# Patient Record
Sex: MALE | Race: WHITE | NOT HISPANIC OR LATINO
[De-identification: names, ages, dates, MRNs, and addresses within clinical notes are randomized per-mention and may not be internally consistent; named-entity substitution may affect disease eponyms.]

---

## 2021-10-20 PROBLEM — Z00.00 ENCOUNTER FOR PREVENTIVE HEALTH EXAMINATION: Status: ACTIVE | Noted: 2021-10-20

## 2021-10-26 ENCOUNTER — APPOINTMENT (OUTPATIENT)
Dept: INTERNAL MEDICINE | Facility: CLINIC | Age: 71
End: 2021-10-26
Payer: MEDICARE

## 2021-10-26 ENCOUNTER — NON-APPOINTMENT (OUTPATIENT)
Age: 71
End: 2021-10-26

## 2021-10-26 ENCOUNTER — LABORATORY RESULT (OUTPATIENT)
Age: 71
End: 2021-10-26

## 2021-10-26 VITALS
TEMPERATURE: 98.3 F | RESPIRATION RATE: 18 BRPM | HEART RATE: 93 BPM | DIASTOLIC BLOOD PRESSURE: 65 MMHG | HEIGHT: 72 IN | BODY MASS INDEX: 23.2 KG/M2 | SYSTOLIC BLOOD PRESSURE: 117 MMHG | WEIGHT: 171.25 LBS | OXYGEN SATURATION: 98 %

## 2021-10-26 DIAGNOSIS — F41.1 GENERALIZED ANXIETY DISORDER: ICD-10-CM

## 2021-10-26 DIAGNOSIS — Z86.59 PERSONAL HISTORY OF OTHER MENTAL AND BEHAVIORAL DISORDERS: ICD-10-CM

## 2021-10-26 DIAGNOSIS — F32.A ANXIETY DISORDER, UNSPECIFIED: ICD-10-CM

## 2021-10-26 DIAGNOSIS — F41.9 ANXIETY DISORDER, UNSPECIFIED: ICD-10-CM

## 2021-10-26 PROCEDURE — G0439: CPT

## 2021-10-26 PROCEDURE — 93000 ELECTROCARDIOGRAM COMPLETE: CPT

## 2021-10-26 RX ORDER — QUETIAPINE FUMARATE 50 MG/1
50 TABLET ORAL
Qty: 180 | Refills: 0 | Status: ACTIVE | COMMUNITY
Start: 2021-09-06

## 2021-10-26 RX ORDER — QUETIAPINE FUMARATE 200 MG/1
200 TABLET ORAL
Qty: 90 | Refills: 0 | Status: DISCONTINUED | COMMUNITY
Start: 2021-09-06

## 2021-10-26 RX ORDER — GABAPENTIN 300 MG/1
300 CAPSULE ORAL
Qty: 270 | Refills: 0 | Status: ACTIVE | COMMUNITY
Start: 2021-09-06

## 2021-10-26 RX ORDER — BUPROPION HYDROCHLORIDE 300 MG/1
300 TABLET, EXTENDED RELEASE ORAL
Qty: 90 | Refills: 0 | Status: DISCONTINUED | COMMUNITY
Start: 2021-09-07

## 2021-10-26 RX ORDER — BUPROPION HYDROCHLORIDE 150 MG/1
150 TABLET, EXTENDED RELEASE ORAL
Qty: 90 | Refills: 0 | Status: ACTIVE | COMMUNITY
Start: 2021-09-07

## 2021-10-27 ENCOUNTER — APPOINTMENT (OUTPATIENT)
Dept: PULMONOLOGY | Facility: CLINIC | Age: 71
End: 2021-10-27
Payer: MEDICARE

## 2021-10-27 VITALS — WEIGHT: 171 LBS | HEIGHT: 72 IN | BODY MASS INDEX: 23.16 KG/M2

## 2021-10-27 DIAGNOSIS — F17.210 NICOTINE DEPENDENCE, CIGARETTES, UNCOMPLICATED: ICD-10-CM

## 2021-10-27 PROCEDURE — G0296 VISIT TO DETERM LDCT ELIG: CPT

## 2021-10-27 NOTE — PLAN
[Smoking Cessation Guidance Provided] : Smoking cessation guidance was provided to patient [Smoking Cessation] : smoking cessation [FreeTextEntry1] : Plan:\par -Low Dose CT chest for lung cancer screening\par -Follow up with patient and his referring provider after his LDCT results have been reviewed by the multi-disciplinary clinical team\par -Encouraged smoking cessation\par -Declined referral to CTC\par \par Should I Screen? tool utilized. 6 year risk of lung cancer is 7%. Patient wishes to proceed with screening.\par \par Engaged in shared decision making with . LOAN ALIA . Answered all questions. He verbalized understanding and agreement. He knows to call back with any questions or concerns.

## 2021-10-27 NOTE — HISTORY OF PRESENT ILLNESS
[Current] : current smoker [_____ pack-years] : [unfilled] pack-years [TextBox_13] : Referred by Dr. Asencio\par \par Mr. LOAN ROJO  is a 71 year old man with a history of nicotine dependence\par \par He  was seen in the office by Dr. Asencio for review of eligibility for, as well as, discussion of Low-Dose CT lung cancer screening program. Over the telephone today we reviewed and confirmed that the patient meets screening eligibility criteria:\par -Age: 71 year \par -Smoking status:\par -Current smoker\par -Number of pack(s) per day: 1 PPD\par -Number of years smoked: 50\par -Number of pack years smokin\par \par He is currently smoking 2 cigars a day. He is not interested in quitting at this time. He needs "some pleasure" in his life. He also smokes e-cigarettes since he cannot smoke in his home. He goes through 1 cartridge every 2-3 days.\par \par Mr. ROJO denies any signs or symptoms of lung cancer including new cough, change in cough, hemoptysis and unintentional weight loss. \par \par Mr. ROJO denies any personal history of lung cancer. No lung cancer in a 1st degree relative. Denies any history of lung disease. Denies any history of occupational exposures\par  [TextBox_6] : 1 [TextBox_8] : 50

## 2021-10-27 NOTE — REASON FOR VISIT
[Home] : at home, [unfilled] , at the time of the visit. [Medical Office: ([facility])___] : at the medical office located in  [Verbal consent obtained from patient] : the patient, [unfilled] [Initial Evaluation] : an initial evaluation visit [Review of Eligibility] : review of eligibility [Low-Dose CT Screening Discussion] : low-dose CT lung cancer screening discussion [Virtual Visit] : virtual visit

## 2021-10-29 DIAGNOSIS — D50.0 IRON DEFICIENCY ANEMIA SECONDARY TO BLOOD LOSS (CHRONIC): ICD-10-CM

## 2021-10-29 LAB
ALBUMIN SERPL ELPH-MCNC: 4.5 G/DL
ALP BLD-CCNC: 131 U/L
ALT SERPL-CCNC: 15 U/L
ANION GAP SERPL CALC-SCNC: 19 MMOL/L
AST SERPL-CCNC: 23 U/L
BASOPHILS # BLD AUTO: 0.11 K/UL
BASOPHILS NFR BLD AUTO: 1.6 %
BILIRUB SERPL-MCNC: 0.3 MG/DL
BUN SERPL-MCNC: 14 MG/DL
CALCIUM SERPL-MCNC: 8.7 MG/DL
CHLORIDE SERPL-SCNC: 97 MMOL/L
CO2 SERPL-SCNC: 18 MMOL/L
CREAT SERPL-MCNC: 1.37 MG/DL
EOSINOPHIL # BLD AUTO: 0.17 K/UL
EOSINOPHIL NFR BLD AUTO: 2.4 %
ESTIMATED AVERAGE GLUCOSE: 105 MG/DL
GLUCOSE SERPL-MCNC: 75 MG/DL
HBA1C MFR BLD HPLC: 5.3 %
HCT VFR BLD CALC: 35.1 %
HGB BLD-MCNC: 9.9 G/DL
IMM GRANULOCYTES NFR BLD AUTO: 0.3 %
LYMPHOCYTES # BLD AUTO: 1.24 K/UL
LYMPHOCYTES NFR BLD AUTO: 17.8 %
MAN DIFF?: NORMAL
MCHC RBC-ENTMCNC: 20 PG
MCHC RBC-ENTMCNC: 28.2 GM/DL
MCV RBC AUTO: 70.8 FL
MONOCYTES # BLD AUTO: 0.64 K/UL
MONOCYTES NFR BLD AUTO: 9.2 %
NEUTROPHILS # BLD AUTO: 4.79 K/UL
NEUTROPHILS NFR BLD AUTO: 68.7 %
PLATELET # BLD AUTO: 424 K/UL
POTASSIUM SERPL-SCNC: 4.8 MMOL/L
PROT SERPL-MCNC: 7.2 G/DL
RBC # BLD: 4.96 M/UL
RBC # FLD: 21.3 %
SODIUM SERPL-SCNC: 134 MMOL/L
TSH SERPL-ACNC: 65 UIU/ML
WBC # FLD AUTO: 6.97 K/UL

## 2021-11-02 ENCOUNTER — OUTPATIENT (OUTPATIENT)
Dept: OUTPATIENT SERVICES | Facility: HOSPITAL | Age: 71
LOS: 1 days | End: 2021-11-02
Payer: MEDICARE

## 2021-11-02 ENCOUNTER — APPOINTMENT (OUTPATIENT)
Dept: CT IMAGING | Facility: HOSPITAL | Age: 71
End: 2021-11-02
Payer: MEDICARE

## 2021-11-02 PROCEDURE — 71271 CT THORAX LUNG CANCER SCR C-: CPT

## 2021-11-02 PROCEDURE — 71271 CT THORAX LUNG CANCER SCR C-: CPT | Mod: 26

## 2021-11-03 ENCOUNTER — NON-APPOINTMENT (OUTPATIENT)
Age: 71
End: 2021-11-03

## 2021-11-09 LAB — SARS-COV-2 N GENE NPH QL NAA+PROBE: NOT DETECTED

## 2021-11-10 ENCOUNTER — LABORATORY RESULT (OUTPATIENT)
Age: 71
End: 2021-11-10

## 2021-11-10 ENCOUNTER — APPOINTMENT (OUTPATIENT)
Dept: PULMONOLOGY | Facility: CLINIC | Age: 71
End: 2021-11-10
Payer: MEDICARE

## 2021-11-10 VITALS
DIASTOLIC BLOOD PRESSURE: 78 MMHG | OXYGEN SATURATION: 98 % | HEART RATE: 127 BPM | BODY MASS INDEX: 23.43 KG/M2 | TEMPERATURE: 97.3 F | HEIGHT: 72 IN | WEIGHT: 173 LBS | SYSTOLIC BLOOD PRESSURE: 130 MMHG

## 2021-11-10 DIAGNOSIS — E27.8 OTHER SPECIFIED DISORDERS OF ADRENAL GLAND: ICD-10-CM

## 2021-11-10 PROCEDURE — 99205 OFFICE O/P NEW HI 60 MIN: CPT

## 2021-11-10 PROCEDURE — 94727 GAS DIL/WSHOT DETER LNG VOL: CPT

## 2021-11-10 PROCEDURE — 94060 EVALUATION OF WHEEZING: CPT

## 2021-11-10 PROCEDURE — 94729 DIFFUSING CAPACITY: CPT

## 2021-11-10 RX ORDER — NICOTINE POLACRILEX 4 MG/1
4 LOZENGE ORAL
Qty: 72 | Refills: 0 | Status: ACTIVE | COMMUNITY
Start: 2021-11-10 | End: 1900-01-01

## 2021-11-10 RX ORDER — TIOTROPIUM BROMIDE 18 UG/1
18 CAPSULE ORAL; RESPIRATORY (INHALATION) DAILY
Qty: 1 | Refills: 6 | Status: ACTIVE | COMMUNITY
Start: 2021-11-10

## 2021-11-10 NOTE — PHYSICAL EXAM
[No Acute Distress] : no acute distress [II] : Mallampati Class: II [Supple] : supple [Normal Rate/Rhythm] : normal rate/rhythm [Normal S1, S2] : normal s1, s2 [No Resp Distress] : no resp distress [Clear to Auscultation Bilaterally] : clear to auscultation bilaterally [Not Tender] : not tender [Gait - Sufficient For Exercise Testing] : gait sufficient for exercise testing [No Edema] : no edema [Oriented x3] : oriented x3 [Normal Affect] : normal affect

## 2021-11-10 NOTE — HISTORY OF PRESENT ILLNESS
[TextBox_4] : 70 yo M, current smoker with history of hypothyroidism, major depression, anxiety here because of an abnormal lung cancer screening scan. A year ago had no exercise limitations but in the last year feels like he had to slow down. Now needs to stop after 4-5 blocks, and takes elevator when he uses the subway. Had an occasional cough in the morning. No PND, orthopnea or lower extremity edema.  \par \par SH: 2-3 cigs a day currently, previously smoking 1 ppd x 50 years, cut back about 6 years ago. Drinks a couple of beers daily. Worked in warehouse management.

## 2021-11-10 NOTE — REVIEW OF SYSTEMS
[Cough] : cough [SOB on Exertion] : sob on exertion [Depression] : depression [Anxiety] : anxiety [Negative] : Gastrointestinal [Headache] : no headache [Dizziness] : no dizziness

## 2021-11-10 NOTE — ASSESSMENT
[FreeTextEntry1] : Data Reviewed: \par \par LDCT 11-2-2021: \par Lungs and airways: Image numbers for description of nodule location refer to thin section axial series number 5.\par There is a spiculated solid nodule which demonstrates a coarse central calcification measuring 21 x 19 x 19 mm in transverse by AP by cephalocaudal dimensions. There are multiple scattered solid and is quite micronodules which are delineated with arrows in series 5:168). Mild mucous plugging is noted within the superior segmental bronchus left lower lobe (5:172). There is moderate centrilobular and substantial paraseptal emphysema.\par Trachea and central bronchi patent.\par Pleura: The pleural spaces are clear.\par Base of neck, mediastinum and heart: The thyroid gland is normal. There is an enlarged right paratracheal lymph node measuring 1 cm in short axis (3:18) no bulky hilar or contralateral lymphadenopathy is identified. The heart and pericardium are within normal limits.\par Vessels/Coronary artery calcification: Mild coronary artery calcification. Small calcified aortic mural plaque formation.\par Soft tissues: Mild symmetric gynecomastia.\par Abdomen: There is a 19 x 24 mm solid left adrenal gland nodule.\par Bones: No aggressive osseous lesions.\par \par PFT 11-10-21: FVC 4.43 (100%), FEV1 2.78 (85%), FEV1/FVC 0.63. No signficant bronchodilator response\par TLC 7.54 (106%), DLCO 10.48 (40%) \par \par Impression/Plan:\par 1. RUL spiculated nodule with right paratracheal adenopathy and left adrenal gland nodule. \par Concern is for metastatic lung cancer\par - will get PET scan\par - referral to IR for left adrenal nodule biopsy\par - obtain pre-op labs today\par \par 2. Dyspnea on exertion with PFT showing mild obstruction.\par - start Spiriva\par - can use albuterol PRN\par \par 3. Current smoker and daily alcohol use\par - advised to stop smoking and cut back on daily alcohol use\par - prescribed nicotine lozenge to help with quitting smoking\par \par 4. Healthcare maintenance\par - uptodate on flu, covid-19 and pneumonia vaccines\par \par RTC in 1 month \par

## 2021-11-11 LAB
ANION GAP SERPL CALC-SCNC: 20 MMOL/L
APTT BLD: 33 SEC
BASOPHILS # BLD AUTO: 0.1 K/UL
BASOPHILS NFR BLD AUTO: 1.5 %
BUN SERPL-MCNC: 12 MG/DL
CALCIUM SERPL-MCNC: 8.9 MG/DL
CHLORIDE SERPL-SCNC: 99 MMOL/L
CO2 SERPL-SCNC: 19 MMOL/L
CREAT SERPL-MCNC: 1.35 MG/DL
EOSINOPHIL # BLD AUTO: 0.22 K/UL
EOSINOPHIL NFR BLD AUTO: 3.3 %
GLUCOSE SERPL-MCNC: 76 MG/DL
HCT VFR BLD CALC: 36.6 %
HGB BLD-MCNC: 10.3 G/DL
IMM GRANULOCYTES NFR BLD AUTO: 0.3 %
INR PPP: 0.97 RATIO
LYMPHOCYTES # BLD AUTO: 1.14 K/UL
LYMPHOCYTES NFR BLD AUTO: 17.1 %
MAN DIFF?: NORMAL
MCHC RBC-ENTMCNC: 20.6 PG
MCHC RBC-ENTMCNC: 28.1 GM/DL
MCV RBC AUTO: 73.1 FL
MONOCYTES # BLD AUTO: 0.57 K/UL
MONOCYTES NFR BLD AUTO: 8.6 %
NEUTROPHILS # BLD AUTO: 4.61 K/UL
NEUTROPHILS NFR BLD AUTO: 69.2 %
PLATELET # BLD AUTO: 447 K/UL
POTASSIUM SERPL-SCNC: 5.3 MMOL/L
PT BLD: 11.5 SEC
RBC # BLD: 5.01 M/UL
RBC # FLD: 20.9 %
SODIUM SERPL-SCNC: 139 MMOL/L
WBC # FLD AUTO: 6.66 K/UL

## 2021-12-01 ENCOUNTER — OUTPATIENT (OUTPATIENT)
Dept: OUTPATIENT SERVICES | Facility: HOSPITAL | Age: 71
LOS: 1 days | End: 2021-12-01
Payer: MEDICARE

## 2021-12-01 LAB — GLUCOSE BLDC GLUCOMTR-MCNC: 93 MG/DL — SIGNIFICANT CHANGE UP (ref 70–99)

## 2021-12-02 PROCEDURE — A9552: CPT

## 2021-12-02 PROCEDURE — 82962 GLUCOSE BLOOD TEST: CPT

## 2021-12-02 PROCEDURE — 78815 PET IMAGE W/CT SKULL-THIGH: CPT | Mod: 26

## 2021-12-02 PROCEDURE — 78815 PET IMAGE W/CT SKULL-THIGH: CPT

## 2021-12-03 ENCOUNTER — APPOINTMENT (OUTPATIENT)
Dept: INTERNAL MEDICINE | Facility: CLINIC | Age: 71
End: 2021-12-03
Payer: MEDICARE

## 2021-12-03 VITALS
DIASTOLIC BLOOD PRESSURE: 80 MMHG | WEIGHT: 173 LBS | TEMPERATURE: 97.8 F | SYSTOLIC BLOOD PRESSURE: 123 MMHG | BODY MASS INDEX: 23.43 KG/M2 | HEIGHT: 72 IN | HEART RATE: 113 BPM | OXYGEN SATURATION: 97 %

## 2021-12-03 DIAGNOSIS — Z23 ENCOUNTER FOR IMMUNIZATION: ICD-10-CM

## 2021-12-03 PROCEDURE — 99213 OFFICE O/P EST LOW 20 MIN: CPT | Mod: 25

## 2021-12-03 PROCEDURE — 90471 IMMUNIZATION ADMIN: CPT

## 2021-12-03 PROCEDURE — 90715 TDAP VACCINE 7 YRS/> IM: CPT

## 2021-12-03 NOTE — HISTORY OF PRESENT ILLNESS
[FreeTextEntry1] : Interim reviewed; \par Primary Lung cancer with Mets\par Needs further tissue diagnosi\par Discussed with pulmonary [de-identified] : Still with some cigarettes\par Some cough \par Will get booster tomorrow;\par

## 2021-12-06 ENCOUNTER — NON-APPOINTMENT (OUTPATIENT)
Age: 71
End: 2021-12-06

## 2021-12-10 ENCOUNTER — APPOINTMENT (OUTPATIENT)
Dept: GASTROENTEROLOGY | Facility: CLINIC | Age: 71
End: 2021-12-10
Payer: MEDICARE

## 2021-12-10 VITALS
DIASTOLIC BLOOD PRESSURE: 60 MMHG | OXYGEN SATURATION: 98 % | RESPIRATION RATE: 16 BRPM | BODY MASS INDEX: 23.43 KG/M2 | WEIGHT: 173 LBS | TEMPERATURE: 97.2 F | HEART RATE: 103 BPM | SYSTOLIC BLOOD PRESSURE: 130 MMHG | HEIGHT: 72 IN

## 2021-12-10 PROCEDURE — 99204 OFFICE O/P NEW MOD 45 MIN: CPT

## 2021-12-10 PROCEDURE — 99214 OFFICE O/P EST MOD 30 MIN: CPT

## 2021-12-12 NOTE — HISTORY OF PRESENT ILLNESS
[de-identified] : 72 yo M current smoker with history of hypothyroidism, major depression, anxiety recently found with RUL spiculated nodule with R paratracheal adenopathy and L adrenal nodule who presents for evaluation and concern for possible metastatic lung cancer\par \par For the lung nodule patient underwent a PET scan and was found to have some areas in the rectum that lit up as well as the L adrenal area. Patient was referred to get a colonoscopy to evalute that abnormality in rectum as well as for EUS and possible biopsy of the L adrenal lesion\par Has a CT scan set up a week from today to better image the L adrenal lesion. \par \par He had a colonoscopy 20 yrs ago and feels like his stomach was never the same. He is adamantly refusing to get a colonoscopy done\par \par Has a sister dying in [facility], had a friend who recently , had to put his cat down. He is very stressed and anxious and now has this new finding of the lung nodule concerning for lung cancel. He is very tearful during today's visit and feels this has been a lot of information. He is not sure he wants to pursue aggressive treatment measures\par \par Labs are notable for anemia. His labs from October also with low Hgb\par \par No GI symptoms and no changes in bowel habits, no abdominal pain, nausea, vomiting, constipation/diarrhea. \par \par PMH: \par hypothyroidism\par major depression \par anxiety \par

## 2021-12-12 NOTE — PHYSICAL EXAM
[General Appearance - Alert] : alert [General Appearance - In No Acute Distress] : in no acute distress [General Appearance - Well Nourished] : well nourished [General Appearance - Well Developed] : well developed [General Appearance - Well-Appearing] : healthy appearing [Sclera] : the sclera and conjunctiva were normal [PERRL With Normal Accommodation] : pupils were equal in size, round, and reactive to light [Extraocular Movements] : extraocular movements were intact [Outer Ear] : the ears and nose were normal in appearance [Oropharynx] : the oropharynx was normal [Neck Appearance] : the appearance of the neck was normal [Neck Cervical Mass (___cm)] : no neck mass was observed [Jugular Venous Distention Increased] : there was no jugular-venous distention [Thyroid Diffuse Enlargement] : the thyroid was not enlarged [Thyroid Nodule] : there were no palpable thyroid nodules [Auscultation Breath Sounds / Voice Sounds] : lungs were clear to auscultation bilaterally [Heart Rate And Rhythm] : heart rate was normal and rhythm regular [Heart Sounds] : normal S1 and S2 [Heart Sounds Gallop] : no gallops [Murmurs] : no murmurs [Heart Sounds Pericardial Friction Rub] : no pericardial rub [Bowel Sounds] : normal bowel sounds [Abdomen Soft] : soft [Abdomen Tenderness] : non-tender [Abdomen Mass (___ Cm)] : no abdominal mass palpated [No CVA Tenderness] : no ~M costovertebral angle tenderness [No Spinal Tenderness] : no spinal tenderness [Abnormal Walk] : normal gait [Nail Clubbing] : no clubbing  or cyanosis of the fingernails [Musculoskeletal - Swelling] : no joint swelling seen [Motor Tone] : muscle strength and tone were normal [Skin Color & Pigmentation] : normal skin color and pigmentation [Skin Turgor] : normal skin turgor [] : no rash [Oriented To Time, Place, And Person] : oriented to person, place, and time

## 2021-12-12 NOTE — ASSESSMENT
[FreeTextEntry1] : 70 yo M current smoker with history of hypothyroidism, major depression, anxiety recently found with RUL spiculated nodule with R paratracheal adenopathy and L adrenal nodule who presents for evaluation and concern for possible metastatic lung cancer\par \par Lung nodule, concern for metastatic cancer because of rectal lesion on PET scan as well as L adrenal area that lit up\par - plan for EGD/EUS and flexible sigmoidoscopy this upcoming Tuesday (will need to confirm that Dr. Solis will also be around to perform open access EUS with biopsy of the adrenal lesion\par - patient is adamantly against colonoscopy. We discussed the risks of not doing a full colonoscopy given concern for metastatic disease and he is overdue for CRC screening (last scope was 20+ yrs ago. Patient is agreeable to a flexible sigmoidoscopy, though this is suboptimal, will allow to get a look at the rectum that was noted to be abnormal on the PET scan\par - reviewed r/b/a/i of the procedure and patient expressed understanding agrees to proceed\par - needs COVID test within 72 hours of the procedure\par - reviewed that he will need to do enemas for preparation for the flex sig and he is agreeable\par - needs adult escort\par - counseled about smoking cessation\par - patient would benefit from speaking to psychiatrist about life stressors\par - preprocedure bloodwork in chart\par \par Follow up post-procedure

## 2021-12-14 ENCOUNTER — APPOINTMENT (OUTPATIENT)
Dept: GASTROENTEROLOGY | Facility: HOSPITAL | Age: 71
End: 2021-12-14

## 2021-12-14 ENCOUNTER — NON-APPOINTMENT (OUTPATIENT)
Age: 71
End: 2021-12-14

## 2021-12-17 ENCOUNTER — APPOINTMENT (OUTPATIENT)
Dept: CT IMAGING | Facility: HOSPITAL | Age: 71
End: 2021-12-17

## 2022-01-01 ENCOUNTER — LABORATORY RESULT (OUTPATIENT)
Age: 72
End: 2022-01-01

## 2022-01-01 ENCOUNTER — OUTPATIENT (OUTPATIENT)
Dept: OUTPATIENT SERVICES | Facility: HOSPITAL | Age: 72
LOS: 1 days | End: 2022-01-01
Payer: MEDICARE

## 2022-01-01 ENCOUNTER — RX RENEWAL (OUTPATIENT)
Age: 72
End: 2022-01-01

## 2022-01-01 ENCOUNTER — RESULT REVIEW (OUTPATIENT)
Age: 72
End: 2022-01-01

## 2022-01-01 ENCOUNTER — TRANSCRIPTION ENCOUNTER (OUTPATIENT)
Age: 72
End: 2022-01-01

## 2022-01-01 ENCOUNTER — APPOINTMENT (OUTPATIENT)
Dept: HEMATOLOGY ONCOLOGY | Facility: CLINIC | Age: 72
End: 2022-01-01

## 2022-01-01 ENCOUNTER — NON-APPOINTMENT (OUTPATIENT)
Age: 72
End: 2022-01-01

## 2022-01-01 ENCOUNTER — APPOINTMENT (OUTPATIENT)
Dept: PULMONOLOGY | Facility: CLINIC | Age: 72
End: 2022-01-01

## 2022-01-01 ENCOUNTER — APPOINTMENT (OUTPATIENT)
Dept: INTERNAL MEDICINE | Facility: CLINIC | Age: 72
End: 2022-01-01

## 2022-01-01 ENCOUNTER — APPOINTMENT (OUTPATIENT)
Dept: CT IMAGING | Facility: HOSPITAL | Age: 72
End: 2022-01-01

## 2022-01-01 ENCOUNTER — APPOINTMENT (OUTPATIENT)
Dept: INFUSION THERAPY | Facility: CLINIC | Age: 72
End: 2022-01-01

## 2022-01-01 ENCOUNTER — APPOINTMENT (OUTPATIENT)
Dept: INTERNAL MEDICINE | Facility: CLINIC | Age: 72
End: 2022-01-01
Payer: MEDICARE

## 2022-01-01 ENCOUNTER — APPOINTMENT (OUTPATIENT)
Dept: ULTRASOUND IMAGING | Facility: HOSPITAL | Age: 72
End: 2022-01-01

## 2022-01-01 ENCOUNTER — APPOINTMENT (OUTPATIENT)
Dept: MRI IMAGING | Facility: HOSPITAL | Age: 72
End: 2022-01-01

## 2022-01-01 ENCOUNTER — APPOINTMENT (OUTPATIENT)
Dept: GASTROENTEROLOGY | Facility: HOSPITAL | Age: 72
End: 2022-01-01

## 2022-01-01 ENCOUNTER — INPATIENT (INPATIENT)
Facility: HOSPITAL | Age: 72
LOS: 29 days | Discharge: HOME | DRG: 871 | End: 2022-12-28
Attending: INTERNAL MEDICINE | Admitting: INTERNAL MEDICINE
Payer: MEDICARE

## 2022-01-01 ENCOUNTER — APPOINTMENT (OUTPATIENT)
Dept: PULMONOLOGY | Facility: CLINIC | Age: 72
End: 2022-01-01
Payer: MEDICARE

## 2022-01-01 ENCOUNTER — APPOINTMENT (OUTPATIENT)
Dept: NUCLEAR MEDICINE | Facility: HOSPITAL | Age: 72
End: 2022-01-01

## 2022-01-01 VITALS
RESPIRATION RATE: 18 BRPM | TEMPERATURE: 98 F | DIASTOLIC BLOOD PRESSURE: 61 MMHG | HEART RATE: 93 BPM | SYSTOLIC BLOOD PRESSURE: 121 MMHG | OXYGEN SATURATION: 97 %

## 2022-01-01 VITALS
HEIGHT: 71 IN | HEART RATE: 108 BPM | RESPIRATION RATE: 18 BRPM | DIASTOLIC BLOOD PRESSURE: 76 MMHG | WEIGHT: 147 LBS | OXYGEN SATURATION: 98 % | SYSTOLIC BLOOD PRESSURE: 125 MMHG | BODY MASS INDEX: 20.58 KG/M2 | TEMPERATURE: 97.7 F

## 2022-01-01 VITALS
HEART RATE: 93 BPM | SYSTOLIC BLOOD PRESSURE: 100 MMHG | RESPIRATION RATE: 18 BRPM | TEMPERATURE: 97 F | DIASTOLIC BLOOD PRESSURE: 63 MMHG | WEIGHT: 147.93 LBS | OXYGEN SATURATION: 99 % | HEIGHT: 72 IN

## 2022-01-01 VITALS
TEMPERATURE: 97 F | SYSTOLIC BLOOD PRESSURE: 144 MMHG | DIASTOLIC BLOOD PRESSURE: 79 MMHG | RESPIRATION RATE: 18 BRPM | HEART RATE: 98 BPM | OXYGEN SATURATION: 100 %

## 2022-01-01 VITALS
SYSTOLIC BLOOD PRESSURE: 105 MMHG | RESPIRATION RATE: 14 BRPM | WEIGHT: 173 LBS | OXYGEN SATURATION: 99 % | HEIGHT: 71 IN | DIASTOLIC BLOOD PRESSURE: 78 MMHG | BODY MASS INDEX: 24.22 KG/M2 | HEART RATE: 110 BPM

## 2022-01-01 VITALS
HEART RATE: 95 BPM | SYSTOLIC BLOOD PRESSURE: 95 MMHG | DIASTOLIC BLOOD PRESSURE: 60 MMHG | WEIGHT: 173 LBS | TEMPERATURE: 97.8 F | HEIGHT: 71 IN | OXYGEN SATURATION: 99 % | BODY MASS INDEX: 24.22 KG/M2

## 2022-01-01 VITALS
WEIGHT: 147 LBS | SYSTOLIC BLOOD PRESSURE: 133 MMHG | RESPIRATION RATE: 18 BRPM | HEART RATE: 96 BPM | HEIGHT: 71 IN | OXYGEN SATURATION: 99 % | DIASTOLIC BLOOD PRESSURE: 78 MMHG | TEMPERATURE: 95.6 F | BODY MASS INDEX: 20.58 KG/M2

## 2022-01-01 VITALS
HEART RATE: 120 BPM | SYSTOLIC BLOOD PRESSURE: 114 MMHG | DIASTOLIC BLOOD PRESSURE: 63 MMHG | RESPIRATION RATE: 20 BRPM | OXYGEN SATURATION: 98 % | TEMPERATURE: 99 F | HEIGHT: 72 IN

## 2022-01-01 VITALS
SYSTOLIC BLOOD PRESSURE: 130 MMHG | DIASTOLIC BLOOD PRESSURE: 80 MMHG | OXYGEN SATURATION: 100 % | BODY MASS INDEX: 23.43 KG/M2 | HEIGHT: 72 IN | HEART RATE: 95 BPM | WEIGHT: 173 LBS | TEMPERATURE: 97.8 F

## 2022-01-01 VITALS
RESPIRATION RATE: 18 BRPM | OXYGEN SATURATION: 99 % | HEIGHT: 71 IN | SYSTOLIC BLOOD PRESSURE: 116 MMHG | DIASTOLIC BLOOD PRESSURE: 82 MMHG | BODY MASS INDEX: 20.3 KG/M2 | HEART RATE: 110 BPM | TEMPERATURE: 97 F | WEIGHT: 145 LBS

## 2022-01-01 VITALS
RESPIRATION RATE: 18 BRPM | HEART RATE: 94 BPM | TEMPERATURE: 97 F | DIASTOLIC BLOOD PRESSURE: 86 MMHG | SYSTOLIC BLOOD PRESSURE: 135 MMHG | OXYGEN SATURATION: 100 %

## 2022-01-01 DIAGNOSIS — R29.818 OTHER SYMPTOMS AND SIGNS INVOLVING THE NERVOUS SYSTEM: ICD-10-CM

## 2022-01-01 DIAGNOSIS — E03.9 HYPOTHYROIDISM, UNSPECIFIED: ICD-10-CM

## 2022-01-01 DIAGNOSIS — R62.7 ADULT FAILURE TO THRIVE: ICD-10-CM

## 2022-01-01 DIAGNOSIS — C34.90 MALIGNANT NEOPLASM OF UNSPECIFIED PART OF UNSPECIFIED BRONCHUS OR LUNG: ICD-10-CM

## 2022-01-01 DIAGNOSIS — Z51.5 ENCOUNTER FOR PALLIATIVE CARE: ICD-10-CM

## 2022-01-01 DIAGNOSIS — R19.7 DIARRHEA, UNSPECIFIED: ICD-10-CM

## 2022-01-01 DIAGNOSIS — F17.200 NICOTINE DEPENDENCE, UNSPECIFIED, UNCOMPLICATED: ICD-10-CM

## 2022-01-01 DIAGNOSIS — N17.9 ACUTE KIDNEY FAILURE, UNSPECIFIED: ICD-10-CM

## 2022-01-01 DIAGNOSIS — J43.2 CENTRILOBULAR EMPHYSEMA: ICD-10-CM

## 2022-01-01 DIAGNOSIS — M79.89 OTHER SPECIFIED SOFT TISSUE DISORDERS: ICD-10-CM

## 2022-01-01 DIAGNOSIS — E83.42 HYPOMAGNESEMIA: ICD-10-CM

## 2022-01-01 DIAGNOSIS — F41.9 ANXIETY DISORDER, UNSPECIFIED: ICD-10-CM

## 2022-01-01 DIAGNOSIS — R91.1 SOLITARY PULMONARY NODULE: ICD-10-CM

## 2022-01-01 DIAGNOSIS — A41.9 SEPSIS, UNSPECIFIED ORGANISM: ICD-10-CM

## 2022-01-01 DIAGNOSIS — I82.409 ACUTE EMBOLISM AND THROMBOSIS OF UNSPECIFIED DEEP VEINS OF UNSPECIFIED LOWER EXTREMITY: ICD-10-CM

## 2022-01-01 DIAGNOSIS — K56.7 ILEUS, UNSPECIFIED: ICD-10-CM

## 2022-01-01 DIAGNOSIS — E83.39 OTHER DISORDERS OF PHOSPHORUS METABOLISM: ICD-10-CM
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
## 2022-01-01 DIAGNOSIS — Z71.89 OTHER SPECIFIED COUNSELING: ICD-10-CM

## 2022-01-01 DIAGNOSIS — R91.8 OTHER NONSPECIFIC ABNORMAL FINDING OF LUNG FIELD: ICD-10-CM

## 2022-01-01 DIAGNOSIS — K62.9 DISEASE OF ANUS AND RECTUM, UNSPECIFIED: ICD-10-CM

## 2022-01-01 DIAGNOSIS — D64.9 ANEMIA, UNSPECIFIED: ICD-10-CM

## 2022-01-01 DIAGNOSIS — N62 HYPERTROPHY OF BREAST: ICD-10-CM

## 2022-01-01 DIAGNOSIS — C79.72 SECONDARY MALIGNANT NEOPLASM OF LEFT ADRENAL GLAND: ICD-10-CM

## 2022-01-01 DIAGNOSIS — G47.00 INSOMNIA, UNSPECIFIED: ICD-10-CM

## 2022-01-01 DIAGNOSIS — E87.6 HYPOKALEMIA: ICD-10-CM

## 2022-01-01 DIAGNOSIS — J43.9 EMPHYSEMA, UNSPECIFIED: ICD-10-CM

## 2022-01-01 DIAGNOSIS — Z29.9 ENCOUNTER FOR PROPHYLACTIC MEASURES, UNSPECIFIED: ICD-10-CM

## 2022-01-01 LAB
ACANTHOCYTES BLD QL SMEAR: SLIGHT — SIGNIFICANT CHANGE UP
ALBUMIN SERPL ELPH-MCNC: 1.8 G/DL — LOW (ref 3.3–5)
ALBUMIN SERPL ELPH-MCNC: 1.8 G/DL — LOW (ref 3.3–5)
ALBUMIN SERPL ELPH-MCNC: 1.9 G/DL — LOW (ref 3.3–5)
ALBUMIN SERPL ELPH-MCNC: 2 G/DL — LOW (ref 3.3–5)
ALBUMIN SERPL ELPH-MCNC: 2 G/DL — LOW (ref 3.3–5)
ALBUMIN SERPL ELPH-MCNC: 2.4 G/DL — LOW (ref 3.3–5)
ALBUMIN SERPL ELPH-MCNC: 2.5 G/DL — LOW (ref 3.3–5)
ALBUMIN SERPL ELPH-MCNC: 2.6 G/DL — LOW (ref 3.3–5)
ALBUMIN SERPL ELPH-MCNC: 2.9 G/DL — LOW (ref 3.3–5)
ALBUMIN SERPL ELPH-MCNC: 3.4 G/DL — SIGNIFICANT CHANGE UP (ref 3.3–5)
ALBUMIN SERPL ELPH-MCNC: 3.6 G/DL
ALBUMIN SERPL ELPH-MCNC: 3.7 G/DL
ALBUMIN SERPL ELPH-MCNC: 3.9 G/DL
ALP BLD-CCNC: 120 U/L
ALP BLD-CCNC: 127 U/L
ALP BLD-CCNC: 130 U/L
ALP SERPL-CCNC: 107 U/L — SIGNIFICANT CHANGE UP (ref 40–120)
ALP SERPL-CCNC: 112 U/L — SIGNIFICANT CHANGE UP (ref 40–120)
ALP SERPL-CCNC: 113 U/L — SIGNIFICANT CHANGE UP (ref 40–120)
ALP SERPL-CCNC: 113 U/L — SIGNIFICANT CHANGE UP (ref 40–120)
ALP SERPL-CCNC: 115 U/L — SIGNIFICANT CHANGE UP (ref 40–120)
ALP SERPL-CCNC: 116 U/L — SIGNIFICANT CHANGE UP (ref 40–120)
ALP SERPL-CCNC: 117 U/L — SIGNIFICANT CHANGE UP (ref 40–120)
ALP SERPL-CCNC: 120 U/L — SIGNIFICANT CHANGE UP (ref 40–120)
ALP SERPL-CCNC: 132 U/L — HIGH (ref 40–120)
ALP SERPL-CCNC: 135 U/L — HIGH (ref 40–120)
ALP SERPL-CCNC: 143 U/L — HIGH (ref 40–120)
ALP SERPL-CCNC: 148 U/L — HIGH (ref 40–120)
ALP SERPL-CCNC: 153 U/L — HIGH (ref 40–120)
ALP SERPL-CCNC: 414 U/L — HIGH (ref 40–120)
ALT FLD-CCNC: 10 U/L — SIGNIFICANT CHANGE UP (ref 10–45)
ALT FLD-CCNC: 10 U/L — SIGNIFICANT CHANGE UP (ref 10–45)
ALT FLD-CCNC: 11 U/L — SIGNIFICANT CHANGE UP (ref 10–45)
ALT FLD-CCNC: 12 U/L — SIGNIFICANT CHANGE UP (ref 10–45)
ALT FLD-CCNC: 13 U/L — SIGNIFICANT CHANGE UP (ref 10–45)
ALT FLD-CCNC: 14 U/L — SIGNIFICANT CHANGE UP (ref 10–45)
ALT FLD-CCNC: 15 U/L — SIGNIFICANT CHANGE UP (ref 10–45)
ALT FLD-CCNC: 18 U/L — SIGNIFICANT CHANGE UP (ref 10–45)
ALT FLD-CCNC: 31 U/L — SIGNIFICANT CHANGE UP (ref 10–45)
ALT FLD-CCNC: 9 U/L — LOW (ref 10–45)
ALT FLD-CCNC: SIGNIFICANT CHANGE UP (ref 10–45)
ALT SERPL-CCNC: 12 U/L
ALT SERPL-CCNC: 15 U/L
ALT SERPL-CCNC: 17 U/L
AMPHET UR-MCNC: NEGATIVE — SIGNIFICANT CHANGE UP
ANION GAP SERPL CALC-SCNC: 10 MMOL/L — SIGNIFICANT CHANGE UP (ref 5–17)
ANION GAP SERPL CALC-SCNC: 11 MMOL/L — SIGNIFICANT CHANGE UP (ref 5–17)
ANION GAP SERPL CALC-SCNC: 12 MMOL/L — SIGNIFICANT CHANGE UP (ref 5–17)
ANION GAP SERPL CALC-SCNC: 13 MMOL/L — SIGNIFICANT CHANGE UP (ref 5–17)
ANION GAP SERPL CALC-SCNC: 14 MMOL/L — SIGNIFICANT CHANGE UP (ref 5–17)
ANION GAP SERPL CALC-SCNC: 15 MMOL/L — SIGNIFICANT CHANGE UP (ref 5–17)
ANION GAP SERPL CALC-SCNC: 16 MMOL/L — SIGNIFICANT CHANGE UP (ref 5–17)
ANION GAP SERPL CALC-SCNC: 16 MMOL/L — SIGNIFICANT CHANGE UP (ref 5–17)
ANION GAP SERPL CALC-SCNC: 17 MMOL/L
ANION GAP SERPL CALC-SCNC: 17 MMOL/L
ANION GAP SERPL CALC-SCNC: 17 MMOL/L — SIGNIFICANT CHANGE UP (ref 5–17)
ANION GAP SERPL CALC-SCNC: 17 MMOL/L — SIGNIFICANT CHANGE UP (ref 5–17)
ANION GAP SERPL CALC-SCNC: 18 MMOL/L
ANION GAP SERPL CALC-SCNC: 21 MMOL/L
ANION GAP SERPL CALC-SCNC: 24 MMOL/L — HIGH (ref 5–17)
ANION GAP SERPL CALC-SCNC: 8 MMOL/L — SIGNIFICANT CHANGE UP (ref 5–17)
ANION GAP SERPL CALC-SCNC: 9 MMOL/L — SIGNIFICANT CHANGE UP (ref 5–17)
ANISOCYTOSIS BLD QL: SIGNIFICANT CHANGE UP
ANISOCYTOSIS BLD QL: SLIGHT — SIGNIFICANT CHANGE UP
APPEARANCE UR: ABNORMAL
APPEARANCE UR: CLEAR — SIGNIFICANT CHANGE UP
APTT BLD: 27.1 SEC — LOW (ref 27.5–35.5)
APTT BLD: 33.6 SEC
AST SERPL-CCNC: 15 U/L — SIGNIFICANT CHANGE UP (ref 10–40)
AST SERPL-CCNC: 16 U/L — SIGNIFICANT CHANGE UP (ref 10–40)
AST SERPL-CCNC: 16 U/L — SIGNIFICANT CHANGE UP (ref 10–40)
AST SERPL-CCNC: 18 U/L — SIGNIFICANT CHANGE UP (ref 10–40)
AST SERPL-CCNC: 19 U/L — SIGNIFICANT CHANGE UP (ref 10–40)
AST SERPL-CCNC: 20 U/L — SIGNIFICANT CHANGE UP (ref 10–40)
AST SERPL-CCNC: 21 U/L
AST SERPL-CCNC: 21 U/L
AST SERPL-CCNC: 21 U/L — SIGNIFICANT CHANGE UP (ref 10–40)
AST SERPL-CCNC: 22 U/L — SIGNIFICANT CHANGE UP (ref 10–40)
AST SERPL-CCNC: 23 U/L — SIGNIFICANT CHANGE UP (ref 10–40)
AST SERPL-CCNC: 23 U/L — SIGNIFICANT CHANGE UP (ref 10–40)
AST SERPL-CCNC: 24 U/L — SIGNIFICANT CHANGE UP (ref 10–40)
AST SERPL-CCNC: 25 U/L — SIGNIFICANT CHANGE UP (ref 10–40)
AST SERPL-CCNC: 25 U/L — SIGNIFICANT CHANGE UP (ref 10–40)
AST SERPL-CCNC: 26 U/L — SIGNIFICANT CHANGE UP (ref 10–40)
AST SERPL-CCNC: 26 U/L — SIGNIFICANT CHANGE UP (ref 10–40)
AST SERPL-CCNC: 28 U/L — SIGNIFICANT CHANGE UP (ref 10–40)
AST SERPL-CCNC: 42 U/L
AST SERPL-CCNC: 64 U/L — HIGH (ref 10–40)
AST SERPL-CCNC: SIGNIFICANT CHANGE UP (ref 10–40)
BACTERIA # UR AUTO: PRESENT /HPF
BARBITURATES UR SCN-MCNC: NEGATIVE — SIGNIFICANT CHANGE UP
BASE EXCESS BLDV CALC-SCNC: -9.2 MMOL/L — LOW (ref -2–3)
BASOPHILS # BLD AUTO: 0 K/UL — SIGNIFICANT CHANGE UP (ref 0–0.2)
BASOPHILS # BLD AUTO: 0.01 K/UL — SIGNIFICANT CHANGE UP (ref 0–0.2)
BASOPHILS # BLD AUTO: 0.02 K/UL — SIGNIFICANT CHANGE UP (ref 0–0.2)
BASOPHILS # BLD AUTO: 0.03 K/UL — SIGNIFICANT CHANGE UP (ref 0–0.2)
BASOPHILS # BLD AUTO: 0.04 K/UL — SIGNIFICANT CHANGE UP (ref 0–0.2)
BASOPHILS # BLD AUTO: 0.04 K/UL — SIGNIFICANT CHANGE UP (ref 0–0.2)
BASOPHILS # BLD AUTO: 0.07 K/UL — SIGNIFICANT CHANGE UP (ref 0–0.2)
BASOPHILS # BLD AUTO: 0.09 K/UL
BASOPHILS # BLD AUTO: 0.11 K/UL
BASOPHILS NFR BLD AUTO: 0 % — SIGNIFICANT CHANGE UP (ref 0–2)
BASOPHILS NFR BLD AUTO: 0.1 % — SIGNIFICANT CHANGE UP (ref 0–2)
BASOPHILS NFR BLD AUTO: 0.2 % — SIGNIFICANT CHANGE UP (ref 0–2)
BASOPHILS NFR BLD AUTO: 0.3 % — SIGNIFICANT CHANGE UP (ref 0–2)
BASOPHILS NFR BLD AUTO: 0.4 % — SIGNIFICANT CHANGE UP (ref 0–2)
BASOPHILS NFR BLD AUTO: 0.5 % — SIGNIFICANT CHANGE UP (ref 0–2)
BASOPHILS NFR BLD AUTO: 0.5 % — SIGNIFICANT CHANGE UP (ref 0–2)
BASOPHILS NFR BLD AUTO: 0.6 % — SIGNIFICANT CHANGE UP (ref 0–2)
BASOPHILS NFR BLD AUTO: 0.9 % — SIGNIFICANT CHANGE UP (ref 0–2)
BASOPHILS NFR BLD AUTO: 1.1 %
BASOPHILS NFR BLD AUTO: 1.1 %
BENZODIAZ UR-MCNC: NEGATIVE — SIGNIFICANT CHANGE UP
BILIRUB SERPL-MCNC: 0.2 MG/DL — SIGNIFICANT CHANGE UP (ref 0.2–1.2)
BILIRUB SERPL-MCNC: 0.3 MG/DL — SIGNIFICANT CHANGE UP (ref 0.2–1.2)
BILIRUB SERPL-MCNC: 0.4 MG/DL — SIGNIFICANT CHANGE UP (ref 0.2–1.2)
BILIRUB SERPL-MCNC: 0.5 MG/DL
BILIRUB SERPL-MCNC: 0.5 MG/DL — SIGNIFICANT CHANGE UP (ref 0.2–1.2)
BILIRUB SERPL-MCNC: 0.6 MG/DL
BILIRUB SERPL-MCNC: 0.7 MG/DL
BILIRUB SERPL-MCNC: 0.7 MG/DL — SIGNIFICANT CHANGE UP (ref 0.2–1.2)
BILIRUB SERPL-MCNC: 0.8 MG/DL — SIGNIFICANT CHANGE UP (ref 0.2–1.2)
BILIRUB UR-MCNC: ABNORMAL
BILIRUB UR-MCNC: ABNORMAL
BILIRUB UR-MCNC: NEGATIVE — SIGNIFICANT CHANGE UP
BILIRUB UR-MCNC: NEGATIVE — SIGNIFICANT CHANGE UP
BLD GP AB SCN SERPL QL: NEGATIVE — SIGNIFICANT CHANGE UP
BUN SERPL-MCNC: 13 MG/DL
BUN SERPL-MCNC: 13 MG/DL
BUN SERPL-MCNC: 13 MG/DL — SIGNIFICANT CHANGE UP (ref 7–23)
BUN SERPL-MCNC: 14 MG/DL
BUN SERPL-MCNC: 14 MG/DL — SIGNIFICANT CHANGE UP (ref 7–23)
BUN SERPL-MCNC: 15 MG/DL — SIGNIFICANT CHANGE UP (ref 7–23)
BUN SERPL-MCNC: 15 MG/DL — SIGNIFICANT CHANGE UP (ref 7–23)
BUN SERPL-MCNC: 16 MG/DL — SIGNIFICANT CHANGE UP (ref 7–23)
BUN SERPL-MCNC: 16 MG/DL — SIGNIFICANT CHANGE UP (ref 7–23)
BUN SERPL-MCNC: 17 MG/DL — SIGNIFICANT CHANGE UP (ref 7–23)
BUN SERPL-MCNC: 19 MG/DL — SIGNIFICANT CHANGE UP (ref 7–23)
BUN SERPL-MCNC: 20 MG/DL — SIGNIFICANT CHANGE UP (ref 7–23)
BUN SERPL-MCNC: 21 MG/DL — SIGNIFICANT CHANGE UP (ref 7–23)
BUN SERPL-MCNC: 22 MG/DL — SIGNIFICANT CHANGE UP (ref 7–23)
BUN SERPL-MCNC: 23 MG/DL — SIGNIFICANT CHANGE UP (ref 7–23)
BUN SERPL-MCNC: 23 MG/DL — SIGNIFICANT CHANGE UP (ref 7–23)
BUN SERPL-MCNC: 26 MG/DL — HIGH (ref 7–23)
BUN SERPL-MCNC: 28 MG/DL — HIGH (ref 7–23)
BUN SERPL-MCNC: 29 MG/DL — HIGH (ref 7–23)
BUN SERPL-MCNC: 30 MG/DL — HIGH (ref 7–23)
BUN SERPL-MCNC: 31 MG/DL — HIGH (ref 7–23)
BUN SERPL-MCNC: 32 MG/DL — HIGH (ref 7–23)
BUN SERPL-MCNC: 32 MG/DL — HIGH (ref 7–23)
BUN SERPL-MCNC: 33 MG/DL — HIGH (ref 7–23)
BUN SERPL-MCNC: 35 MG/DL — HIGH (ref 7–23)
BUN SERPL-MCNC: 35 MG/DL — HIGH (ref 7–23)
BUN SERPL-MCNC: 36 MG/DL — HIGH (ref 7–23)
BUN SERPL-MCNC: 9 MG/DL
BURR CELLS BLD QL SMEAR: PRESENT — SIGNIFICANT CHANGE UP
BURR CELLS BLD QL SMEAR: SLIGHT — SIGNIFICANT CHANGE UP
C DIFF GDH STL QL: NEGATIVE — SIGNIFICANT CHANGE UP
C DIFF GDH STL QL: SIGNIFICANT CHANGE UP
CA-I SERPL-SCNC: 0.94 MMOL/L — LOW (ref 1.15–1.33)
CALCIUM SERPL-MCNC: 10.3 MG/DL
CALCIUM SERPL-MCNC: 6.5 MG/DL — CRITICAL LOW (ref 8.4–10.5)
CALCIUM SERPL-MCNC: 6.6 MG/DL — LOW (ref 8.4–10.5)
CALCIUM SERPL-MCNC: 6.8 MG/DL — LOW (ref 8.4–10.5)
CALCIUM SERPL-MCNC: 6.9 MG/DL — LOW (ref 8.4–10.5)
CALCIUM SERPL-MCNC: 7 MG/DL — LOW (ref 8.4–10.5)
CALCIUM SERPL-MCNC: 7 MG/DL — LOW (ref 8.4–10.5)
CALCIUM SERPL-MCNC: 7.1 MG/DL — LOW (ref 8.4–10.5)
CALCIUM SERPL-MCNC: 7.2 MG/DL — LOW (ref 8.4–10.5)
CALCIUM SERPL-MCNC: 7.3 MG/DL — LOW (ref 8.4–10.5)
CALCIUM SERPL-MCNC: 7.4 MG/DL — LOW (ref 8.4–10.5)
CALCIUM SERPL-MCNC: 7.6 MG/DL — LOW (ref 8.4–10.5)
CALCIUM SERPL-MCNC: 7.7 MG/DL — LOW (ref 8.4–10.5)
CALCIUM SERPL-MCNC: 7.8 MG/DL — LOW (ref 8.4–10.5)
CALCIUM SERPL-MCNC: 7.8 MG/DL — LOW (ref 8.4–10.5)
CALCIUM SERPL-MCNC: 7.9 MG/DL — LOW (ref 8.4–10.5)
CALCIUM SERPL-MCNC: 8 MG/DL — LOW (ref 8.4–10.5)
CALCIUM SERPL-MCNC: 8.2 MG/DL — LOW (ref 8.4–10.5)
CALCIUM SERPL-MCNC: 8.9 MG/DL
CALCIUM SERPL-MCNC: 9.3 MG/DL
CALCIUM SERPL-MCNC: 9.8 MG/DL
CHLORIDE SERPL-SCNC: 100 MMOL/L
CHLORIDE SERPL-SCNC: 101 MMOL/L
CHLORIDE SERPL-SCNC: 101 MMOL/L — SIGNIFICANT CHANGE UP (ref 96–108)
CHLORIDE SERPL-SCNC: 102 MMOL/L
CHLORIDE SERPL-SCNC: 103 MMOL/L — SIGNIFICANT CHANGE UP (ref 96–108)
CHLORIDE SERPL-SCNC: 104 MMOL/L — SIGNIFICANT CHANGE UP (ref 96–108)
CHLORIDE SERPL-SCNC: 105 MMOL/L — SIGNIFICANT CHANGE UP (ref 96–108)
CHLORIDE SERPL-SCNC: 106 MMOL/L — SIGNIFICANT CHANGE UP (ref 96–108)
CHLORIDE SERPL-SCNC: 106 MMOL/L — SIGNIFICANT CHANGE UP (ref 96–108)
CHLORIDE SERPL-SCNC: 107 MMOL/L — SIGNIFICANT CHANGE UP (ref 96–108)
CHLORIDE SERPL-SCNC: 108 MMOL/L — SIGNIFICANT CHANGE UP (ref 96–108)
CHLORIDE SERPL-SCNC: 109 MMOL/L — HIGH (ref 96–108)
CHLORIDE SERPL-SCNC: 110 MMOL/L — HIGH (ref 96–108)
CHLORIDE SERPL-SCNC: 111 MMOL/L — HIGH (ref 96–108)
CHLORIDE SERPL-SCNC: 111 MMOL/L — HIGH (ref 96–108)
CHLORIDE SERPL-SCNC: 113 MMOL/L — HIGH (ref 96–108)
CHLORIDE SERPL-SCNC: 114 MMOL/L — HIGH (ref 96–108)
CHLORIDE SERPL-SCNC: 99 MMOL/L
CO2 BLDV-SCNC: 18.1 MMOL/L — LOW (ref 22–26)
CO2 SERPL-SCNC: 13 MMOL/L — LOW (ref 22–31)
CO2 SERPL-SCNC: 14 MMOL/L — LOW (ref 22–31)
CO2 SERPL-SCNC: 14 MMOL/L — LOW (ref 22–31)
CO2 SERPL-SCNC: 15 MMOL/L — LOW (ref 22–31)
CO2 SERPL-SCNC: 16 MMOL/L
CO2 SERPL-SCNC: 16 MMOL/L — LOW (ref 22–31)
CO2 SERPL-SCNC: 16 MMOL/L — LOW (ref 22–31)
CO2 SERPL-SCNC: 17 MMOL/L — LOW (ref 22–31)
CO2 SERPL-SCNC: 18 MMOL/L — LOW (ref 22–31)
CO2 SERPL-SCNC: 19 MMOL/L — LOW (ref 22–31)
CO2 SERPL-SCNC: 20 MMOL/L — LOW (ref 22–31)
CO2 SERPL-SCNC: 21 MMOL/L
CO2 SERPL-SCNC: 21 MMOL/L — LOW (ref 22–31)
CO2 SERPL-SCNC: 22 MMOL/L
CO2 SERPL-SCNC: 22 MMOL/L — SIGNIFICANT CHANGE UP (ref 22–31)
CO2 SERPL-SCNC: 23 MMOL/L — SIGNIFICANT CHANGE UP (ref 22–31)
CO2 SERPL-SCNC: 24 MMOL/L
CO2 SERPL-SCNC: 24 MMOL/L — SIGNIFICANT CHANGE UP (ref 22–31)
COCAINE METAB.OTHER UR-MCNC: NEGATIVE — SIGNIFICANT CHANGE UP
COD CRY URNS QL: ABNORMAL /HPF
COLOR SPEC: YELLOW — SIGNIFICANT CHANGE UP
COMMENT - URINE: SIGNIFICANT CHANGE UP
CREAT ?TM UR-MCNC: 401 MG/DL — SIGNIFICANT CHANGE UP
CREAT SERPL-MCNC: 1.1 MG/DL
CREAT SERPL-MCNC: 1.1 MG/DL
CREAT SERPL-MCNC: 1.18 MG/DL
CREAT SERPL-MCNC: 1.19 MG/DL — SIGNIFICANT CHANGE UP (ref 0.5–1.3)
CREAT SERPL-MCNC: 1.25 MG/DL — SIGNIFICANT CHANGE UP (ref 0.5–1.3)
CREAT SERPL-MCNC: 1.28 MG/DL — SIGNIFICANT CHANGE UP (ref 0.5–1.3)
CREAT SERPL-MCNC: 1.3 MG/DL
CREAT SERPL-MCNC: 1.44 MG/DL — HIGH (ref 0.5–1.3)
CREAT SERPL-MCNC: 1.54 MG/DL — HIGH (ref 0.5–1.3)
CREAT SERPL-MCNC: 1.55 MG/DL — HIGH (ref 0.5–1.3)
CREAT SERPL-MCNC: 1.66 MG/DL — HIGH (ref 0.5–1.3)
CREAT SERPL-MCNC: 1.72 MG/DL — HIGH (ref 0.5–1.3)
CREAT SERPL-MCNC: 1.8 MG/DL — HIGH (ref 0.5–1.3)
CREAT SERPL-MCNC: 1.81 MG/DL — HIGH (ref 0.5–1.3)
CREAT SERPL-MCNC: 1.82 MG/DL — HIGH (ref 0.5–1.3)
CREAT SERPL-MCNC: 1.83 MG/DL — HIGH (ref 0.5–1.3)
CREAT SERPL-MCNC: 1.87 MG/DL — HIGH (ref 0.5–1.3)
CREAT SERPL-MCNC: 1.88 MG/DL — HIGH (ref 0.5–1.3)
CREAT SERPL-MCNC: 1.9 MG/DL — HIGH (ref 0.5–1.3)
CREAT SERPL-MCNC: 1.93 MG/DL — HIGH (ref 0.5–1.3)
CREAT SERPL-MCNC: 1.94 MG/DL — HIGH (ref 0.5–1.3)
CREAT SERPL-MCNC: 1.98 MG/DL — HIGH (ref 0.5–1.3)
CREAT SERPL-MCNC: 1.99 MG/DL — HIGH (ref 0.5–1.3)
CREAT SERPL-MCNC: 2.03 MG/DL — HIGH (ref 0.5–1.3)
CREAT SERPL-MCNC: 2.08 MG/DL — HIGH (ref 0.5–1.3)
CREAT SERPL-MCNC: 2.14 MG/DL — HIGH (ref 0.5–1.3)
CREAT SERPL-MCNC: 2.15 MG/DL — HIGH (ref 0.5–1.3)
CREAT SERPL-MCNC: 2.15 MG/DL — HIGH (ref 0.5–1.3)
CREAT SERPL-MCNC: 2.18 MG/DL — HIGH (ref 0.5–1.3)
CREAT SERPL-MCNC: 2.19 MG/DL — HIGH (ref 0.5–1.3)
CREAT SERPL-MCNC: 2.19 MG/DL — HIGH (ref 0.5–1.3)
CREAT SERPL-MCNC: 2.24 MG/DL — HIGH (ref 0.5–1.3)
CREAT SERPL-MCNC: 2.28 MG/DL — HIGH (ref 0.5–1.3)
CREAT SERPL-MCNC: 2.31 MG/DL — HIGH (ref 0.5–1.3)
CREAT SERPL-MCNC: 2.35 MG/DL — HIGH (ref 0.5–1.3)
CREAT SERPL-MCNC: 2.35 MG/DL — HIGH (ref 0.5–1.3)
CREAT SERPL-MCNC: 2.68 MG/DL — HIGH (ref 0.5–1.3)
CREAT SERPL-MCNC: 2.97 MG/DL — HIGH (ref 0.5–1.3)
CREAT SERPL-MCNC: 3.23 MG/DL — HIGH (ref 0.5–1.3)
CREAT SERPL-MCNC: 3.35 MG/DL — HIGH (ref 0.5–1.3)
CREAT SERPL-MCNC: 3.48 MG/DL — HIGH (ref 0.5–1.3)
CRP SERPL-MCNC: 132 MG/L — HIGH (ref 0–4)
CRP SERPL-MCNC: 198.6 MG/L — HIGH (ref 0–4)
CULTURE RESULTS: SIGNIFICANT CHANGE UP
D DIMER BLD IA.RAPID-MCNC: 1401 NG/ML DDU — HIGH
DACRYOCYTES BLD QL SMEAR: SLIGHT — SIGNIFICANT CHANGE UP
DIFF PNL FLD: ABNORMAL
EGFR: 18 ML/MIN/1.73M2 — LOW
EGFR: 19 ML/MIN/1.73M2 — LOW
EGFR: 20 ML/MIN/1.73M2 — LOW
EGFR: 22 ML/MIN/1.73M2 — LOW
EGFR: 25 ML/MIN/1.73M2 — LOW
EGFR: 29 ML/MIN/1.73M2 — LOW
EGFR: 30 ML/MIN/1.73M2 — LOW
EGFR: 31 ML/MIN/1.73M2 — LOW
EGFR: 32 ML/MIN/1.73M2 — LOW
EGFR: 33 ML/MIN/1.73M2 — LOW
EGFR: 34 ML/MIN/1.73M2 — LOW
EGFR: 35 ML/MIN/1.73M2 — LOW
EGFR: 35 ML/MIN/1.73M2 — LOW
EGFR: 36 ML/MIN/1.73M2 — LOW
EGFR: 37 ML/MIN/1.73M2 — LOW
EGFR: 37 ML/MIN/1.73M2 — LOW
EGFR: 38 ML/MIN/1.73M2 — LOW
EGFR: 38 ML/MIN/1.73M2 — LOW
EGFR: 39 ML/MIN/1.73M2 — LOW
EGFR: 40 ML/MIN/1.73M2 — LOW
EGFR: 42 ML/MIN/1.73M2 — LOW
EGFR: 44 ML/MIN/1.73M2 — LOW
EGFR: 48 ML/MIN/1.73M2 — LOW
EGFR: 48 ML/MIN/1.73M2 — LOW
EGFR: 52 ML/MIN/1.73M2 — LOW
EGFR: 59 ML/MIN/1.73M2
EGFR: 60 ML/MIN/1.73M2 — SIGNIFICANT CHANGE UP
EGFR: 62 ML/MIN/1.73M2 — SIGNIFICANT CHANGE UP
EGFR: 65 ML/MIN/1.73M2 — SIGNIFICANT CHANGE UP
EGFR: 66 ML/MIN/1.73M2
EGFR: 72 ML/MIN/1.73M2
EGFR: 72 ML/MIN/1.73M2
ELLIPTOCYTES BLD QL SMEAR: SLIGHT — SIGNIFICANT CHANGE UP
ELLIPTOCYTES BLD QL SMEAR: SLIGHT — SIGNIFICANT CHANGE UP
EOSINOPHIL # BLD AUTO: 0 K/UL — SIGNIFICANT CHANGE UP (ref 0–0.5)
EOSINOPHIL # BLD AUTO: 0.02 K/UL — SIGNIFICANT CHANGE UP (ref 0–0.5)
EOSINOPHIL # BLD AUTO: 0.02 K/UL — SIGNIFICANT CHANGE UP (ref 0–0.5)
EOSINOPHIL # BLD AUTO: 0.03 K/UL — SIGNIFICANT CHANGE UP (ref 0–0.5)
EOSINOPHIL # BLD AUTO: 0.04 K/UL — SIGNIFICANT CHANGE UP (ref 0–0.5)
EOSINOPHIL # BLD AUTO: 0.05 K/UL — SIGNIFICANT CHANGE UP (ref 0–0.5)
EOSINOPHIL # BLD AUTO: 0.06 K/UL — SIGNIFICANT CHANGE UP (ref 0–0.5)
EOSINOPHIL # BLD AUTO: 0.06 K/UL — SIGNIFICANT CHANGE UP (ref 0–0.5)
EOSINOPHIL # BLD AUTO: 0.09 K/UL — SIGNIFICANT CHANGE UP (ref 0–0.5)
EOSINOPHIL # BLD AUTO: 0.1 K/UL — SIGNIFICANT CHANGE UP (ref 0–0.5)
EOSINOPHIL # BLD AUTO: 0.11 K/UL — SIGNIFICANT CHANGE UP (ref 0–0.5)
EOSINOPHIL # BLD AUTO: 0.11 K/UL — SIGNIFICANT CHANGE UP (ref 0–0.5)
EOSINOPHIL # BLD AUTO: 0.14 K/UL
EOSINOPHIL # BLD AUTO: 0.15 K/UL — SIGNIFICANT CHANGE UP (ref 0–0.5)
EOSINOPHIL # BLD AUTO: 0.16 K/UL
EOSINOPHIL # BLD AUTO: 0.16 K/UL — SIGNIFICANT CHANGE UP (ref 0–0.5)
EOSINOPHIL NFR BLD AUTO: 0 % — SIGNIFICANT CHANGE UP (ref 0–6)
EOSINOPHIL NFR BLD AUTO: 0.1 % — SIGNIFICANT CHANGE UP (ref 0–6)
EOSINOPHIL NFR BLD AUTO: 0.2 % — SIGNIFICANT CHANGE UP (ref 0–6)
EOSINOPHIL NFR BLD AUTO: 0.2 % — SIGNIFICANT CHANGE UP (ref 0–6)
EOSINOPHIL NFR BLD AUTO: 0.4 % — SIGNIFICANT CHANGE UP (ref 0–6)
EOSINOPHIL NFR BLD AUTO: 0.5 % — SIGNIFICANT CHANGE UP (ref 0–6)
EOSINOPHIL NFR BLD AUTO: 0.6 % — SIGNIFICANT CHANGE UP (ref 0–6)
EOSINOPHIL NFR BLD AUTO: 0.8 % — SIGNIFICANT CHANGE UP (ref 0–6)
EOSINOPHIL NFR BLD AUTO: 0.9 % — SIGNIFICANT CHANGE UP (ref 0–6)
EOSINOPHIL NFR BLD AUTO: 1.3 % — SIGNIFICANT CHANGE UP (ref 0–6)
EOSINOPHIL NFR BLD AUTO: 1.4 % — SIGNIFICANT CHANGE UP (ref 0–6)
EOSINOPHIL NFR BLD AUTO: 1.4 % — SIGNIFICANT CHANGE UP (ref 0–6)
EOSINOPHIL NFR BLD AUTO: 1.6 %
EOSINOPHIL NFR BLD AUTO: 1.7 %
EPI CELLS # UR: SIGNIFICANT CHANGE UP /HPF (ref 0–5)
ERYTHROCYTE [SEDIMENTATION RATE] IN BLOOD: 60 MM/HR — HIGH
ETHANOL SERPL-MCNC: <10 MG/DL — SIGNIFICANT CHANGE UP (ref 0–10)
FERRITIN SERPL-MCNC: 393 NG/ML — SIGNIFICANT CHANGE UP (ref 30–400)
FOLATE SERPL-MCNC: 5.3 NG/ML — SIGNIFICANT CHANGE UP
GAS PNL BLDV: 142 MMOL/L — SIGNIFICANT CHANGE UP (ref 136–145)
GAS PNL BLDV: SIGNIFICANT CHANGE UP
GAS PNL BLDV: SIGNIFICANT CHANGE UP
GI PCR PANEL: SIGNIFICANT CHANGE UP
GIANT PLATELETS BLD QL SMEAR: PRESENT — SIGNIFICANT CHANGE UP
GLUCOSE BLDC GLUCOMTR-MCNC: 100 MG/DL — HIGH (ref 70–99)
GLUCOSE BLDC GLUCOMTR-MCNC: 101 MG/DL — HIGH (ref 70–99)
GLUCOSE BLDC GLUCOMTR-MCNC: 102 MG/DL — HIGH (ref 70–99)
GLUCOSE BLDC GLUCOMTR-MCNC: 106 MG/DL — HIGH (ref 70–99)
GLUCOSE BLDC GLUCOMTR-MCNC: 108 MG/DL — HIGH (ref 70–99)
GLUCOSE BLDC GLUCOMTR-MCNC: 109 MG/DL — HIGH (ref 70–99)
GLUCOSE BLDC GLUCOMTR-MCNC: 110 MG/DL — HIGH (ref 70–99)
GLUCOSE BLDC GLUCOMTR-MCNC: 111 MG/DL — HIGH (ref 70–99)
GLUCOSE BLDC GLUCOMTR-MCNC: 112 MG/DL — HIGH (ref 70–99)
GLUCOSE BLDC GLUCOMTR-MCNC: 112 MG/DL — HIGH (ref 70–99)
GLUCOSE BLDC GLUCOMTR-MCNC: 115 MG/DL — HIGH (ref 70–99)
GLUCOSE BLDC GLUCOMTR-MCNC: 115 MG/DL — HIGH (ref 70–99)
GLUCOSE BLDC GLUCOMTR-MCNC: 116 MG/DL — HIGH (ref 70–99)
GLUCOSE BLDC GLUCOMTR-MCNC: 119 MG/DL — HIGH (ref 70–99)
GLUCOSE BLDC GLUCOMTR-MCNC: 122 MG/DL — HIGH (ref 70–99)
GLUCOSE BLDC GLUCOMTR-MCNC: 123 MG/DL — HIGH (ref 70–99)
GLUCOSE BLDC GLUCOMTR-MCNC: 126 MG/DL — HIGH (ref 70–99)
GLUCOSE BLDC GLUCOMTR-MCNC: 127 MG/DL — HIGH (ref 70–99)
GLUCOSE BLDC GLUCOMTR-MCNC: 128 MG/DL — HIGH (ref 70–99)
GLUCOSE BLDC GLUCOMTR-MCNC: 134 MG/DL — HIGH (ref 70–99)
GLUCOSE BLDC GLUCOMTR-MCNC: 136 MG/DL — HIGH (ref 70–99)
GLUCOSE BLDC GLUCOMTR-MCNC: 141 MG/DL — HIGH (ref 70–99)
GLUCOSE BLDC GLUCOMTR-MCNC: 147 MG/DL — HIGH (ref 70–99)
GLUCOSE BLDC GLUCOMTR-MCNC: 154 MG/DL — HIGH (ref 70–99)
GLUCOSE BLDC GLUCOMTR-MCNC: 156 MG/DL — HIGH (ref 70–99)
GLUCOSE BLDC GLUCOMTR-MCNC: 161 MG/DL — HIGH (ref 70–99)
GLUCOSE BLDC GLUCOMTR-MCNC: 69 MG/DL — LOW (ref 70–99)
GLUCOSE BLDC GLUCOMTR-MCNC: 75 MG/DL — SIGNIFICANT CHANGE UP (ref 70–99)
GLUCOSE BLDC GLUCOMTR-MCNC: 89 MG/DL — SIGNIFICANT CHANGE UP (ref 70–99)
GLUCOSE BLDC GLUCOMTR-MCNC: 89 MG/DL — SIGNIFICANT CHANGE UP (ref 70–99)
GLUCOSE BLDC GLUCOMTR-MCNC: 90 MG/DL — SIGNIFICANT CHANGE UP (ref 70–99)
GLUCOSE BLDC GLUCOMTR-MCNC: 92 MG/DL — SIGNIFICANT CHANGE UP (ref 70–99)
GLUCOSE BLDC GLUCOMTR-MCNC: 93 MG/DL — SIGNIFICANT CHANGE UP (ref 70–99)
GLUCOSE BLDC GLUCOMTR-MCNC: 93 MG/DL — SIGNIFICANT CHANGE UP (ref 70–99)
GLUCOSE BLDC GLUCOMTR-MCNC: 94 MG/DL — SIGNIFICANT CHANGE UP (ref 70–99)
GLUCOSE BLDC GLUCOMTR-MCNC: 96 MG/DL — SIGNIFICANT CHANGE UP (ref 70–99)
GLUCOSE BLDC GLUCOMTR-MCNC: 96 MG/DL — SIGNIFICANT CHANGE UP (ref 70–99)
GLUCOSE BLDC GLUCOMTR-MCNC: 99 MG/DL — SIGNIFICANT CHANGE UP (ref 70–99)
GLUCOSE SERPL-MCNC: 100 MG/DL — HIGH (ref 70–99)
GLUCOSE SERPL-MCNC: 101 MG/DL
GLUCOSE SERPL-MCNC: 101 MG/DL — HIGH (ref 70–99)
GLUCOSE SERPL-MCNC: 103 MG/DL
GLUCOSE SERPL-MCNC: 103 MG/DL
GLUCOSE SERPL-MCNC: 106 MG/DL — HIGH (ref 70–99)
GLUCOSE SERPL-MCNC: 108 MG/DL — HIGH (ref 70–99)
GLUCOSE SERPL-MCNC: 110 MG/DL — HIGH (ref 70–99)
GLUCOSE SERPL-MCNC: 110 MG/DL — HIGH (ref 70–99)
GLUCOSE SERPL-MCNC: 112 MG/DL — HIGH (ref 70–99)
GLUCOSE SERPL-MCNC: 112 MG/DL — HIGH (ref 70–99)
GLUCOSE SERPL-MCNC: 113 MG/DL — HIGH (ref 70–99)
GLUCOSE SERPL-MCNC: 116 MG/DL — HIGH (ref 70–99)
GLUCOSE SERPL-MCNC: 116 MG/DL — HIGH (ref 70–99)
GLUCOSE SERPL-MCNC: 117 MG/DL — HIGH (ref 70–99)
GLUCOSE SERPL-MCNC: 118 MG/DL — HIGH (ref 70–99)
GLUCOSE SERPL-MCNC: 119 MG/DL — HIGH (ref 70–99)
GLUCOSE SERPL-MCNC: 119 MG/DL — HIGH (ref 70–99)
GLUCOSE SERPL-MCNC: 120 MG/DL — HIGH (ref 70–99)
GLUCOSE SERPL-MCNC: 120 MG/DL — HIGH (ref 70–99)
GLUCOSE SERPL-MCNC: 124 MG/DL — HIGH (ref 70–99)
GLUCOSE SERPL-MCNC: 129 MG/DL — HIGH (ref 70–99)
GLUCOSE SERPL-MCNC: 137 MG/DL — HIGH (ref 70–99)
GLUCOSE SERPL-MCNC: 137 MG/DL — HIGH (ref 70–99)
GLUCOSE SERPL-MCNC: 138 MG/DL — HIGH (ref 70–99)
GLUCOSE SERPL-MCNC: 147 MG/DL — HIGH (ref 70–99)
GLUCOSE SERPL-MCNC: 157 MG/DL — HIGH (ref 70–99)
GLUCOSE SERPL-MCNC: 72 MG/DL — SIGNIFICANT CHANGE UP (ref 70–99)
GLUCOSE SERPL-MCNC: 77 MG/DL — SIGNIFICANT CHANGE UP (ref 70–99)
GLUCOSE SERPL-MCNC: 86 MG/DL — SIGNIFICANT CHANGE UP (ref 70–99)
GLUCOSE SERPL-MCNC: 90 MG/DL — SIGNIFICANT CHANGE UP (ref 70–99)
GLUCOSE SERPL-MCNC: 91 MG/DL — SIGNIFICANT CHANGE UP (ref 70–99)
GLUCOSE SERPL-MCNC: 91 MG/DL — SIGNIFICANT CHANGE UP (ref 70–99)
GLUCOSE SERPL-MCNC: 96 MG/DL
GLUCOSE SERPL-MCNC: 97 MG/DL — SIGNIFICANT CHANGE UP (ref 70–99)
GLUCOSE SERPL-MCNC: 97 MG/DL — SIGNIFICANT CHANGE UP (ref 70–99)
GLUCOSE SERPL-MCNC: 98 MG/DL — SIGNIFICANT CHANGE UP (ref 70–99)
GLUCOSE UR QL: NEGATIVE — SIGNIFICANT CHANGE UP
GRAN CASTS # UR COMP ASSIST: ABNORMAL /LPF
GRAN CASTS # UR COMP ASSIST: ABNORMAL /LPF
HAPTOGLOB SERPL-MCNC: 257 MG/DL — HIGH (ref 34–200)
HAPTOGLOB SERPL-MCNC: 348 MG/DL — HIGH (ref 34–200)
HCO3 BLDV-SCNC: 17 MMOL/L — LOW (ref 22–29)
HCT VFR BLD CALC: 17.3 % — CRITICAL LOW (ref 39–50)
HCT VFR BLD CALC: 18.8 % — CRITICAL LOW (ref 39–50)
HCT VFR BLD CALC: 19.1 % — CRITICAL LOW (ref 39–50)
HCT VFR BLD CALC: 21.1 % — LOW (ref 39–50)
HCT VFR BLD CALC: 21.7 % — LOW (ref 39–50)
HCT VFR BLD CALC: 22.3 % — LOW (ref 39–50)
HCT VFR BLD CALC: 22.3 % — LOW (ref 39–50)
HCT VFR BLD CALC: 22.4 % — LOW (ref 39–50)
HCT VFR BLD CALC: 22.5 % — LOW (ref 39–50)
HCT VFR BLD CALC: 23 % — LOW (ref 39–50)
HCT VFR BLD CALC: 23 % — LOW (ref 39–50)
HCT VFR BLD CALC: 23.2 % — LOW (ref 39–50)
HCT VFR BLD CALC: 24.1 % — LOW (ref 39–50)
HCT VFR BLD CALC: 24.5 % — LOW (ref 39–50)
HCT VFR BLD CALC: 25.3 % — LOW (ref 39–50)
HCT VFR BLD CALC: 25.5 % — LOW (ref 39–50)
HCT VFR BLD CALC: 25.8 % — LOW (ref 39–50)
HCT VFR BLD CALC: 26.1 % — LOW (ref 39–50)
HCT VFR BLD CALC: 27.1 % — LOW (ref 39–50)
HCT VFR BLD CALC: 27.2 % — LOW (ref 39–50)
HCT VFR BLD CALC: 27.6 % — LOW (ref 39–50)
HCT VFR BLD CALC: 28.3 % — LOW (ref 39–50)
HCT VFR BLD CALC: 28.7 % — LOW (ref 39–50)
HCT VFR BLD CALC: 28.8 % — LOW (ref 39–50)
HCT VFR BLD CALC: 29.1 % — LOW (ref 39–50)
HCT VFR BLD CALC: 29.1 % — LOW (ref 39–50)
HCT VFR BLD CALC: 30.8 % — LOW (ref 39–50)
HCT VFR BLD CALC: 31 % — LOW (ref 39–50)
HCT VFR BLD CALC: 31.4 % — LOW (ref 39–50)
HCT VFR BLD CALC: 31.5 % — LOW (ref 39–50)
HCT VFR BLD CALC: 31.6 % — LOW (ref 39–50)
HCT VFR BLD CALC: 31.6 % — LOW (ref 39–50)
HCT VFR BLD CALC: 32 % — LOW (ref 39–50)
HCT VFR BLD CALC: 32.3 % — LOW (ref 39–50)
HCT VFR BLD CALC: 32.5 % — LOW (ref 39–50)
HCT VFR BLD CALC: 33.6 % — LOW (ref 39–50)
HCT VFR BLD CALC: 34.3 % — LOW (ref 39–50)
HCT VFR BLD CALC: 35.8 %
HCT VFR BLD CALC: 45.6 %
HCV AB S/CO SERPL IA: 0.04 S/CO — SIGNIFICANT CHANGE UP
HCV AB SERPL-IMP: SIGNIFICANT CHANGE UP
HGB BLD-MCNC: 10.1 G/DL — LOW (ref 13–17)
HGB BLD-MCNC: 10.2 G/DL — LOW (ref 13–17)
HGB BLD-MCNC: 10.2 G/DL — LOW (ref 13–17)
HGB BLD-MCNC: 10.3 G/DL — LOW (ref 13–17)
HGB BLD-MCNC: 10.4 G/DL — LOW (ref 13–17)
HGB BLD-MCNC: 10.5 G/DL — LOW (ref 13–17)
HGB BLD-MCNC: 10.5 G/DL — LOW (ref 13–17)
HGB BLD-MCNC: 10.6 G/DL
HGB BLD-MCNC: 10.6 G/DL — LOW (ref 13–17)
HGB BLD-MCNC: 10.7 G/DL — LOW (ref 13–17)
HGB BLD-MCNC: 10.9 G/DL — LOW (ref 13–17)
HGB BLD-MCNC: 11.7 G/DL — LOW (ref 13–17)
HGB BLD-MCNC: 13.5 G/DL
HGB BLD-MCNC: 5.7 G/DL — CRITICAL LOW (ref 13–17)
HGB BLD-MCNC: 6 G/DL — CRITICAL LOW (ref 13–17)
HGB BLD-MCNC: 6.2 G/DL — CRITICAL LOW (ref 13–17)
HGB BLD-MCNC: 6.8 G/DL — CRITICAL LOW (ref 13–17)
HGB BLD-MCNC: 7 G/DL — CRITICAL LOW (ref 13–17)
HGB BLD-MCNC: 7.1 G/DL — LOW (ref 13–17)
HGB BLD-MCNC: 7.2 G/DL — LOW (ref 13–17)
HGB BLD-MCNC: 7.2 G/DL — LOW (ref 13–17)
HGB BLD-MCNC: 7.4 G/DL — LOW (ref 13–17)
HGB BLD-MCNC: 7.5 G/DL — LOW (ref 13–17)
HGB BLD-MCNC: 7.6 G/DL — LOW (ref 13–17)
HGB BLD-MCNC: 7.7 G/DL — LOW (ref 13–17)
HGB BLD-MCNC: 7.8 G/DL — LOW (ref 13–17)
HGB BLD-MCNC: 7.9 G/DL — LOW (ref 13–17)
HGB BLD-MCNC: 8 G/DL — LOW (ref 13–17)
HGB BLD-MCNC: 8 G/DL — LOW (ref 13–17)
HGB BLD-MCNC: 8.1 G/DL — LOW (ref 13–17)
HGB BLD-MCNC: 8.3 G/DL — LOW (ref 13–17)
HGB BLD-MCNC: 8.5 G/DL — LOW (ref 13–17)
HGB BLD-MCNC: 8.9 G/DL — LOW (ref 13–17)
HGB BLD-MCNC: 9.1 G/DL — LOW (ref 13–17)
HGB BLD-MCNC: 9.4 G/DL — LOW (ref 13–17)
HGB BLD-MCNC: 9.5 G/DL — LOW (ref 13–17)
HGB BLD-MCNC: 9.5 G/DL — LOW (ref 13–17)
HGB BLD-MCNC: 9.6 G/DL — LOW (ref 13–17)
HGB BLD-MCNC: 9.7 G/DL — LOW (ref 13–17)
HYALINE CASTS # UR AUTO: ABNORMAL /LPF (ref 0–2)
HYPOCHROMIA BLD QL: SIGNIFICANT CHANGE UP
HYPOCHROMIA BLD QL: SIGNIFICANT CHANGE UP
HYPOCHROMIA BLD QL: SLIGHT — SIGNIFICANT CHANGE UP
HYPOCHROMIA BLD QL: SLIGHT — SIGNIFICANT CHANGE UP
IGA FLD-MCNC: 208 MG/DL — SIGNIFICANT CHANGE UP (ref 84–499)
IGA FLD-MCNC: 274 MG/DL — SIGNIFICANT CHANGE UP (ref 84–499)
IMM GRANULOCYTES NFR BLD AUTO: 0.5 %
IMM GRANULOCYTES NFR BLD AUTO: 0.5 %
IMM GRANULOCYTES NFR BLD AUTO: 0.5 % — SIGNIFICANT CHANGE UP (ref 0–0.9)
IMM GRANULOCYTES NFR BLD AUTO: 0.7 % — SIGNIFICANT CHANGE UP (ref 0–0.9)
IMM GRANULOCYTES NFR BLD AUTO: 0.9 % — SIGNIFICANT CHANGE UP (ref 0–0.9)
IMM GRANULOCYTES NFR BLD AUTO: 0.9 % — SIGNIFICANT CHANGE UP (ref 0–0.9)
IMM GRANULOCYTES NFR BLD AUTO: 1.3 % — HIGH (ref 0–0.9)
IMM GRANULOCYTES NFR BLD AUTO: 1.4 % — HIGH (ref 0–0.9)
IMM GRANULOCYTES NFR BLD AUTO: 1.5 % — HIGH (ref 0–0.9)
IMM GRANULOCYTES NFR BLD AUTO: 1.8 % — HIGH (ref 0–0.9)
IMM GRANULOCYTES NFR BLD AUTO: 1.9 % — HIGH (ref 0–0.9)
IMM GRANULOCYTES NFR BLD AUTO: 2.2 % — HIGH (ref 0–0.9)
IMM GRANULOCYTES NFR BLD AUTO: 2.3 % — HIGH (ref 0–0.9)
IMM GRANULOCYTES NFR BLD AUTO: 2.4 % — HIGH (ref 0–0.9)
IMM GRANULOCYTES NFR BLD AUTO: 2.6 % — HIGH (ref 0–0.9)
IMM GRANULOCYTES NFR BLD AUTO: 2.8 % — HIGH (ref 0–0.9)
IMM GRANULOCYTES NFR BLD AUTO: 2.9 % — HIGH (ref 0–0.9)
IMM GRANULOCYTES NFR BLD AUTO: 3.2 % — HIGH (ref 0–0.9)
IMM GRANULOCYTES NFR BLD AUTO: 3.8 % — HIGH (ref 0–0.9)
IMM GRANULOCYTES NFR BLD AUTO: 4.2 % — HIGH (ref 0–0.9)
IMM GRANULOCYTES NFR BLD AUTO: 4.3 % — HIGH (ref 0–0.9)
INR BLD: 1.23 — HIGH (ref 0.88–1.16)
INR PPP: 1.06 RATIO
IRON SATN MFR SERPL: 31 % — SIGNIFICANT CHANGE UP (ref 16–55)
IRON SATN MFR SERPL: 40 UG/DL — LOW (ref 45–165)
KETONES UR-MCNC: ABNORMAL MG/DL
KETONES UR-MCNC: NEGATIVE — SIGNIFICANT CHANGE UP
LACTATE SERPL-SCNC: 1.6 MMOL/L — SIGNIFICANT CHANGE UP (ref 0.5–2)
LACTATE SERPL-SCNC: 12.1 MMOL/L — CRITICAL HIGH (ref 0.5–2)
LACTATE SERPL-SCNC: 2.3 MMOL/L — HIGH (ref 0.5–2)
LACTATE SERPL-SCNC: 2.9 MMOL/L — HIGH (ref 0.5–2)
LDH SERPL L TO P-CCNC: 261 U/L — HIGH (ref 50–242)
LDH SERPL L TO P-CCNC: 299 U/L — HIGH (ref 50–242)
LEGIONELLA AG UR QL: NEGATIVE — SIGNIFICANT CHANGE UP
LEUKOCYTE ESTERASE UR-ACNC: ABNORMAL
LEUKOCYTE ESTERASE UR-ACNC: NEGATIVE — SIGNIFICANT CHANGE UP
LG PLATELETS BLD QL AUTO: SLIGHT — SIGNIFICANT CHANGE UP
LYMPHOCYTES # BLD AUTO: 0.48 K/UL — LOW (ref 1–3.3)
LYMPHOCYTES # BLD AUTO: 0.55 K/UL — LOW (ref 1–3.3)
LYMPHOCYTES # BLD AUTO: 0.75 K/UL — LOW (ref 1–3.3)
LYMPHOCYTES # BLD AUTO: 0.75 K/UL — LOW (ref 1–3.3)
LYMPHOCYTES # BLD AUTO: 0.79 K/UL — LOW (ref 1–3.3)
LYMPHOCYTES # BLD AUTO: 0.8 K/UL — LOW (ref 1–3.3)
LYMPHOCYTES # BLD AUTO: 0.86 K/UL — LOW (ref 1–3.3)
LYMPHOCYTES # BLD AUTO: 0.9 K/UL — LOW (ref 1–3.3)
LYMPHOCYTES # BLD AUTO: 0.91 K/UL — LOW (ref 1–3.3)
LYMPHOCYTES # BLD AUTO: 0.91 K/UL — LOW (ref 1–3.3)
LYMPHOCYTES # BLD AUTO: 0.95 K/UL — LOW (ref 1–3.3)
LYMPHOCYTES # BLD AUTO: 0.95 K/UL — LOW (ref 1–3.3)
LYMPHOCYTES # BLD AUTO: 0.97 K/UL — LOW (ref 1–3.3)
LYMPHOCYTES # BLD AUTO: 0.98 K/UL — LOW (ref 1–3.3)
LYMPHOCYTES # BLD AUTO: 0.99 K/UL — LOW (ref 1–3.3)
LYMPHOCYTES # BLD AUTO: 1.16 K/UL — SIGNIFICANT CHANGE UP (ref 1–3.3)
LYMPHOCYTES # BLD AUTO: 1.18 K/UL — SIGNIFICANT CHANGE UP (ref 1–3.3)
LYMPHOCYTES # BLD AUTO: 1.22 K/UL — SIGNIFICANT CHANGE UP (ref 1–3.3)
LYMPHOCYTES # BLD AUTO: 1.23 K/UL — SIGNIFICANT CHANGE UP (ref 1–3.3)
LYMPHOCYTES # BLD AUTO: 1.24 K/UL
LYMPHOCYTES # BLD AUTO: 1.24 K/UL — SIGNIFICANT CHANGE UP (ref 1–3.3)
LYMPHOCYTES # BLD AUTO: 1.31 K/UL
LYMPHOCYTES # BLD AUTO: 1.44 K/UL — SIGNIFICANT CHANGE UP (ref 1–3.3)
LYMPHOCYTES # BLD AUTO: 1.46 K/UL — SIGNIFICANT CHANGE UP (ref 1–3.3)
LYMPHOCYTES # BLD AUTO: 1.47 K/UL — SIGNIFICANT CHANGE UP (ref 1–3.3)
LYMPHOCYTES # BLD AUTO: 1.49 K/UL — SIGNIFICANT CHANGE UP (ref 1–3.3)
LYMPHOCYTES # BLD AUTO: 1.56 K/UL — SIGNIFICANT CHANGE UP (ref 1–3.3)
LYMPHOCYTES # BLD AUTO: 1.58 K/UL — SIGNIFICANT CHANGE UP (ref 1–3.3)
LYMPHOCYTES # BLD AUTO: 1.75 K/UL — SIGNIFICANT CHANGE UP (ref 1–3.3)
LYMPHOCYTES # BLD AUTO: 1.76 K/UL — SIGNIFICANT CHANGE UP (ref 1–3.3)
LYMPHOCYTES # BLD AUTO: 1.96 K/UL — SIGNIFICANT CHANGE UP (ref 1–3.3)
LYMPHOCYTES # BLD AUTO: 10 % — LOW (ref 13–44)
LYMPHOCYTES # BLD AUTO: 10.9 % — LOW (ref 13–44)
LYMPHOCYTES # BLD AUTO: 11 % — LOW (ref 13–44)
LYMPHOCYTES # BLD AUTO: 11.3 % — LOW (ref 13–44)
LYMPHOCYTES # BLD AUTO: 11.5 % — LOW (ref 13–44)
LYMPHOCYTES # BLD AUTO: 11.7 % — LOW (ref 13–44)
LYMPHOCYTES # BLD AUTO: 11.7 % — LOW (ref 13–44)
LYMPHOCYTES # BLD AUTO: 11.9 % — LOW (ref 13–44)
LYMPHOCYTES # BLD AUTO: 12.3 % — LOW (ref 13–44)
LYMPHOCYTES # BLD AUTO: 12.4 % — LOW (ref 13–44)
LYMPHOCYTES # BLD AUTO: 12.7 % — LOW (ref 13–44)
LYMPHOCYTES # BLD AUTO: 12.8 % — LOW (ref 13–44)
LYMPHOCYTES # BLD AUTO: 13.3 % — SIGNIFICANT CHANGE UP (ref 13–44)
LYMPHOCYTES # BLD AUTO: 13.5 % — SIGNIFICANT CHANGE UP (ref 13–44)
LYMPHOCYTES # BLD AUTO: 15.2 % — SIGNIFICANT CHANGE UP (ref 13–44)
LYMPHOCYTES # BLD AUTO: 15.6 % — SIGNIFICANT CHANGE UP (ref 13–44)
LYMPHOCYTES # BLD AUTO: 17.1 % — SIGNIFICANT CHANGE UP (ref 13–44)
LYMPHOCYTES # BLD AUTO: 19.9 % — SIGNIFICANT CHANGE UP (ref 13–44)
LYMPHOCYTES # BLD AUTO: 2.11 K/UL — SIGNIFICANT CHANGE UP (ref 1–3.3)
LYMPHOCYTES # BLD AUTO: 2.53 K/UL — SIGNIFICANT CHANGE UP (ref 1–3.3)
LYMPHOCYTES # BLD AUTO: 2.68 K/UL — SIGNIFICANT CHANGE UP (ref 1–3.3)
LYMPHOCYTES # BLD AUTO: 22.1 % — SIGNIFICANT CHANGE UP (ref 13–44)
LYMPHOCYTES # BLD AUTO: 6.1 % — LOW (ref 13–44)
LYMPHOCYTES # BLD AUTO: 6.9 % — LOW (ref 13–44)
LYMPHOCYTES # BLD AUTO: 7 % — LOW (ref 13–44)
LYMPHOCYTES # BLD AUTO: 7.8 % — LOW (ref 13–44)
LYMPHOCYTES # BLD AUTO: 7.8 % — LOW (ref 13–44)
LYMPHOCYTES # BLD AUTO: 7.9 % — LOW (ref 13–44)
LYMPHOCYTES # BLD AUTO: 8.3 % — LOW (ref 13–44)
LYMPHOCYTES # BLD AUTO: 8.7 % — LOW (ref 13–44)
LYMPHOCYTES # BLD AUTO: 8.7 % — LOW (ref 13–44)
LYMPHOCYTES NFR BLD AUTO: 12.8 %
LYMPHOCYTES NFR BLD AUTO: 15 %
MACROCYTES BLD QL: SLIGHT — SIGNIFICANT CHANGE UP
MAGNESIUM SERPL-MCNC: 1.3 MG/DL — LOW (ref 1.6–2.6)
MAGNESIUM SERPL-MCNC: 1.3 MG/DL — LOW (ref 1.6–2.6)
MAGNESIUM SERPL-MCNC: 1.4 MG/DL — LOW (ref 1.6–2.6)
MAGNESIUM SERPL-MCNC: 1.6 MG/DL — SIGNIFICANT CHANGE UP (ref 1.6–2.6)
MAGNESIUM SERPL-MCNC: 1.7 MG/DL — SIGNIFICANT CHANGE UP (ref 1.6–2.6)
MAGNESIUM SERPL-MCNC: 1.8 MG/DL — SIGNIFICANT CHANGE UP (ref 1.6–2.6)
MAGNESIUM SERPL-MCNC: 1.9 MG/DL — SIGNIFICANT CHANGE UP (ref 1.6–2.6)
MAGNESIUM SERPL-MCNC: 2 MG/DL — SIGNIFICANT CHANGE UP (ref 1.6–2.6)
MAGNESIUM SERPL-MCNC: 2.1 MG/DL — SIGNIFICANT CHANGE UP (ref 1.6–2.6)
MAGNESIUM SERPL-MCNC: 2.1 MG/DL — SIGNIFICANT CHANGE UP (ref 1.6–2.6)
MAGNESIUM SERPL-MCNC: 2.2 MG/DL — SIGNIFICANT CHANGE UP (ref 1.6–2.6)
MAGNESIUM SERPL-MCNC: 2.5 MG/DL
MAGNESIUM SERPL-MCNC: 2.5 MG/DL — SIGNIFICANT CHANGE UP (ref 1.6–2.6)
MAGNESIUM SERPL-MCNC: 2.7 MG/DL — HIGH (ref 1.6–2.6)
MAGNESIUM SERPL-MCNC: 3.3 MG/DL — HIGH (ref 1.6–2.6)
MAGNESIUM SERPL-MCNC: 3.7 MG/DL — HIGH (ref 1.6–2.6)
MAN DIFF?: NORMAL
MAN DIFF?: NORMAL
MANUAL SMEAR VERIFICATION: SIGNIFICANT CHANGE UP
MCHC RBC-ENTMCNC: 21.5 PG
MCHC RBC-ENTMCNC: 24.1 PG
MCHC RBC-ENTMCNC: 25.2 PG — LOW (ref 27–34)
MCHC RBC-ENTMCNC: 25.3 PG — LOW (ref 27–34)
MCHC RBC-ENTMCNC: 25.4 PG — LOW (ref 27–34)
MCHC RBC-ENTMCNC: 25.5 PG — LOW (ref 27–34)
MCHC RBC-ENTMCNC: 25.7 PG — LOW (ref 27–34)
MCHC RBC-ENTMCNC: 26.1 PG — LOW (ref 27–34)
MCHC RBC-ENTMCNC: 26.7 PG — LOW (ref 27–34)
MCHC RBC-ENTMCNC: 26.8 PG — LOW (ref 27–34)
MCHC RBC-ENTMCNC: 26.8 PG — LOW (ref 27–34)
MCHC RBC-ENTMCNC: 27 PG — SIGNIFICANT CHANGE UP (ref 27–34)
MCHC RBC-ENTMCNC: 27.4 PG — SIGNIFICANT CHANGE UP (ref 27–34)
MCHC RBC-ENTMCNC: 27.5 PG — SIGNIFICANT CHANGE UP (ref 27–34)
MCHC RBC-ENTMCNC: 27.6 PG — SIGNIFICANT CHANGE UP (ref 27–34)
MCHC RBC-ENTMCNC: 27.8 PG — SIGNIFICANT CHANGE UP (ref 27–34)
MCHC RBC-ENTMCNC: 28.1 PG — SIGNIFICANT CHANGE UP (ref 27–34)
MCHC RBC-ENTMCNC: 28.1 PG — SIGNIFICANT CHANGE UP (ref 27–34)
MCHC RBC-ENTMCNC: 28.3 PG — SIGNIFICANT CHANGE UP (ref 27–34)
MCHC RBC-ENTMCNC: 28.3 PG — SIGNIFICANT CHANGE UP (ref 27–34)
MCHC RBC-ENTMCNC: 28.4 PG — SIGNIFICANT CHANGE UP (ref 27–34)
MCHC RBC-ENTMCNC: 28.5 PG — SIGNIFICANT CHANGE UP (ref 27–34)
MCHC RBC-ENTMCNC: 28.5 PG — SIGNIFICANT CHANGE UP (ref 27–34)
MCHC RBC-ENTMCNC: 28.6 PG — SIGNIFICANT CHANGE UP (ref 27–34)
MCHC RBC-ENTMCNC: 28.6 PG — SIGNIFICANT CHANGE UP (ref 27–34)
MCHC RBC-ENTMCNC: 28.7 PG — SIGNIFICANT CHANGE UP (ref 27–34)
MCHC RBC-ENTMCNC: 28.9 PG — SIGNIFICANT CHANGE UP (ref 27–34)
MCHC RBC-ENTMCNC: 29.1 PG — SIGNIFICANT CHANGE UP (ref 27–34)
MCHC RBC-ENTMCNC: 29.6 GM/DL
MCHC RBC-ENTMCNC: 29.6 GM/DL
MCHC RBC-ENTMCNC: 30.8 GM/DL — LOW (ref 32–36)
MCHC RBC-ENTMCNC: 31.4 GM/DL — LOW (ref 32–36)
MCHC RBC-ENTMCNC: 31.6 GM/DL — LOW (ref 32–36)
MCHC RBC-ENTMCNC: 31.6 GM/DL — LOW (ref 32–36)
MCHC RBC-ENTMCNC: 31.8 GM/DL — LOW (ref 32–36)
MCHC RBC-ENTMCNC: 32.1 GM/DL — SIGNIFICANT CHANGE UP (ref 32–36)
MCHC RBC-ENTMCNC: 32.2 GM/DL — SIGNIFICANT CHANGE UP (ref 32–36)
MCHC RBC-ENTMCNC: 32.2 GM/DL — SIGNIFICANT CHANGE UP (ref 32–36)
MCHC RBC-ENTMCNC: 32.3 GM/DL — SIGNIFICANT CHANGE UP (ref 32–36)
MCHC RBC-ENTMCNC: 32.3 GM/DL — SIGNIFICANT CHANGE UP (ref 32–36)
MCHC RBC-ENTMCNC: 32.4 GM/DL — SIGNIFICANT CHANGE UP (ref 32–36)
MCHC RBC-ENTMCNC: 32.6 GM/DL — SIGNIFICANT CHANGE UP (ref 32–36)
MCHC RBC-ENTMCNC: 32.6 GM/DL — SIGNIFICANT CHANGE UP (ref 32–36)
MCHC RBC-ENTMCNC: 32.7 GM/DL — SIGNIFICANT CHANGE UP (ref 32–36)
MCHC RBC-ENTMCNC: 32.8 GM/DL — SIGNIFICANT CHANGE UP (ref 32–36)
MCHC RBC-ENTMCNC: 32.9 GM/DL — SIGNIFICANT CHANGE UP (ref 32–36)
MCHC RBC-ENTMCNC: 33 GM/DL — SIGNIFICANT CHANGE UP (ref 32–36)
MCHC RBC-ENTMCNC: 33.1 GM/DL — SIGNIFICANT CHANGE UP (ref 32–36)
MCHC RBC-ENTMCNC: 33.2 GM/DL — SIGNIFICANT CHANGE UP (ref 32–36)
MCHC RBC-ENTMCNC: 33.3 GM/DL — SIGNIFICANT CHANGE UP (ref 32–36)
MCHC RBC-ENTMCNC: 33.5 GM/DL — SIGNIFICANT CHANGE UP (ref 32–36)
MCHC RBC-ENTMCNC: 33.5 GM/DL — SIGNIFICANT CHANGE UP (ref 32–36)
MCHC RBC-ENTMCNC: 33.6 GM/DL — SIGNIFICANT CHANGE UP (ref 32–36)
MCHC RBC-ENTMCNC: 34.1 GM/DL — SIGNIFICANT CHANGE UP (ref 32–36)
MCHC RBC-ENTMCNC: 34.3 GM/DL — SIGNIFICANT CHANGE UP (ref 32–36)
MCV RBC AUTO: 72.6 FL
MCV RBC AUTO: 74.2 FL — LOW (ref 80–100)
MCV RBC AUTO: 75.3 FL — LOW (ref 80–100)
MCV RBC AUTO: 76.3 FL — LOW (ref 80–100)
MCV RBC AUTO: 77.3 FL — LOW (ref 80–100)
MCV RBC AUTO: 77.5 FL — LOW (ref 80–100)
MCV RBC AUTO: 79.3 FL — LOW (ref 80–100)
MCV RBC AUTO: 79.4 FL — LOW (ref 80–100)
MCV RBC AUTO: 80.6 FL — SIGNIFICANT CHANGE UP (ref 80–100)
MCV RBC AUTO: 81.3 FL
MCV RBC AUTO: 81.9 FL — SIGNIFICANT CHANGE UP (ref 80–100)
MCV RBC AUTO: 83 FL — SIGNIFICANT CHANGE UP (ref 80–100)
MCV RBC AUTO: 83.1 FL — SIGNIFICANT CHANGE UP (ref 80–100)
MCV RBC AUTO: 83.3 FL — SIGNIFICANT CHANGE UP (ref 80–100)
MCV RBC AUTO: 83.8 FL — SIGNIFICANT CHANGE UP (ref 80–100)
MCV RBC AUTO: 83.8 FL — SIGNIFICANT CHANGE UP (ref 80–100)
MCV RBC AUTO: 84.2 FL — SIGNIFICANT CHANGE UP (ref 80–100)
MCV RBC AUTO: 84.3 FL — SIGNIFICANT CHANGE UP (ref 80–100)
MCV RBC AUTO: 84.3 FL — SIGNIFICANT CHANGE UP (ref 80–100)
MCV RBC AUTO: 84.7 FL — SIGNIFICANT CHANGE UP (ref 80–100)
MCV RBC AUTO: 85.4 FL — SIGNIFICANT CHANGE UP (ref 80–100)
MCV RBC AUTO: 85.5 FL — SIGNIFICANT CHANGE UP (ref 80–100)
MCV RBC AUTO: 85.8 FL — SIGNIFICANT CHANGE UP (ref 80–100)
MCV RBC AUTO: 86.1 FL — SIGNIFICANT CHANGE UP (ref 80–100)
MCV RBC AUTO: 86.4 FL — SIGNIFICANT CHANGE UP (ref 80–100)
MCV RBC AUTO: 86.5 FL — SIGNIFICANT CHANGE UP (ref 80–100)
MCV RBC AUTO: 86.6 FL — SIGNIFICANT CHANGE UP (ref 80–100)
MCV RBC AUTO: 86.7 FL — SIGNIFICANT CHANGE UP (ref 80–100)
MCV RBC AUTO: 87 FL — SIGNIFICANT CHANGE UP (ref 80–100)
MCV RBC AUTO: 87.2 FL — SIGNIFICANT CHANGE UP (ref 80–100)
MCV RBC AUTO: 87.5 FL — SIGNIFICANT CHANGE UP (ref 80–100)
MCV RBC AUTO: 87.9 FL — SIGNIFICANT CHANGE UP (ref 80–100)
MCV RBC AUTO: 88.2 FL — SIGNIFICANT CHANGE UP (ref 80–100)
MCV RBC AUTO: 88.5 FL — SIGNIFICANT CHANGE UP (ref 80–100)
MCV RBC AUTO: 88.6 FL — SIGNIFICANT CHANGE UP (ref 80–100)
MCV RBC AUTO: 89 FL — SIGNIFICANT CHANGE UP (ref 80–100)
MCV RBC AUTO: 89.3 FL — SIGNIFICANT CHANGE UP (ref 80–100)
METAMYELOCYTES # FLD: 0.9 % — HIGH (ref 0–0)
METAMYELOCYTES # FLD: 0.9 % — HIGH (ref 0–0)
METHADONE UR-MCNC: NEGATIVE — SIGNIFICANT CHANGE UP
MICROCYTES BLD QL: SIGNIFICANT CHANGE UP
MICROCYTES BLD QL: SLIGHT — SIGNIFICANT CHANGE UP
MONOCYTES # BLD AUTO: 0 K/UL — SIGNIFICANT CHANGE UP (ref 0–0.9)
MONOCYTES # BLD AUTO: 0.07 K/UL — SIGNIFICANT CHANGE UP (ref 0–0.9)
MONOCYTES # BLD AUTO: 0.11 K/UL — SIGNIFICANT CHANGE UP (ref 0–0.9)
MONOCYTES # BLD AUTO: 0.18 K/UL — SIGNIFICANT CHANGE UP (ref 0–0.9)
MONOCYTES # BLD AUTO: 0.29 K/UL — SIGNIFICANT CHANGE UP (ref 0–0.9)
MONOCYTES # BLD AUTO: 0.32 K/UL — SIGNIFICANT CHANGE UP (ref 0–0.9)
MONOCYTES # BLD AUTO: 0.34 K/UL — SIGNIFICANT CHANGE UP (ref 0–0.9)
MONOCYTES # BLD AUTO: 0.36 K/UL — SIGNIFICANT CHANGE UP (ref 0–0.9)
MONOCYTES # BLD AUTO: 0.39 K/UL — SIGNIFICANT CHANGE UP (ref 0–0.9)
MONOCYTES # BLD AUTO: 0.41 K/UL — SIGNIFICANT CHANGE UP (ref 0–0.9)
MONOCYTES # BLD AUTO: 0.42 K/UL — SIGNIFICANT CHANGE UP (ref 0–0.9)
MONOCYTES # BLD AUTO: 0.43 K/UL — SIGNIFICANT CHANGE UP (ref 0–0.9)
MONOCYTES # BLD AUTO: 0.46 K/UL — SIGNIFICANT CHANGE UP (ref 0–0.9)
MONOCYTES # BLD AUTO: 0.47 K/UL — SIGNIFICANT CHANGE UP (ref 0–0.9)
MONOCYTES # BLD AUTO: 0.48 K/UL — SIGNIFICANT CHANGE UP (ref 0–0.9)
MONOCYTES # BLD AUTO: 0.51 K/UL — SIGNIFICANT CHANGE UP (ref 0–0.9)
MONOCYTES # BLD AUTO: 0.54 K/UL — SIGNIFICANT CHANGE UP (ref 0–0.9)
MONOCYTES # BLD AUTO: 0.54 K/UL — SIGNIFICANT CHANGE UP (ref 0–0.9)
MONOCYTES # BLD AUTO: 0.57 K/UL — SIGNIFICANT CHANGE UP (ref 0–0.9)
MONOCYTES # BLD AUTO: 0.62 K/UL — SIGNIFICANT CHANGE UP (ref 0–0.9)
MONOCYTES # BLD AUTO: 0.63 K/UL — SIGNIFICANT CHANGE UP (ref 0–0.9)
MONOCYTES # BLD AUTO: 0.75 K/UL
MONOCYTES # BLD AUTO: 0.83 K/UL — SIGNIFICANT CHANGE UP (ref 0–0.9)
MONOCYTES # BLD AUTO: 0.84 K/UL — SIGNIFICANT CHANGE UP (ref 0–0.9)
MONOCYTES # BLD AUTO: 0.89 K/UL — SIGNIFICANT CHANGE UP (ref 0–0.9)
MONOCYTES # BLD AUTO: 0.89 K/UL — SIGNIFICANT CHANGE UP (ref 0–0.9)
MONOCYTES # BLD AUTO: 0.93 K/UL — HIGH (ref 0–0.9)
MONOCYTES # BLD AUTO: 0.93 K/UL — HIGH (ref 0–0.9)
MONOCYTES # BLD AUTO: 0.94 K/UL
MONOCYTES # BLD AUTO: 1.08 K/UL — HIGH (ref 0–0.9)
MONOCYTES # BLD AUTO: 1.1 K/UL — HIGH (ref 0–0.9)
MONOCYTES # BLD AUTO: 1.16 K/UL — HIGH (ref 0–0.9)
MONOCYTES # BLD AUTO: 1.17 K/UL — HIGH (ref 0–0.9)
MONOCYTES # BLD AUTO: 1.31 K/UL — HIGH (ref 0–0.9)
MONOCYTES NFR BLD AUTO: 0 % — LOW (ref 2–14)
MONOCYTES NFR BLD AUTO: 0.9 % — LOW (ref 2–14)
MONOCYTES NFR BLD AUTO: 1.7 % — LOW (ref 2–14)
MONOCYTES NFR BLD AUTO: 1.8 % — LOW (ref 2–14)
MONOCYTES NFR BLD AUTO: 2.6 % — SIGNIFICANT CHANGE UP (ref 2–14)
MONOCYTES NFR BLD AUTO: 2.6 % — SIGNIFICANT CHANGE UP (ref 2–14)
MONOCYTES NFR BLD AUTO: 2.7 % — SIGNIFICANT CHANGE UP (ref 2–14)
MONOCYTES NFR BLD AUTO: 4.3 % — SIGNIFICANT CHANGE UP (ref 2–14)
MONOCYTES NFR BLD AUTO: 4.4 % — SIGNIFICANT CHANGE UP (ref 2–14)
MONOCYTES NFR BLD AUTO: 4.4 % — SIGNIFICANT CHANGE UP (ref 2–14)
MONOCYTES NFR BLD AUTO: 4.6 % — SIGNIFICANT CHANGE UP (ref 2–14)
MONOCYTES NFR BLD AUTO: 5.1 % — SIGNIFICANT CHANGE UP (ref 2–14)
MONOCYTES NFR BLD AUTO: 5.2 % — SIGNIFICANT CHANGE UP (ref 2–14)
MONOCYTES NFR BLD AUTO: 5.4 % — SIGNIFICANT CHANGE UP (ref 2–14)
MONOCYTES NFR BLD AUTO: 5.4 % — SIGNIFICANT CHANGE UP (ref 2–14)
MONOCYTES NFR BLD AUTO: 5.5 % — SIGNIFICANT CHANGE UP (ref 2–14)
MONOCYTES NFR BLD AUTO: 5.7 % — SIGNIFICANT CHANGE UP (ref 2–14)
MONOCYTES NFR BLD AUTO: 5.9 % — SIGNIFICANT CHANGE UP (ref 2–14)
MONOCYTES NFR BLD AUTO: 6 % — SIGNIFICANT CHANGE UP (ref 2–14)
MONOCYTES NFR BLD AUTO: 6.1 % — SIGNIFICANT CHANGE UP (ref 2–14)
MONOCYTES NFR BLD AUTO: 6.5 % — SIGNIFICANT CHANGE UP (ref 2–14)
MONOCYTES NFR BLD AUTO: 6.5 % — SIGNIFICANT CHANGE UP (ref 2–14)
MONOCYTES NFR BLD AUTO: 6.8 % — SIGNIFICANT CHANGE UP (ref 2–14)
MONOCYTES NFR BLD AUTO: 7 % — SIGNIFICANT CHANGE UP (ref 2–14)
MONOCYTES NFR BLD AUTO: 7.5 % — SIGNIFICANT CHANGE UP (ref 2–14)
MONOCYTES NFR BLD AUTO: 7.5 % — SIGNIFICANT CHANGE UP (ref 2–14)
MONOCYTES NFR BLD AUTO: 7.7 % — SIGNIFICANT CHANGE UP (ref 2–14)
MONOCYTES NFR BLD AUTO: 8.2 % — SIGNIFICANT CHANGE UP (ref 2–14)
MONOCYTES NFR BLD AUTO: 8.6 % — SIGNIFICANT CHANGE UP (ref 2–14)
MONOCYTES NFR BLD AUTO: 8.8 % — SIGNIFICANT CHANGE UP (ref 2–14)
MONOCYTES NFR BLD AUTO: 9.1 %
MONOCYTES NFR BLD AUTO: 9.2 %
MRSA PCR RESULT.: NEGATIVE — SIGNIFICANT CHANGE UP
MYELOCYTES NFR BLD: 0.9 % — HIGH (ref 0–0)
NEUTROPHILS # BLD AUTO: 10.45 K/UL — HIGH (ref 1.8–7.4)
NEUTROPHILS # BLD AUTO: 10.63 K/UL — HIGH (ref 1.8–7.4)
NEUTROPHILS # BLD AUTO: 11.36 K/UL — HIGH (ref 1.8–7.4)
NEUTROPHILS # BLD AUTO: 11.81 K/UL — HIGH (ref 1.8–7.4)
NEUTROPHILS # BLD AUTO: 11.87 K/UL — HIGH (ref 1.8–7.4)
NEUTROPHILS # BLD AUTO: 12.57 K/UL — HIGH (ref 1.8–7.4)
NEUTROPHILS # BLD AUTO: 13.06 K/UL — HIGH (ref 1.8–7.4)
NEUTROPHILS # BLD AUTO: 13.73 K/UL — HIGH (ref 1.8–7.4)
NEUTROPHILS # BLD AUTO: 15.92 K/UL — HIGH (ref 1.8–7.4)
NEUTROPHILS # BLD AUTO: 16 K/UL — HIGH (ref 1.8–7.4)
NEUTROPHILS # BLD AUTO: 16.77 K/UL — HIGH (ref 1.8–7.4)
NEUTROPHILS # BLD AUTO: 20.4 K/UL — HIGH (ref 1.8–7.4)
NEUTROPHILS # BLD AUTO: 21.92 K/UL — HIGH (ref 1.8–7.4)
NEUTROPHILS # BLD AUTO: 22.01 K/UL — HIGH (ref 1.8–7.4)
NEUTROPHILS # BLD AUTO: 3.34 K/UL — SIGNIFICANT CHANGE UP (ref 1.8–7.4)
NEUTROPHILS # BLD AUTO: 3.45 K/UL — SIGNIFICANT CHANGE UP (ref 1.8–7.4)
NEUTROPHILS # BLD AUTO: 3.89 K/UL — SIGNIFICANT CHANGE UP (ref 1.8–7.4)
NEUTROPHILS # BLD AUTO: 4.2 K/UL — SIGNIFICANT CHANGE UP (ref 1.8–7.4)
NEUTROPHILS # BLD AUTO: 4.48 K/UL — SIGNIFICANT CHANGE UP (ref 1.8–7.4)
NEUTROPHILS # BLD AUTO: 5.13 K/UL — SIGNIFICANT CHANGE UP (ref 1.8–7.4)
NEUTROPHILS # BLD AUTO: 5.44 K/UL — SIGNIFICANT CHANGE UP (ref 1.8–7.4)
NEUTROPHILS # BLD AUTO: 5.89 K/UL — SIGNIFICANT CHANGE UP (ref 1.8–7.4)
NEUTROPHILS # BLD AUTO: 5.9 K/UL — SIGNIFICANT CHANGE UP (ref 1.8–7.4)
NEUTROPHILS # BLD AUTO: 5.98 K/UL — SIGNIFICANT CHANGE UP (ref 1.8–7.4)
NEUTROPHILS # BLD AUTO: 6.01 K/UL
NEUTROPHILS # BLD AUTO: 6.2 K/UL — SIGNIFICANT CHANGE UP (ref 1.8–7.4)
NEUTROPHILS # BLD AUTO: 6.31 K/UL — SIGNIFICANT CHANGE UP (ref 1.8–7.4)
NEUTROPHILS # BLD AUTO: 7.67 K/UL
NEUTROPHILS # BLD AUTO: 7.84 K/UL — HIGH (ref 1.8–7.4)
NEUTROPHILS # BLD AUTO: 8.63 K/UL — HIGH (ref 1.8–7.4)
NEUTROPHILS # BLD AUTO: 8.71 K/UL — HIGH (ref 1.8–7.4)
NEUTROPHILS # BLD AUTO: 9.12 K/UL — HIGH (ref 1.8–7.4)
NEUTROPHILS # BLD AUTO: 9.3 K/UL — HIGH (ref 1.8–7.4)
NEUTROPHILS # BLD AUTO: 9.4 K/UL — HIGH (ref 1.8–7.4)
NEUTROPHILS NFR BLD AUTO: 70.3 % — SIGNIFICANT CHANGE UP (ref 43–77)
NEUTROPHILS NFR BLD AUTO: 70.7 % — SIGNIFICANT CHANGE UP (ref 43–77)
NEUTROPHILS NFR BLD AUTO: 72.6 %
NEUTROPHILS NFR BLD AUTO: 74.8 %
NEUTROPHILS NFR BLD AUTO: 75.2 % — SIGNIFICANT CHANGE UP (ref 43–77)
NEUTROPHILS NFR BLD AUTO: 75.5 % — SIGNIFICANT CHANGE UP (ref 43–77)
NEUTROPHILS NFR BLD AUTO: 75.6 % — SIGNIFICANT CHANGE UP (ref 43–77)
NEUTROPHILS NFR BLD AUTO: 75.9 % — SIGNIFICANT CHANGE UP (ref 43–77)
NEUTROPHILS NFR BLD AUTO: 76.5 % — SIGNIFICANT CHANGE UP (ref 43–77)
NEUTROPHILS NFR BLD AUTO: 76.7 % — SIGNIFICANT CHANGE UP (ref 43–77)
NEUTROPHILS NFR BLD AUTO: 77 % — SIGNIFICANT CHANGE UP (ref 43–77)
NEUTROPHILS NFR BLD AUTO: 77.4 % — HIGH (ref 43–77)
NEUTROPHILS NFR BLD AUTO: 77.9 % — HIGH (ref 43–77)
NEUTROPHILS NFR BLD AUTO: 78 % — HIGH (ref 43–77)
NEUTROPHILS NFR BLD AUTO: 80 % — HIGH (ref 43–77)
NEUTROPHILS NFR BLD AUTO: 80.7 % — HIGH (ref 43–77)
NEUTROPHILS NFR BLD AUTO: 80.8 % — HIGH (ref 43–77)
NEUTROPHILS NFR BLD AUTO: 81.1 % — HIGH (ref 43–77)
NEUTROPHILS NFR BLD AUTO: 81.2 % — HIGH (ref 43–77)
NEUTROPHILS NFR BLD AUTO: 81.4 % — HIGH (ref 43–77)
NEUTROPHILS NFR BLD AUTO: 82.6 % — HIGH (ref 43–77)
NEUTROPHILS NFR BLD AUTO: 82.8 % — HIGH (ref 43–77)
NEUTROPHILS NFR BLD AUTO: 82.9 % — HIGH (ref 43–77)
NEUTROPHILS NFR BLD AUTO: 83.1 % — HIGH (ref 43–77)
NEUTROPHILS NFR BLD AUTO: 84.2 % — HIGH (ref 43–77)
NEUTROPHILS NFR BLD AUTO: 84.7 % — HIGH (ref 43–77)
NEUTROPHILS NFR BLD AUTO: 85.2 % — HIGH (ref 43–77)
NEUTROPHILS NFR BLD AUTO: 86 % — HIGH (ref 43–77)
NEUTROPHILS NFR BLD AUTO: 86.1 % — HIGH (ref 43–77)
NEUTROPHILS NFR BLD AUTO: 87 % — HIGH (ref 43–77)
NEUTROPHILS NFR BLD AUTO: 89.5 % — HIGH (ref 43–77)
NEUTROPHILS NFR BLD AUTO: 90.3 % — HIGH (ref 43–77)
NEUTROPHILS NFR BLD AUTO: 92.2 % — HIGH (ref 43–77)
NEUTROPHILS NFR BLD AUTO: 92.2 % — HIGH (ref 43–77)
NEUTS BAND # BLD: 0.9 % — SIGNIFICANT CHANGE UP (ref 0–8)
NITRITE UR-MCNC: NEGATIVE — SIGNIFICANT CHANGE UP
NON-GYNECOLOGICAL CYTOLOGY STUDY: SIGNIFICANT CHANGE UP
NRBC # BLD: 0 /100 WBCS — SIGNIFICANT CHANGE UP (ref 0–0)
NRBC # BLD: 1 /100 — HIGH (ref 0–0)
NRBC # BLD: 1 /100 — HIGH (ref 0–0)
NRBC # BLD: SIGNIFICANT CHANGE UP /100 WBCS (ref 0–0)
NRBC # BLD: SIGNIFICANT CHANGE UP /100 WBCS (ref 0–0)
OPIATES UR-MCNC: NEGATIVE — SIGNIFICANT CHANGE UP
OSMOLALITY UR: 502 MOSM/KG — SIGNIFICANT CHANGE UP (ref 300–900)
OVALOCYTES BLD QL SMEAR: SLIGHT — SIGNIFICANT CHANGE UP
PCO2 BLDV: 37 MMHG — LOW (ref 42–55)
PCP SPEC-MCNC: SIGNIFICANT CHANGE UP
PCP UR-MCNC: NEGATIVE — SIGNIFICANT CHANGE UP
PH BLDV: 7.27 — LOW (ref 7.32–7.43)
PH UR: 5 — SIGNIFICANT CHANGE UP (ref 5–8)
PH UR: 5.5 — SIGNIFICANT CHANGE UP (ref 5–8)
PH UR: 6 — SIGNIFICANT CHANGE UP (ref 5–8)
PH UR: 6 — SIGNIFICANT CHANGE UP (ref 5–8)
PHOSPHATE SERPL-MCNC: 1.7 MG/DL — LOW (ref 2.5–4.5)
PHOSPHATE SERPL-MCNC: 1.8 MG/DL — LOW (ref 2.5–4.5)
PHOSPHATE SERPL-MCNC: 1.9 MG/DL — LOW (ref 2.5–4.5)
PHOSPHATE SERPL-MCNC: 1.9 MG/DL — LOW (ref 2.5–4.5)
PHOSPHATE SERPL-MCNC: 2.1 MG/DL — LOW (ref 2.5–4.5)
PHOSPHATE SERPL-MCNC: 2.2 MG/DL — LOW (ref 2.5–4.5)
PHOSPHATE SERPL-MCNC: 2.3 MG/DL — LOW (ref 2.5–4.5)
PHOSPHATE SERPL-MCNC: 2.5 MG/DL — SIGNIFICANT CHANGE UP (ref 2.5–4.5)
PHOSPHATE SERPL-MCNC: 2.5 MG/DL — SIGNIFICANT CHANGE UP (ref 2.5–4.5)
PHOSPHATE SERPL-MCNC: 2.6 MG/DL — SIGNIFICANT CHANGE UP (ref 2.5–4.5)
PHOSPHATE SERPL-MCNC: 2.7 MG/DL — SIGNIFICANT CHANGE UP (ref 2.5–4.5)
PHOSPHATE SERPL-MCNC: 2.8 MG/DL — SIGNIFICANT CHANGE UP (ref 2.5–4.5)
PHOSPHATE SERPL-MCNC: 2.9 MG/DL — SIGNIFICANT CHANGE UP (ref 2.5–4.5)
PHOSPHATE SERPL-MCNC: 2.9 MG/DL — SIGNIFICANT CHANGE UP (ref 2.5–4.5)
PHOSPHATE SERPL-MCNC: 3 MG/DL — SIGNIFICANT CHANGE UP (ref 2.5–4.5)
PHOSPHATE SERPL-MCNC: 3 MG/DL — SIGNIFICANT CHANGE UP (ref 2.5–4.5)
PHOSPHATE SERPL-MCNC: 3.1 MG/DL — SIGNIFICANT CHANGE UP (ref 2.5–4.5)
PHOSPHATE SERPL-MCNC: 3.3 MG/DL — SIGNIFICANT CHANGE UP (ref 2.5–4.5)
PHOSPHATE SERPL-MCNC: 3.5 MG/DL — SIGNIFICANT CHANGE UP (ref 2.5–4.5)
PHOSPHATE SERPL-MCNC: 4.3 MG/DL — SIGNIFICANT CHANGE UP (ref 2.5–4.5)
PHOSPHATE SERPL-MCNC: 5.4 MG/DL — HIGH (ref 2.5–4.5)
PLAT MORPH BLD: ABNORMAL
PLAT MORPH BLD: NORMAL — SIGNIFICANT CHANGE UP
PLATELET # BLD AUTO: 141 K/UL — LOW (ref 150–400)
PLATELET # BLD AUTO: 145 K/UL — LOW (ref 150–400)
PLATELET # BLD AUTO: 155 K/UL — SIGNIFICANT CHANGE UP (ref 150–400)
PLATELET # BLD AUTO: 157 K/UL — SIGNIFICANT CHANGE UP (ref 150–400)
PLATELET # BLD AUTO: 166 K/UL — SIGNIFICANT CHANGE UP (ref 150–400)
PLATELET # BLD AUTO: 170 K/UL — SIGNIFICANT CHANGE UP (ref 150–400)
PLATELET # BLD AUTO: 182 K/UL — SIGNIFICANT CHANGE UP (ref 150–400)
PLATELET # BLD AUTO: 185 K/UL — SIGNIFICANT CHANGE UP (ref 150–400)
PLATELET # BLD AUTO: 193 K/UL — SIGNIFICANT CHANGE UP (ref 150–400)
PLATELET # BLD AUTO: 200 K/UL — SIGNIFICANT CHANGE UP (ref 150–400)
PLATELET # BLD AUTO: 216 K/UL — SIGNIFICANT CHANGE UP (ref 150–400)
PLATELET # BLD AUTO: 226 K/UL — SIGNIFICANT CHANGE UP (ref 150–400)
PLATELET # BLD AUTO: 241 K/UL — SIGNIFICANT CHANGE UP (ref 150–400)
PLATELET # BLD AUTO: 250 K/UL — SIGNIFICANT CHANGE UP (ref 150–400)
PLATELET # BLD AUTO: 253 K/UL — SIGNIFICANT CHANGE UP (ref 150–400)
PLATELET # BLD AUTO: 258 K/UL — SIGNIFICANT CHANGE UP (ref 150–400)
PLATELET # BLD AUTO: 275 K/UL — SIGNIFICANT CHANGE UP (ref 150–400)
PLATELET # BLD AUTO: 280 K/UL — SIGNIFICANT CHANGE UP (ref 150–400)
PLATELET # BLD AUTO: 289 K/UL — SIGNIFICANT CHANGE UP (ref 150–400)
PLATELET # BLD AUTO: 293 K/UL — SIGNIFICANT CHANGE UP (ref 150–400)
PLATELET # BLD AUTO: 296 K/UL — SIGNIFICANT CHANGE UP (ref 150–400)
PLATELET # BLD AUTO: 302 K/UL — SIGNIFICANT CHANGE UP (ref 150–400)
PLATELET # BLD AUTO: 304 K/UL — SIGNIFICANT CHANGE UP (ref 150–400)
PLATELET # BLD AUTO: 322 K/UL — SIGNIFICANT CHANGE UP (ref 150–400)
PLATELET # BLD AUTO: 326 K/UL — SIGNIFICANT CHANGE UP (ref 150–400)
PLATELET # BLD AUTO: 330 K/UL — SIGNIFICANT CHANGE UP (ref 150–400)
PLATELET # BLD AUTO: 332 K/UL — SIGNIFICANT CHANGE UP (ref 150–400)
PLATELET # BLD AUTO: 335 K/UL — SIGNIFICANT CHANGE UP (ref 150–400)
PLATELET # BLD AUTO: 338 K/UL — SIGNIFICANT CHANGE UP (ref 150–400)
PLATELET # BLD AUTO: 344 K/UL — SIGNIFICANT CHANGE UP (ref 150–400)
PLATELET # BLD AUTO: 347 K/UL — SIGNIFICANT CHANGE UP (ref 150–400)
PLATELET # BLD AUTO: 361 K/UL — SIGNIFICANT CHANGE UP (ref 150–400)
PLATELET # BLD AUTO: 369 K/UL — SIGNIFICANT CHANGE UP (ref 150–400)
PLATELET # BLD AUTO: 382 K/UL
PLATELET # BLD AUTO: 390 K/UL
PLATELET # BLD AUTO: 390 K/UL — SIGNIFICANT CHANGE UP (ref 150–400)
PLATELET # BLD AUTO: 403 K/UL — HIGH (ref 150–400)
PLATELET # BLD AUTO: 404 K/UL — HIGH (ref 150–400)
PLATELET # BLD AUTO: 449 K/UL — HIGH (ref 150–400)
PO2 BLDV: <33 MMHG — LOW (ref 25–45)
POCT ISTAT CREATININE: 1.3 MG/DL — SIGNIFICANT CHANGE UP (ref 0.5–1.3)
POIKILOCYTOSIS BLD QL AUTO: SIGNIFICANT CHANGE UP
POIKILOCYTOSIS BLD QL AUTO: SIGNIFICANT CHANGE UP
POIKILOCYTOSIS BLD QL AUTO: SLIGHT — SIGNIFICANT CHANGE UP
POLYCHROMASIA BLD QL SMEAR: SLIGHT — SIGNIFICANT CHANGE UP
POTASSIUM BLDV-SCNC: 3.6 MMOL/L — SIGNIFICANT CHANGE UP (ref 3.5–5.1)
POTASSIUM SERPL-MCNC: 2.4 MMOL/L — CRITICAL LOW (ref 3.5–5.3)
POTASSIUM SERPL-MCNC: 2.5 MMOL/L — CRITICAL LOW (ref 3.5–5.3)
POTASSIUM SERPL-MCNC: 2.6 MMOL/L — CRITICAL LOW (ref 3.5–5.3)
POTASSIUM SERPL-MCNC: 2.9 MMOL/L — CRITICAL LOW (ref 3.5–5.3)
POTASSIUM SERPL-MCNC: 2.9 MMOL/L — CRITICAL LOW (ref 3.5–5.3)
POTASSIUM SERPL-MCNC: 3 MMOL/L — LOW (ref 3.5–5.3)
POTASSIUM SERPL-MCNC: 3.1 MMOL/L — LOW (ref 3.5–5.3)
POTASSIUM SERPL-MCNC: 3.1 MMOL/L — LOW (ref 3.5–5.3)
POTASSIUM SERPL-MCNC: 3.2 MMOL/L — LOW (ref 3.5–5.3)
POTASSIUM SERPL-MCNC: 3.3 MMOL/L — LOW (ref 3.5–5.3)
POTASSIUM SERPL-MCNC: 3.3 MMOL/L — LOW (ref 3.5–5.3)
POTASSIUM SERPL-MCNC: 3.4 MMOL/L — LOW (ref 3.5–5.3)
POTASSIUM SERPL-MCNC: 3.5 MMOL/L — SIGNIFICANT CHANGE UP (ref 3.5–5.3)
POTASSIUM SERPL-MCNC: 3.6 MMOL/L — SIGNIFICANT CHANGE UP (ref 3.5–5.3)
POTASSIUM SERPL-MCNC: 3.7 MMOL/L — SIGNIFICANT CHANGE UP (ref 3.5–5.3)
POTASSIUM SERPL-MCNC: 3.9 MMOL/L — SIGNIFICANT CHANGE UP (ref 3.5–5.3)
POTASSIUM SERPL-MCNC: 4 MMOL/L — SIGNIFICANT CHANGE UP (ref 3.5–5.3)
POTASSIUM SERPL-MCNC: 4.1 MMOL/L — SIGNIFICANT CHANGE UP (ref 3.5–5.3)
POTASSIUM SERPL-MCNC: 4.2 MMOL/L — SIGNIFICANT CHANGE UP (ref 3.5–5.3)
POTASSIUM SERPL-MCNC: 4.3 MMOL/L — SIGNIFICANT CHANGE UP (ref 3.5–5.3)
POTASSIUM SERPL-MCNC: 4.4 MMOL/L — SIGNIFICANT CHANGE UP (ref 3.5–5.3)
POTASSIUM SERPL-MCNC: 4.4 MMOL/L — SIGNIFICANT CHANGE UP (ref 3.5–5.3)
POTASSIUM SERPL-MCNC: 4.5 MMOL/L — SIGNIFICANT CHANGE UP (ref 3.5–5.3)
POTASSIUM SERPL-MCNC: 4.5 MMOL/L — SIGNIFICANT CHANGE UP (ref 3.5–5.3)
POTASSIUM SERPL-MCNC: 4.6 MMOL/L — SIGNIFICANT CHANGE UP (ref 3.5–5.3)
POTASSIUM SERPL-MCNC: 4.7 MMOL/L — SIGNIFICANT CHANGE UP (ref 3.5–5.3)
POTASSIUM SERPL-MCNC: 5.4 MMOL/L — HIGH (ref 3.5–5.3)
POTASSIUM SERPL-MCNC: SIGNIFICANT CHANGE UP (ref 3.5–5.3)
POTASSIUM SERPL-SCNC: 2.4 MMOL/L — CRITICAL LOW (ref 3.5–5.3)
POTASSIUM SERPL-SCNC: 2.5 MMOL/L — CRITICAL LOW (ref 3.5–5.3)
POTASSIUM SERPL-SCNC: 2.6 MMOL/L — CRITICAL LOW (ref 3.5–5.3)
POTASSIUM SERPL-SCNC: 2.9 MMOL/L — CRITICAL LOW (ref 3.5–5.3)
POTASSIUM SERPL-SCNC: 2.9 MMOL/L — CRITICAL LOW (ref 3.5–5.3)
POTASSIUM SERPL-SCNC: 3 MMOL/L — LOW (ref 3.5–5.3)
POTASSIUM SERPL-SCNC: 3.1 MMOL/L — LOW (ref 3.5–5.3)
POTASSIUM SERPL-SCNC: 3.1 MMOL/L — LOW (ref 3.5–5.3)
POTASSIUM SERPL-SCNC: 3.2 MMOL/L — LOW (ref 3.5–5.3)
POTASSIUM SERPL-SCNC: 3.3 MMOL/L — LOW (ref 3.5–5.3)
POTASSIUM SERPL-SCNC: 3.3 MMOL/L — LOW (ref 3.5–5.3)
POTASSIUM SERPL-SCNC: 3.4 MMOL/L — LOW (ref 3.5–5.3)
POTASSIUM SERPL-SCNC: 3.5 MMOL/L — SIGNIFICANT CHANGE UP (ref 3.5–5.3)
POTASSIUM SERPL-SCNC: 3.6 MMOL/L — SIGNIFICANT CHANGE UP (ref 3.5–5.3)
POTASSIUM SERPL-SCNC: 3.7 MMOL/L — SIGNIFICANT CHANGE UP (ref 3.5–5.3)
POTASSIUM SERPL-SCNC: 3.9 MMOL/L — SIGNIFICANT CHANGE UP (ref 3.5–5.3)
POTASSIUM SERPL-SCNC: 4 MMOL/L — SIGNIFICANT CHANGE UP (ref 3.5–5.3)
POTASSIUM SERPL-SCNC: 4.1 MMOL/L
POTASSIUM SERPL-SCNC: 4.1 MMOL/L — SIGNIFICANT CHANGE UP (ref 3.5–5.3)
POTASSIUM SERPL-SCNC: 4.2 MMOL/L — SIGNIFICANT CHANGE UP (ref 3.5–5.3)
POTASSIUM SERPL-SCNC: 4.3 MMOL/L — SIGNIFICANT CHANGE UP (ref 3.5–5.3)
POTASSIUM SERPL-SCNC: 4.4 MMOL/L — SIGNIFICANT CHANGE UP (ref 3.5–5.3)
POTASSIUM SERPL-SCNC: 4.4 MMOL/L — SIGNIFICANT CHANGE UP (ref 3.5–5.3)
POTASSIUM SERPL-SCNC: 4.5 MMOL/L
POTASSIUM SERPL-SCNC: 4.5 MMOL/L — SIGNIFICANT CHANGE UP (ref 3.5–5.3)
POTASSIUM SERPL-SCNC: 4.5 MMOL/L — SIGNIFICANT CHANGE UP (ref 3.5–5.3)
POTASSIUM SERPL-SCNC: 4.6 MMOL/L
POTASSIUM SERPL-SCNC: 4.6 MMOL/L — SIGNIFICANT CHANGE UP (ref 3.5–5.3)
POTASSIUM SERPL-SCNC: 4.7 MMOL/L — SIGNIFICANT CHANGE UP (ref 3.5–5.3)
POTASSIUM SERPL-SCNC: 5.4 MMOL/L — HIGH (ref 3.5–5.3)
POTASSIUM SERPL-SCNC: 5.6 MMOL/L
POTASSIUM SERPL-SCNC: SIGNIFICANT CHANGE UP (ref 3.5–5.3)
PROCALCITONIN SERPL-MCNC: 0.38 NG/ML — HIGH (ref 0.02–0.1)
PROMYELOCYTES # FLD: 0.9 % — HIGH (ref 0–0)
PROMYELOCYTES # FLD: 0.9 % — HIGH (ref 0–0)
PROT SERPL-MCNC: 4.6 G/DL — LOW (ref 6–8.3)
PROT SERPL-MCNC: 4.8 G/DL — LOW (ref 6–8.3)
PROT SERPL-MCNC: 4.9 G/DL — LOW (ref 6–8.3)
PROT SERPL-MCNC: 4.9 G/DL — LOW (ref 6–8.3)
PROT SERPL-MCNC: 5 G/DL — LOW (ref 6–8.3)
PROT SERPL-MCNC: 5.1 G/DL — LOW (ref 6–8.3)
PROT SERPL-MCNC: 5.1 G/DL — LOW (ref 6–8.3)
PROT SERPL-MCNC: 5.2 G/DL — LOW (ref 6–8.3)
PROT SERPL-MCNC: 5.4 G/DL — LOW (ref 6–8.3)
PROT SERPL-MCNC: 5.5 G/DL — LOW (ref 6–8.3)
PROT SERPL-MCNC: 5.6 G/DL — LOW (ref 6–8.3)
PROT SERPL-MCNC: 5.7 G/DL — LOW (ref 6–8.3)
PROT SERPL-MCNC: 6.3 G/DL — SIGNIFICANT CHANGE UP (ref 6–8.3)
PROT SERPL-MCNC: 7.2 G/DL
PROT SERPL-MCNC: 7.3 G/DL
PROT SERPL-MCNC: 7.5 G/DL
PROT UR-MCNC: 30 MG/DL
PROT UR-MCNC: 30 MG/DL
PROT UR-MCNC: ABNORMAL MG/DL
PROT UR-MCNC: ABNORMAL MG/DL
PROTHROM AB SERPL-ACNC: 14.7 SEC — HIGH (ref 10.5–13.4)
PT BLD: 12.3 SEC
RBC # BLD: 2.18 M/UL — LOW (ref 4.2–5.8)
RBC # BLD: 2.19 M/UL — LOW (ref 4.2–5.8)
RBC # BLD: 2.23 M/UL — LOW (ref 4.2–5.8)
RBC # BLD: 2.4 M/UL — LOW (ref 4.2–5.8)
RBC # BLD: 2.61 M/UL — LOW (ref 4.2–5.8)
RBC # BLD: 2.66 M/UL — LOW (ref 4.2–5.8)
RBC # BLD: 2.67 M/UL — LOW (ref 4.2–5.8)
RBC # BLD: 2.69 M/UL — LOW (ref 4.2–5.8)
RBC # BLD: 2.77 M/UL — LOW (ref 4.2–5.8)
RBC # BLD: 2.77 M/UL — LOW (ref 4.2–5.8)
RBC # BLD: 2.88 M/UL — LOW (ref 4.2–5.8)
RBC # BLD: 2.9 M/UL — LOW (ref 4.2–5.8)
RBC # BLD: 2.95 M/UL — LOW (ref 4.2–5.8)
RBC # BLD: 2.96 M/UL — LOW (ref 4.2–5.8)
RBC # BLD: 3.03 M/UL — LOW (ref 4.2–5.8)
RBC # BLD: 3.04 M/UL — LOW (ref 4.2–5.8)
RBC # BLD: 3.11 M/UL — LOW (ref 4.2–5.8)
RBC # BLD: 3.26 M/UL — LOW (ref 4.2–5.8)
RBC # BLD: 3.31 M/UL — LOW (ref 4.2–5.8)
RBC # BLD: 3.36 M/UL — LOW (ref 4.2–5.8)
RBC # BLD: 3.36 M/UL — LOW (ref 4.2–5.8)
RBC # BLD: 3.37 M/UL — LOW (ref 4.2–5.8)
RBC # BLD: 3.5 M/UL — LOW (ref 4.2–5.8)
RBC # BLD: 3.52 M/UL — LOW (ref 4.2–5.8)
RBC # BLD: 3.56 M/UL — LOW (ref 4.2–5.8)
RBC # BLD: 3.59 M/UL — LOW (ref 4.2–5.8)
RBC # BLD: 3.6 M/UL — LOW (ref 4.2–5.8)
RBC # BLD: 3.61 M/UL — LOW (ref 4.2–5.8)
RBC # BLD: 3.63 M/UL — LOW (ref 4.2–5.8)
RBC # BLD: 3.67 M/UL — LOW (ref 4.2–5.8)
RBC # BLD: 3.69 M/UL — LOW (ref 4.2–5.8)
RBC # BLD: 3.73 M/UL — LOW (ref 4.2–5.8)
RBC # BLD: 3.76 M/UL — LOW (ref 4.2–5.8)
RBC # BLD: 3.76 M/UL — LOW (ref 4.2–5.8)
RBC # BLD: 3.81 M/UL — LOW (ref 4.2–5.8)
RBC # BLD: 4.62 M/UL — SIGNIFICANT CHANGE UP (ref 4.2–5.8)
RBC # BLD: 4.93 M/UL
RBC # BLD: 5.61 M/UL
RBC # FLD: 14.5 % — SIGNIFICANT CHANGE UP (ref 10.3–14.5)
RBC # FLD: 14.6 % — HIGH (ref 10.3–14.5)
RBC # FLD: 14.9 % — HIGH (ref 10.3–14.5)
RBC # FLD: 15.3 % — HIGH (ref 10.3–14.5)
RBC # FLD: 15.4 % — HIGH (ref 10.3–14.5)
RBC # FLD: 15.4 % — HIGH (ref 10.3–14.5)
RBC # FLD: 15.6 % — HIGH (ref 10.3–14.5)
RBC # FLD: 15.9 % — HIGH (ref 10.3–14.5)
RBC # FLD: 15.9 % — HIGH (ref 10.3–14.5)
RBC # FLD: 16.4 % — HIGH (ref 10.3–14.5)
RBC # FLD: 16.7 % — HIGH (ref 10.3–14.5)
RBC # FLD: 18.6 % — HIGH (ref 10.3–14.5)
RBC # FLD: 18.6 % — HIGH (ref 10.3–14.5)
RBC # FLD: 18.9 % — HIGH (ref 10.3–14.5)
RBC # FLD: 18.9 % — HIGH (ref 10.3–14.5)
RBC # FLD: 19 % — HIGH (ref 10.3–14.5)
RBC # FLD: 19.6 % — HIGH (ref 10.3–14.5)
RBC # FLD: 19.6 % — HIGH (ref 10.3–14.5)
RBC # FLD: 19.7 % — HIGH (ref 10.3–14.5)
RBC # FLD: 19.8 %
RBC # FLD: 19.8 % — HIGH (ref 10.3–14.5)
RBC # FLD: 19.8 % — HIGH (ref 10.3–14.5)
RBC # FLD: 19.9 %
RBC # FLD: 19.9 % — HIGH (ref 10.3–14.5)
RBC # FLD: 20 % — HIGH (ref 10.3–14.5)
RBC # FLD: 20.2 % — HIGH (ref 10.3–14.5)
RBC # FLD: 20.3 % — HIGH (ref 10.3–14.5)
RBC # FLD: 20.3 % — HIGH (ref 10.3–14.5)
RBC # FLD: 20.6 % — HIGH (ref 10.3–14.5)
RBC # FLD: 20.8 % — HIGH (ref 10.3–14.5)
RBC # FLD: 20.9 % — HIGH (ref 10.3–14.5)
RBC # FLD: 21.1 % — HIGH (ref 10.3–14.5)
RBC # FLD: 21.5 % — HIGH (ref 10.3–14.5)
RBC # FLD: 21.8 % — HIGH (ref 10.3–14.5)
RBC # FLD: 22 % — HIGH (ref 10.3–14.5)
RBC BLD AUTO: ABNORMAL
RBC CASTS # UR COMP ASSIST: < 5 /HPF — SIGNIFICANT CHANGE UP
RBC CASTS # UR COMP ASSIST: ABNORMAL /HPF
RBC CASTS # UR COMP ASSIST: ABNORMAL /HPF
RETICS #: 54.2 K/UL — SIGNIFICANT CHANGE UP (ref 25–125)
RETICS/RBC NFR: 2.1 % — SIGNIFICANT CHANGE UP (ref 0.5–2.5)
RH IG SCN BLD-IMP: POSITIVE — SIGNIFICANT CHANGE UP
S AUREUS DNA NOSE QL NAA+PROBE: NEGATIVE — SIGNIFICANT CHANGE UP
S PNEUM AG UR QL: NEGATIVE — SIGNIFICANT CHANGE UP
SAO2 % BLDV: 7.3 % — LOW (ref 67–88)
SARS-COV-2 RNA SPEC QL NAA+PROBE: NEGATIVE — SIGNIFICANT CHANGE UP
SARS-COV-2 RNA SPEC QL NAA+PROBE: NEGATIVE — SIGNIFICANT CHANGE UP
SARS-COV-2 RNA SPEC QL NAA+PROBE: SIGNIFICANT CHANGE UP
SMUDGE CELLS # BLD: PRESENT — SIGNIFICANT CHANGE UP
SODIUM SERPL-SCNC: 134 MMOL/L — LOW (ref 135–145)
SODIUM SERPL-SCNC: 136 MMOL/L — SIGNIFICANT CHANGE UP (ref 135–145)
SODIUM SERPL-SCNC: 137 MMOL/L
SODIUM SERPL-SCNC: 137 MMOL/L — SIGNIFICANT CHANGE UP (ref 135–145)
SODIUM SERPL-SCNC: 138 MMOL/L — SIGNIFICANT CHANGE UP (ref 135–145)
SODIUM SERPL-SCNC: 138 MMOL/L — SIGNIFICANT CHANGE UP (ref 135–145)
SODIUM SERPL-SCNC: 139 MMOL/L
SODIUM SERPL-SCNC: 139 MMOL/L — SIGNIFICANT CHANGE UP (ref 135–145)
SODIUM SERPL-SCNC: 140 MMOL/L
SODIUM SERPL-SCNC: 140 MMOL/L — SIGNIFICANT CHANGE UP (ref 135–145)
SODIUM SERPL-SCNC: 141 MMOL/L — SIGNIFICANT CHANGE UP (ref 135–145)
SODIUM SERPL-SCNC: 142 MMOL/L
SODIUM SERPL-SCNC: 143 MMOL/L — SIGNIFICANT CHANGE UP (ref 135–145)
SODIUM SERPL-SCNC: 144 MMOL/L — SIGNIFICANT CHANGE UP (ref 135–145)
SODIUM UR-SCNC: <20 MMOL/L — SIGNIFICANT CHANGE UP
SP GR SPEC: >=1.03 — SIGNIFICANT CHANGE UP (ref 1–1.03)
SPECIMEN SOURCE: SIGNIFICANT CHANGE UP
SPHEROCYTES BLD QL SMEAR: SIGNIFICANT CHANGE UP
SPHEROCYTES BLD QL SMEAR: SLIGHT — SIGNIFICANT CHANGE UP
SURGICAL PATHOLOGY STUDY: SIGNIFICANT CHANGE UP
SURGICAL PATHOLOGY STUDY: SIGNIFICANT CHANGE UP
T4 FREE SERPL-MCNC: 0.67 NG/DL — LOW (ref 0.93–1.7)
T4 FREE SERPL-MCNC: 1 NG/DL
T4 FREE SERPL-MCNC: 1 NG/DL
T4 FREE SERPL-MCNC: 1.3 NG/DL
THC UR QL: NEGATIVE — SIGNIFICANT CHANGE UP
TIBC SERPL-MCNC: 128 UG/DL — LOW (ref 220–430)
TRANSFERRIN SERPL-MCNC: 114 MG/DL — LOW (ref 200–360)
TSH SERPL-ACNC: 12.5 UIU/ML
TSH SERPL-ACNC: 7.19 UIU/ML
TSH SERPL-MCNC: 10.81 UIU/ML — HIGH (ref 0.27–4.2)
TTG IGA SER-ACNC: 1.3 U/ML — SIGNIFICANT CHANGE UP
TTG IGA SER-ACNC: 1.3 U/ML — SIGNIFICANT CHANGE UP
TTG IGA SER-ACNC: 1.6 U/ML — SIGNIFICANT CHANGE UP
TTG IGA SER-ACNC: NEGATIVE — SIGNIFICANT CHANGE UP
TTG IGG SER IA-ACNC: NEGATIVE — SIGNIFICANT CHANGE UP
TTG IGG SER-ACNC: <1.2 U/ML — SIGNIFICANT CHANGE UP
UIBC SERPL-MCNC: 88 UG/DL — LOW (ref 110–370)
UROBILINOGEN FLD QL: 0.2 E.U./DL — SIGNIFICANT CHANGE UP
UROBILINOGEN FLD QL: 1 E.U./DL — SIGNIFICANT CHANGE UP
VANCOMYCIN TROUGH SERPL-MCNC: 19.7 UG/ML — SIGNIFICANT CHANGE UP (ref 10–20)
VARIANT LYMPHS # BLD: 0.9 % — SIGNIFICANT CHANGE UP (ref 0–6)
VIT B12 SERPL-MCNC: 500 PG/ML — SIGNIFICANT CHANGE UP (ref 232–1245)
WBC # BLD: 10.53 K/UL — HIGH (ref 3.8–10.5)
WBC # BLD: 11.36 K/UL — HIGH (ref 3.8–10.5)
WBC # BLD: 11.47 K/UL — HIGH (ref 3.8–10.5)
WBC # BLD: 11.9 K/UL — HIGH (ref 3.8–10.5)
WBC # BLD: 12.03 K/UL — HIGH (ref 3.8–10.5)
WBC # BLD: 12.32 K/UL — HIGH (ref 3.8–10.5)
WBC # BLD: 12.43 K/UL — HIGH (ref 3.8–10.5)
WBC # BLD: 13.19 K/UL — HIGH (ref 3.8–10.5)
WBC # BLD: 13.61 K/UL — HIGH (ref 3.8–10.5)
WBC # BLD: 14.11 K/UL — HIGH (ref 3.8–10.5)
WBC # BLD: 16.18 K/UL — HIGH (ref 3.8–10.5)
WBC # BLD: 16.24 K/UL — HIGH (ref 3.8–10.5)
WBC # BLD: 17.16 K/UL — HIGH (ref 3.8–10.5)
WBC # BLD: 17.49 K/UL — HIGH (ref 3.8–10.5)
WBC # BLD: 17.54 K/UL — HIGH (ref 3.8–10.5)
WBC # BLD: 19.07 K/UL — HIGH (ref 3.8–10.5)
WBC # BLD: 20.17 K/UL — HIGH (ref 3.8–10.5)
WBC # BLD: 23.69 K/UL — HIGH (ref 3.8–10.5)
WBC # BLD: 23.87 K/UL — HIGH (ref 3.8–10.5)
WBC # BLD: 25.19 K/UL — HIGH (ref 3.8–10.5)
WBC # BLD: 3.54 K/UL — LOW (ref 3.8–10.5)
WBC # BLD: 3.84 K/UL — SIGNIFICANT CHANGE UP (ref 3.8–10.5)
WBC # BLD: 4.07 K/UL — SIGNIFICANT CHANGE UP (ref 3.8–10.5)
WBC # BLD: 4.1 K/UL — SIGNIFICANT CHANGE UP (ref 3.8–10.5)
WBC # BLD: 4.26 K/UL — SIGNIFICANT CHANGE UP (ref 3.8–10.5)
WBC # BLD: 4.83 K/UL — SIGNIFICANT CHANGE UP (ref 3.8–10.5)
WBC # BLD: 5.53 K/UL — SIGNIFICANT CHANGE UP (ref 3.8–10.5)
WBC # BLD: 5.94 K/UL — SIGNIFICANT CHANGE UP (ref 3.8–10.5)
WBC # BLD: 5.97 K/UL — SIGNIFICANT CHANGE UP (ref 3.8–10.5)
WBC # BLD: 6.35 K/UL — SIGNIFICANT CHANGE UP (ref 3.8–10.5)
WBC # BLD: 7.07 K/UL — SIGNIFICANT CHANGE UP (ref 3.8–10.5)
WBC # BLD: 7.25 K/UL — SIGNIFICANT CHANGE UP (ref 3.8–10.5)
WBC # BLD: 7.49 K/UL — SIGNIFICANT CHANGE UP (ref 3.8–10.5)
WBC # BLD: 7.58 K/UL — SIGNIFICANT CHANGE UP (ref 3.8–10.5)
WBC # BLD: 7.8 K/UL — SIGNIFICANT CHANGE UP (ref 3.8–10.5)
WBC # BLD: 8.35 K/UL — SIGNIFICANT CHANGE UP (ref 3.8–10.5)
WBC # BLD: 9.11 K/UL — SIGNIFICANT CHANGE UP (ref 3.8–10.5)
WBC # FLD AUTO: 10.24 K/UL
WBC # FLD AUTO: 10.53 K/UL — HIGH (ref 3.8–10.5)
WBC # FLD AUTO: 11.36 K/UL — HIGH (ref 3.8–10.5)
WBC # FLD AUTO: 11.47 K/UL — HIGH (ref 3.8–10.5)
WBC # FLD AUTO: 11.9 K/UL — HIGH (ref 3.8–10.5)
WBC # FLD AUTO: 12.03 K/UL — HIGH (ref 3.8–10.5)
WBC # FLD AUTO: 12.32 K/UL — HIGH (ref 3.8–10.5)
WBC # FLD AUTO: 12.43 K/UL — HIGH (ref 3.8–10.5)
WBC # FLD AUTO: 13.19 K/UL — HIGH (ref 3.8–10.5)
WBC # FLD AUTO: 13.61 K/UL — HIGH (ref 3.8–10.5)
WBC # FLD AUTO: 14.11 K/UL — HIGH (ref 3.8–10.5)
WBC # FLD AUTO: 16.18 K/UL — HIGH (ref 3.8–10.5)
WBC # FLD AUTO: 16.24 K/UL — HIGH (ref 3.8–10.5)
WBC # FLD AUTO: 17.16 K/UL — HIGH (ref 3.8–10.5)
WBC # FLD AUTO: 17.49 K/UL — HIGH (ref 3.8–10.5)
WBC # FLD AUTO: 17.54 K/UL — HIGH (ref 3.8–10.5)
WBC # FLD AUTO: 19.07 K/UL — HIGH (ref 3.8–10.5)
WBC # FLD AUTO: 20.17 K/UL — HIGH (ref 3.8–10.5)
WBC # FLD AUTO: 23.69 K/UL — HIGH (ref 3.8–10.5)
WBC # FLD AUTO: 23.87 K/UL — HIGH (ref 3.8–10.5)
WBC # FLD AUTO: 25.19 K/UL — HIGH (ref 3.8–10.5)
WBC # FLD AUTO: 3.54 K/UL — LOW (ref 3.8–10.5)
WBC # FLD AUTO: 3.84 K/UL — SIGNIFICANT CHANGE UP (ref 3.8–10.5)
WBC # FLD AUTO: 4.07 K/UL — SIGNIFICANT CHANGE UP (ref 3.8–10.5)
WBC # FLD AUTO: 4.1 K/UL — SIGNIFICANT CHANGE UP (ref 3.8–10.5)
WBC # FLD AUTO: 4.26 K/UL — SIGNIFICANT CHANGE UP (ref 3.8–10.5)
WBC # FLD AUTO: 4.83 K/UL — SIGNIFICANT CHANGE UP (ref 3.8–10.5)
WBC # FLD AUTO: 5.53 K/UL — SIGNIFICANT CHANGE UP (ref 3.8–10.5)
WBC # FLD AUTO: 5.94 K/UL — SIGNIFICANT CHANGE UP (ref 3.8–10.5)
WBC # FLD AUTO: 5.97 K/UL — SIGNIFICANT CHANGE UP (ref 3.8–10.5)
WBC # FLD AUTO: 6.35 K/UL — SIGNIFICANT CHANGE UP (ref 3.8–10.5)
WBC # FLD AUTO: 7.07 K/UL — SIGNIFICANT CHANGE UP (ref 3.8–10.5)
WBC # FLD AUTO: 7.25 K/UL — SIGNIFICANT CHANGE UP (ref 3.8–10.5)
WBC # FLD AUTO: 7.49 K/UL — SIGNIFICANT CHANGE UP (ref 3.8–10.5)
WBC # FLD AUTO: 7.58 K/UL — SIGNIFICANT CHANGE UP (ref 3.8–10.5)
WBC # FLD AUTO: 7.8 K/UL — SIGNIFICANT CHANGE UP (ref 3.8–10.5)
WBC # FLD AUTO: 8.27 K/UL
WBC # FLD AUTO: 8.35 K/UL — SIGNIFICANT CHANGE UP (ref 3.8–10.5)
WBC # FLD AUTO: 9.11 K/UL — SIGNIFICANT CHANGE UP (ref 3.8–10.5)
WBC UR QL: < 5 /HPF — SIGNIFICANT CHANGE UP
WBC UR QL: > 10 /HPF
WBC UR QL: ABNORMAL /HPF

## 2022-01-01 PROCEDURE — 99232 SBSQ HOSP IP/OBS MODERATE 35: CPT | Mod: GC

## 2022-01-01 PROCEDURE — 96411 CHEMO IV PUSH ADDL DRUG: CPT

## 2022-01-01 PROCEDURE — 99221 1ST HOSP IP/OBS SF/LOW 40: CPT

## 2022-01-01 PROCEDURE — 87507 IADNA-DNA/RNA PROBE TQ 12-25: CPT

## 2022-01-01 PROCEDURE — U0005: CPT

## 2022-01-01 PROCEDURE — 80048 BASIC METABOLIC PNL TOTAL CA: CPT

## 2022-01-01 PROCEDURE — 99233 SBSQ HOSP IP/OBS HIGH 50: CPT

## 2022-01-01 PROCEDURE — 74018 RADEX ABDOMEN 1 VIEW: CPT | Mod: 26

## 2022-01-01 PROCEDURE — 99231 SBSQ HOSP IP/OBS SF/LOW 25: CPT

## 2022-01-01 PROCEDURE — 88305 TISSUE EXAM BY PATHOLOGIST: CPT | Mod: 26

## 2022-01-01 PROCEDURE — 72125 CT NECK SPINE W/O DYE: CPT | Mod: MA

## 2022-01-01 PROCEDURE — 86900 BLOOD TYPING SEROLOGIC ABO: CPT

## 2022-01-01 PROCEDURE — 36415 COLL VENOUS BLD VENIPUNCTURE: CPT

## 2022-01-01 PROCEDURE — 99497 ADVNCD CARE PLAN 30 MIN: CPT | Mod: 25

## 2022-01-01 PROCEDURE — 99215 OFFICE O/P EST HI 40 MIN: CPT

## 2022-01-01 PROCEDURE — 84439 ASSAY OF FREE THYROXINE: CPT

## 2022-01-01 PROCEDURE — 99232 SBSQ HOSP IP/OBS MODERATE 35: CPT

## 2022-01-01 PROCEDURE — 88173 CYTOPATH EVAL FNA REPORT: CPT | Mod: 26

## 2022-01-01 PROCEDURE — 82570 ASSAY OF URINE CREATININE: CPT

## 2022-01-01 PROCEDURE — 96413 CHEMO IV INFUSION 1 HR: CPT

## 2022-01-01 PROCEDURE — 82746 ASSAY OF FOLIC ACID SERUM: CPT

## 2022-01-01 PROCEDURE — 93010 ELECTROCARDIOGRAM REPORT: CPT

## 2022-01-01 PROCEDURE — 85652 RBC SED RATE AUTOMATED: CPT

## 2022-01-01 PROCEDURE — 88342 IMHCHEM/IMCYTCHM 1ST ANTB: CPT

## 2022-01-01 PROCEDURE — 99205 OFFICE O/P NEW HI 60 MIN: CPT

## 2022-01-01 PROCEDURE — 71045 X-RAY EXAM CHEST 1 VIEW: CPT | Mod: 26

## 2022-01-01 PROCEDURE — 82565 ASSAY OF CREATININE: CPT

## 2022-01-01 PROCEDURE — 97110 THERAPEUTIC EXERCISES: CPT

## 2022-01-01 PROCEDURE — 88341 IMHCHEM/IMCYTCHM EA ADD ANTB: CPT

## 2022-01-01 PROCEDURE — 97535 SELF CARE MNGMENT TRAINING: CPT

## 2022-01-01 PROCEDURE — 70553 MRI BRAIN STEM W/O & W/DYE: CPT | Mod: 26

## 2022-01-01 PROCEDURE — 85025 COMPLETE CBC W/AUTO DIFF WBC: CPT

## 2022-01-01 PROCEDURE — 99233 SBSQ HOSP IP/OBS HIGH 50: CPT | Mod: GC

## 2022-01-01 PROCEDURE — 96367 TX/PROPH/DG ADDL SEQ IV INF: CPT

## 2022-01-01 PROCEDURE — 85730 THROMBOPLASTIN TIME PARTIAL: CPT

## 2022-01-01 PROCEDURE — 87324 CLOSTRIDIUM AG IA: CPT

## 2022-01-01 PROCEDURE — 71260 CT THORAX DX C+: CPT | Mod: 26

## 2022-01-01 PROCEDURE — 87040 BLOOD CULTURE FOR BACTERIA: CPT

## 2022-01-01 PROCEDURE — 87086 URINE CULTURE/COLONY COUNT: CPT

## 2022-01-01 PROCEDURE — 84132 ASSAY OF SERUM POTASSIUM: CPT

## 2022-01-01 PROCEDURE — 87899 AGENT NOS ASSAY W/OPTIC: CPT

## 2022-01-01 PROCEDURE — 87640 STAPH A DNA AMP PROBE: CPT

## 2022-01-01 PROCEDURE — 83735 ASSAY OF MAGNESIUM: CPT

## 2022-01-01 PROCEDURE — 85027 COMPLETE CBC AUTOMATED: CPT

## 2022-01-01 PROCEDURE — 76942 ECHO GUIDE FOR BIOPSY: CPT

## 2022-01-01 PROCEDURE — 70553 MRI BRAIN STEM W/O & W/DYE: CPT

## 2022-01-01 PROCEDURE — 93010 ELECTROCARDIOGRAM REPORT: CPT | Mod: 77

## 2022-01-01 PROCEDURE — A9552: CPT

## 2022-01-01 PROCEDURE — 88173 CYTOPATH EVAL FNA REPORT: CPT

## 2022-01-01 PROCEDURE — 99443: CPT

## 2022-01-01 PROCEDURE — A9585: CPT

## 2022-01-01 PROCEDURE — 88342 IMHCHEM/IMCYTCHM 1ST ANTB: CPT | Mod: 26,59

## 2022-01-01 PROCEDURE — 84100 ASSAY OF PHOSPHORUS: CPT

## 2022-01-01 PROCEDURE — 70450 CT HEAD/BRAIN W/O DYE: CPT | Mod: 26,MA

## 2022-01-01 PROCEDURE — 87635 SARS-COV-2 COVID-19 AMP PRB: CPT

## 2022-01-01 PROCEDURE — 82040 ASSAY OF SERUM ALBUMIN: CPT

## 2022-01-01 PROCEDURE — 70450 CT HEAD/BRAIN W/O DYE: CPT | Mod: MA

## 2022-01-01 PROCEDURE — 38505 NEEDLE BIOPSY LYMPH NODES: CPT

## 2022-01-01 PROCEDURE — P9016: CPT

## 2022-01-01 PROCEDURE — 83605 ASSAY OF LACTIC ACID: CPT

## 2022-01-01 PROCEDURE — 87449 NOS EACH ORGANISM AG IA: CPT

## 2022-01-01 PROCEDURE — 71250 CT THORAX DX C-: CPT | Mod: MA

## 2022-01-01 PROCEDURE — 88305 TISSUE EXAM BY PATHOLOGIST: CPT | Mod: 26,59

## 2022-01-01 PROCEDURE — 78816 PET IMAGE W/CT FULL BODY: CPT

## 2022-01-01 PROCEDURE — 93971 EXTREMITY STUDY: CPT | Mod: 26,LT

## 2022-01-01 PROCEDURE — 99291 CRITICAL CARE FIRST HOUR: CPT | Mod: 25

## 2022-01-01 PROCEDURE — 87641 MR-STAPH DNA AMP PROBE: CPT

## 2022-01-01 PROCEDURE — 38505 NEEDLE BIOPSY LYMPH NODES: CPT | Mod: RT

## 2022-01-01 PROCEDURE — 96374 THER/PROPH/DIAG INJ IV PUSH: CPT

## 2022-01-01 PROCEDURE — 99213 OFFICE O/P EST LOW 20 MIN: CPT | Mod: 25

## 2022-01-01 PROCEDURE — 88344 IMHCHEM/IMCYTCHM EA MLT ANTB: CPT

## 2022-01-01 PROCEDURE — 99291 CRITICAL CARE FIRST HOUR: CPT

## 2022-01-01 PROCEDURE — 74018 RADEX ABDOMEN 1 VIEW: CPT

## 2022-01-01 PROCEDURE — 97161 PT EVAL LOW COMPLEX 20 MIN: CPT

## 2022-01-01 PROCEDURE — 96375 TX/PRO/DX INJ NEW DRUG ADDON: CPT

## 2022-01-01 PROCEDURE — 82803 BLOOD GASES ANY COMBINATION: CPT

## 2022-01-01 PROCEDURE — 71046 X-RAY EXAM CHEST 2 VIEWS: CPT | Mod: 26

## 2022-01-01 PROCEDURE — 86364 TISS TRNSGLTMNASE EA IG CLAS: CPT

## 2022-01-01 PROCEDURE — 82962 GLUCOSE BLOOD TEST: CPT

## 2022-01-01 PROCEDURE — 36430 TRANSFUSION BLD/BLD COMPNT: CPT

## 2022-01-01 PROCEDURE — 74176 CT ABD & PELVIS W/O CONTRAST: CPT

## 2022-01-01 PROCEDURE — 99223 1ST HOSP IP/OBS HIGH 75: CPT

## 2022-01-01 PROCEDURE — 74176 CT ABD & PELVIS W/O CONTRAST: CPT | Mod: 26

## 2022-01-01 PROCEDURE — 96417 CHEMO IV INFUS EACH ADDL SEQ: CPT

## 2022-01-01 PROCEDURE — 74177 CT ABD & PELVIS W/CONTRAST: CPT

## 2022-01-01 PROCEDURE — 82728 ASSAY OF FERRITIN: CPT

## 2022-01-01 PROCEDURE — 83540 ASSAY OF IRON: CPT

## 2022-01-01 PROCEDURE — 71260 CT THORAX DX C+: CPT

## 2022-01-01 PROCEDURE — 71045 X-RAY EXAM CHEST 1 VIEW: CPT

## 2022-01-01 PROCEDURE — U0003: CPT

## 2022-01-01 PROCEDURE — 82330 ASSAY OF CALCIUM: CPT

## 2022-01-01 PROCEDURE — 86901 BLOOD TYPING SEROLOGIC RH(D): CPT

## 2022-01-01 PROCEDURE — 99222 1ST HOSP IP/OBS MODERATE 55: CPT | Mod: GC

## 2022-01-01 PROCEDURE — 85045 AUTOMATED RETICULOCYTE COUNT: CPT

## 2022-01-01 PROCEDURE — 86140 C-REACTIVE PROTEIN: CPT

## 2022-01-01 PROCEDURE — 88344 IMHCHEM/IMCYTCHM EA MLT ANTB: CPT | Mod: 26

## 2022-01-01 PROCEDURE — 72125 CT NECK SPINE W/O DYE: CPT | Mod: 26,MA

## 2022-01-01 PROCEDURE — 88342 IMHCHEM/IMCYTCHM 1ST ANTB: CPT | Mod: 26

## 2022-01-01 PROCEDURE — 84466 ASSAY OF TRANSFERRIN: CPT

## 2022-01-01 PROCEDURE — 99498 ADVNCD CARE PLAN ADDL 30 MIN: CPT | Mod: 25

## 2022-01-01 PROCEDURE — 84145 PROCALCITONIN (PCT): CPT

## 2022-01-01 PROCEDURE — 96372 THER/PROPH/DIAG INJ SC/IM: CPT

## 2022-01-01 PROCEDURE — 88312 SPECIAL STAINS GROUP 1: CPT

## 2022-01-01 PROCEDURE — 83935 ASSAY OF URINE OSMOLALITY: CPT

## 2022-01-01 PROCEDURE — 80202 ASSAY OF VANCOMYCIN: CPT

## 2022-01-01 PROCEDURE — 80053 COMPREHEN METABOLIC PANEL: CPT

## 2022-01-01 PROCEDURE — 84295 ASSAY OF SERUM SODIUM: CPT

## 2022-01-01 PROCEDURE — 84443 ASSAY THYROID STIM HORMONE: CPT

## 2022-01-01 PROCEDURE — 99358 PROLONG SERVICE W/O CONTACT: CPT | Mod: NC

## 2022-01-01 PROCEDURE — 82607 VITAMIN B-12: CPT

## 2022-01-01 PROCEDURE — 45331 SIGMOIDOSCOPY AND BIOPSY: CPT | Mod: GC,59

## 2022-01-01 PROCEDURE — 71250 CT THORAX DX C-: CPT | Mod: 26,MA

## 2022-01-01 PROCEDURE — 86803 HEPATITIS C AB TEST: CPT

## 2022-01-01 PROCEDURE — 88305 TISSUE EXAM BY PATHOLOGIST: CPT

## 2022-01-01 PROCEDURE — 88341 IMHCHEM/IMCYTCHM EA ADD ANTB: CPT | Mod: 26,59

## 2022-01-01 PROCEDURE — 85379 FIBRIN DEGRADATION QUANT: CPT

## 2022-01-01 PROCEDURE — 83550 IRON BINDING TEST: CPT

## 2022-01-01 PROCEDURE — 84300 ASSAY OF URINE SODIUM: CPT

## 2022-01-01 PROCEDURE — 74177 CT ABD & PELVIS W/CONTRAST: CPT | Mod: 26

## 2022-01-01 PROCEDURE — 43239 EGD BIOPSY SINGLE/MULTIPLE: CPT | Mod: GC

## 2022-01-01 PROCEDURE — 83615 LACTATE (LD) (LDH) ENZYME: CPT

## 2022-01-01 PROCEDURE — 83010 ASSAY OF HAPTOGLOBIN QUANT: CPT

## 2022-01-01 PROCEDURE — 86850 RBC ANTIBODY SCREEN: CPT

## 2022-01-01 PROCEDURE — 76942 ECHO GUIDE FOR BIOPSY: CPT | Mod: 26

## 2022-01-01 PROCEDURE — 82784 ASSAY IGA/IGD/IGG/IGM EACH: CPT

## 2022-01-01 PROCEDURE — 83520 IMMUNOASSAY QUANT NOS NONAB: CPT

## 2022-01-01 PROCEDURE — 43235 EGD DIAGNOSTIC BRUSH WASH: CPT | Mod: GC

## 2022-01-01 PROCEDURE — 85610 PROTHROMBIN TIME: CPT

## 2022-01-01 PROCEDURE — 86923 COMPATIBILITY TEST ELECTRIC: CPT

## 2022-01-01 PROCEDURE — 90662 IIV NO PRSV INCREASED AG IM: CPT

## 2022-01-01 PROCEDURE — 80307 DRUG TEST PRSMV CHEM ANLYZR: CPT

## 2022-01-01 PROCEDURE — 88341 IMHCHEM/IMCYTCHM EA ADD ANTB: CPT | Mod: 26

## 2022-01-01 PROCEDURE — 81001 URINALYSIS AUTO W/SCOPE: CPT

## 2022-01-01 PROCEDURE — 71046 X-RAY EXAM CHEST 2 VIEWS: CPT

## 2022-01-01 PROCEDURE — 74176 CT ABD & PELVIS W/O CONTRAST: CPT | Mod: 26,MA

## 2022-01-01 PROCEDURE — 93971 EXTREMITY STUDY: CPT

## 2022-01-01 PROCEDURE — 88312 SPECIAL STAINS GROUP 1: CPT | Mod: 26

## 2022-01-01 PROCEDURE — 97530 THERAPEUTIC ACTIVITIES: CPT

## 2022-01-01 PROCEDURE — 93005 ELECTROCARDIOGRAM TRACING: CPT

## 2022-01-01 PROCEDURE — 78816 PET IMAGE W/CT FULL BODY: CPT | Mod: 26

## 2022-01-01 PROCEDURE — 97116 GAIT TRAINING THERAPY: CPT

## 2022-01-01 RX ORDER — POTASSIUM CHLORIDE 20 MEQ
40 PACKET (EA) ORAL ONCE
Refills: 0 | Status: COMPLETED | OUTPATIENT
Start: 2022-01-01 | End: 2022-01-01

## 2022-01-01 RX ORDER — SUCRALFATE 1 G
1 TABLET ORAL EVERY 6 HOURS
Refills: 0 | Status: DISCONTINUED | OUTPATIENT
Start: 2022-01-01 | End: 2022-01-01

## 2022-01-01 RX ORDER — QUETIAPINE FUMARATE 200 MG/1
200 TABLET, FILM COATED ORAL AT BEDTIME
Refills: 0 | Status: DISCONTINUED | OUTPATIENT
Start: 2022-01-01 | End: 2022-01-01

## 2022-01-01 RX ORDER — SODIUM CHLORIDE 9 MG/ML
1200 INJECTION, SOLUTION INTRAVENOUS
Refills: 0 | Status: DISCONTINUED | OUTPATIENT
Start: 2022-01-01 | End: 2022-01-01

## 2022-01-01 RX ORDER — UMECLIDINIUM 62.5 UG/1
62.5 AEROSOL, POWDER ORAL DAILY
Qty: 30 | Refills: 5 | Status: ACTIVE | COMMUNITY
Start: 2021-11-10 | End: 1900-01-01

## 2022-01-01 RX ORDER — SODIUM CHLORIDE 9 MG/ML
1000 INJECTION, SOLUTION INTRAVENOUS
Refills: 0 | Status: DISCONTINUED | OUTPATIENT
Start: 2022-01-01 | End: 2022-01-01

## 2022-01-01 RX ORDER — QUETIAPINE FUMARATE 200 MG/1
1 TABLET, FILM COATED ORAL
Qty: 0 | Refills: 0 | DISCHARGE

## 2022-01-01 RX ORDER — LEVOTHYROXINE SODIUM 125 MCG
88 TABLET ORAL DAILY
Refills: 0 | Status: DISCONTINUED | OUTPATIENT
Start: 2022-01-01 | End: 2022-01-01

## 2022-01-01 RX ORDER — MAGNESIUM SULFATE 500 MG/ML
1 VIAL (ML) INJECTION ONCE
Refills: 0 | Status: COMPLETED | OUTPATIENT
Start: 2022-01-01 | End: 2022-01-01

## 2022-01-01 RX ORDER — MAGNESIUM SULFATE 500 MG/ML
2 VIAL (ML) INJECTION ONCE
Refills: 0 | Status: COMPLETED | OUTPATIENT
Start: 2022-01-01 | End: 2022-01-01

## 2022-01-01 RX ORDER — ACETAMINOPHEN 500 MG
1000 TABLET ORAL ONCE
Refills: 0 | Status: COMPLETED | OUTPATIENT
Start: 2022-01-01 | End: 2022-01-01

## 2022-01-01 RX ORDER — PANTOPRAZOLE SODIUM 20 MG/1
40 TABLET, DELAYED RELEASE ORAL EVERY 12 HOURS
Refills: 0 | Status: DISCONTINUED | OUTPATIENT
Start: 2022-01-01 | End: 2022-01-01

## 2022-01-01 RX ORDER — UMECLIDINIUM 62.5 UG/1
1 AEROSOL, POWDER ORAL
Qty: 0 | Refills: 0 | DISCHARGE

## 2022-01-01 RX ORDER — PROCHLORPERAZINE MALEATE 5 MG/1
5 TABLET ORAL
Qty: 30 | Refills: 3 | Status: ACTIVE | COMMUNITY
Start: 2022-01-01 | End: 1900-01-01

## 2022-01-01 RX ORDER — POTASSIUM CHLORIDE 20 MEQ
10 PACKET (EA) ORAL
Refills: 0 | Status: COMPLETED | OUTPATIENT
Start: 2022-01-01 | End: 2022-01-01

## 2022-01-01 RX ORDER — ATOVAQUONE 750 MG/5ML
1500 SUSPENSION ORAL DAILY
Refills: 0 | Status: DISCONTINUED | OUTPATIENT
Start: 2022-01-01 | End: 2022-01-01

## 2022-01-01 RX ORDER — POLYETHYLENE GLYCOL 3350 AND ELECTROLYTES WITH LEMON FLAVOR 236; 22.74; 6.74; 5.86; 2.97 G/4L; G/4L; G/4L; G/4L; G/4L
236 POWDER, FOR SOLUTION ORAL
Qty: 1 | Refills: 0 | Status: ACTIVE | COMMUNITY
Start: 2022-01-01 | End: 1900-01-01

## 2022-01-01 RX ORDER — POTASSIUM CHLORIDE 20 MEQ
40 PACKET (EA) ORAL EVERY 4 HOURS
Refills: 0 | Status: COMPLETED | OUTPATIENT
Start: 2022-01-01 | End: 2022-01-01

## 2022-01-01 RX ORDER — PEMBROLIZUMAB 25 MG/ML
200 INJECTION, SOLUTION INTRAVENOUS ONCE
Refills: 0 | Status: COMPLETED | OUTPATIENT
Start: 2022-01-01 | End: 2022-01-01

## 2022-01-01 RX ORDER — PIPERACILLIN AND TAZOBACTAM 4; .5 G/20ML; G/20ML
3.38 INJECTION, POWDER, LYOPHILIZED, FOR SOLUTION INTRAVENOUS EVERY 8 HOURS
Refills: 0 | Status: DISCONTINUED | OUTPATIENT
Start: 2022-01-01 | End: 2022-01-01

## 2022-01-01 RX ORDER — QUETIAPINE FUMARATE 200 MG/1
250 TABLET, FILM COATED ORAL
Qty: 0 | Refills: 0 | DISCHARGE

## 2022-01-01 RX ORDER — LEVOTHYROXINE SODIUM 125 MCG
50 TABLET ORAL
Refills: 0 | Status: DISCONTINUED | OUTPATIENT
Start: 2022-01-01 | End: 2022-01-01

## 2022-01-01 RX ORDER — GABAPENTIN 400 MG/1
1 CAPSULE ORAL
Qty: 0 | Refills: 0 | DISCHARGE

## 2022-01-01 RX ORDER — DEXAMETHASONE 0.5 MG/5ML
10 ELIXIR ORAL ONCE
Refills: 0 | Status: COMPLETED | OUTPATIENT
Start: 2022-01-01 | End: 2022-01-01

## 2022-01-01 RX ORDER — BACLOFEN 100 %
2.5 POWDER (GRAM) MISCELLANEOUS EVERY 8 HOURS
Refills: 0 | Status: DISCONTINUED | OUTPATIENT
Start: 2022-01-01 | End: 2022-01-01

## 2022-01-01 RX ORDER — SODIUM,POTASSIUM PHOSPHATES 278-250MG
1 POWDER IN PACKET (EA) ORAL
Refills: 0 | Status: COMPLETED | OUTPATIENT
Start: 2022-01-01 | End: 2022-01-01

## 2022-01-01 RX ORDER — PANTOPRAZOLE SODIUM 20 MG/1
40 TABLET, DELAYED RELEASE ORAL
Refills: 0 | Status: DISCONTINUED | OUTPATIENT
Start: 2022-01-01 | End: 2022-01-01

## 2022-01-01 RX ORDER — DIPHENOXYLATE HCL/ATROPINE 2.5-.025MG
2 TABLET ORAL EVERY 6 HOURS
Refills: 0 | Status: DISCONTINUED | OUTPATIENT
Start: 2022-01-01 | End: 2022-01-01

## 2022-01-01 RX ORDER — MAGNESIUM SULFATE 500 MG/ML
4 VIAL (ML) INJECTION ONCE
Refills: 0 | Status: COMPLETED | OUTPATIENT
Start: 2022-01-01 | End: 2022-01-01

## 2022-01-01 RX ORDER — HEPARIN SODIUM 5000 [USP'U]/ML
5000 INJECTION INTRAVENOUS; SUBCUTANEOUS EVERY 8 HOURS
Refills: 0 | Status: DISCONTINUED | OUTPATIENT
Start: 2022-01-01 | End: 2022-01-01

## 2022-01-01 RX ORDER — SODIUM CHLORIDE 9 MG/ML
1000 INJECTION, SOLUTION INTRAVENOUS ONCE
Refills: 0 | Status: COMPLETED | OUTPATIENT
Start: 2022-01-01 | End: 2022-01-01

## 2022-01-01 RX ORDER — PREGABALIN 225 MG/1
1000 CAPSULE ORAL ONCE
Refills: 0 | Status: COMPLETED | OUTPATIENT
Start: 2022-01-01 | End: 2022-01-01

## 2022-01-01 RX ORDER — GLUCOSAMINE HCL/CHONDROITIN SU 500-400 MG
3 CAPSULE ORAL AT BEDTIME
Qty: 30 | Refills: 0 | Status: ACTIVE | COMMUNITY
Start: 2022-01-01 | End: 1900-01-01

## 2022-01-01 RX ORDER — SODIUM CHLORIDE 9 MG/ML
500 INJECTION, SOLUTION INTRAVENOUS
Refills: 0 | Status: COMPLETED | OUTPATIENT
Start: 2022-01-01 | End: 2022-01-01

## 2022-01-01 RX ORDER — PANTOPRAZOLE SODIUM 20 MG/1
1 TABLET, DELAYED RELEASE ORAL
Qty: 60 | Refills: 3
Start: 2022-01-01 | End: 2023-04-25

## 2022-01-01 RX ORDER — SODIUM CHLORIDE 9 MG/ML
2200 INJECTION INTRAMUSCULAR; INTRAVENOUS; SUBCUTANEOUS ONCE
Refills: 0 | Status: COMPLETED | OUTPATIENT
Start: 2022-01-01 | End: 2022-01-01

## 2022-01-01 RX ORDER — ACETAMINOPHEN 500 MG
650 TABLET ORAL EVERY 6 HOURS
Refills: 0 | Status: DISCONTINUED | OUTPATIENT
Start: 2022-01-01 | End: 2022-01-01

## 2022-01-01 RX ORDER — BACITRACIN ZINC 500 UNIT/G
1 OINTMENT IN PACKET (EA) TOPICAL DAILY
Refills: 0 | Status: DISCONTINUED | OUTPATIENT
Start: 2022-01-01 | End: 2022-01-01

## 2022-01-01 RX ORDER — HYDROCORTISONE 20 MG
70 TABLET ORAL EVERY 24 HOURS
Refills: 0 | Status: DISCONTINUED | OUTPATIENT
Start: 2022-01-01 | End: 2022-01-01

## 2022-01-01 RX ORDER — SUCRALFATE 1 G
10 TABLET ORAL
Qty: 600 | Refills: 0
Start: 2022-01-01 | End: 2023-01-10

## 2022-01-01 RX ORDER — MAGNESIUM SULFATE 500 MG/ML
1 VIAL (ML) INJECTION ONCE
Refills: 0 | Status: DISCONTINUED | OUTPATIENT
Start: 2022-01-01 | End: 2022-01-01

## 2022-01-01 RX ORDER — VANCOMYCIN HCL 1 G
1250 VIAL (EA) INTRAVENOUS ONCE
Refills: 0 | Status: COMPLETED | OUTPATIENT
Start: 2022-01-01 | End: 2022-01-01

## 2022-01-01 RX ORDER — ACETAMINOPHEN 500 MG
650 TABLET ORAL ONCE
Refills: 0 | Status: COMPLETED | OUTPATIENT
Start: 2022-01-01 | End: 2022-01-01

## 2022-01-01 RX ORDER — SODIUM CHLORIDE 9 MG/ML
500 INJECTION, SOLUTION INTRAVENOUS ONCE
Refills: 0 | Status: COMPLETED | OUTPATIENT
Start: 2022-01-01 | End: 2022-01-01

## 2022-01-01 RX ORDER — POTASSIUM PHOSPHATE, MONOBASIC POTASSIUM PHOSPHATE, DIBASIC 236; 224 MG/ML; MG/ML
30 INJECTION, SOLUTION INTRAVENOUS ONCE
Refills: 0 | Status: COMPLETED | OUTPATIENT
Start: 2022-01-01 | End: 2022-01-01

## 2022-01-01 RX ORDER — SIMETHICONE 80 MG/1
80 TABLET, CHEWABLE ORAL
Qty: 30 | Refills: 0 | Status: ACTIVE | COMMUNITY
Start: 2022-01-01 | End: 1900-01-01

## 2022-01-01 RX ORDER — LEVOTHYROXINE SODIUM 125 MCG
1 TABLET ORAL
Qty: 30 | Refills: 3
Start: 2022-01-01 | End: 2023-04-25

## 2022-01-01 RX ORDER — VANCOMYCIN HCL 1 G
1250 VIAL (EA) INTRAVENOUS ONCE
Refills: 0 | Status: DISCONTINUED | OUTPATIENT
Start: 2022-01-01 | End: 2022-01-01

## 2022-01-01 RX ORDER — PALONOSETRON HYDROCHLORIDE 0.25 MG/5ML
0.25 INJECTION, SOLUTION INTRAVENOUS ONCE
Refills: 0 | Status: COMPLETED | OUTPATIENT
Start: 2022-01-01 | End: 2022-01-01

## 2022-01-01 RX ORDER — VANCOMYCIN HCL 1 G
1000 VIAL (EA) INTRAVENOUS EVERY 24 HOURS
Refills: 0 | Status: DISCONTINUED | OUTPATIENT
Start: 2022-01-01 | End: 2022-01-01

## 2022-01-01 RX ORDER — BUPROPION HYDROCHLORIDE 150 MG/1
150 TABLET, EXTENDED RELEASE ORAL DAILY
Refills: 0 | Status: DISCONTINUED | OUTPATIENT
Start: 2022-01-01 | End: 2022-01-01

## 2022-01-01 RX ORDER — LEVOTHYROXINE SODIUM 0.07 MG/1
75 TABLET ORAL
Qty: 30 | Refills: 5 | Status: ACTIVE | COMMUNITY
Start: 2021-10-29 | End: 1900-01-01

## 2022-01-01 RX ORDER — ALBUTEROL 90 UG/1
2 AEROSOL, METERED ORAL
Qty: 0 | Refills: 0 | DISCHARGE

## 2022-01-01 RX ORDER — PANTOPRAZOLE SODIUM 20 MG/1
1 TABLET, DELAYED RELEASE ORAL
Qty: 30 | Refills: 3
Start: 2022-01-01 | End: 2023-04-25

## 2022-01-01 RX ORDER — SODIUM CHLORIDE 9 MG/ML
1000 INJECTION INTRAMUSCULAR; INTRAVENOUS; SUBCUTANEOUS
Refills: 0 | Status: DISCONTINUED | OUTPATIENT
Start: 2022-01-01 | End: 2022-01-01

## 2022-01-01 RX ORDER — LEVOTHYROXINE SODIUM 125 MCG
88 TABLET ORAL EVERY 24 HOURS
Refills: 0 | Status: DISCONTINUED | OUTPATIENT
Start: 2022-01-01 | End: 2022-01-01

## 2022-01-01 RX ORDER — BUPROPION HYDROCHLORIDE 150 MG/1
1 TABLET, EXTENDED RELEASE ORAL
Qty: 0 | Refills: 0 | DISCHARGE

## 2022-01-01 RX ORDER — GABAPENTIN 400 MG/1
1 CAPSULE ORAL
Qty: 0 | Refills: 0 | DISCHARGE
Start: 2022-01-01

## 2022-01-01 RX ORDER — LEVOTHYROXINE SODIUM 125 MCG
75 TABLET ORAL DAILY
Refills: 0 | Status: ACTIVE | OUTPATIENT
Start: 2022-01-01 | End: 2023-10-28

## 2022-01-01 RX ORDER — LIDOCAINE HCL 20 MG/ML
10 VIAL (ML) INJECTION ONCE
Refills: 0 | Status: COMPLETED | OUTPATIENT
Start: 2022-01-01 | End: 2022-01-01

## 2022-01-01 RX ORDER — LEVOTHYROXINE SODIUM 125 MCG
88 TABLET ORAL
Refills: 0 | Status: DISCONTINUED | OUTPATIENT
Start: 2022-01-01 | End: 2022-01-01

## 2022-01-01 RX ORDER — CALCIUM GLUCONATE 100 MG/ML
2 VIAL (ML) INTRAVENOUS ONCE
Refills: 0 | Status: COMPLETED | OUTPATIENT
Start: 2022-01-01 | End: 2022-01-01

## 2022-01-01 RX ORDER — ALBUTEROL SULFATE 90 UG/1
108 (90 BASE) INHALANT RESPIRATORY (INHALATION)
Qty: 8.5 | Refills: 6 | Status: ACTIVE | COMMUNITY
Start: 2021-11-10 | End: 1900-01-01

## 2022-01-01 RX ORDER — LEVOTHYROXINE SODIUM 125 MCG
1 TABLET ORAL
Qty: 0 | Refills: 0 | DISCHARGE

## 2022-01-01 RX ORDER — SUCRALFATE 1 G
1 TABLET ORAL
Qty: 56 | Refills: 0
Start: 2022-01-01 | End: 2023-01-01

## 2022-01-01 RX ORDER — POTASSIUM CHLORIDE 20 MEQ
20 PACKET (EA) ORAL ONCE
Refills: 0 | Status: DISCONTINUED | OUTPATIENT
Start: 2022-01-01 | End: 2022-01-01

## 2022-01-01 RX ORDER — PIPERACILLIN AND TAZOBACTAM 4; .5 G/20ML; G/20ML
3.38 INJECTION, POWDER, LYOPHILIZED, FOR SOLUTION INTRAVENOUS ONCE
Refills: 0 | Status: COMPLETED | OUTPATIENT
Start: 2022-01-01 | End: 2022-01-01

## 2022-01-01 RX ORDER — FOSAPREPITANT DIMEGLUMINE 150 MG/5ML
150 INJECTION, POWDER, LYOPHILIZED, FOR SOLUTION INTRAVENOUS ONCE
Refills: 0 | Status: COMPLETED | OUTPATIENT
Start: 2022-01-01 | End: 2022-01-01

## 2022-01-01 RX ORDER — INFLUENZA VIRUS VACCINE 15; 15; 15; 15 UG/.5ML; UG/.5ML; UG/.5ML; UG/.5ML
0.7 SUSPENSION INTRAMUSCULAR ONCE
Refills: 0 | Status: COMPLETED | OUTPATIENT
Start: 2022-01-01 | End: 2022-01-01

## 2022-01-01 RX ORDER — POTASSIUM CHLORIDE 20 MEQ
10 PACKET (EA) ORAL
Refills: 0 | Status: DISCONTINUED | OUTPATIENT
Start: 2022-01-01 | End: 2022-01-01

## 2022-01-01 RX ORDER — FOLIC ACID 1 MG/1
1 TABLET ORAL DAILY
Qty: 1 | Refills: 3 | Status: ACTIVE | COMMUNITY
Start: 2022-01-01 | End: 1900-01-01

## 2022-01-01 RX ORDER — SODIUM,POTASSIUM PHOSPHATES 278-250MG
1 POWDER IN PACKET (EA) ORAL ONCE
Refills: 0 | Status: COMPLETED | OUTPATIENT
Start: 2022-01-01 | End: 2022-01-01

## 2022-01-01 RX ORDER — CARBOPLATIN 50 MG
372 VIAL (EA) INTRAVENOUS ONCE
Refills: 0 | Status: COMPLETED | OUTPATIENT
Start: 2022-01-01 | End: 2022-01-01

## 2022-01-01 RX ORDER — PEMETREXED 500 MG/20ML
925 INJECTION, SOLUTION, CONCENTRATE INTRAVENOUS ONCE
Refills: 0 | Status: COMPLETED | OUTPATIENT
Start: 2022-01-01 | End: 2022-01-01

## 2022-01-01 RX ORDER — METOCLOPRAMIDE HCL 10 MG
10 TABLET ORAL ONCE
Refills: 0 | Status: COMPLETED | OUTPATIENT
Start: 2022-01-01 | End: 2022-01-01

## 2022-01-01 RX ORDER — BUPROPION HYDROCHLORIDE 150 MG/1
150 TABLET, EXTENDED RELEASE ORAL EVERY 24 HOURS
Refills: 0 | Status: DISCONTINUED | OUTPATIENT
Start: 2022-01-01 | End: 2022-01-01

## 2022-01-01 RX ORDER — QUETIAPINE FUMARATE 200 MG/1
250 TABLET, FILM COATED ORAL AT BEDTIME
Refills: 0 | Status: DISCONTINUED | OUTPATIENT
Start: 2022-01-01 | End: 2022-01-01

## 2022-01-01 RX ORDER — SODIUM ZIRCONIUM CYCLOSILICATE 10 G/10G
5 POWDER, FOR SUSPENSION ORAL ONCE
Refills: 0 | Status: COMPLETED | OUTPATIENT
Start: 2022-01-01 | End: 2022-01-01

## 2022-01-01 RX ORDER — IRON SUCROSE 20 MG/ML
100 INJECTION, SOLUTION INTRAVENOUS ONCE
Refills: 0 | Status: COMPLETED | OUTPATIENT
Start: 2022-01-01 | End: 2022-01-01

## 2022-01-01 RX ORDER — ACETAMINOPHEN 500 MG
650 TABLET ORAL ONCE
Refills: 0 | Status: DISCONTINUED | OUTPATIENT
Start: 2022-01-01 | End: 2022-01-01

## 2022-01-01 RX ADMIN — Medication 1 APPLICATION(S): at 11:31

## 2022-01-01 RX ADMIN — Medication 1 GRAM(S): at 00:34

## 2022-01-01 RX ADMIN — Medication 88 MICROGRAM(S): at 06:43

## 2022-01-01 RX ADMIN — HEPARIN SODIUM 5000 UNIT(S): 5000 INJECTION INTRAVENOUS; SUBCUTANEOUS at 05:52

## 2022-01-01 RX ADMIN — Medication 1 TABLET(S): at 11:24

## 2022-01-01 RX ADMIN — Medication 25 GRAM(S): at 11:32

## 2022-01-01 RX ADMIN — Medication 60 MILLIGRAM(S): at 06:13

## 2022-01-01 RX ADMIN — Medication 40 MILLIEQUIVALENT(S): at 10:31

## 2022-01-01 RX ADMIN — BUPROPION HYDROCHLORIDE 150 MILLIGRAM(S): 150 TABLET, EXTENDED RELEASE ORAL at 13:15

## 2022-01-01 RX ADMIN — Medication 1 GRAM(S): at 11:44

## 2022-01-01 RX ADMIN — BUPROPION HYDROCHLORIDE 150 MILLIGRAM(S): 150 TABLET, EXTENDED RELEASE ORAL at 11:19

## 2022-01-01 RX ADMIN — Medication 10 MILLILITER(S): at 18:16

## 2022-01-01 RX ADMIN — Medication 25 GRAM(S): at 14:17

## 2022-01-01 RX ADMIN — Medication 100 MILLIEQUIVALENT(S): at 18:56

## 2022-01-01 RX ADMIN — PANTOPRAZOLE SODIUM 40 MILLIGRAM(S): 20 TABLET, DELAYED RELEASE ORAL at 18:40

## 2022-01-01 RX ADMIN — Medication 650 MILLIGRAM(S): at 18:11

## 2022-01-01 RX ADMIN — PANTOPRAZOLE SODIUM 40 MILLIGRAM(S): 20 TABLET, DELAYED RELEASE ORAL at 16:31

## 2022-01-01 RX ADMIN — BUPROPION HYDROCHLORIDE 150 MILLIGRAM(S): 150 TABLET, EXTENDED RELEASE ORAL at 11:29

## 2022-01-01 RX ADMIN — Medication 200 GRAM(S): at 17:47

## 2022-01-01 RX ADMIN — Medication 25 GRAM(S): at 22:13

## 2022-01-01 RX ADMIN — Medication 2 TABLET(S): at 00:51

## 2022-01-01 RX ADMIN — Medication 2 TABLET(S): at 18:57

## 2022-01-01 RX ADMIN — Medication 2 TABLET(S): at 05:50

## 2022-01-01 RX ADMIN — Medication 1 GRAM(S): at 23:22

## 2022-01-01 RX ADMIN — Medication 1 TABLET(S): at 13:32

## 2022-01-01 RX ADMIN — HEPARIN SODIUM 5000 UNIT(S): 5000 INJECTION INTRAVENOUS; SUBCUTANEOUS at 07:03

## 2022-01-01 RX ADMIN — Medication 1 GRAM(S): at 19:11

## 2022-01-01 RX ADMIN — Medication 2 TABLET(S): at 05:40

## 2022-01-01 RX ADMIN — Medication 1 GRAM(S): at 18:08

## 2022-01-01 RX ADMIN — Medication 1 GRAM(S): at 00:50

## 2022-01-01 RX ADMIN — Medication 25 GRAM(S): at 21:46

## 2022-01-01 RX ADMIN — PIPERACILLIN AND TAZOBACTAM 25 GRAM(S): 4; .5 INJECTION, POWDER, LYOPHILIZED, FOR SOLUTION INTRAVENOUS at 14:22

## 2022-01-01 RX ADMIN — Medication 1 TABLET(S): at 11:44

## 2022-01-01 RX ADMIN — Medication 650 MILLIGRAM(S): at 09:40

## 2022-01-01 RX ADMIN — Medication 1 TABLET(S): at 11:59

## 2022-01-01 RX ADMIN — INFLUENZA VIRUS VACCINE 0.7 MILLILITER(S): 15; 15; 15; 15 SUSPENSION INTRAMUSCULAR at 17:51

## 2022-01-01 RX ADMIN — Medication 1 GRAM(S): at 06:34

## 2022-01-01 RX ADMIN — PANTOPRAZOLE SODIUM 40 MILLIGRAM(S): 20 TABLET, DELAYED RELEASE ORAL at 06:34

## 2022-01-01 RX ADMIN — Medication 60 MILLIGRAM(S): at 06:25

## 2022-01-01 RX ADMIN — Medication 1 GRAM(S): at 18:07

## 2022-01-01 RX ADMIN — PANTOPRAZOLE SODIUM 40 MILLIGRAM(S): 20 TABLET, DELAYED RELEASE ORAL at 18:21

## 2022-01-01 RX ADMIN — Medication 40 MILLIGRAM(S): at 06:54

## 2022-01-01 RX ADMIN — BUPROPION HYDROCHLORIDE 150 MILLIGRAM(S): 150 TABLET, EXTENDED RELEASE ORAL at 19:01

## 2022-01-01 RX ADMIN — Medication 75 MICROGRAM(S): at 07:58

## 2022-01-01 RX ADMIN — Medication 1 APPLICATION(S): at 11:17

## 2022-01-01 RX ADMIN — Medication 1 GRAM(S): at 12:10

## 2022-01-01 RX ADMIN — Medication 1 GRAM(S): at 17:01

## 2022-01-01 RX ADMIN — Medication 40 MILLIEQUIVALENT(S): at 14:16

## 2022-01-01 RX ADMIN — PEMETREXED 925 MILLIGRAM(S): 500 INJECTION, SOLUTION, CONCENTRATE INTRAVENOUS at 13:22

## 2022-01-01 RX ADMIN — SODIUM CHLORIDE 500 MILLILITER(S): 9 INJECTION, SOLUTION INTRAVENOUS at 10:31

## 2022-01-01 RX ADMIN — Medication 1 TABLET(S): at 12:15

## 2022-01-01 RX ADMIN — Medication 250 MILLIGRAM(S): at 11:22

## 2022-01-01 RX ADMIN — PANTOPRAZOLE SODIUM 40 MILLIGRAM(S): 20 TABLET, DELAYED RELEASE ORAL at 06:30

## 2022-01-01 RX ADMIN — PIPERACILLIN AND TAZOBACTAM 25 GRAM(S): 4; .5 INJECTION, POWDER, LYOPHILIZED, FOR SOLUTION INTRAVENOUS at 13:39

## 2022-01-01 RX ADMIN — Medication 1 TABLET(S): at 12:10

## 2022-01-01 RX ADMIN — SODIUM CHLORIDE 100 MILLILITER(S): 9 INJECTION INTRAMUSCULAR; INTRAVENOUS; SUBCUTANEOUS at 19:45

## 2022-01-01 RX ADMIN — Medication 40 MILLIEQUIVALENT(S): at 19:25

## 2022-01-01 RX ADMIN — Medication 1000 MILLIGRAM(S): at 10:20

## 2022-01-01 RX ADMIN — Medication 2 TABLET(S): at 07:04

## 2022-01-01 RX ADMIN — PANTOPRAZOLE SODIUM 40 MILLIGRAM(S): 20 TABLET, DELAYED RELEASE ORAL at 15:00

## 2022-01-01 RX ADMIN — Medication 2.5 MILLIGRAM(S): at 13:27

## 2022-01-01 RX ADMIN — Medication 25 GRAM(S): at 16:46

## 2022-01-01 RX ADMIN — Medication 88 MICROGRAM(S): at 06:30

## 2022-01-01 RX ADMIN — Medication 100 GRAM(S): at 15:06

## 2022-01-01 RX ADMIN — Medication 1 TABLET(S): at 12:12

## 2022-01-01 RX ADMIN — PANTOPRAZOLE SODIUM 40 MILLIGRAM(S): 20 TABLET, DELAYED RELEASE ORAL at 07:21

## 2022-01-01 RX ADMIN — Medication 1 APPLICATION(S): at 14:42

## 2022-01-01 RX ADMIN — HEPARIN SODIUM 5000 UNIT(S): 5000 INJECTION INTRAVENOUS; SUBCUTANEOUS at 21:40

## 2022-01-01 RX ADMIN — PANTOPRAZOLE SODIUM 40 MILLIGRAM(S): 20 TABLET, DELAYED RELEASE ORAL at 17:45

## 2022-01-01 RX ADMIN — Medication 2.5 MILLIGRAM(S): at 21:54

## 2022-01-01 RX ADMIN — BUPROPION HYDROCHLORIDE 150 MILLIGRAM(S): 150 TABLET, EXTENDED RELEASE ORAL at 14:49

## 2022-01-01 RX ADMIN — Medication 40 MILLIGRAM(S): at 06:34

## 2022-01-01 RX ADMIN — Medication 1 GRAM(S): at 11:40

## 2022-01-01 RX ADMIN — Medication 1 GRAM(S): at 18:54

## 2022-01-01 RX ADMIN — Medication 1 GRAM(S): at 12:36

## 2022-01-01 RX ADMIN — Medication 204 MILLIGRAM(S): at 12:15

## 2022-01-01 RX ADMIN — Medication 88 MICROGRAM(S): at 06:24

## 2022-01-01 RX ADMIN — Medication 2 TABLET(S): at 05:36

## 2022-01-01 RX ADMIN — Medication 1 GRAM(S): at 06:53

## 2022-01-01 RX ADMIN — Medication 200 MILLIGRAM(S): at 15:12

## 2022-01-01 RX ADMIN — Medication 2 TABLET(S): at 23:27

## 2022-01-01 RX ADMIN — Medication 250 MILLIGRAM(S): at 11:45

## 2022-01-01 RX ADMIN — SODIUM CHLORIDE 100 MILLILITER(S): 9 INJECTION, SOLUTION INTRAVENOUS at 12:16

## 2022-01-01 RX ADMIN — HEPARIN SODIUM 5000 UNIT(S): 5000 INJECTION INTRAVENOUS; SUBCUTANEOUS at 22:44

## 2022-01-01 RX ADMIN — POTASSIUM PHOSPHATE, MONOBASIC POTASSIUM PHOSPHATE, DIBASIC 83.33 MILLIMOLE(S): 236; 224 INJECTION, SOLUTION INTRAVENOUS at 12:18

## 2022-01-01 RX ADMIN — PANTOPRAZOLE SODIUM 40 MILLIGRAM(S): 20 TABLET, DELAYED RELEASE ORAL at 04:13

## 2022-01-01 RX ADMIN — Medication 2 TABLET(S): at 01:13

## 2022-01-01 RX ADMIN — Medication 2 TABLET(S): at 19:03

## 2022-01-01 RX ADMIN — Medication 40 MILLIGRAM(S): at 19:20

## 2022-01-01 RX ADMIN — Medication 60 MILLIGRAM(S): at 12:10

## 2022-01-01 RX ADMIN — Medication 1 GRAM(S): at 23:45

## 2022-01-01 RX ADMIN — Medication 88 MICROGRAM(S): at 06:03

## 2022-01-01 RX ADMIN — PANTOPRAZOLE SODIUM 40 MILLIGRAM(S): 20 TABLET, DELAYED RELEASE ORAL at 14:17

## 2022-01-01 RX ADMIN — Medication 1 TABLET(S): at 11:29

## 2022-01-01 RX ADMIN — PIPERACILLIN AND TAZOBACTAM 25 GRAM(S): 4; .5 INJECTION, POWDER, LYOPHILIZED, FOR SOLUTION INTRAVENOUS at 05:40

## 2022-01-01 RX ADMIN — Medication 1 GRAM(S): at 05:29

## 2022-01-01 RX ADMIN — Medication 1000 MILLIGRAM(S): at 10:05

## 2022-01-01 RX ADMIN — PANTOPRAZOLE SODIUM 40 MILLIGRAM(S): 20 TABLET, DELAYED RELEASE ORAL at 18:45

## 2022-01-01 RX ADMIN — Medication 1000 MILLIGRAM(S): at 16:46

## 2022-01-01 RX ADMIN — Medication 2 TABLET(S): at 13:29

## 2022-01-01 RX ADMIN — Medication 60 MILLIGRAM(S): at 06:54

## 2022-01-01 RX ADMIN — Medication 2 TABLET(S): at 06:29

## 2022-01-01 RX ADMIN — SODIUM CHLORIDE 100 MILLILITER(S): 9 INJECTION, SOLUTION INTRAVENOUS at 18:22

## 2022-01-01 RX ADMIN — HEPARIN SODIUM 5000 UNIT(S): 5000 INJECTION INTRAVENOUS; SUBCUTANEOUS at 14:17

## 2022-01-01 RX ADMIN — IRON SUCROSE 100 MILLIGRAM(S): 20 INJECTION, SOLUTION INTRAVENOUS at 16:26

## 2022-01-01 RX ADMIN — HEPARIN SODIUM 5000 UNIT(S): 5000 INJECTION INTRAVENOUS; SUBCUTANEOUS at 14:39

## 2022-01-01 RX ADMIN — ATOVAQUONE 1500 MILLIGRAM(S): 750 SUSPENSION ORAL at 12:35

## 2022-01-01 RX ADMIN — QUETIAPINE FUMARATE 250 MILLIGRAM(S): 200 TABLET, FILM COATED ORAL at 22:10

## 2022-01-01 RX ADMIN — HEPARIN SODIUM 5000 UNIT(S): 5000 INJECTION INTRAVENOUS; SUBCUTANEOUS at 14:59

## 2022-01-01 RX ADMIN — Medication 1 GRAM(S): at 00:15

## 2022-01-01 RX ADMIN — Medication 1 GRAM(S): at 07:20

## 2022-01-01 RX ADMIN — HEPARIN SODIUM 5000 UNIT(S): 5000 INJECTION INTRAVENOUS; SUBCUTANEOUS at 22:22

## 2022-01-01 RX ADMIN — PANTOPRAZOLE SODIUM 40 MILLIGRAM(S): 20 TABLET, DELAYED RELEASE ORAL at 06:08

## 2022-01-01 RX ADMIN — Medication 1 GRAM(S): at 22:13

## 2022-01-01 RX ADMIN — FOSAPREPITANT DIMEGLUMINE 150 MILLIGRAM(S): 150 INJECTION, POWDER, LYOPHILIZED, FOR SOLUTION INTRAVENOUS at 13:05

## 2022-01-01 RX ADMIN — Medication 88 MICROGRAM(S): at 05:40

## 2022-01-01 RX ADMIN — Medication 40 MILLIEQUIVALENT(S): at 18:45

## 2022-01-01 RX ADMIN — Medication 1 TABLET(S): at 12:09

## 2022-01-01 RX ADMIN — HEPARIN SODIUM 5000 UNIT(S): 5000 INJECTION INTRAVENOUS; SUBCUTANEOUS at 22:58

## 2022-01-01 RX ADMIN — HEPARIN SODIUM 5000 UNIT(S): 5000 INJECTION INTRAVENOUS; SUBCUTANEOUS at 06:29

## 2022-01-01 RX ADMIN — HEPARIN SODIUM 5000 UNIT(S): 5000 INJECTION INTRAVENOUS; SUBCUTANEOUS at 13:32

## 2022-01-01 RX ADMIN — BUPROPION HYDROCHLORIDE 150 MILLIGRAM(S): 150 TABLET, EXTENDED RELEASE ORAL at 13:38

## 2022-01-01 RX ADMIN — Medication 50 MICROGRAM(S): at 06:05

## 2022-01-01 RX ADMIN — Medication 40 MILLIGRAM(S): at 18:24

## 2022-01-01 RX ADMIN — Medication 1 TABLET(S): at 11:17

## 2022-01-01 RX ADMIN — Medication 1 GRAM(S): at 23:33

## 2022-01-01 RX ADMIN — Medication 40 MILLIGRAM(S): at 06:09

## 2022-01-01 RX ADMIN — Medication 1 TABLET(S): at 11:58

## 2022-01-01 RX ADMIN — Medication 400 MILLIGRAM(S): at 09:50

## 2022-01-01 RX ADMIN — Medication 40 MILLIGRAM(S): at 17:45

## 2022-01-01 RX ADMIN — Medication 100 MILLIEQUIVALENT(S): at 14:24

## 2022-01-01 RX ADMIN — Medication 1 GRAM(S): at 06:02

## 2022-01-01 RX ADMIN — HEPARIN SODIUM 5000 UNIT(S): 5000 INJECTION INTRAVENOUS; SUBCUTANEOUS at 22:13

## 2022-01-01 RX ADMIN — Medication 2 TABLET(S): at 14:54

## 2022-01-01 RX ADMIN — Medication 1 APPLICATION(S): at 11:43

## 2022-01-01 RX ADMIN — BUPROPION HYDROCHLORIDE 150 MILLIGRAM(S): 150 TABLET, EXTENDED RELEASE ORAL at 14:44

## 2022-01-01 RX ADMIN — Medication 88 MICROGRAM(S): at 06:29

## 2022-01-01 RX ADMIN — PANTOPRAZOLE SODIUM 40 MILLIGRAM(S): 20 TABLET, DELAYED RELEASE ORAL at 03:19

## 2022-01-01 RX ADMIN — Medication 2 TABLET(S): at 23:40

## 2022-01-01 RX ADMIN — Medication 40 MILLIGRAM(S): at 06:25

## 2022-01-01 RX ADMIN — Medication 1 GRAM(S): at 17:44

## 2022-01-01 RX ADMIN — Medication 100 MILLIEQUIVALENT(S): at 21:46

## 2022-01-01 RX ADMIN — Medication 88 MICROGRAM(S): at 06:33

## 2022-01-01 RX ADMIN — Medication 1 GRAM(S): at 12:34

## 2022-01-01 RX ADMIN — Medication 50 MICROGRAM(S): at 06:53

## 2022-01-01 RX ADMIN — QUETIAPINE FUMARATE 250 MILLIGRAM(S): 200 TABLET, FILM COATED ORAL at 22:14

## 2022-01-01 RX ADMIN — Medication 1 GRAM(S): at 12:15

## 2022-01-01 RX ADMIN — PANTOPRAZOLE SODIUM 40 MILLIGRAM(S): 20 TABLET, DELAYED RELEASE ORAL at 18:38

## 2022-01-01 RX ADMIN — Medication 1 GRAM(S): at 06:30

## 2022-01-01 RX ADMIN — HEPARIN SODIUM 5000 UNIT(S): 5000 INJECTION INTRAVENOUS; SUBCUTANEOUS at 15:12

## 2022-01-01 RX ADMIN — HEPARIN SODIUM 5000 UNIT(S): 5000 INJECTION INTRAVENOUS; SUBCUTANEOUS at 19:09

## 2022-01-01 RX ADMIN — Medication 1 GRAM(S): at 01:14

## 2022-01-01 RX ADMIN — Medication 2 TABLET(S): at 13:47

## 2022-01-01 RX ADMIN — Medication 1 GRAM(S): at 22:09

## 2022-01-01 RX ADMIN — Medication 650 MILLIGRAM(S): at 16:11

## 2022-01-01 RX ADMIN — SODIUM CHLORIDE 500 MILLILITER(S): 9 INJECTION, SOLUTION INTRAVENOUS at 11:18

## 2022-01-01 RX ADMIN — Medication 650 MILLIGRAM(S): at 14:30

## 2022-01-01 RX ADMIN — Medication 100 GRAM(S): at 14:32

## 2022-01-01 RX ADMIN — Medication 650 MILLIGRAM(S): at 22:39

## 2022-01-01 RX ADMIN — Medication 1 TABLET(S): at 13:18

## 2022-01-01 RX ADMIN — HEPARIN SODIUM 5000 UNIT(S): 5000 INJECTION INTRAVENOUS; SUBCUTANEOUS at 21:43

## 2022-01-01 RX ADMIN — PIPERACILLIN AND TAZOBACTAM 25 GRAM(S): 4; .5 INJECTION, POWDER, LYOPHILIZED, FOR SOLUTION INTRAVENOUS at 07:04

## 2022-01-01 RX ADMIN — HEPARIN SODIUM 5000 UNIT(S): 5000 INJECTION INTRAVENOUS; SUBCUTANEOUS at 14:49

## 2022-01-01 RX ADMIN — Medication 1 GRAM(S): at 18:38

## 2022-01-01 RX ADMIN — HEPARIN SODIUM 5000 UNIT(S): 5000 INJECTION INTRAVENOUS; SUBCUTANEOUS at 22:15

## 2022-01-01 RX ADMIN — Medication 1 TABLET(S): at 11:39

## 2022-01-01 RX ADMIN — Medication 1 GRAM(S): at 05:36

## 2022-01-01 RX ADMIN — Medication 2 TABLET(S): at 13:11

## 2022-01-01 RX ADMIN — Medication 50 MICROGRAM(S): at 08:02

## 2022-01-01 RX ADMIN — ATOVAQUONE 1500 MILLIGRAM(S): 750 SUSPENSION ORAL at 19:19

## 2022-01-01 RX ADMIN — PANTOPRAZOLE SODIUM 40 MILLIGRAM(S): 20 TABLET, DELAYED RELEASE ORAL at 17:18

## 2022-01-01 RX ADMIN — Medication 650 MILLIGRAM(S): at 15:09

## 2022-01-01 RX ADMIN — HEPARIN SODIUM 5000 UNIT(S): 5000 INJECTION INTRAVENOUS; SUBCUTANEOUS at 06:35

## 2022-01-01 RX ADMIN — BUPROPION HYDROCHLORIDE 150 MILLIGRAM(S): 150 TABLET, EXTENDED RELEASE ORAL at 13:36

## 2022-01-01 RX ADMIN — HEPARIN SODIUM 5000 UNIT(S): 5000 INJECTION INTRAVENOUS; SUBCUTANEOUS at 06:13

## 2022-01-01 RX ADMIN — BUPROPION HYDROCHLORIDE 150 MILLIGRAM(S): 150 TABLET, EXTENDED RELEASE ORAL at 14:54

## 2022-01-01 RX ADMIN — PIPERACILLIN AND TAZOBACTAM 25 GRAM(S): 4; .5 INJECTION, POWDER, LYOPHILIZED, FOR SOLUTION INTRAVENOUS at 16:31

## 2022-01-01 RX ADMIN — HEPARIN SODIUM 5000 UNIT(S): 5000 INJECTION INTRAVENOUS; SUBCUTANEOUS at 14:28

## 2022-01-01 RX ADMIN — Medication 650 MILLIGRAM(S): at 06:06

## 2022-01-01 RX ADMIN — HEPARIN SODIUM 5000 UNIT(S): 5000 INJECTION INTRAVENOUS; SUBCUTANEOUS at 09:24

## 2022-01-01 RX ADMIN — Medication 100 MILLIEQUIVALENT(S): at 17:47

## 2022-01-01 RX ADMIN — Medication 400 MILLIGRAM(S): at 10:05

## 2022-01-01 RX ADMIN — Medication 1 GRAM(S): at 16:31

## 2022-01-01 RX ADMIN — POTASSIUM PHOSPHATE, MONOBASIC POTASSIUM PHOSPHATE, DIBASIC 83.33 MILLIMOLE(S): 236; 224 INJECTION, SOLUTION INTRAVENOUS at 12:11

## 2022-01-01 RX ADMIN — HEPARIN SODIUM 5000 UNIT(S): 5000 INJECTION INTRAVENOUS; SUBCUTANEOUS at 22:09

## 2022-01-01 RX ADMIN — PANTOPRAZOLE SODIUM 40 MILLIGRAM(S): 20 TABLET, DELAYED RELEASE ORAL at 06:54

## 2022-01-01 RX ADMIN — HEPARIN SODIUM 5000 UNIT(S): 5000 INJECTION INTRAVENOUS; SUBCUTANEOUS at 06:53

## 2022-01-01 RX ADMIN — HEPARIN SODIUM 5000 UNIT(S): 5000 INJECTION INTRAVENOUS; SUBCUTANEOUS at 05:48

## 2022-01-01 RX ADMIN — HEPARIN SODIUM 5000 UNIT(S): 5000 INJECTION INTRAVENOUS; SUBCUTANEOUS at 13:28

## 2022-01-01 RX ADMIN — HEPARIN SODIUM 5000 UNIT(S): 5000 INJECTION INTRAVENOUS; SUBCUTANEOUS at 05:28

## 2022-01-01 RX ADMIN — PANTOPRAZOLE SODIUM 40 MILLIGRAM(S): 20 TABLET, DELAYED RELEASE ORAL at 02:52

## 2022-01-01 RX ADMIN — Medication 1 GRAM(S): at 05:53

## 2022-01-01 RX ADMIN — Medication 75 MICROGRAM(S): at 05:48

## 2022-01-01 RX ADMIN — PEMBROLIZUMAB 200 MILLIGRAM(S): 25 INJECTION, SOLUTION INTRAVENOUS at 11:41

## 2022-01-01 RX ADMIN — PANTOPRAZOLE SODIUM 40 MILLIGRAM(S): 20 TABLET, DELAYED RELEASE ORAL at 06:13

## 2022-01-01 RX ADMIN — PANTOPRAZOLE SODIUM 40 MILLIGRAM(S): 20 TABLET, DELAYED RELEASE ORAL at 06:01

## 2022-01-01 RX ADMIN — Medication 1 GRAM(S): at 12:12

## 2022-01-01 RX ADMIN — PANTOPRAZOLE SODIUM 40 MILLIGRAM(S): 20 TABLET, DELAYED RELEASE ORAL at 17:55

## 2022-01-01 RX ADMIN — PANTOPRAZOLE SODIUM 40 MILLIGRAM(S): 20 TABLET, DELAYED RELEASE ORAL at 17:10

## 2022-01-01 RX ADMIN — Medication 40 MILLIEQUIVALENT(S): at 00:31

## 2022-01-01 RX ADMIN — Medication 1 APPLICATION(S): at 12:00

## 2022-01-01 RX ADMIN — Medication 1 GRAM(S): at 06:13

## 2022-01-01 RX ADMIN — Medication 1 GRAM(S): at 11:57

## 2022-01-01 RX ADMIN — Medication 88 MICROGRAM(S): at 06:34

## 2022-01-01 RX ADMIN — Medication 650 MILLIGRAM(S): at 23:46

## 2022-01-01 RX ADMIN — Medication 650 MILLIGRAM(S): at 15:30

## 2022-01-01 RX ADMIN — Medication 40 MILLIEQUIVALENT(S): at 14:02

## 2022-01-01 RX ADMIN — HEPARIN SODIUM 5000 UNIT(S): 5000 INJECTION INTRAVENOUS; SUBCUTANEOUS at 21:35

## 2022-01-01 RX ADMIN — Medication 2 TABLET(S): at 18:04

## 2022-01-01 RX ADMIN — Medication 1 TABLET(S): at 13:28

## 2022-01-01 RX ADMIN — Medication 100 MILLIEQUIVALENT(S): at 08:02

## 2022-01-01 RX ADMIN — Medication 1 APPLICATION(S): at 12:08

## 2022-01-01 RX ADMIN — Medication 75 MICROGRAM(S): at 05:41

## 2022-01-01 RX ADMIN — Medication 1 GRAM(S): at 11:28

## 2022-01-01 RX ADMIN — ATOVAQUONE 1500 MILLIGRAM(S): 750 SUSPENSION ORAL at 11:44

## 2022-01-01 RX ADMIN — Medication 650 MILLIGRAM(S): at 00:46

## 2022-01-01 RX ADMIN — Medication 1 APPLICATION(S): at 14:30

## 2022-01-01 RX ADMIN — BUPROPION HYDROCHLORIDE 150 MILLIGRAM(S): 150 TABLET, EXTENDED RELEASE ORAL at 13:28

## 2022-01-01 RX ADMIN — SODIUM CHLORIDE 110 MILLILITER(S): 9 INJECTION, SOLUTION INTRAVENOUS at 19:37

## 2022-01-01 RX ADMIN — Medication 650 MILLIGRAM(S): at 06:25

## 2022-01-01 RX ADMIN — BUPROPION HYDROCHLORIDE 150 MILLIGRAM(S): 150 TABLET, EXTENDED RELEASE ORAL at 13:29

## 2022-01-01 RX ADMIN — Medication 650 MILLIGRAM(S): at 22:22

## 2022-01-01 RX ADMIN — PREGABALIN 1000 MICROGRAM(S): 225 CAPSULE ORAL at 14:30

## 2022-01-01 RX ADMIN — Medication 1 GRAM(S): at 12:08

## 2022-01-01 RX ADMIN — HEPARIN SODIUM 5000 UNIT(S): 5000 INJECTION INTRAVENOUS; SUBCUTANEOUS at 22:36

## 2022-01-01 RX ADMIN — Medication 1 TABLET(S): at 11:33

## 2022-01-01 RX ADMIN — Medication 200 MILLIGRAM(S): at 01:34

## 2022-01-01 RX ADMIN — Medication 1 GRAM(S): at 11:43

## 2022-01-01 RX ADMIN — Medication 650 MILLIGRAM(S): at 12:12

## 2022-01-01 RX ADMIN — Medication 1 APPLICATION(S): at 11:28

## 2022-01-01 RX ADMIN — Medication 88 MICROGRAM(S): at 06:53

## 2022-01-01 RX ADMIN — PIPERACILLIN AND TAZOBACTAM 25 GRAM(S): 4; .5 INJECTION, POWDER, LYOPHILIZED, FOR SOLUTION INTRAVENOUS at 14:40

## 2022-01-01 RX ADMIN — PANTOPRAZOLE SODIUM 40 MILLIGRAM(S): 20 TABLET, DELAYED RELEASE ORAL at 19:08

## 2022-01-01 RX ADMIN — Medication 62.5 MILLIMOLE(S): at 11:29

## 2022-01-01 RX ADMIN — Medication 40 MILLIEQUIVALENT(S): at 11:39

## 2022-01-01 RX ADMIN — Medication 1 GRAM(S): at 11:17

## 2022-01-01 RX ADMIN — HEPARIN SODIUM 5000 UNIT(S): 5000 INJECTION INTRAVENOUS; SUBCUTANEOUS at 22:54

## 2022-01-01 RX ADMIN — BUPROPION HYDROCHLORIDE 150 MILLIGRAM(S): 150 TABLET, EXTENDED RELEASE ORAL at 14:59

## 2022-01-01 RX ADMIN — Medication 2 TABLET(S): at 18:06

## 2022-01-01 RX ADMIN — Medication 1 GRAM(S): at 18:42

## 2022-01-01 RX ADMIN — Medication 1 GRAM(S): at 05:50

## 2022-01-01 RX ADMIN — POTASSIUM PHOSPHATE, MONOBASIC POTASSIUM PHOSPHATE, DIBASIC 83.33 MILLIMOLE(S): 236; 224 INJECTION, SOLUTION INTRAVENOUS at 01:24

## 2022-01-01 RX ADMIN — Medication 2 TABLET(S): at 13:50

## 2022-01-01 RX ADMIN — Medication 1 PACKET(S): at 12:33

## 2022-01-01 RX ADMIN — Medication 88 MICROGRAM(S): at 06:54

## 2022-01-01 RX ADMIN — PIPERACILLIN AND TAZOBACTAM 200 GRAM(S): 4; .5 INJECTION, POWDER, LYOPHILIZED, FOR SOLUTION INTRAVENOUS at 11:29

## 2022-01-01 RX ADMIN — Medication 50 MICROGRAM(S): at 06:18

## 2022-01-01 RX ADMIN — Medication 1 GRAM(S): at 17:55

## 2022-01-01 RX ADMIN — HEPARIN SODIUM 5000 UNIT(S): 5000 INJECTION INTRAVENOUS; SUBCUTANEOUS at 06:25

## 2022-01-01 RX ADMIN — Medication 1 PACKET(S): at 11:32

## 2022-01-01 RX ADMIN — PANTOPRAZOLE SODIUM 40 MILLIGRAM(S): 20 TABLET, DELAYED RELEASE ORAL at 06:53

## 2022-01-01 RX ADMIN — PIPERACILLIN AND TAZOBACTAM 25 GRAM(S): 4; .5 INJECTION, POWDER, LYOPHILIZED, FOR SOLUTION INTRAVENOUS at 19:49

## 2022-01-01 RX ADMIN — Medication 100 MILLIEQUIVALENT(S): at 00:01

## 2022-01-01 RX ADMIN — PANTOPRAZOLE SODIUM 40 MILLIGRAM(S): 20 TABLET, DELAYED RELEASE ORAL at 15:25

## 2022-01-01 RX ADMIN — PANTOPRAZOLE SODIUM 40 MILLIGRAM(S): 20 TABLET, DELAYED RELEASE ORAL at 05:28

## 2022-01-01 RX ADMIN — PIPERACILLIN AND TAZOBACTAM 25 GRAM(S): 4; .5 INJECTION, POWDER, LYOPHILIZED, FOR SOLUTION INTRAVENOUS at 22:10

## 2022-01-01 RX ADMIN — SODIUM CHLORIDE 500 MILLILITER(S): 9 INJECTION, SOLUTION INTRAVENOUS at 18:45

## 2022-01-01 RX ADMIN — BUPROPION HYDROCHLORIDE 150 MILLIGRAM(S): 150 TABLET, EXTENDED RELEASE ORAL at 14:02

## 2022-01-01 RX ADMIN — PEMBROLIZUMAB 200 MILLIGRAM(S): 25 INJECTION, SOLUTION INTRAVENOUS at 12:12

## 2022-01-01 RX ADMIN — Medication 1 TABLET(S): at 14:29

## 2022-01-01 RX ADMIN — Medication 40 MILLIEQUIVALENT(S): at 14:25

## 2022-01-01 RX ADMIN — Medication 10 MILLIGRAM(S): at 12:30

## 2022-01-01 RX ADMIN — Medication 650 MILLIGRAM(S): at 17:51

## 2022-01-01 RX ADMIN — Medication 40 MILLIGRAM(S): at 07:20

## 2022-01-01 RX ADMIN — Medication 40 MILLIEQUIVALENT(S): at 15:25

## 2022-01-01 RX ADMIN — Medication 1 GRAM(S): at 23:40

## 2022-01-01 RX ADMIN — PANTOPRAZOLE SODIUM 40 MILLIGRAM(S): 20 TABLET, DELAYED RELEASE ORAL at 19:20

## 2022-01-01 RX ADMIN — Medication 1 GRAM(S): at 22:50

## 2022-01-01 RX ADMIN — Medication 2 TABLET(S): at 19:02

## 2022-01-01 RX ADMIN — BUPROPION HYDROCHLORIDE 150 MILLIGRAM(S): 150 TABLET, EXTENDED RELEASE ORAL at 14:43

## 2022-01-01 RX ADMIN — SODIUM CHLORIDE 2200 MILLILITER(S): 9 INJECTION INTRAMUSCULAR; INTRAVENOUS; SUBCUTANEOUS at 11:13

## 2022-01-01 RX ADMIN — Medication 40 MILLIGRAM(S): at 18:07

## 2022-01-01 RX ADMIN — SODIUM CHLORIDE 110 MILLILITER(S): 9 INJECTION, SOLUTION INTRAVENOUS at 21:48

## 2022-01-01 RX ADMIN — Medication 50 MICROGRAM(S): at 06:30

## 2022-01-01 RX ADMIN — HEPARIN SODIUM 5000 UNIT(S): 5000 INJECTION INTRAVENOUS; SUBCUTANEOUS at 22:50

## 2022-01-01 RX ADMIN — Medication 1 GRAM(S): at 00:35

## 2022-01-01 RX ADMIN — Medication 25 GRAM(S): at 07:54

## 2022-01-01 RX ADMIN — Medication 250 MILLIGRAM(S): at 11:43

## 2022-01-01 RX ADMIN — Medication 100 MILLIEQUIVALENT(S): at 15:36

## 2022-01-01 RX ADMIN — HEPARIN SODIUM 5000 UNIT(S): 5000 INJECTION INTRAVENOUS; SUBCUTANEOUS at 06:03

## 2022-01-01 RX ADMIN — Medication 10 MILLIGRAM(S): at 14:46

## 2022-01-01 RX ADMIN — Medication 372 MILLIGRAM(S): at 13:25

## 2022-01-01 RX ADMIN — Medication 25 GRAM(S): at 17:02

## 2022-01-01 RX ADMIN — Medication 1 GRAM(S): at 17:52

## 2022-01-01 RX ADMIN — BUPROPION HYDROCHLORIDE 150 MILLIGRAM(S): 150 TABLET, EXTENDED RELEASE ORAL at 13:40

## 2022-01-01 RX ADMIN — SODIUM CHLORIDE 110 MILLILITER(S): 9 INJECTION, SOLUTION INTRAVENOUS at 10:07

## 2022-01-01 RX ADMIN — Medication 2 TABLET(S): at 12:14

## 2022-01-01 RX ADMIN — PANTOPRAZOLE SODIUM 40 MILLIGRAM(S): 20 TABLET, DELAYED RELEASE ORAL at 06:43

## 2022-01-01 RX ADMIN — Medication 1 GRAM(S): at 17:40

## 2022-01-01 RX ADMIN — Medication 25 GRAM(S): at 18:56

## 2022-01-01 RX ADMIN — Medication 88 MICROGRAM(S): at 07:04

## 2022-01-01 RX ADMIN — PANTOPRAZOLE SODIUM 40 MILLIGRAM(S): 20 TABLET, DELAYED RELEASE ORAL at 15:19

## 2022-01-01 RX ADMIN — SODIUM CHLORIDE 100 MILLILITER(S): 9 INJECTION INTRAMUSCULAR; INTRAVENOUS; SUBCUTANEOUS at 00:00

## 2022-01-01 RX ADMIN — Medication 40 MILLIEQUIVALENT(S): at 11:16

## 2022-01-01 RX ADMIN — Medication 25 GRAM(S): at 09:53

## 2022-01-01 RX ADMIN — PANTOPRAZOLE SODIUM 40 MILLIGRAM(S): 20 TABLET, DELAYED RELEASE ORAL at 06:05

## 2022-01-01 RX ADMIN — Medication 650 MILLIGRAM(S): at 10:40

## 2022-01-01 RX ADMIN — Medication 60 MILLIGRAM(S): at 05:28

## 2022-01-01 RX ADMIN — HEPARIN SODIUM 5000 UNIT(S): 5000 INJECTION INTRAVENOUS; SUBCUTANEOUS at 14:43

## 2022-01-01 RX ADMIN — Medication 40 MILLIEQUIVALENT(S): at 12:15

## 2022-01-01 RX ADMIN — Medication 50 MICROGRAM(S): at 05:36

## 2022-01-01 RX ADMIN — SODIUM CHLORIDE 500 MILLILITER(S): 9 INJECTION, SOLUTION INTRAVENOUS at 09:28

## 2022-01-01 RX ADMIN — PANTOPRAZOLE SODIUM 40 MILLIGRAM(S): 20 TABLET, DELAYED RELEASE ORAL at 05:39

## 2022-01-01 RX ADMIN — HEPARIN SODIUM 5000 UNIT(S): 5000 INJECTION INTRAVENOUS; SUBCUTANEOUS at 14:45

## 2022-01-01 RX ADMIN — Medication 1 GRAM(S): at 05:40

## 2022-01-01 RX ADMIN — Medication 1 GRAM(S): at 06:29

## 2022-01-01 RX ADMIN — Medication 650 MILLIGRAM(S): at 23:30

## 2022-01-01 RX ADMIN — BUPROPION HYDROCHLORIDE 150 MILLIGRAM(S): 150 TABLET, EXTENDED RELEASE ORAL at 11:24

## 2022-01-01 RX ADMIN — Medication 166.67 MILLIGRAM(S): at 12:34

## 2022-01-01 RX ADMIN — Medication 1 GRAM(S): at 22:59

## 2022-01-01 RX ADMIN — Medication 1 GRAM(S): at 06:33

## 2022-01-01 RX ADMIN — ATOVAQUONE 1500 MILLIGRAM(S): 750 SUSPENSION ORAL at 12:12

## 2022-01-01 RX ADMIN — Medication 2 TABLET(S): at 18:38

## 2022-01-01 RX ADMIN — PIPERACILLIN AND TAZOBACTAM 25 GRAM(S): 4; .5 INJECTION, POWDER, LYOPHILIZED, FOR SOLUTION INTRAVENOUS at 05:48

## 2022-01-01 RX ADMIN — Medication 1 TABLET(S): at 11:27

## 2022-01-01 RX ADMIN — Medication 2 TABLET(S): at 18:46

## 2022-01-01 RX ADMIN — Medication 2 TABLET(S): at 11:40

## 2022-01-01 RX ADMIN — Medication 2 TABLET(S): at 13:17

## 2022-01-01 RX ADMIN — SODIUM CHLORIDE 100 MILLILITER(S): 9 INJECTION, SOLUTION INTRAVENOUS at 01:03

## 2022-01-01 RX ADMIN — Medication 1 GRAM(S): at 18:06

## 2022-01-01 RX ADMIN — Medication 40 MILLIGRAM(S): at 06:33

## 2022-01-01 RX ADMIN — PANTOPRAZOLE SODIUM 40 MILLIGRAM(S): 20 TABLET, DELAYED RELEASE ORAL at 18:07

## 2022-01-01 RX ADMIN — Medication 62.5 MILLIMOLE(S): at 14:42

## 2022-01-01 RX ADMIN — Medication 2.5 MILLIGRAM(S): at 07:45

## 2022-01-01 RX ADMIN — Medication 1 GRAM(S): at 17:18

## 2022-01-01 RX ADMIN — Medication 2 TABLET(S): at 06:43

## 2022-01-01 RX ADMIN — Medication 1 TABLET(S): at 11:19

## 2022-01-01 RX ADMIN — Medication 88 MICROGRAM(S): at 05:29

## 2022-01-01 RX ADMIN — PEMETREXED 925 MILLIGRAM(S): 500 INJECTION, SOLUTION, CONCENTRATE INTRAVENOUS at 13:10

## 2022-01-01 RX ADMIN — Medication 88 MICROGRAM(S): at 07:21

## 2022-01-01 RX ADMIN — HEPARIN SODIUM 5000 UNIT(S): 5000 INJECTION INTRAVENOUS; SUBCUTANEOUS at 06:30

## 2022-01-01 RX ADMIN — Medication 1 GRAM(S): at 23:51

## 2022-01-01 RX ADMIN — Medication 200 MILLIGRAM(S): at 14:09

## 2022-01-01 RX ADMIN — Medication 40 MILLIGRAM(S): at 19:09

## 2022-01-01 RX ADMIN — POTASSIUM PHOSPHATE, MONOBASIC POTASSIUM PHOSPHATE, DIBASIC 83.33 MILLIMOLE(S): 236; 224 INJECTION, SOLUTION INTRAVENOUS at 01:27

## 2022-01-01 RX ADMIN — Medication 650 MILLIGRAM(S): at 22:13

## 2022-01-01 RX ADMIN — BUPROPION HYDROCHLORIDE 150 MILLIGRAM(S): 150 TABLET, EXTENDED RELEASE ORAL at 14:29

## 2022-01-01 RX ADMIN — BUPROPION HYDROCHLORIDE 150 MILLIGRAM(S): 150 TABLET, EXTENDED RELEASE ORAL at 15:20

## 2022-01-01 RX ADMIN — HEPARIN SODIUM 5000 UNIT(S): 5000 INJECTION INTRAVENOUS; SUBCUTANEOUS at 13:39

## 2022-01-01 RX ADMIN — Medication 100 MILLIEQUIVALENT(S): at 09:47

## 2022-01-01 RX ADMIN — BUPROPION HYDROCHLORIDE 150 MILLIGRAM(S): 150 TABLET, EXTENDED RELEASE ORAL at 17:02

## 2022-01-01 RX ADMIN — PIPERACILLIN AND TAZOBACTAM 25 GRAM(S): 4; .5 INJECTION, POWDER, LYOPHILIZED, FOR SOLUTION INTRAVENOUS at 22:53

## 2022-01-01 RX ADMIN — ATOVAQUONE 1500 MILLIGRAM(S): 750 SUSPENSION ORAL at 13:27

## 2022-01-01 RX ADMIN — Medication 88 MICROGRAM(S): at 06:09

## 2022-01-01 RX ADMIN — Medication 1 TABLET(S): at 12:29

## 2022-01-01 RX ADMIN — Medication 2 TABLET(S): at 00:49

## 2022-01-01 RX ADMIN — Medication 88 MICROGRAM(S): at 05:53

## 2022-01-01 RX ADMIN — Medication 1 GRAM(S): at 19:32

## 2022-01-01 RX ADMIN — PANTOPRAZOLE SODIUM 40 MILLIGRAM(S): 20 TABLET, DELAYED RELEASE ORAL at 06:25

## 2022-01-01 RX ADMIN — Medication 400 MILLIGRAM(S): at 11:20

## 2022-01-01 RX ADMIN — SODIUM ZIRCONIUM CYCLOSILICATE 5 GRAM(S): 10 POWDER, FOR SUSPENSION ORAL at 19:04

## 2022-01-01 RX ADMIN — Medication 50 MICROGRAM(S): at 06:43

## 2022-01-01 RX ADMIN — Medication 1 GRAM(S): at 06:25

## 2022-01-01 RX ADMIN — Medication 60 MILLIGRAM(S): at 06:03

## 2022-01-01 RX ADMIN — Medication 1 APPLICATION(S): at 12:14

## 2022-01-01 RX ADMIN — Medication 1 GRAM(S): at 14:29

## 2022-01-01 RX ADMIN — Medication 1 GRAM(S): at 17:48

## 2022-01-01 RX ADMIN — HEPARIN SODIUM 5000 UNIT(S): 5000 INJECTION INTRAVENOUS; SUBCUTANEOUS at 06:43

## 2022-01-01 RX ADMIN — Medication 40 MILLIEQUIVALENT(S): at 10:49

## 2022-01-01 RX ADMIN — PALONOSETRON HYDROCHLORIDE 0.25 MILLIGRAM(S): 0.25 INJECTION, SOLUTION INTRAVENOUS at 12:30

## 2022-01-01 RX ADMIN — Medication 1 GRAM(S): at 18:40

## 2022-01-01 RX ADMIN — SODIUM CHLORIDE 100 MILLILITER(S): 9 INJECTION, SOLUTION INTRAVENOUS at 05:28

## 2022-01-01 RX ADMIN — HEPARIN SODIUM 5000 UNIT(S): 5000 INJECTION INTRAVENOUS; SUBCUTANEOUS at 07:04

## 2022-01-01 RX ADMIN — PANTOPRAZOLE SODIUM 40 MILLIGRAM(S): 20 TABLET, DELAYED RELEASE ORAL at 05:53

## 2022-01-01 RX ADMIN — Medication 40 MILLIEQUIVALENT(S): at 11:11

## 2022-01-01 RX ADMIN — Medication 1 APPLICATION(S): at 12:10

## 2022-01-01 RX ADMIN — HEPARIN SODIUM 5000 UNIT(S): 5000 INJECTION INTRAVENOUS; SUBCUTANEOUS at 13:18

## 2022-01-01 RX ADMIN — Medication 1 GRAM(S): at 01:23

## 2022-01-01 RX ADMIN — Medication 40 MILLIGRAM(S): at 05:54

## 2022-01-01 RX ADMIN — Medication 1 GRAM(S): at 11:59

## 2022-01-01 RX ADMIN — Medication 650 MILLIGRAM(S): at 21:39

## 2022-01-01 RX ADMIN — ATOVAQUONE 1500 MILLIGRAM(S): 750 SUSPENSION ORAL at 11:29

## 2022-01-01 RX ADMIN — Medication 1 GRAM(S): at 06:24

## 2022-01-01 RX ADMIN — Medication 40 MILLIEQUIVALENT(S): at 14:41

## 2022-01-01 RX ADMIN — Medication 650 MILLIGRAM(S): at 06:54

## 2022-01-01 RX ADMIN — Medication 40 MILLIEQUIVALENT(S): at 18:04

## 2022-01-01 RX ADMIN — Medication 100 GRAM(S): at 06:55

## 2022-01-01 RX ADMIN — Medication 40 MILLIGRAM(S): at 18:44

## 2022-01-01 RX ADMIN — PIPERACILLIN AND TAZOBACTAM 25 GRAM(S): 4; .5 INJECTION, POWDER, LYOPHILIZED, FOR SOLUTION INTRAVENOUS at 22:34

## 2022-01-01 RX ADMIN — Medication 2 TABLET(S): at 00:34

## 2022-01-01 RX ADMIN — Medication 1 PACKET(S): at 05:40

## 2022-01-01 RX ADMIN — Medication 372 MILLIGRAM(S): at 14:30

## 2022-01-01 RX ADMIN — PANTOPRAZOLE SODIUM 40 MILLIGRAM(S): 20 TABLET, DELAYED RELEASE ORAL at 06:33

## 2022-01-01 RX ADMIN — Medication 100 MILLIEQUIVALENT(S): at 16:46

## 2022-01-01 RX ADMIN — BUPROPION HYDROCHLORIDE 150 MILLIGRAM(S): 150 TABLET, EXTENDED RELEASE ORAL at 11:45

## 2022-01-01 RX ADMIN — HEPARIN SODIUM 5000 UNIT(S): 5000 INJECTION INTRAVENOUS; SUBCUTANEOUS at 06:55

## 2022-01-01 RX ADMIN — Medication 650 MILLIGRAM(S): at 12:42

## 2022-01-01 RX ADMIN — Medication 2 TABLET(S): at 12:10

## 2022-01-01 RX ADMIN — Medication 40 MILLIEQUIVALENT(S): at 22:53

## 2022-01-01 RX ADMIN — Medication 1 GRAM(S): at 00:38

## 2022-01-01 RX ADMIN — Medication 88 MICROGRAM(S): at 06:25

## 2022-01-01 RX ADMIN — Medication 1 TABLET(S): at 11:11

## 2022-01-01 RX ADMIN — PANTOPRAZOLE SODIUM 40 MILLIGRAM(S): 20 TABLET, DELAYED RELEASE ORAL at 18:08

## 2022-01-01 RX ADMIN — HEPARIN SODIUM 5000 UNIT(S): 5000 INJECTION INTRAVENOUS; SUBCUTANEOUS at 13:37

## 2022-01-01 RX ADMIN — Medication 40 MILLIEQUIVALENT(S): at 10:27

## 2022-01-01 RX ADMIN — Medication 2 TABLET(S): at 17:02

## 2022-01-01 RX ADMIN — Medication 40 MILLIEQUIVALENT(S): at 10:12

## 2022-01-01 RX ADMIN — Medication 1 APPLICATION(S): at 13:27

## 2022-01-01 RX ADMIN — FOSAPREPITANT DIMEGLUMINE 510 MILLIGRAM(S): 150 INJECTION, POWDER, LYOPHILIZED, FOR SOLUTION INTRAVENOUS at 12:35

## 2022-01-01 RX ADMIN — Medication 1 TABLET(S): at 11:43

## 2022-01-01 RX ADMIN — SODIUM CHLORIDE 110 MILLILITER(S): 9 INJECTION, SOLUTION INTRAVENOUS at 06:19

## 2022-01-01 RX ADMIN — Medication 1 GRAM(S): at 06:54

## 2022-01-01 RX ADMIN — Medication 2 TABLET(S): at 07:01

## 2022-01-01 RX ADMIN — PANTOPRAZOLE SODIUM 40 MILLIGRAM(S): 20 TABLET, DELAYED RELEASE ORAL at 18:51

## 2022-01-01 RX ADMIN — Medication 1 GRAM(S): at 22:14

## 2022-01-01 RX ADMIN — BUPROPION HYDROCHLORIDE 150 MILLIGRAM(S): 150 TABLET, EXTENDED RELEASE ORAL at 13:37

## 2022-01-01 RX ADMIN — Medication 1 GRAM(S): at 18:45

## 2022-01-01 RX ADMIN — Medication 1 GRAM(S): at 13:27

## 2022-01-01 RX ADMIN — HEPARIN SODIUM 5000 UNIT(S): 5000 INJECTION INTRAVENOUS; SUBCUTANEOUS at 22:19

## 2022-01-01 RX ADMIN — Medication 88 MICROGRAM(S): at 06:13

## 2022-01-01 RX ADMIN — Medication 650 MILLIGRAM(S): at 07:25

## 2022-01-01 RX ADMIN — SODIUM CHLORIDE 110 MILLILITER(S): 9 INJECTION, SOLUTION INTRAVENOUS at 12:41

## 2022-01-01 RX ADMIN — BUPROPION HYDROCHLORIDE 150 MILLIGRAM(S): 150 TABLET, EXTENDED RELEASE ORAL at 14:19

## 2022-01-01 RX ADMIN — Medication 40 MILLIGRAM(S): at 18:41

## 2022-01-01 RX ADMIN — SODIUM CHLORIDE 110 MILLILITER(S): 9 INJECTION, SOLUTION INTRAVENOUS at 06:06

## 2022-01-01 RX ADMIN — BUPROPION HYDROCHLORIDE 150 MILLIGRAM(S): 150 TABLET, EXTENDED RELEASE ORAL at 13:18

## 2022-01-01 RX ADMIN — HEPARIN SODIUM 5000 UNIT(S): 5000 INJECTION INTRAVENOUS; SUBCUTANEOUS at 06:34

## 2022-01-01 RX ADMIN — Medication 1 TABLET(S): at 12:36

## 2022-01-01 RX ADMIN — Medication 1 GRAM(S): at 06:09

## 2022-01-01 RX ADMIN — PANTOPRAZOLE SODIUM 40 MILLIGRAM(S): 20 TABLET, DELAYED RELEASE ORAL at 17:51

## 2022-01-01 RX ADMIN — HEPARIN SODIUM 5000 UNIT(S): 5000 INJECTION INTRAVENOUS; SUBCUTANEOUS at 22:34

## 2022-01-01 RX ADMIN — Medication 650 MILLIGRAM(S): at 09:52

## 2022-01-01 RX ADMIN — Medication 100 GRAM(S): at 10:16

## 2022-01-01 RX ADMIN — SODIUM CHLORIDE 110 MILLILITER(S): 9 INJECTION, SOLUTION INTRAVENOUS at 17:55

## 2022-01-01 RX ADMIN — Medication 650 MILLIGRAM(S): at 23:13

## 2022-01-01 RX ADMIN — Medication 1 GRAM(S): at 11:27

## 2022-01-01 RX ADMIN — SODIUM CHLORIDE 500 MILLILITER(S): 9 INJECTION, SOLUTION INTRAVENOUS at 23:24

## 2022-01-01 RX ADMIN — Medication 2 TABLET(S): at 19:32

## 2022-01-01 RX ADMIN — Medication 25 GRAM(S): at 10:27

## 2022-01-01 RX ADMIN — Medication 1 GRAM(S): at 18:21

## 2022-01-01 RX ADMIN — PANTOPRAZOLE SODIUM 40 MILLIGRAM(S): 20 TABLET, DELAYED RELEASE ORAL at 18:41

## 2022-03-02 NOTE — HEALTH RISK ASSESSMENT
[Current] : Current [Yes] : Yes [0] : 2) Feeling down, depressed, or hopeless: Not at all (0) [[id number]] : 0

## 2022-03-02 NOTE — ASSESSMENT
[FreeTextEntry1] : Patient has had nio follow up regarding his Lung cancer or the GI findings;\par Will repeat chest film today since concerned he may have progression of his cancer\par With consultants;

## 2022-03-02 NOTE — HISTORY OF PRESENT ILLNESS
[FreeTextEntry1] : Has had no follow up and no biopsy done\par No treatment has been started\par Has home attendent [de-identified] : Will get sigmoscopy; Does not want colonoscopy;\par Coughs in the morning'\par eating well\par No weight loss\par ;

## 2022-04-14 NOTE — ASSESSMENT
[FreeTextEntry1] : Lung cancer with mets;\par There is a question of bowel involvement vs new primary\par Will see GI and get of visit with pulmonary\par

## 2022-04-14 NOTE — HISTORY OF PRESENT ILLNESS
[FreeTextEntry1] : Will follow up with pulmonary next  [de-identified] : Also colonoscopy yet\par Shortness of breath with activity\par Will repeat thyroid function today

## 2022-06-27 NOTE — ASSESSMENT
[FreeTextEntry1] : Data Reviewed: \par \par LDCT 11-2-2021: \par Lungs and airways: Image numbers for description of nodule location refer to thin section axial series number 5.\par There is a spiculated solid nodule which demonstrates a coarse central calcification measuring 21 x 19 x 19 mm in transverse by AP by cephalocaudal dimensions. There are multiple scattered solid and is quite micronodules which are delineated with arrows in series 5:168). Mild mucous plugging is noted within the superior segmental bronchus left lower lobe (5:172). There is moderate centrilobular and substantial paraseptal emphysema.\par Trachea and central bronchi patent.\par Pleura: The pleural spaces are clear.\par Base of neck, mediastinum and heart: The thyroid gland is normal. There is an enlarged right paratracheal lymph node measuring 1 cm in short axis (3:18) no bulky hilar or contralateral lymphadenopathy is identified. The heart and pericardium are within normal limits.\par Vessels/Coronary artery calcification: Mild coronary artery calcification. Small calcified aortic mural plaque formation.\par Soft tissues: Mild symmetric gynecomastia.\par Abdomen: There is a 19 x 24 mm solid left adrenal gland nodule.\par Bones: No aggressive osseous lesions.\par \par \par PET scan 12-2-21:\par 1. FDG avid right apical segment spiculated nodule concerning for neoplasm. Given the presence of abnormal FDG uptake within the rectum, a biopsy is recommended to identify whether the nodule is metastasis versus primary.\par 2. Focal FDG uptake in rectum may represent another possible primary source, correlation with physical exam and possible colonoscopy is recommended.\par 3. FDG avid mediastinal, abdominal and inguinal lymph nodes likely represent connie metastasis as described above..\par 4. FDG avid 2.3 cm left adrenal lesion likely represents metastasis.\par \par PFT 11-10-21: FVC 4.43 (100%), FEV1 2.78 (85%), FEV1/FVC 0.63. No signficant bronchodilator response\par TLC 7.54 (106%), DLCO 10.48 (40%) \par \par Impression/Plan:\par 1. RUL spiculated nodule with right paratracheal adenopathy and left adrenal gland nodule. PEt scan back in  with fdg avidlity in the right apical segment pulmonary nodule, focal fdg uptake in the rectum and fdg avid left adrenal lesion. There were also fdg avid mediastinl and abdominal and inguinal lymph nodes. \par - based on our previsu plan i had referred him to gi to colonoscopy, egd and biopsy of th eleft adrenal lesion and possible rectal lesion however patient did not follow through with the biopsy. He now reports that he does not want to pursue this. \par - we will repeat oet scan and see if there is disease progression and if the optimal site for biopsy is now different. \par \par 2. Dyspnea on exertion with PFT showing mild obstruction.\par - continue incruse \par - can use albuterol PRN\par \par \par 3. Healthcare maintenance\par - uptodate on flu, covid-19 and pneumonia vaccines\par \par discussed with pcp, Dr. Asencio \par

## 2022-06-27 NOTE — HISTORY OF PRESENT ILLNESS
[TextBox_4] : 6-27-21 i have not seen him in over 6 months, multiple rescheduled and missed appointments. today reports issues with car service thus doing this over the phone.he did not get colonoscopy, endoscopy or sigmoidoscopy and saying that he definitely does not want to get it because of the prep-. on bad days walks 1 block before getting out of breath. cough is worse, not significantly relived by albuterol. no other complains. \par \par \par 70 yo M, current smoker with history of hypothyroidism, major depression, anxiety here because of an abnormal lung cancer screening scan. A year ago had no exercise limitations but in the last year feels like he had to slow down. Now needs to stop after 4-5 blocks, and takes elevator when he uses the subway. Had an occasional cough in the morning. No PND, orthopnea or lower extremity edema.  \par \par SH: 2-3 cigs a day currently, previously smoking 1 ppd x 50 years, cut back about 6 years ago. Drinks a couple of beers daily. Worked in warehouse management.

## 2022-06-27 NOTE — REASON FOR VISIT
[Home] : at home, [unfilled] , at the time of the visit. [Medical Office: ([facility])___] : at the medical office located in  [Verbal consent obtained from patient] : the patient, [unfilled] [Follow-Up] : a follow-up visit [Pulmonary Nodules] : pulmonary nodules

## 2022-06-30 PROBLEM — J43.9 EMPHYSEMA LUNG: Status: ACTIVE | Noted: 2021-11-10

## 2022-06-30 PROBLEM — F17.200 CURRENT SMOKER: Status: ACTIVE | Noted: 2021-10-26

## 2022-06-30 NOTE — HISTORY OF PRESENT ILLNESS
[FreeTextEntry1] : Has significant congestion;\par Pulmonary follow up noted [de-identified] : Shortness  of breath with minimal exertion; \par Will get another PET

## 2022-06-30 NOTE — HEALTH RISK ASSESSMENT
[Current] : Current [No] : No [No falls in past year] : Patient reported no falls in the past year [0] : 2) Feeling down, depressed, or hopeless: Not at all (0) [[id number]] : 0

## 2022-06-30 NOTE — PHYSICAL EXAM
[Normal] : soft, non-tender, non-distended, no masses palpated, no HSM and normal bowel sounds [de-identified] : mohini

## 2022-08-04 PROBLEM — R91.1 PULMONARY NODULE: Status: ACTIVE | Noted: 2021-11-03

## 2022-09-21 NOTE — ASSESSMENT
[FreeTextEntry1] : 72 yo M who presents for initial consultation with metastatic lung cancer\par \par ONC HX: \par Patient is a current smoker with history of hypothyroidism, major depression, anxiety recently found with RUL spiculated nodule with R paratracheal adenopathy and L adrenal nodule. Concern for metastatic cancer because of rectal lesion on PET scan as well as L adrenal area that lit up.\par \par 7/28/22 PET-CT Scan\par Since prior PET/CT from 12/2/2021, increase in FDG uptake in nodular thickening of the rectum, suspicious for malignancy/metastatic disease. Recommend correlation with clinical exam and colonoscopy. New focal FDG uptake in the right peripheral zone of the prostate without discrete CT correlate, suspicious for malignancy/metastatic disease. Recommend correlation with PSA level and close urologic follow up. New FDG avid right hilar and left axillary lymph nodes. Additional thoracic, abdominal, and inguinal FDG avid lymph nodes described above were seen on previous PET/CT from 12/2/2021, and are stable in size and demonstrate slightly decreased FDG uptake.\par \par \par 4. Stable size of right apical spiculated nodule with slight interval decrease in radiotracer uptake.\par \par 5. Stable size of left adrenal mass with slight interval decrease in radiotracer uptake

## 2022-09-29 PROBLEM — E03.9 HYPOTHYROIDISM: Status: ACTIVE | Noted: 2021-10-29

## 2022-09-29 NOTE — HISTORY OF PRESENT ILLNESS
[Disease: _____________________] : Disease: [unfilled] [AJCC Stage: ____] : AJCC Stage: [unfilled] [90: Able to carry normal activity; minor signs or symptoms of disease.] : 90: Able to carry normal activity; minor signs or symptoms of disease.  [ECOG Performance Status: 1 - Restricted in physically strenuous activity but ambulatory and able to carry out work of a light or sedentary nature] : Performance Status: 1 - Restricted in physically strenuous activity but ambulatory and able to carry out work of a light or sedentary nature, e.g., light house work, office work [de-identified] : 71M, current smoker (~50py, now smokes e-cigarettes), with hypothyroidism, depression, anxiety, who presents for initial consultation with metastatic lung adenocarcinoma.\par \par ONC HX: \par Patient is a current smoker with history of hypothyroidism, major depression, anxiety recently found with RUL spiculated nodule with R paratracheal adenopathy and L adrenal nodule. Concern for metastatic cancer because of rectal lesion on PET scan as well as L adrenal area that lit up. Now, s/p FNA of R axillary node, with pathology consistent with metastatic lung adenocarcinoma.\par \par 7/28/22 PET-CT Scan\par Since prior PET/CT from 12/2/2021, increase in FDG uptake in nodular thickening of the rectum, suspicious for malignancy/metastatic disease. Recommend correlation with clinical exam and colonoscopy. New focal FDG uptake in the right peripheral zone of the prostate without discrete CT correlate, suspicious for malignancy/metastatic disease. Recommend correlation with PSA level and close urologic follow up. New FDG avid right hilar and left axillary lymph nodes. Additional thoracic, abdominal, and inguinal FDG avid lymph nodes described above were seen on previous PET/CT from 12/2/2021, and are stable in size and demonstrate slightly decreased FDG uptake.Stable size of right apical spiculated nodule with slight interval decrease in radiotracer uptake. Stable size of left adrenal mass with slight interval decrease in radiotracer uptake. \par \par 8/18/22 Biopsy\par Final Diagnosis\par Lymph node, right axillary, core biopsy:\par - Metastatic adenocarcinoma, consistent with lung primary\par \par \par  [de-identified] : Metastatic adenocarcinoma, consistent with lung primary [de-identified] : Pulm: \par GI:

## 2022-09-29 NOTE — ASSESSMENT
[FreeTextEntry1] : 71M, current smoker (~50py, now smokes e-cigarettes), with hypothyroidism, depression, anxiety, who presents for initial consultation with metastatic lung adenocarcinoma.\par \par 1.) metastatic lung adenocarcinoma, AJCC Stage IV\par Pt with R axillary LN biopsy positive for metastatic lung adenocarcinoma, with FDG avidity in thoracic, abdominal, inguinal LNs, as well as rectal thickening. Pt has not had any recent colonoscopies to further investigate GI involvement. He also has not had any brain imaging or NGS performed. We recommended repeat imaging studies for updated staging. Final treatment plans and prognosis contingent on further data from these pending tests.\par -Repeat CT chest/abdomen/pelvis\par -MR Brain w/wo contrast\par -NGS \par -Will reach out to GI, Dr. Barnes for colonoscopy\par -Information about carboplatin, pemetrexed, pembrolizumab provided to patient\par -B12 injection at next visit\par -RTC in 2 weeks

## 2022-09-29 NOTE — REASON FOR VISIT
[Initial Consultation] : an initial consultation [Formal Caregiver] : formal caregiver [FreeTextEntry2] : metastatic lung adenocarcinoma

## 2022-10-13 PROBLEM — K62.9 RECTAL ABNORMALITY: Status: ACTIVE | Noted: 2022-01-01

## 2022-10-13 NOTE — ASSESSMENT
[FreeTextEntry1] : 71M, current smoker (~50py, now smokes e-cigarettes), with hypothyroidism, depression, anxiety, who presents for  follow up of metastatic lung adenocarcinoma.\par \par 1.) metastatic lung adenocarcinoma, AJCC Stage IV\par Pt with R axillary LN biopsy positive for metastatic lung adenocarcinoma, with FDG avidity in thoracic, abdominal, inguinal LNs, as well as rectal thickening. Pt has not had any recent colonoscopies to further investigate GI involvement. Repeat CT CAP with increase in priamry lung mass, persistent rectal thickening and other sites of disease as previously described. 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, and class 1 recommendation for Tdxd, FDA approved in 2nd line setting in NSCLC after progression on chemoimmunotherapy.\par -Caris NGS from tissue is pending\par -Discussed therapy with carbo/pemetrexed/pembro as first line in metastatic NSCLC today, consent obtained, all questions answered\par -Information about carboplatin, pemetrexed, pembrolizumab provided to patient\par -B12 injection today\par -rx for folic acid \par -rx for Compazine PRN\par -RTC in 2 weeks for first treatment\par \par 2.) rectal thickening - possible second primary\par -spoke to  who will arrange for patient to undergo at least flex sig to further evaluate

## 2022-10-13 NOTE — REASON FOR VISIT
[Formal Caregiver] : formal caregiver [Follow-Up Visit] : a follow-up [FreeTextEntry2] : metastatic lung adenocarcinoma

## 2022-10-13 NOTE — HISTORY OF PRESENT ILLNESS
[Disease: _____________________] : Disease: [unfilled] [AJCC Stage: ____] : AJCC Stage: [unfilled] [90: Able to carry normal activity; minor signs or symptoms of disease.] : 90: Able to carry normal activity; minor signs or symptoms of disease.  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 Concern for metastatic cancer because of rectal lesion on PET scan as well as L adrenal area that lit up. Now, s/p FNA of R axillary node, with pathology consistent with metastatic lung adenocarcinoma. Liquid NGS with ERBB2 V777L mutation, Veristrat good.\par \par 7/28/22 PET-CT Scan\par Since prior PET/CT from 12/2/2021, increase in FDG uptake in nodular thickening of the rectum, suspicious for malignancy/metastatic disease. Recommend correlation with clinical exam and colonoscopy. New focal FDG uptake in the right peripheral zone of the prostate without discrete CT correlate, suspicious for malignancy/metastatic disease. Recommend correlation with PSA level and close urologic follow up. New FDG avid right hilar and left axillary lymph nodes. Additional thoracic, abdominal, and inguinal FDG avid lymph nodes described above were seen on previous PET/CT from 12/2/2021, and are stable in size and demonstrate slightly decreased FDG uptake.Stable size of right apical spiculated nodule with slight interval decrease in radiotracer uptake. Stable size of left adrenal mass with slight interval decrease in radiotracer uptake. 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 [de-identified] : Metastatic adenocarcinoma, consistent with lung primary - ERBB2 V777L mutation [de-identified] : Pulm: \par GI:  [de-identified] : Reports feeling well. Strength is good. Appetite is good.

## 2022-10-27 PROBLEM — G47.00 INSOMNIA: Status: ACTIVE | Noted: 2022-01-01

## 2022-10-27 PROBLEM — C34.90 LUNG CANCER: Status: ACTIVE | Noted: 2022-01-01

## 2022-10-27 NOTE — REASON FOR VISIT
[Follow-Up Visit] : a follow-up [Formal Caregiver] : formal caregiver [FreeTextEntry2] : metastatic lung adenocarcinoma

## 2022-10-27 NOTE — ASSESSMENT
[FreeTextEntry1] : 71M, current smoker (~50py, now smokes e-cigarettes), with hypothyroidism, depression, anxiety, who presents for  follow up of metastatic lung adenocarcinoma.\par \par 1.) metastatic lung adenocarcinoma, AJCC Stage IV\par Pt with R axillary LN biopsy positive for metastatic lung adenocarcinoma, with FDG avidity in thoracic, abdominal, inguinal LNs, as well as rectal thickening. Pt has not had any recent colonoscopies to further investigate GI involvement. Repeat CT CAP with increase in priamry lung mass, persistent rectal thickening and other sites of disease as previously described. 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, and class 1 recommendation for Tdxd, FDA approved in 2nd line setting in NSCLC after progression on chemoimmunotherapy.\par -[facility] NGS from tissue insufficent for testing, but [facility] with STK11 and ATR VUS, PD-L1 negative, MSI-high\par -B12 q 9weeks while on pemetrexed\par -rx for folic acid \par -rx for Compazine PRN\par -RTC in 3 weeks\par \par 2.) rectal thickening - possible second primary\par -spoke to  who will arrange for patient to undergo at least flex sig to further evaluate\par \par 3.) Hyperkalemia - 5.6\par Likely hemolyzed sample.\par Will re-draw\par \par Cleared for C1 Carbo/pem/pem today.

## 2022-10-27 NOTE — HISTORY OF PRESENT ILLNESS
[Disease: _____________________] : Disease: [unfilled] [AJCC Stage: ____] : AJCC Stage: [unfilled] [90: Able to carry normal activity; minor signs or symptoms of disease.] : 90: Able to carry normal activity; minor signs or symptoms of disease.  [ECOG Performance Status: 1 - Restricted in physically strenuous activity but ambulatory and able to carry out work of a light or sedentary nature] : Performance Status: 1 - Restricted in physically strenuous activity but ambulatory and able to carry out work of a light or sedentary nature, e.g., light house work, office work [de-identified] : 71M, current smoker (~50py, now smokes e-cigarettes), with hypothyroidism, depression, anxiety, here today for follow up of stage IV lung adenocarcinoma  (ERBB2 V777L, STK11, ATR Q8525V, PD-L1 negative), here for C1D1 carbo/pem/pem.\par \par ONC HX: \par Patient is a current smoker with history of hypothyroidism, major depression, anxiety recently found with RUL spiculated nodule with R paratracheal adenopathy and L adrenal nodule. Concern for metastatic cancer because of rectal lesion on PET scan as well as L adrenal area that lit up. Now, s/p FNA of R axillary node, with pathology consistent with metastatic lung adenocarcinoma. Liquid NGS with ERBB2 V777L mutation, Veristrat good.\par \par 7/28/22 PET-CT Scan\par Since prior PET/CT from 12/2/2021, increase in FDG uptake in nodular thickening of the rectum, suspicious for malignancy/metastatic disease. Recommend correlation with clinical exam and colonoscopy. New focal FDG uptake in the right peripheral zone of the prostate without discrete CT correlate, suspicious for malignancy/metastatic disease. Recommend correlation with PSA level and close urologic follow up. New FDG avid right hilar and left axillary lymph nodes. Additional thoracic, abdominal, and inguinal FDG avid lymph nodes described above were seen on previous PET/CT from 12/2/2021, and are stable in size and demonstrate slightly decreased FDG uptake.Stable size of right apical spiculated nodule with slight interval decrease in radiotracer uptake. Stable size of left adrenal mass with slight interval decrease in radiotracer uptake. 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 [de-identified] : Metastatic adenocarcinoma, consistent with lung primary - ERBB2 V777L mutation via liquid, STK11 G233S, ATR [id number], TMB-High, PD-L1 negative [de-identified] : Pulm: \par GI:  [Therapy: ___] : Therapy: [unfilled] [Cycle: ___] : Cycle: [unfilled] [Day: ___] : Day: [unfilled] [FreeTextEntry1] : 10/27/2022: C1 Carbo/pem/pem [de-identified] : Reports feeling well. Strength is good. Appetite is good. Anxious about doing chemo today.

## 2022-11-16 NOTE — HISTORY OF PRESENT ILLNESS
[Disease: _____________________] : Disease: [unfilled] [AJCC Stage: ____] : AJCC Stage: [unfilled] [de-identified] : 72M, current smoker (~50py, now smokes e-cigarettes), with hypothyroidism, depression, anxiety, here today for follow up of stage IV lung adenocarcinoma  (ERBB2 V777L, STK11, ATR [id number], PD-L1 negative), here for C2D1 carbo/pem/pem.\par \par ONC HX: \par Patient is a current smoker with history of hypothyroidism, major depression, anxiety recently found with RUL spiculated nodule with R paratracheal adenopathy and L adrenal nodule. Concern for metastatic cancer because of rectal lesion on PET scan as well as L adrenal area that lit up. Now, s/p FNA of R axillary node, with pathology consistent with metastatic lung adenocarcinoma. Liquid NGS with ERBB2 V777L mutation, Veristrat good.\par \par 7/28/22 PET-CT Scan\par Since prior PET/CT from 12/2/2021, increase in FDG uptake in nodular thickening of the rectum, suspicious for malignancy/metastatic disease. Recommend correlation with clinical exam and colonoscopy. New focal FDG uptake in the right peripheral zone of the prostate without discrete CT correlate, suspicious for malignancy/metastatic disease. Recommend correlation with PSA level and close urologic follow up. New FDG avid right hilar and left axillary lymph nodes. Additional thoracic, abdominal, and inguinal FDG avid lymph nodes described above were seen on previous PET/CT from 12/2/2021, and are stable in size and demonstrate slightly decreased FDG uptake.Stable size of right apical spiculated nodule with slight interval decrease in radiotracer uptake. Stable size of left adrenal mass with slight interval decrease in radiotracer uptake. 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 [de-identified] : Metastatic adenocarcinoma, consistent with lung primary - ERBB2 V777L mutation via liquid, STK11 G233S, ATR [id number], TMB-High, PD-L1 negative [de-identified] : Pulm: \par GI:  [Therapy: ___] : Therapy: [unfilled] [Cycle: ___] : Cycle: [unfilled] [Day: ___] : Day: [unfilled] [FreeTextEntry1] : 10/27/2022: C1 Carbo/pem/pem\par 11/17/2022: C2 Carbo/pem/pem [de-identified] : Reports feeling well. Strength is good. Appetite is good. Anxious about doing chemo today. [90: Able to carry normal activity; minor signs or symptoms of disease.] : 90: Able to carry normal activity; minor signs or symptoms of disease.  [ECOG Performance Status: 1 - Restricted in physically strenuous activity but ambulatory and able to carry out work of a light or sedentary nature] : Performance Status: 1 - Restricted in physically strenuous activity but ambulatory and able to carry out work of a light or sedentary nature, e.g., light house work, office work

## 2022-11-16 NOTE — ASSESSMENT
[FreeTextEntry1] : 72M, current smoker (~50py, now smokes e-cigarettes), with hypothyroidism, depression, anxiety, who presents for  follow up of metastatic lung adenocarcinoma with C2D1 carbo/pem/pem.\par \par 1.) metastatic lung adenocarcinoma, AJCC Stage IV\par Pt with R axillary LN biopsy positive for metastatic lung adenocarcinoma, with FDG avidity in thoracic, abdominal, inguinal LNs, as well as rectal thickening. Pt has not had any recent colonoscopies to further investigate GI involvement. Repeat CT CAP with increase in priamry lung mass, persistent rectal thickening and other sites of disease as previously described. 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, and class 1 recommendation for Tdxd, FDA approved in 2nd line setting in NSCLC after progression on chemoimmunotherapy.\par -[facility] NGS from tissue insufficent for testing, but [facility] with STK11 and ATR VUS, PD-L1 negative, MSI-high\par -B12 q 9weeks while on pemetrexed\par -rx for folic acid \par -rx for Compazine PRN\par -RTC in 3 weeks\par \par 2.) rectal thickening - possible second primary\par -spoke to  who will arrange for patient to undergo at least flex sig to further evaluate\par \par 3.) Hyperkalemia - 5.6\par Likely hemolyzed sample.\par Will re-draw\par \par Cleared for C1 Carbo/pem/pem today.

## 2022-11-28 NOTE — H&P ADULT - PROBLEM SELECTOR PLAN 7
Patient with hypomagnesemia, likely due to persistent diarrhea.  Likely contributing to overall weakness and falls.  - Replete PRN

## 2022-11-28 NOTE — CONSULT NOTE ADULT - SUBJECTIVE AND OBJECTIVE BOX
All the information obtained from HHA at bedside or notes from outpatient medical records. Pt unable to cooperate in interview due to not answering any questions.     HPI:   73 YO M PMHx of Stage 4 metastatic adenocarcinoma cancer diagnosed on 8/18/2022, (Dr. Wang wells and Dr. Patricia Oncologist), started Chemo  (D1,C1 with Carco/Pen/Pem in 10/27), did not recived second cycle in 11/17 due to weakness and did not reported to the Oncologist office. He was BIBEMS due to AMS and poor po intacke when HHA found him today at home and called EMS for change in mental status. He does have HHA 2/7 days which help him for cleaning and cooking. Per HHA at bedside his baseline is walk with help and eat food with help with AOOX3 at baseline. but today when HHA saw him, he did not feel well, did not eat any food or no drinking. Pt had fall with his head in 11/5 and was recorded in outpatient heme/onc notes. Pt refused to go to the ED or outpt office to get CTH at that time and his close head scare was improved after that. Due to intermittent diarrhea he has had since 4 weeks ago, he had dizziness during walking and weakness. He had unwitnessed second fall last week with both legs with bruises and old scars.   On EMS arrival, he received 1L NS rate 150cc/hr for SBP 60s.   In the ED on arrival Tmax 100.5, -147/63-65, -92, Satting well on RA, s              REVIEW OF SYSTEMS:      PAST MEDICAL & SURGICAL HISTORY:  Stage 4 mettastatic Lung adenocarcinoma Cancer       SOCIAL HISTORY:  Active smoker for more than 50 years     MEDICATIONS:  Pulmonary:    Antimicrobials:  piperacillin/tazobactam IVPB.. 3.375 Gram(s) IV Intermittent once    Anticoagulants:    Onc:    GI/:    Endocrine:    Cardiac:    Other Medications:  potassium chloride  10 mEq/100 mL IVPB 10 milliEquivalent(s) IV Intermittent every 1 hour      Allergies    No Known Allergies    Intolerances        Vital Signs Last 24 Hrs  T(C): 38.1 (28 Nov 2022 11:03), Max: 38.1 (28 Nov 2022 11:03)  T(F): 100.5 (28 Nov 2022 11:03), Max: 100.5 (28 Nov 2022 11:03)  HR: 86 (28 Nov 2022 14:28) (86 - 120)  BP: 128/60 (28 Nov 2022 14:28) (114/63 - 157/61)  BP(mean): --  RR: 20 (28 Nov 2022 14:28) (20 - 20)  SpO2: 100% (28 Nov 2022 14:28) (97% - 100%)    Parameters below as of 28 Nov 2022 14:28  Patient On (Oxygen Delivery Method): room air            PHYSICAL EXAM:  Constitutional: WD  Head: NC/AT  EENT: PERRL, anicteric sclera; oropharynx clear, MMM  Neck: supple, no appreciable JVD  Respiratory: CTA B/L; no W/R/R  Cardiovascular: +S1/S2, RRR  Gastrointestinal: soft, NT/ND  Extremities: WWP; no edema, clubbing or cyanosis  Vascular: 2+ radial and pedal pulses  Neurological: AAOx3; no focal deficits    LABS:      CBC Full  -  ( 28 Nov 2022 10:33 )  WBC Count : 16.24 K/uL  RBC Count : 4.62 M/uL  Hemoglobin : 11.7 g/dL  Hematocrit : 34.3 %  Platelet Count - Automated : 449 K/uL  Mean Cell Volume : 74.2 fl  Mean Cell Hemoglobin : 25.3 pg  Mean Cell Hemoglobin Concentration : 34.1 gm/dL  Auto Neutrophil # : 12.57 K/uL  Auto Lymphocyte # : 2.53 K/uL  Auto Monocyte # : 0.84 K/uL  Auto Eosinophil # : 0.00 K/uL  Auto Basophil # : 0.00 K/uL  Auto Neutrophil % : 77.4 %  Auto Lymphocyte % : 15.6 %  Auto Monocyte % : 5.2 %  Auto Eosinophil % : 0.0 %  Auto Basophil % : 0.0 %    11-28    143  |  101  |  31<H>  ----------------------------<  137<H>  3.2<L>   |  18<L>  |  2.35<H>    Ca    7.6<L>      28 Nov 2022 10:33    TPro  6.3  /  Alb  3.4  /  TBili  0.8  /  DBili  x   /  AST  23  /  ALT  18  /  AlkPhos  148<H>  11-28          Urinalysis Basic - ( 28 Nov 2022 11:03 )    Color: Yellow / Appearance: Clear / SG: >=1.030 / pH: x  Gluc: x / Ketone: Trace mg/dL  / Bili: Moderate / Urobili: 1.0 E.U./dL   Blood: x / Protein: Trace mg/dL / Nitrite: NEGATIVE   Leuk Esterase: Trace / RBC: < 5 /HPF / WBC > 10 /HPF   Sq Epi: x / Non Sq Epi: 0-5 /HPF / Bacteria: Present /HPF                RADIOLOGY & ADDITIONAL STUDIES: All the information obtained from HHA at bedside or notes from outpatient medical records. Pt unable to cooperate in interview due to not answering any questions.     HPI:   71 YO M PMHx of Stage 4 metastatic adenocarcinoma cancer diagnosed on 8/18/2022, (Dr. Wang wells and Dr. Patricia Oncologist), started Chemo  (D1,C1 with Carco/Pen/Pem in 10/27), did not recived second cycle in 11/17 due to weakness and did not reported to the Oncologist office. He was BIBEMS due to AMS and poor po intacke when HHA found him today at home and called EMS for change in mental status. He does have HHA 2/7 days which help him for cleaning and cooking. Per HHA at bedside his baseline is walk with help and eat food with help with AOOX3 at baseline. but today when HHA saw him, he did not feel well, did not eat any food or no drinking. Pt had fall with his head in 11/5 and was recorded in outpatient heme/onc notes. Pt refused to go to the ED or outpt office to get CTH at that time and his close head scare was improved after that. Due to intermittent diarrhea he has had since 4 weeks ago, he had dizziness during walking and weakness. He had unwitnessed second fall last week with both legs with bruises and old scars.   On EMS arrival, he received 1L NS rate 150cc/hr for SBP 60s.   In the ED on arrival Tmax 100.5, -147/63-65, -92, Satting well on RA, Leukocytosis 16 and Lactate 12. AG 24, BUN/Cr 31/2.35. UA+, VBG 7.2/ 37. He received 2200cc NS and lactate improved to 2.9. Pending repeat CMP. had 2 times diarrhea in the ED, mark placed in the ED and makes urine. During interview. he is AAOX0 not responsive to questions and answer with "SURE" with all the questions. CTH no negative for acute ICH. CTAP and chest shows unchanged right upper lobe malignancy and no sign of pneumonia, pleural effusion. Decreased left periaortic node 0.7 cm short axis, previously 1.3 cm; decreased right inguinal node 0.5 cm short axis.         REVIEW OF SYSTEMS:  Unable to abtain since pt does not response to any questions and just answer "SURE" to all the questions. He does not remind his name, place.     PAST MEDICAL & SURGICAL HISTORY:  Stage 4 metastatic Lung adenocarcinoma Cancer   anxiety  depression  hypothyroidism  Emphysema       SOCIAL HISTORY:  Active smoker for more than 50 years   Heavy alcohol drinker. (Per aid drink 10 Beer every night)          Allergies  None     Vital Signs Last 24 Hrs  T(C): 38.1 (28 Nov 2022 11:03), Max: 38.1 (28 Nov 2022 11:03)  T(F): 100.5 (28 Nov 2022 11:03), Max: 100.5 (28 Nov 2022 11:03)  HR: 86 (28 Nov 2022 14:28) (86 - 120)  BP: 128/60 (28 Nov 2022 14:28) (114/63 - 157/61)  BP(mean): --  RR: 20 (28 Nov 2022 14:28) (20 - 20)  SpO2: 100% (28 Nov 2022 14:28) (97% - 100%)    Parameters below as of 28 Nov 2022 14:28  Patient On (Oxygen Delivery Method): room air      PHYSICAL EXAM:  Constitutional: WD  Head: NC/AT  EENT: PERRL, anicteric sclera; oropharynx clear, MMM  Neck: supple, no appreciable JVD  Respiratory: CTA B/L; no W/R/R  Cardiovascular: +S1/S2, RRR  Gastrointestinal: soft, NT/ND  Extremities: WWP; no edema, clubbing or cyanosis  Vascular: 2+ radial and pedal pulses  Neurological: AAOx3; no focal deficits    LABS:      CBC Full  -  ( 28 Nov 2022 10:33 )  WBC Count : 16.24 K/uL  RBC Count : 4.62 M/uL  Hemoglobin : 11.7 g/dL  Hematocrit : 34.3 %  Platelet Count - Automated : 449 K/uL  Mean Cell Volume : 74.2 fl  Mean Cell Hemoglobin : 25.3 pg  Mean Cell Hemoglobin Concentration : 34.1 gm/dL  Auto Neutrophil # : 12.57 K/uL  Auto Lymphocyte # : 2.53 K/uL  Auto Monocyte # : 0.84 K/uL  Auto Eosinophil # : 0.00 K/uL  Auto Basophil # : 0.00 K/uL  Auto Neutrophil % : 77.4 %  Auto Lymphocyte % : 15.6 %  Auto Monocyte % : 5.2 %  Auto Eosinophil % : 0.0 %  Auto Basophil % : 0.0 %    11-28    143  |  101  |  31<H>  ----------------------------<  137<H>  3.2<L>   |  18<L>  |  2.35<H>    Ca    7.6<L>      28 Nov 2022 10:33    TPro  6.3  /  Alb  3.4  /  TBili  0.8  /  DBili  x   /  AST  23  /  ALT  18  /  AlkPhos  148<H>  11-28          Urinalysis Basic - ( 28 Nov 2022 11:03 )    Color: Yellow / Appearance: Clear / SG: >=1.030 / pH: x  Gluc: x / Ketone: Trace mg/dL  / Bili: Moderate / Urobili: 1.0 E.U./dL   Blood: x / Protein: Trace mg/dL / Nitrite: NEGATIVE   Leuk Esterase: Trace / RBC: < 5 /HPF / WBC > 10 /HPF   Sq Epi: x / Non Sq Epi: 0-5 /HPF / Bacteria: Present /HPF                RADIOLOGY & ADDITIONAL STUDIES: All the information obtained from HHA at bedside or notes from outpatient medical records. Pt unable to cooperate in interview due to not answering any questions.     HPI:   73 YO M PMHx of Stage 4 metastatic adenocarcinoma cancer diagnosed on 8/18/2022, (Dr. Wang wells and Dr. Patricia Oncologist), started Chemo  (D1,C1 with Carco/Pen/Pem in 10/27), did not recived second cycle in 11/17 due to weakness and did not reported to the Oncologist office. He was BIBEMS due to AMS and poor po intacke when HHA found him today at home and called EMS for change in mental status. He does have HHA 2/7 days which help him for cleaning and cooking. Per HHA at bedside his baseline is walk with help and eat food with help with AOOX3 at baseline. but today when HHA saw him, he did not feel well, did not eat any food or no drinking. Pt had fall with his head in 11/5 and was recorded in outpatient heme/onc notes. Pt refused to go to the ED or outpt office to get CTH at that time and his close head scare was improved after that. Due to intermittent diarrhea he has had since 4 weeks ago, he had dizziness during walking and weakness. He had unwitnessed second fall last week with both legs with bruises and old scars.   On EMS arrival, he received 1L NS rate 150cc/hr for SBP 60s.     In the ED on arrival Tmax 100.5, -147/63-65, -92, Satting well on RA, Leukocytosis 16 and Lactate 12. AG 24, BUN/Cr 31/2.35. UA+, VBG 7.2/ 37. He received 2200cc NS and lactate improved to 2.9. Pending repeat CMP. had 2 times diarrhea in the ED, mark placed in the ED and makes urine. During interview. he is AAOX0 not responsive to questions and answer with "SURE" with all the questions. Not following command. CTH no negative for acute ICH. CTAP and chest shows unchanged right upper lobe malignancy and no sign of pneumonia, pleural effusion. Decreased left periaortic node 0.7 cm short axis, previously 1.3 cm; decreased right inguinal node 0.5 cm short axis.   Does have active watery brown diarrhea       REVIEW OF SYSTEMS:  Unable to abtain since pt does not response to any questions and just answer "SURE" to all the questions. He does not remind his name, place.     PAST MEDICAL & SURGICAL HISTORY:  Stage 4 metastatic Lung adenocarcinoma Cancer   anxiety  depression  hypothyroidism  Emphysema     SOCIAL HISTORY:  Active smoker for more than 50 years   Heavy alcohol drinker. (Per aid drink 10 Beer every night)      Allergies  None     Vital Signs Last 24 Hrs  T(C): 38.1 (28 Nov 2022 11:03), Max: 38.1 (28 Nov 2022 11:03)  T(F): 100.5 (28 Nov 2022 11:03), Max: 100.5 (28 Nov 2022 11:03)  HR: 86 (28 Nov 2022 14:28) (86 - 120)  BP: 128/60 (28 Nov 2022 14:28) (114/63 - 157/61)  BP(mean): --  RR: 20 (28 Nov 2022 14:28) (20 - 20)  SpO2: 100% (28 Nov 2022 14:28) (97% - 100%)    Parameters below as of 28 Nov 2022 14:28  Patient On (Oxygen Delivery Method): room air      PHYSICAL EXAM:  Constitutional: NAD, awake   EENT: PERRL,MIdriasis b/l reactive to the light , left side frontal old scar from head trauma fall   Neck: supple, no appreciable JVD  Respiratory: CTA B/L; no W/R/R, Pectus excavatum   Cardiovascular: +S1/S2, RRR  Gastrointestinal: soft, NT/ND, active diarrhea   : mark with urine   Extremities: WWP; no edema, b/l Knee bruises and abression dry and old   Vascular: 2+ radial and pedal pulses  Neurological: AAOx0. does not follow command     LABS:      CBC Full  -  ( 28 Nov 2022 10:33 )  WBC Count : 16.24 K/uL  RBC Count : 4.62 M/uL  Hemoglobin : 11.7 g/dL  Hematocrit : 34.3 %  Platelet Count - Automated : 449 K/uL  Mean Cell Volume : 74.2 fl  Mean Cell Hemoglobin : 25.3 pg  Mean Cell Hemoglobin Concentration : 34.1 gm/dL  Auto Neutrophil # : 12.57 K/uL  Auto Lymphocyte # : 2.53 K/uL  Auto Monocyte # : 0.84 K/uL  Auto Eosinophil # : 0.00 K/uL  Auto Basophil # : 0.00 K/uL  Auto Neutrophil % : 77.4 %  Auto Lymphocyte % : 15.6 %  Auto Monocyte % : 5.2 %  Auto Eosinophil % : 0.0 %  Auto Basophil % : 0.0 %    11-28    143  |  101  |  31<H>  ----------------------------<  137<H>  3.2<L>   |  18<L>  |  2.35<H>    Ca    7.6<L>      28 Nov 2022 10:33    TPro  6.3  /  Alb  3.4  /  TBili  0.8  /  DBili  x   /  AST  23  /  ALT  18  /  AlkPhos  148<H>  11-28          Urinalysis Basic - ( 28 Nov 2022 11:03 )    Color: Yellow / Appearance: Clear / SG: >=1.030 / pH: x  Gluc: x / Ketone: Trace mg/dL  / Bili: Moderate / Urobili: 1.0 E.U./dL   Blood: x / Protein: Trace mg/dL / Nitrite: NEGATIVE   Leuk Esterase: Trace / RBC: < 5 /HPF / WBC > 10 /HPF   Sq Epi: x / Non Sq Epi: 0-5 /HPF / Bacteria: Present /HPF

## 2022-11-28 NOTE — ED ADULT TRIAGE NOTE - CHIEF COMPLAINT QUOTE
Pt brought in by EMS with A for altered mental status this morning. Pt AOx4 at baseline, today AOx1, not responding to questions or commands. Pt hypotensive to 50s systolically in the field, IO placed and NS running. [facility] states she spoke to pt 3 days ago. Pt found in bed altered this morning. Currently being treated for lung cancer, last chemo 1 month ago.

## 2022-11-28 NOTE — ED PROVIDER NOTE - CLINICAL SUMMARY MEDICAL DECISION MAKING FREE TEXT BOX
Pt presenting w ams, hypotension, frequent falls, hx limited by ams. Aide at bedside stating last known normal three days ago. Will obtain labs, cultures, pan scan for trauma/ams ?ich, other rib/visceral injury, likely admit.  ICU consulted for admission, stable for RMF, disc w Dr. Asencio.

## 2022-11-28 NOTE — H&P ADULT - PROBLEM SELECTOR PLAN 12
F: s/p 2.2L NS  E: replete prn  N: pureed diet   GI: PPI  DVT: Heparin  Dispo: [facility]

## 2022-11-28 NOTE — H&P ADULT - NSHPLABSRESULTS_GEN_ALL_CORE
LABS:                        9.1    17.16 )-----------( 361      ( 28 Nov 2022 20:38 )             27.1     11-29    140  |  106  |  32<H>  ----------------------------<  129<H>  3.0<L>   |  18<L>  |  2.19<H>    Ca    7.4<L>      29 Nov 2022 01:55  Phos  2.5     11-29  Mg     3.7     11-29    TPro  5.0<L>  /  Alb  2.6<L>  /  TBili  0.7  /  DBili  x   /  AST  21  /  ALT  14  /  AlkPhos  117  11-28      Urinalysis Basic - ( 28 Nov 2022 11:03 )    Color: Yellow / Appearance: Clear / SG: >=1.030 / pH: x  Gluc: x / Ketone: Trace mg/dL  / Bili: Moderate / Urobili: 1.0 E.U./dL   Blood: x / Protein: Trace mg/dL / Nitrite: NEGATIVE   Leuk Esterase: Trace / RBC: < 5 /HPF / WBC > 10 /HPF   Sq Epi: x / Non Sq Epi: 0-5 /HPF / Bacteria: Present /HPF

## 2022-11-28 NOTE — H&P ADULT - PROBLEM SELECTOR PLAN 1
Patient with encephalopahty and hypotension, meeting 3 SIRS on admission for tachycardia, tachypnea, and leukocytosis.  S/p Zosyn 3.375 x 3, Vancomycin 1250mg, NaCl 2200cc bolus in ED.    - F/u Bcx and UCx Patient with encephalopathy and hypotension, meeting 3 SIRS on admission for tachycardia, tachypnea, and leukocytosis.  Source possibly pulmonary and urinary.  Lactate 12.1 --> 2.3, UA trace LE, Bacteria present, hyaline casts, C. Diff negative GI PCR negative.  Lactate cleared, S/p Zosyn 3.375 x 3, Vancomycin 1250mg, NaCl 2200cc bolus in ED.    - F/u Bcx and UCx  - Continue Vanc and Zosyn for now

## 2022-11-28 NOTE — ED ADULT NURSE NOTE - CHIEF COMPLAINT QUOTE
Pt brought in by EMS with A for altered mental status this morning. Pt AOx4 at baseline, today AOx1, not responding to questions or commands. Pt hypotensive to 50s systolically in the field, IO placed and NS running. [facility] states she spoke to pt 3 days ago. Pt found in bed altered this morning. Currently being treated for lung cancer, last chemo 1 month ago.

## 2022-11-28 NOTE — H&P ADULT - PROBLEM SELECTOR PLAN 11
F: s/p 2.2L NS  E: replete prn  N: pureed diet   GI: PPI  DVT: Heparin  Dispo: [facility] Patient with history of anxiety and depression, home medications Bupropion XL 150mg daily, Gabapentin 300mg TID, Quetiapine 50mg qhs.   - Holding quetiapine and gabapentin in setting of recent encephalopathy and ASHLYN, can restart as clinically appropriate

## 2022-11-28 NOTE — H&P ADULT - NSHPPHYSICALEXAM_GEN_ALL_CORE
VITALS:   T(C): 36.6 (11-29-22 @ 05:27), Max: 38.1 (11-28-22 @ 11:03)  HR: 64 (11-29-22 @ 05:27) (64 - 120)  BP: 102/60 (11-29-22 @ 05:27) (102/60 - 157/61)  RR: 18 (11-29-22 @ 05:27) (18 - 20)  SpO2: 99% (11-29-22 @ 05:27) (97% - 100%)    GENERAL: Non acute distress, lying in bed comfortably  HEAD:  Atraumatic, normocephalic  EYES: PERRLA, conjunctiva and sclera clear  ENT: Dry mucous membranes  NECK: Supple, no JVD  HEART: Regular rate and rhythm, no murmurs, rubs, or gallops  LUNGS: Unlabored respirations.  Clear to auscultation bilaterally, no crackles, wheezing, or rhonchi  ABDOMEN: Soft, nontender, nondistended, +BS  EXTREMITIES: 2+ peripheral pulses bilaterally. No clubbing, cyanosis, or edema  NERVOUS SYSTEM:  A&Ox2, no focal deficits   SKIN: No rashes or lesions

## 2022-11-28 NOTE — CONSULT NOTE ADULT - ASSESSMENT
71 yo PMHx of lung CA undergoing chemo brought in for AMS for two days, hypotension, ?falls. Hx limited by pt ams 73 YO M PMHx of Stage 4 metastatic adenocarcinoma cancer diagnosed on 8/18/2022, (Dr. Wang wells and Dr. Patricia Oncologist), started Chemo  (D1,C1 with Carco/Pen/Pem in 10/27), did not received second cycle in 11/17 due to weakness and refused the treatment, s/p 2 times fall in past month with head trauma and knee abressions, reports dizziness, weakness, poor po intake, intermittent chronic watery diarrhea, active smoker and alcohol drinker, ICU consulted for lactate 12 and hypotension, which improved after fluid resuscitation. Lactate is 2.9 and BP stable. Pt is stable to go to the [facility] and does not requires medicine tele/7lachman at this time. Please call ICU consult if the status of patient changed.      Problem/Plan:     Neuro    Pulm      CVD    ID  #Sever Sepsis   Leukocytosis,  and Tmax 100.5. Likely due to diarrhea vs UTI, UA+. creatinine 2.35 , Lactate 12  s/p fluid 2200cc in ED and 1L with EMS. VS stable. HD stable for floor.   -CT chest no sign of pneumonia, effusion, CTAP no sign of collitis. CXR unremarable. no lymphnode in exam axillary and inguinal   s/p Vanc , Zosyn in the ED.   -f/u BCX, UCX, stool PCR, culture, c.diff   -c/w vanc/ zosyn   -Send MRSA       Renal  # ASHLYN     # electrolytes abnormality       GI  #Diarrhea       Heme/Onc   #Lung cancer         Meaintenace      Dispo: [facility]                 71 YO M PMHx of Stage 4 metastatic adenocarcinoma cancer diagnosed on 8/18/2022, (Dr. Wang wells and Dr. Patricia Oncologist), started Chemo  (D1,C1 with Carco/Pen/Pem in 10/27), did not received second cycle in 11/17 due to weakness and refused the treatment, s/p 2 times fall in past month with head trauma and knee abressions, reports dizziness, weakness, poor po intake, intermittent chronic watery diarrhea, active smoker and alcohol drinker, ICU consulted for lactate 12 and hypotension, which improved after fluid resuscitation. Lactate is 2.9 and BP stable. Pt is stable to go to the [facility] and does not requires medicine tele/7lachman at this time. Please call ICU consult if the status of patient changed.      Problem/Plan:     Neuro  #Acute metabolic encaphalopathy vs Toxic encephalopathy  Likely due to poor po intake with chronic diarrhea and UTI and electrolytes abnormality vs alcohol intoxication   Baseline mental status per [facility] is AAOx3 and per Dr Asencio (PCP) and oncology team   per [facility], he is big alcohol drinker 10 beers every night, last time one week ago?   -AAOX zero on arrival but awake and no acute distress. just answer all questions with word "SURE"  -f/u toxicology urine and blood alcohol  -replete electrolytes  -c/w fluids since pt does have active diarhea and not PO intake     # Alcohol abuse   per [facility], he is big alcohol drinker 10 beers every night, last time one week ago?   -CIWA protocol      Pulm  NO active issue , satting well on RA     CVD  Volume: looks very dry, dry mouth, but still makes urine. active watery diarrhea   -s.p 2200cc an 1 L by ems.   - c/w fluid   -strick uop     ID  #Sever Sepsis   Leukocytosis,  and Tmax 100.5. Likely due to diarrhea vs UTI, UA+. creatinine 2.35 , Lactate 12  s/p fluid 2200cc in ED and 1L with EMS. VS stable. HD stable for floor.   -CT chest no sign of pneumonia, effusion, CTAP no sign of collitis. CXR unremarable. no lymphnode in exam axillary and inguinal   s/p Vanc , Zosyn in the ED.   -f/u BCX, UCX, stool PCR, culture, c.diff   -c/w vanc/ zosyn   -Send MRSA       Renal  # ASHLYN, vs ATN   Creatinine 10/27 was 1.1.  On arrival 2.35. More like;y Pre-renal  active watery diarrhea, poor po intake, granular+   Makes urine in the ED  - f/u urine lytes.   -trend creatinine  -avoid nephrotoxic agent  -Strik UOP   -keep mark for close monitoring of UOP and record       #UTI   UA +.   -f/u UCX and BCX   - c/w Zosyn       # electrolytes abnormality   sever hypokalemia due to diarrhea and poor PO intake     GI  #Diarrhea   Watery active chronic diarrhea  -f/u stool culture, pcr   -f/u c.diff   -c/w fluid       MSK   #Fall   s/p 2 times fall in past month CTH negative for ICH   Fall percausion     Heme/Onc   #Lung cancer   diagnosed with metastatic adenocarcinoma lung cancer to adrenal and rectal. diagnosed on 8/18/2022, (Dr. Wang wells and Dr. Patricia Oncologist), started Chemo  (D1,C1 with Carco/Pen/Pem in 10/27), did not received second cycle in 11/17 due to weakness  Dr. Patricia is aware of pt admission. Please call Oncology fellow to inform them, they should see pt and discuss it with their attending.       Meaintenace  F: s/p 1 L EMS, 2200 cc NS ED, need more fluid   E: repleat K and Mg as needed     CODE: Pt does not have any HCP and unclear who makes decision Per HHA he does have niece in [place] which they have o find the number for him.   Dispo: F undfer Dr. Asencio service

## 2022-11-28 NOTE — H&P ADULT - PROBLEM SELECTOR PLAN 2
Patient with frequent falls at home, per outpatient nursing notes, patient's HHA have reported falls at home sometimes with head trauma.  He ambulated at baseline with cane.  Etiology likely due to overall decompensation in setting of metastatic cancer  - PT/OT consult Patient with frequent falls at home, per outpatient nursing notes, patient's HHA have reported falls at home sometimes with head trauma.  He ambulated at baseline with cane.  Etiology likely due to overall decompensation in setting of metastatic cancer. CTH and CT cervical spine without fractures of trauma.    - PT/OT consult

## 2022-11-28 NOTE — ED ADULT NURSE NOTE - ED STAT RN HANDOFF DETAILS
Covering for EDD [place]: Patient stable for transfer. No acute distress noted. Safety precautions maintained.

## 2022-11-28 NOTE — ED PROVIDER NOTE - PHYSICAL EXAMINATION
CONSTITUTIONAL: moving all ext, moaning  HEENT: Head is atraumatic. Eyes clear bilaterally, normal EOMI. Airway patent.  CARDIAC: Normal rate, regular rhythm.  Heart sounds S1, S2.   RESPIRATORY: Breath sounds clear and equal bilaterally. no tachypnea, respiratory distress.   GASTROINTESTINAL: Abdomen soft, non-tender, no guarding, distension.  MUSCULOSKELETAL: Spine appears normal, no midline spinal tenderness, range of motion is not limited, no muscle or joint tenderness. no bony tenderness.   NEUROLOGICAL: Alert, no focal deficits, no motor or sensory deficits.  SKIN: Skin normal color for race, warm, dry and intact. No evidence of rash.  PSYCHIATRIC: Normal mood and affect. no apparent risk to self or others. CONSTITUTIONAL: moving all ext, moaning  HEENT: Head is atraumatic. Eyes clear bilaterally, normal EOMI. Airway patent.  CARDIAC: Normal rate, regular rhythm.  Heart sounds S1, S2.   RESPIRATORY: Breath sounds clear and equal bilaterally. no tachypnea, respiratory distress.   GASTROINTESTINAL: Abdomen soft, non-tender, no guarding, distension.  MUSCULOSKELETAL: Spine appears normal, no midline spinal tenderness, range of motion is not limited, no muscle or joint tenderness. no bony tenderness.   NEUROLOGICAL: Alert, no focal deficits, no motor or sensory deficits.  SKIN: Skin normal color for race, warm, dry and intact. No evidence of rash.

## 2022-11-28 NOTE — H&P ADULT - PROBLEM SELECTOR PLAN 4
Patient with underlying metastatic lung adenocarcinoma with chronic diarrhea and frequent falls, overall appears very frail.    - Nutrition Consult   - PT/OT consult

## 2022-11-28 NOTE — ED PROVIDER NOTE - OBJECTIVE STATEMENT
73 yo PMHx of lung CA undergoing chemo brought in for AMS for two days, hypotension, ?falls. Hx limited by pt ams. 73 yo PMHx of lung CA undergoing chemo brought in for AMS for two days, hypotension, ?falls. Hx limited by pt ams. Per hypotensive as outpt, SBP in 50s. Aide states last spoke to pt normally approx 3 days ago.

## 2022-11-28 NOTE — ED PROVIDER NOTE - CARE PLAN
Principal Discharge DX:	Sepsis   1 Principal Discharge DX:	Sepsis  Secondary Diagnosis:	Hypokalemia  Secondary Diagnosis:	ASHLYN (acute kidney injury)

## 2022-11-28 NOTE — H&P ADULT - PROBLEM SELECTOR PLAN 8
Patient with hypophosphatemia, likely due to persistent diarrhea.  Likely contributing to overall weakness and falls.  - Replete PRN

## 2022-11-28 NOTE — H&P ADULT - PROBLEM SELECTOR PLAN 3
Patient with ASHLYN on admission BUN 31, Scr 2.35 -->Scr 2.31 (baseline Scr 1.1 10/22).  ASHLYN likely prerenal in setting of hypovolemia due to diarrhea and poor po intake.  Given prostate mets, there may also be a post-renal component.    - bladder scan, trend Cr, avoid nephrotoxic drugs, renally dose meds  - will obtain urine lytes if not improving

## 2022-11-28 NOTE — H&P ADULT - HISTORY OF PRESENT ILLNESS
71 y/o male history of metastatic stage IV lung adenocarcinoma, hypothyroidism, depression, anxiety, undergoing chemo brought in by EMS with HHA for altered mental status Monday morning. At baseline patient is A&Ox3, per HHA he was AOx1, not responding to questions or commands. Patient was reported to be hypotensive to SBP 50s in the field, IO was placed and given fluids with improvement in BP.  Patient has HHA 2 days per week for 10 hours, per ED note HHA states she spoke to pt 3 days ago and foung him in his bed Monday morning at which time he was confused. Currently being treated for lung cancer, last chemo 1 month ago.  Patient also has had recurrent falls at home, some associated with head trauma but no loc, syncope, bladder/bowel incontinence.  Otherwise no reported illness, sick contacts, medical changes.  ROS otherwise negative.      ED Course   Vitals: Temp 98.7,  --> 86, /63, RR 20, SaO2 98% on 4L NC --> 97% room air   Labs: WBC 16.24, Hgb 11.7, Plt 449, Na 143, K 3.2, CO2 18, AG 24, BUN 31, Scr 2.35, Ca 7.6, Ma 1.4, Lactate 12.1 --> 2.3, UA trace LE, Bacteria present, hyaline casts, C. Diff negative GI PCR negative  EKG: sinus tachycardia, 1st degree AV block, narrow QRS, prolonged QTC  CXR: Known right upper lobe pulmonary mass   CT chest, abdomen, pelvis: No acute fractures. Unchanged right upper lobe malignancy. Decreased left adrenal metastasis. Decreased abdominal and pelvic adenopathy.  CTH: No intracranial hemorrhage or acute transcortical infarct.  Generalized volume loss with small vessel ischemic disease.  CT C-spine: No fracture. Levoscoliosis with Grade 1 anterolisthesis of C2 on C3 and C7 on T1. Multilevel cervical spondylosis. Right apical lung spiculated mass.  Interventions: Tylenol ig IV, Ca Gluconate 2g, MgSO4 2g x 2, KCl 40mg po x 1, KCl 10mg IV x 5, Zosyn 3.375 x 3, Vancomycin 1250mg, NaCl 2200cc bolus   73 y/o male history of metastatic stage IV lung adenocarcinoma, hypothyroidism, depression, anxiety, undergoing chemo brought in by EMS with HHA for altered mental status Monday morning. At baseline patient is A&Ox3, per HHA he was AOx1, not responding to questions or commands. Patient was reported to be hypotensive to SBP 50s in the field, IO was placed and given fluids with improvement in BP. Patient lives along, has HHA 2 days per week for 10 hours, per ED note HHA states she spoke to pt 3 days ago and foung him in his bed Monday morning at which time he was confused. Currently being treated for lung cancer, last chemo 1 month ago.  Patient also has had recurrent falls at home, some associated with head trauma but no loc, syncope, bladder/bowel incontinence.  Otherwise no reported illness, sick contacts, medical changes.  ROS otherwise negative.      ED Course   Vitals: Temp 98.7,  --> 86, /63, RR 20, SaO2 98% on 4L NC --> 97% room air   Labs: WBC 16.24, Hgb 11.7, Plt 449, Na 143, K 3.2, CO2 18, AG 24, BUN 31, Scr 2.35, Ca 7.6, Ma 1.4, Lactate 12.1 --> 2.3, UA trace LE, Bacteria present, hyaline casts, C. Diff negative GI PCR negative  EKG: sinus tachycardia, 1st degree AV block, narrow QRS, prolonged QTC  CXR: Known right upper lobe pulmonary mass   CT chest, abdomen, pelvis: No acute fractures. Unchanged right upper lobe malignancy. Decreased left adrenal metastasis. Decreased abdominal and pelvic adenopathy.  CTH: No intracranial hemorrhage or acute transcortical infarct.  Generalized volume loss with small vessel ischemic disease.  CT C-spine: No fracture. Levoscoliosis with Grade 1 anterolisthesis of C2 on C3 and C7 on T1. Multilevel cervical spondylosis. Right apical lung spiculated mass.  Interventions: Tylenol ig IV, Ca Gluconate 2g, MgSO4 2g x 2, KCl 40mg po x 1, KCl 10mg IV x 5, Zosyn 3.375 x 3, Vancomycin 1250mg, NaCl 2200cc bolus

## 2022-11-28 NOTE — ED ADULT NURSE NOTE - OBJECTIVE STATEMENT
Pt arrived to ED brought in by EMS with home health aid. HHA found patient at home in bed, pt was AMS so HHA called 911. At baseline, pt is AAOx3, ambulatory in his apt. Pt had chemo 1 month ago and was supposed to go for hi ssecond treatment last week but did not go because he was feeling too weak. A reports patient has had multiple falls over the past 3 weeks. Pt has multiple healing scabs on his forehead, bilat shoulders, R side abd bruising. Pt is non verbal on arrival, IO placed in L lower leg by EMS, pt was hypotensive in the field but is normotensive on arrival to ED. Pt brought to RESUS room, Dr. Diaz at pt bedside. Put on CM, EKG done, labs drawn and sent. Will continue to assess.

## 2022-11-28 NOTE — H&P ADULT - PROBLEM SELECTOR PLAN 10
Patient with recently found (08/22) with RUL spiculated nodule with R paratracheal adenopathy and L adrenal nodule. Concern for metastatic cancer because of rectal lesion on PET scan as well as L adrenal area that lit up. Now, s/p FNA of R axillary node, with pathology consistent with metastatic lung adenocarcinoma.  - Consult heme/onc

## 2022-11-28 NOTE — ED ADULT NURSE REASSESSMENT NOTE - NS ED NURSE REASSESS COMMENT FT1
Pt taken to CT on monitor, tolerated CT well. Pt brought back and taken off NRB, pt is satting 100% on RA, RR within normal limits. Pt still appears confused but is starting to say few words. HHA still at bedside. Will continue to assess.
Pt mark catheter stopped draining urine and had some blood coming out from around the tube. Mark removed and attempted to replace. Attempt was unsuccessful, catheter would not advance and no urine was draining. Pt lower abd/ suprapubic area feels hard when palpated. Dr. Diaz made aware. Pt on CM, Vitals stable, will monitor.

## 2022-11-28 NOTE — H&P ADULT - ASSESSMENT
73 y/o male history of metastatic stage IV lung adenocarcinoma, hypothyroidism, depression, anxiety, undergoing chemo brought in by EMS with HHA for altered mental status Monday morning admitted for sepsis and electrolyte derangements.

## 2022-11-28 NOTE — H&P ADULT - PROBLEM SELECTOR PLAN 9
Patient with history of hypothyroidism, home medications Levothyroxine 75mcg daily.   - Continue home medications   - F/u TSH, T4

## 2022-11-28 NOTE — H&P ADULT - PROBLEM SELECTOR PLAN 5
Patient with diarrhea described as loose stool, per outpatient notes seems to be chronic for several weeks. Patient was prescribed Loperamide early November.  Etiology is likely to be in setting of metastatic malignancy vs thyroid disorder.  Diarrhea likely not infectious C. diff negative, GI PCR negative.    - Consider GI consult and Loperamide if diarrhea persists

## 2022-11-28 NOTE — ED ADULT TRIAGE NOTE - CCCP TRG CHIEF CMPLNT
Spoke to patient's sister Clara,  Confirmed - patient is currently taking Bumetanide 2 mg in the morning and 1 mg in the afternoon.    Refill request for Bumetanide 1 mg approved.       altered mental status

## 2022-11-29 NOTE — DIETITIAN INITIAL EVALUATION ADULT - OTHER INFO
Pt brought in by EMS with A for altered mental status this morning. Pt AOx4 at baseline, today AOx1, not responding to questions or commands. Pt hypotensive to 50s systolically in the field, IO placed and NS running. [facility] states she spoke to pt 3 days ago. Pt found in bed altered this morning. Currently being treated for lung cancer, last chemo 1 month    Pt seen on 4UR at bedside. Appetite currently poor per pt; PTA, appetite was fair to poor per pt. As per diet/24h recall, PTA pt consumed an average of two meals per day, which consisted of a variety of foods, some "international" foods as per pt, pt endorsed that the type of food changes based on pt's taste buds, specifically since pt became sick. During current admission, consumption of 25% meals on average as noted per pt. Preferences: No cultural, ethnic, Orthodox food preferences noted. GI: s/s fecal incontinence noted. Pt c/o some nausea and diarrhea, RD will ensure that team is aware. Last BM on 11/29/22. Casey: 16. Skin integrity: redness blanchable, scratches. No edema noted. Denies pain/discomfort. NKFA. No issues chewing/swallowing noted. Pertinent lab values: low H&H, low potassium, elevated BUN, Creat, Glucose, Phos, low Ca, eGFR; will monitor trends. Pt is receiving a Pureed diet. Pertinent nutrition-related medications/supplements: pt is receiving levothyroxine, as well as IV Abx and IVF for hydration/electrolyte repletion. Pt stated UBW ~150-160 pounds, which is consistent with wt upon admission (~159 pounds). Pt's IBW is 178#, pt is 89% of IBW. Dietitian conducted nutrition focused physical exam: during NFPE, pt exhibited mild to moderate temporal muscle wasting, mild orbital fat loss and loss to the quadriceps region. Pt amenable to education; RD provided education in regards to the importance of adequate macro and micronutrients, as well as hydration to support ADLs, maintain energy levels and overall functional/nutritional status. RD provided education on importance of optimal PO intake, discussed increased nutrient needs 2/2 cancer undergoing chemotherapy/radiation. Encouraged small, frequent, nutrient dense meals. Discussed protein sources. Pt expressed understanding. Pt was receptive and verbalized understanding. Pt stated he has enjoyed/drank Ensure ONS in the past and would try it during current hospital stay. No additional nutrition-related concerns. RD will remain available per protocol. Additional nutrition recommendations listed below to follow.  Pt brought in by EMS with A for altered mental status this morning. Pt AOx4 at baseline, today AOx1, not responding to questions or commands. Pt hypotensive to 50s systolically in the field, IO placed and NS running. [facility] states she spoke to pt 3 days ago. Pt found in bed altered this morning. Currently being treated for lung cancer, last chemo 1 month    Pt seen on 4UR at bedside. Appetite currently poor per pt; PTA, appetite was fair to poor per pt. As per diet/24h recall, PTA pt consumed an average of two meals per day, which consisted of a variety of foods, some "international" foods as per pt, pt endorsed that the type of food changes based on pt's taste buds, specifically since pt became sick. During current admission, consumption of 25% meals on average as noted per pt. Pt stated he does not see the pureed foods as appealing, pt left his dentures at home. Preferences: No cultural, ethnic, Catholic food preferences noted. GI: s/s fecal incontinence noted. Pt c/o some nausea and diarrhea, RD will ensure that team is aware. Last BM on 11/29/22. Casey: 16. Skin integrity: redness blanchable, scratches. No edema noted. Denies pain/discomfort. NKFA. No issues chewing/swallowing noted. Pertinent lab values: low H&H, low potassium, elevated BUN, Creat, Glucose, Phos, low Ca, eGFR; will monitor trends. Pt is receiving a Pureed diet. Pertinent nutrition-related medications/supplements: pt is receiving levothyroxine, as well as IV Abx and IVF for hydration/electrolyte repletion. Pt stated UBW ~150-160 pounds, which is consistent with wt upon admission (~159 pounds). Pt's IBW is 178#, pt is 89% of IBW. Dietitian conducted nutrition focused physical exam: during NFPE, pt exhibited mild to moderate temporal muscle wasting, mild orbital fat loss and loss to the quadriceps region. Pt amenable to education; RD provided education in regards to the importance of adequate macro and micronutrients, as well as hydration to support ADLs, maintain energy levels and overall functional/nutritional status. RD provided education on importance of optimal PO intake, discussed increased nutrient needs 2/2 cancer undergoing chemotherapy/radiation. Encouraged small, frequent, nutrient dense meals. Discussed protein sources. Pt expressed understanding. Pt was receptive and verbalized understanding. Pt stated he has enjoyed/drank Ensure ONS in the past and would try it during current hospital stay. No additional nutrition-related concerns. RD will remain available per protocol. Additional nutrition recommendations listed below to follow.

## 2022-11-29 NOTE — PROGRESS NOTE ADULT - PROBLEM SELECTOR PLAN 1
Patient with encephalopathy and hypotension, meeting 3 SIRS on admission for tachycardia, tachypnea, and leukocytosis.  Source possibly pulmonary and urinary.  Lactate 12.1 --> 2.3, UA trace LE, Bacteria present, hyaline casts, C. Diff negative GI PCR negative.  Lactate cleared, S/p Zosyn 3.375 x 3, Vancomycin 1250mg, NaCl 2200cc bolus in ED.    - F/u Bcx and UCx  - Continue Vanc and Zosyn for now

## 2022-11-29 NOTE — PROGRESS NOTE ADULT - PROBLEM SELECTOR PLAN 11
Patient with history of anxiety and depression, home medications Bupropion XL 150mg daily, Gabapentin 300mg TID, Quetiapine 50mg qhs.   - Holding quetiapine and gabapentin in setting of recent encephalopathy and ASHLYN, can restart as clinically appropriate

## 2022-11-29 NOTE — OCCUPATIONAL THERAPY INITIAL EVALUATION ADULT - GROOMING, PREVIOUS LEVEL OF FUNCTION, OT EVAL
Bee HOWARD clled to check the status of referral for pt. She has an apt scheduled for 7/17 @ 9:00 with Dr Kaplan, [place] office. They will refax the notes for the referral today.    Fax [phone]  Phone [phone] Aleyda (Bee HOWARD)   independent

## 2022-11-29 NOTE — PROGRESS NOTE ADULT - PROBLEM SELECTOR PLAN 12
F: s/p 2.2L NS  E: replete prn  N: pureed diet   GI: PPI  DVT: Heparin  Dispo: [facility]

## 2022-11-29 NOTE — PROGRESS NOTE ADULT - PROBLEM SELECTOR PLAN 10
Patient with recently found (08/22) with RUL spiculated nodule with R paratracheal adenopathy and L adrenal nodule. Concern for metastatic cancer because of rectal lesion on PET scan as well as L adrenal area that lit up. Now, s/p FNA of R axillary node, with pathology consistent with metastatic lung adenocarcinoma.  - Could consult heme/onc

## 2022-11-29 NOTE — PATIENT PROFILE ADULT - FALL HARM RISK - HARM RISK INTERVENTIONS

## 2022-11-29 NOTE — CONSULT NOTE ADULT - SUBJECTIVE AND OBJECTIVE BOX
Hematology Oncology Consult Note      HPI:  73 y/o male history of metastatic stage IV lung adenocarcinoma, hypothyroidism, depression, anxiety, undergoing chemo brought in by EMS with HHA for altered mental status Monday morning. At baseline patient is A&Ox3, per HHA he was AOx1, not responding to questions or commands. Patient was reported to be hypotensive to SBP 50s in the field, IO was placed and given fluids with improvement in BP. Patient lives along, has HHA 2 days per week for 10 hours, per ED note HHA states she spoke to pt 3 days ago and foung him in his bed Monday morning at which time he was confused. Currently being treated for lung cancer, last chemo 1 month ago.  Patient also has had recurrent falls at home, some associated with head trauma but no loc, syncope, bladder/bowel incontinence.  Otherwise no reported illness, sick contacts, medical changes.  ROS otherwise negative.      ED Course   Vitals: Temp 98.7,  --> 86, /63, RR 20, SaO2 98% on 4L NC --> 97% room air   Labs: WBC 16.24, Hgb 11.7, Plt 449, Na 143, K 3.2, CO2 18, AG 24, BUN 31, Scr 2.35, Ca 7.6, Ma 1.4, Lactate 12.1 --> 2.3, UA trace LE, Bacteria present, hyaline casts, C. Diff negative GI PCR negative  EKG: sinus tachycardia, 1st degree AV block, narrow QRS, prolonged QTC  CXR: Known right upper lobe pulmonary mass   CT chest, abdomen, pelvis: No acute fractures. Unchanged right upper lobe malignancy. Decreased left adrenal metastasis. Decreased abdominal and pelvic adenopathy.  CTH: No intracranial hemorrhage or acute transcortical infarct.  Generalized volume loss with small vessel ischemic disease.  CT C-spine: No fracture. Levoscoliosis with Grade 1 anterolisthesis of C2 on C3 and C7 on T1. Multilevel cervical spondylosis. Right apical lung spiculated mass.  Interventions: Tylenol ig IV, Ca Gluconate 2g, MgSO4 2g x 2, KCl 40mg po x 1, KCl 10mg IV x 5, Zosyn 3.375 x 3, Vancomycin 1250mg, NaCl 2200cc bolus   (28 Nov 2022 21:33)    SUBJECTIVE: Patient seen and examined at bedside.    OBJECTIVE:    VITAL SIGNS:  ICU Vital Signs Last 24 Hrs  T(C): 36.6 (29 Nov 2022 05:27), Max: 38.1 (28 Nov 2022 11:03)  T(F): 97.8 (29 Nov 2022 05:27), Max: 100.5 (28 Nov 2022 11:03)  HR: 64 (29 Nov 2022 05:27) (64 - 120)  BP: 102/60 (29 Nov 2022 05:27) (102/60 - 157/61)  BP(mean): --  ABP: --  ABP(mean): --  RR: 18 (29 Nov 2022 05:27) (18 - 20)  SpO2: 99% (29 Nov 2022 05:27) (97% - 100%)    O2 Parameters below as of 29 Nov 2022 05:27  Patient On (Oxygen Delivery Method): room air              CAPILLARY BLOOD GLUCOSE          PHYSICAL EXAM:  General: NAD, answering questions, pleasantly conversant  HEENT: NC/AT; PERRL, clear conjunctiva  Neck: supple  Respiratory: CTA b/l  Cardiovascular: +S1/S2; RRR  Abdomen: soft, NT/ND; +BS x4  Extremities: WWP, 2+ peripheral pulses b/l; no LE edema  Skin: normal color and turgor; no rash  Neurological:     MEDICATIONS:  MEDICATIONS  (STANDING):  buPROPion XL (24-Hour) . 150 milliGRAM(s) Oral daily  heparin   Injectable 5000 Unit(s) SubCutaneous every 8 hours  influenza  Vaccine (HIGH DOSE) 0.7 milliLiter(s) IntraMuscular once  levothyroxine 75 MICROGram(s) Oral daily  piperacillin/tazobactam IVPB.. 3.375 Gram(s) IV Intermittent every 8 hours  sodium chloride 0.9%. 1000 milliLiter(s) (100 mL/Hr) IV Continuous <Continuous>  vancomycin  IVPB 1000 milliGRAM(s) IV Intermittent every 24 hours    MEDICATIONS  (PRN):  acetaminophen     Tablet .. 650 milliGRAM(s) Oral every 6 hours PRN Temp greater or equal to 38C (100.4F), Mild Pain (1 - 3)  LORazepam   Injectable 1 milliGRAM(s) IV Push every 2 hours PRN CIWA-Ar score increase by 2 points and a total score of 7 or less      ALLERGIES:  Allergies    No Known Allergies    Intolerances        LABS:                        9.5    20.17 )-----------( 347      ( 29 Nov 2022 05:30 )             28.7     11-29    140  |  107  |  29<H>  ----------------------------<  147<H>  3.4<L>   |  19<L>  |  1.98<H>    Ca    6.9<L>      29 Nov 2022 05:30  Phos  5.4     11-29  Mg     3.3     11-29    TPro  5.1<L>  /  Alb  2.9<L>  /  TBili  0.4  /  DBili  x   /  AST  22  /  ALT  14  /  AlkPhos  115  11-29    PT/INR - ( 29 Nov 2022 05:30 )   PT: 14.7 sec;   INR: 1.23          PTT - ( 29 Nov 2022 05:30 )  PTT:27.1 sec  Urinalysis Basic - ( 28 Nov 2022 11:03 )    Color: Yellow / Appearance: Clear / SG: >=1.030 / pH: x  Gluc: x / Ketone: Trace mg/dL  / Bili: Moderate / Urobili: 1.0 E.U./dL   Blood: x / Protein: Trace mg/dL / Nitrite: NEGATIVE   Leuk Esterase: Trace / RBC: < 5 /HPF / WBC > 10 /HPF   Sq Epi: x / Non Sq Epi: 0-5 /HPF / Bacteria: Present /HPF            Culture - Blood (collected 11-28-22 @ 10:25)  Source: .Blood Blood-Peripheral  Preliminary Report (11-29-22 @ 00:00):    No growth at 12 hours    Culture - Blood (collected 11-28-22 @ 10:10)  Source: .Blood Blood-Peripheral  Preliminary Report (11-29-22 @ 00:00):    No growth at 12 hours            RADIOLOGY & ADDITIONAL TESTS: Reviewed.   Hematology Oncology Consult Note      HPI:  73 y/o male history of metastatic stage IV lung adenocarcinoma, hypothyroidism, depression, anxiety, undergoing chemo brought in by EMS with HHA for altered mental status Monday morning. At baseline patient is A&Ox3, per HHA he was AOx1, not responding to questions or commands. Patient was reported to be hypotensive to SBP 50s in the field, IO was placed and given fluids with improvement in BP. Patient lives along, has HHA 2 days per week for 10 hours, per ED note HHA states she spoke to pt 3 days ago and foung him in his bed Monday morning at which time he was confused. Currently being treated for lung cancer, last chemo 1 month ago.  Patient also has had recurrent falls at home, some associated with head trauma but no loc, syncope, bladder/bowel incontinence.  Otherwise no reported illness, sick contacts, medical changes.  ROS otherwise negative.      ED Course   Vitals: Temp 98.7,  --> 86, /63, RR 20, SaO2 98% on 4L NC --> 97% room air   Labs: WBC 16.24, Hgb 11.7, Plt 449, Na 143, K 3.2, CO2 18, AG 24, BUN 31, Scr 2.35, Ca 7.6, Ma 1.4, Lactate 12.1 --> 2.3, UA trace LE, Bacteria present, hyaline casts, C. Diff negative GI PCR negative  EKG: sinus tachycardia, 1st degree AV block, narrow QRS, prolonged QTC  CXR: Known right upper lobe pulmonary mass   CT chest, abdomen, pelvis: No acute fractures. Unchanged right upper lobe malignancy. Decreased left adrenal metastasis. Decreased abdominal and pelvic adenopathy.  CTH: No intracranial hemorrhage or acute transcortical infarct.  Generalized volume loss with small vessel ischemic disease.  CT C-spine: No fracture. Levoscoliosis with Grade 1 anterolisthesis of C2 on C3 and C7 on T1. Multilevel cervical spondylosis. Right apical lung spiculated mass.  Interventions: Tylenol ig IV, Ca Gluconate 2g, MgSO4 2g x 2, KCl 40mg po x 1, KCl 10mg IV x 5, Zosyn 3.375 x 3, Vancomycin 1250mg, NaCl 2200cc bolus   (28 Nov 2022 21:33)    SUBJECTIVE: Patient seen and examined at bedside.    OBJECTIVE:    VITAL SIGNS:  ICU Vital Signs Last 24 Hrs  T(C): 36.6 (29 Nov 2022 05:27), Max: 38.1 (28 Nov 2022 11:03)  T(F): 97.8 (29 Nov 2022 05:27), Max: 100.5 (28 Nov 2022 11:03)  HR: 64 (29 Nov 2022 05:27) (64 - 120)  BP: 102/60 (29 Nov 2022 05:27) (102/60 - 157/61)  BP(mean): --  ABP: --  ABP(mean): --  RR: 18 (29 Nov 2022 05:27) (18 - 20)  SpO2: 99% (29 Nov 2022 05:27) (97% - 100%)    O2 Parameters below as of 29 Nov 2022 05:27  Patient On (Oxygen Delivery Method): room air              CAPILLARY BLOOD GLUCOSE          PHYSICAL EXAM:  General: elderly, thin, NAD, answering questions, pleasantly conversant  HEENT: NC/AT; PERRL, clear conjunctiva  Neck: supple  Respiratory: CTA b/l  Cardiovascular: +S1/S2; RRR  Abdomen: soft, NT/ND; +BS x4  Extremities: WWP, 2+ peripheral pulses b/l; no LE edema  Skin: normal color and turgor; no rash  Neurological: AAOx3      MEDICATIONS:  MEDICATIONS  (STANDING):  buPROPion XL (24-Hour) . 150 milliGRAM(s) Oral daily  heparin   Injectable 5000 Unit(s) SubCutaneous every 8 hours  influenza  Vaccine (HIGH DOSE) 0.7 milliLiter(s) IntraMuscular once  levothyroxine 75 MICROGram(s) Oral daily  piperacillin/tazobactam IVPB.. 3.375 Gram(s) IV Intermittent every 8 hours  sodium chloride 0.9%. 1000 milliLiter(s) (100 mL/Hr) IV Continuous <Continuous>  vancomycin  IVPB 1000 milliGRAM(s) IV Intermittent every 24 hours    MEDICATIONS  (PRN):  acetaminophen     Tablet .. 650 milliGRAM(s) Oral every 6 hours PRN Temp greater or equal to 38C (100.4F), Mild Pain (1 - 3)  LORazepam   Injectable 1 milliGRAM(s) IV Push every 2 hours PRN CIWA-Ar score increase by 2 points and a total score of 7 or less      ALLERGIES:  Allergies    No Known Allergies    Intolerances        LABS:                        9.5    20.17 )-----------( 347      ( 29 Nov 2022 05:30 )             28.7     11-29    140  |  107  |  29<H>  ----------------------------<  147<H>  3.4<L>   |  19<L>  |  1.98<H>    Ca    6.9<L>      29 Nov 2022 05:30  Phos  5.4     11-29  Mg     3.3     11-29    TPro  5.1<L>  /  Alb  2.9<L>  /  TBili  0.4  /  DBili  x   /  AST  22  /  ALT  14  /  AlkPhos  115  11-29    PT/INR - ( 29 Nov 2022 05:30 )   PT: 14.7 sec;   INR: 1.23          PTT - ( 29 Nov 2022 05:30 )  PTT:27.1 sec  Urinalysis Basic - ( 28 Nov 2022 11:03 )    Color: Yellow / Appearance: Clear / SG: >=1.030 / pH: x  Gluc: x / Ketone: Trace mg/dL  / Bili: Moderate / Urobili: 1.0 E.U./dL   Blood: x / Protein: Trace mg/dL / Nitrite: NEGATIVE   Leuk Esterase: Trace / RBC: < 5 /HPF / WBC > 10 /HPF   Sq Epi: x / Non Sq Epi: 0-5 /HPF / Bacteria: Present /HPF            Culture - Blood (collected 11-28-22 @ 10:25)  Source: .Blood Blood-Peripheral  Preliminary Report (11-29-22 @ 00:00):    No growth at 12 hours    Culture - Blood (collected 11-28-22 @ 10:10)  Source: .Blood Blood-Peripheral  Preliminary Report (11-29-22 @ 00:00):    No growth at 12 hours            RADIOLOGY & ADDITIONAL TESTS: Reviewed.

## 2022-11-29 NOTE — PROGRESS NOTE ADULT - PROBLEM SELECTOR PLAN 2
Patient with frequent falls at home, per outpatient nursing notes, patient's HHA have reported falls at home sometimes with head trauma.  He ambulated at baseline with cane.  Etiology likely due to overall decompensation in setting of metastatic cancer. CTH and CT cervical spine without fractures of trauma.    - PT/OT consult

## 2022-11-29 NOTE — CONSULT NOTE ADULT - ASSESSMENT
Pending discussion with covering attending Dr. Randall. 72M, current smoker (~50py, now smokes e-cigarettes), with hypothyroidism, depression, anxiety, who presents for follow up of metastatic lung adenocarcinoma with C2D1 carbo/pem/pem.    # metastatic lung adenocarcinoma, AJCC Stage IV  Pt with R axillary LN biopsy positive for metastatic lung adenocarcinoma, with FDG avidity in thoracic, abdominal, inguinal LNs, as well as rectal thickening. Pt has not had any recent colonoscopies to further investigate GI involvement. Repeat CT CAP with increase in primary lung mass, persistent rectal thickening and other sites of disease as previously described. 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 Received Cycle 1 of chemo with carboplatin, pemetrexed, and pembrolizumab on 10/27/22. Now admitted with sepsis and ASHLYN, possibly 2/2 infection vs chemotherapy. Patient states that diarrhea started 1-2 weeks after chemotherapy and has been having a 2-3 bowel movements for the past 2 weeks. Diarrhea is a known side effect of this regimen, but Immune-mediated colitis from immune checkpoint therapy onset is varied with reported median onsent of 5-10 weeks. Interval CT scan on admission 11/28 showing unchanged right upper lobe malignancy since October 7, 2022. Decreased left adrenal metastasis. Decreased abdominal and pelvic adenopathy.    Recommendations:  - Will hold off on further chemotherapy until outpatient and improvement of ASHLYN/infection  - Infectious work up and fluids/antibiotic management per primary team    Pending discussion with covering attending Dr. Randall. 71 y/o male history of metastatic stage IV lung adenocarcinoma (carboplatin, pemetrexed, pembrolizumab on C1D1 10/27), hypothyroidism, depression, anxiety, undergoing chemo brought in by EMS with HHA for altered mental status. Found to have sepsis and ASHLYN. Oncology consulted given recent treatment of lung cancer.    # metastatic lung adenocarcinoma, AJCC Stage IV  Pt with R axillary LN biopsy positive for metastatic lung adenocarcinoma, with FDG avidity in thoracic, abdominal, inguinal LNs, as well as rectal thickening. Pt has not had any recent colonoscopies to further investigate GI involvement. Repeat CT CAP with increase in primary lung mass, persistent rectal thickening and other sites of disease as previously described. 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 Received Cycle 1 of chemo with carboplatin, pemetrexed, and pembrolizumab on 10/27/22. Now admitted with sepsis and ASHLYN, possibly 2/2 infection vs chemotherapy. Patient states that diarrhea started 1-2 weeks after chemotherapy and has been having a 2-3 bowel movements for the past 2 weeks. 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 Interval CT scan on admission 11/28 showing unchanged right upper lobe malignancy since October 7, 2022. Decreased left adrenal metastasis. Decreased abdominal and pelvic adenopathy.    Recommendations:  - Will hold off on further chemotherapy until outpatient and improvement of ASHLYN/infection  - Infectious work up and fluids/antibiotic management per primary team    Pending discussion with covering attending Dr. Randall. 73 y/o male history of metastatic stage IV lung adenocarcinoma (carboplatin, pemetrexed, pembrolizumab on C1D1 10/27), hypothyroidism, depression, anxiety, undergoing chemo brought in by EMS with HHA for altered mental status. Found to have sepsis and ASHLYN. Oncology consulted given recent treatment of lung cancer.    # metastatic lung adenocarcinoma, AJCC Stage IV  Pt with R axillary LN biopsy positive for metastatic lung adenocarcinoma, with FDG avidity in thoracic, abdominal, inguinal LNs, as well as rectal thickening. Pt has not had any recent colonoscopies to further investigate GI involvement. Repeat CT CAP with increase in primary lung mass, persistent rectal thickening and other sites of disease as previously described. 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 Received Cycle 1 of chemo with carboplatin, pemetrexed, and pembrolizumab on 10/27/22. Now admitted with sepsis and ASHLYN, possibly 2/2 infection vs chemotherapy. Patient states that diarrhea started 1-2 weeks after chemotherapy and has been having a 2-3 bowel movements for the past 2 weeks. 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 Interval CT scan on admission 11/28 showing unchanged right upper lobe malignancy since October 7, 2022. Decreased left adrenal metastasis. Decreased abdominal and pelvic adenopathy.    Recommendations:  - Will hold off on further chemotherapy until outpatient and improvement of ASHLYN/infection  - Infectious work up and fluids/antibiotic management per primary team    Discussed attending Dr. Randall, covering for Dr. Patricia.

## 2022-11-29 NOTE — PROGRESS NOTE ADULT - SUBJECTIVE AND OBJECTIVE BOX
INTERVAL HPI/OVERNIGHT EVENTS:  Awake and appears alert today;  Actually knew my name      MEDICATIONS  (STANDING):  buPROPion XL (24-Hour) . 150 milliGRAM(s) Oral daily  heparin   Injectable 5000 Unit(s) SubCutaneous every 8 hours  influenza  Vaccine (HIGH DOSE) 0.7 milliLiter(s) IntraMuscular once  levothyroxine 75 MICROGram(s) Oral daily  piperacillin/tazobactam IVPB.. 3.375 Gram(s) IV Intermittent every 8 hours  sodium chloride 0.9%. 1000 milliLiter(s) (100 mL/Hr) IV Continuous <Continuous>  vancomycin  IVPB 1000 milliGRAM(s) IV Intermittent every 24 hours    MEDICATIONS  (PRN):  acetaminophen     Tablet .. 650 milliGRAM(s) Oral every 6 hours PRN Temp greater or equal to 38C (100.4F), Mild Pain (1 - 3)  LORazepam   Injectable 1 milliGRAM(s) IV Push every 2 hours PRN CIWA-Ar score increase by 2 points and a total score of 7 or less      Allergies    No Known Allergies    Intolerances        Vital Signs Last 24 Hrs  T(C): 36.6 (29 Nov 2022 05:27), Max: 38.1 (28 Nov 2022 11:03)  T(F): 97.8 (29 Nov 2022 05:27), Max: 100.5 (28 Nov 2022 11:03)  HR: 61 (29 Nov 2022 10:00) (58 - 106)  BP: 111/71 (29 Nov 2022 10:00) (102/60 - 157/61)  BP(mean): --  RR: 18 (29 Nov 2022 05:27) (18 - 20)  SpO2: 99% (29 Nov 2022 05:27) (97% - 100%)    Parameters below as of 29 Nov 2022 05:27  Patient On (Oxygen Delivery Method): room air              Constitutional:  Awake     Eyes: ARBEN    ENMT: Negative    Neck: Supple    Back:  no tenderness     Respiratory:  clear    Cardiovascular: S1 S2    Gastrointestinal:  soft    Genitourinary:    Extremities:  no edema    Vascular:    Neurological:    Skin:    Lymph Nodes:            LABS:                        9.5    20.17 )-----------( 347      ( 29 Nov 2022 05:30 )             28.7     11-29    140  |  107  |  29<H>  ----------------------------<  147<H>  3.4<L>   |  19<L>  |  1.98<H>    Ca    6.9<L>      29 Nov 2022 05:30  Phos  5.4     11-29  Mg     3.3     11-29    TPro  5.1<L>  /  Alb  2.9<L>  /  TBili  0.4  /  DBili  x   /  AST  22  /  ALT  14  /  AlkPhos  115  11-29    PT/INR - ( 29 Nov 2022 05:30 )   PT: 14.7 sec;   INR: 1.23          PTT - ( 29 Nov 2022 05:30 )  PTT:27.1 sec  Urinalysis Basic - ( 28 Nov 2022 11:03 )    Color: Yellow / Appearance: Clear / SG: >=1.030 / pH: x  Gluc: x / Ketone: Trace mg/dL  / Bili: Moderate / Urobili: 1.0 E.U./dL   Blood: x / Protein: Trace mg/dL / Nitrite: NEGATIVE   Leuk Esterase: Trace / RBC: < 5 /HPF / WBC > 10 /HPF   Sq Epi: x / Non Sq Epi: 0-5 /HPF / Bacteria: Present /HPF        RADIOLOGY & ADDITIONAL TESTS:

## 2022-11-29 NOTE — DIETITIAN INITIAL EVALUATION ADULT - NUTRITIONGOAL OUTCOME1
Pt to consistently meet at least 75% of EEE via tolerated route that is consistent with GOC during hospital stay; pt will no longer exhibit s/s of malnutrition

## 2022-11-29 NOTE — DIETITIAN INITIAL EVALUATION ADULT - COLLABORATION WITH OTHER PROVIDERS
RD has collaborated with team in regards to diet recs, ONS/nourishment recs, pt's overall nutritional status.  Enbrel Counseling:  I discussed with the patient the risks of etanercept including but not limited to myelosuppression, immunosuppression, autoimmune hepatitis, demyelinating diseases, lymphoma, and infections.  The patient understands that monitoring is required including a PPD at baseline and must alert us or the primary physician if symptoms of infection or other concerning signs are noted.

## 2022-11-29 NOTE — PHYSICAL THERAPY INITIAL EVALUATION ADULT - IMPAIRMENTS FOUND, PT EVAL
Informed Pt's daughter that the Pt's has an up coming Appt.  that has been schedule for 01/19/18@11:20am    aerobic capacity/endurance/gait, locomotion, and balance/muscle strength/ventilation and respiration/gas exchange

## 2022-11-29 NOTE — PROGRESS NOTE ADULT - SUBJECTIVE AND OBJECTIVE BOX
OVERNIGHT EVENTS:  None.    SUBJECTIVE / INTERVAL HPI: Patient seen and examined at bedside.  Friendly and alert, in no distress and endorsing no discomfort.    VITAL SIGNS:  Vital Signs Last 24 Hrs  T(C): 36.6 (29 Nov 2022 05:27), Max: 38.1 (28 Nov 2022 11:03)  T(F): 97.8 (29 Nov 2022 05:27), Max: 100.5 (28 Nov 2022 11:03)  HR: 61 (29 Nov 2022 10:00) (58 - 106)  BP: 111/71 (29 Nov 2022 10:00) (102/60 - 157/61)  BP(mean): --  RR: 18 (29 Nov 2022 05:27) (18 - 20)  SpO2: 99% (29 Nov 2022 05:27) (97% - 100%)    Parameters below as of 29 Nov 2022 05:27  Patient On (Oxygen Delivery Method): room air      I&O's Summary      PHYSICAL EXAM:  GENERAL: Non acute distress, lying in bed comfortably  HEAD:  Atraumatic, normocephalic  EYES: PERRLA, conjunctiva and sclera clear  ENT: Dry mucous membranes  NECK: Supple, no JVD  HEART: Regular rate and rhythm, no murmurs, rubs, or gallops  LUNGS: Unlabored respirations.  Clear to auscultation bilaterally, no crackles, wheezing, or rhonchi  ABDOMEN: Soft, nontender, nondistended, +BS  EXTREMITIES: 2+ peripheral pulses bilaterally. No clubbing, cyanosis, or edema  NERVOUS SYSTEM:  A&Ox2, no focal deficits   SKIN: No rashes or lesions    MEDICATIONS:  MEDICATIONS  (STANDING):  buPROPion XL (24-Hour) . 150 milliGRAM(s) Oral daily  heparin   Injectable 5000 Unit(s) SubCutaneous every 8 hours  influenza  Vaccine (HIGH DOSE) 0.7 milliLiter(s) IntraMuscular once  levothyroxine 75 MICROGram(s) Oral daily  piperacillin/tazobactam IVPB.. 3.375 Gram(s) IV Intermittent every 8 hours  sodium chloride 0.9%. 1000 milliLiter(s) (100 mL/Hr) IV Continuous <Continuous>  vancomycin  IVPB 1000 milliGRAM(s) IV Intermittent every 24 hours    MEDICATIONS  (PRN):  acetaminophen     Tablet .. 650 milliGRAM(s) Oral every 6 hours PRN Temp greater or equal to 38C (100.4F), Mild Pain (1 - 3)  LORazepam   Injectable 1 milliGRAM(s) IV Push every 2 hours PRN CIWA-Ar score increase by 2 points and a total score of 7 or less      ALLERGIES:  Allergies    No Known Allergies    Intolerances        LABS:                        9.5    20.17 )-----------( 347      ( 29 Nov 2022 05:30 )             28.7     11-29    140  |  107  |  29<H>  ----------------------------<  147<H>  3.4<L>   |  19<L>  |  1.98<H>    Ca    6.9<L>      29 Nov 2022 05:30  Phos  5.4     11-29  Mg     3.3     11-29    TPro  5.1<L>  /  Alb  2.9<L>  /  TBili  0.4  /  DBili  x   /  AST  22  /  ALT  14  /  AlkPhos  115  11-29    PT/INR - ( 29 Nov 2022 05:30 )   PT: 14.7 sec;   INR: 1.23          PTT - ( 29 Nov 2022 05:30 )  PTT:27.1 sec  Urinalysis Basic - ( 28 Nov 2022 11:03 )    Color: Yellow / Appearance: Clear / SG: >=1.030 / pH: x  Gluc: x / Ketone: Trace mg/dL  / Bili: Moderate / Urobili: 1.0 E.U./dL   Blood: x / Protein: Trace mg/dL / Nitrite: NEGATIVE   Leuk Esterase: Trace / RBC: < 5 /HPF / WBC > 10 /HPF   Sq Epi: x / Non Sq Epi: 0-5 /HPF / Bacteria: Present /HPF      CAPILLARY BLOOD GLUCOSE          RADIOLOGY & ADDITIONAL TESTS: Reviewed.   HOSPITAL COURSE:  73 y/o male history of metastatic stage IV lung adenocarcinoma, hypothyroidism, depression, anxiety, undergoing chemo brought in by EMS with HHA for altered mental status Monday morning. At baseline patient is A&Ox3, per HHA he was AOx1, not responding to questions or commands. Patient was reported to be hypotensive to SBP 50s in the field, IO was placed and given fluids with improvement in BP. Patient lives along, has HHA 2 days per week for 10 hours, per ED note HHA states she spoke to pt 3 days ago and found him in his bed Monday morning at which time he was confused. Currently being treated for lung cancer, last chemo 1 month ago.  Patient also has had recurrent falls at home, some associated with head trauma but no loc, syncope, bladder/bowel incontinence.  In late morning, was fully oriented back to baseline.  Met sepsis criteria and started on vanc/zosyn, blood cultures ngtd.  Per nutrition found to be malnourished, started on ensure enlive TID and multivitamin.  heme/onc consulted, will hold off on outpatient chemotherapy until clinical improvement.      OVERNIGHT EVENTS:  None.    SUBJECTIVE / INTERVAL HPI: Patient seen and examined at bedside.  Friendly and alert, in no distress and endorsing no discomfort.    VITAL SIGNS:  Vital Signs Last 24 Hrs  T(C): 36.6 (29 Nov 2022 05:27), Max: 38.1 (28 Nov 2022 11:03)  T(F): 97.8 (29 Nov 2022 05:27), Max: 100.5 (28 Nov 2022 11:03)  HR: 61 (29 Nov 2022 10:00) (58 - 106)  BP: 111/71 (29 Nov 2022 10:00) (102/60 - 157/61)  BP(mean): --  RR: 18 (29 Nov 2022 05:27) (18 - 20)  SpO2: 99% (29 Nov 2022 05:27) (97% - 100%)    Parameters below as of 29 Nov 2022 05:27  Patient On (Oxygen Delivery Method): room air      I&O's Summary      PHYSICAL EXAM:  GENERAL: Non acute distress, lying in bed comfortably  HEAD:  Atraumatic, normocephalic  EYES: PERRLA, conjunctiva and sclera clear  ENT: Dry mucous membranes  NECK: Supple, no JVD  HEART: Regular rate and rhythm, no murmurs, rubs, or gallops  LUNGS: Unlabored respirations.  Clear to auscultation bilaterally, no crackles, wheezing, or rhonchi  ABDOMEN: Soft, nontender, nondistended, +BS  EXTREMITIES: 2+ peripheral pulses bilaterally. No clubbing, cyanosis, or edema  NERVOUS SYSTEM:  A&Ox2, no focal deficits   SKIN: No rashes or lesions    MEDICATIONS:  MEDICATIONS  (STANDING):  buPROPion XL (24-Hour) . 150 milliGRAM(s) Oral daily  heparin   Injectable 5000 Unit(s) SubCutaneous every 8 hours  influenza  Vaccine (HIGH DOSE) 0.7 milliLiter(s) IntraMuscular once  levothyroxine 75 MICROGram(s) Oral daily  piperacillin/tazobactam IVPB.. 3.375 Gram(s) IV Intermittent every 8 hours  sodium chloride 0.9%. 1000 milliLiter(s) (100 mL/Hr) IV Continuous <Continuous>  vancomycin  IVPB 1000 milliGRAM(s) IV Intermittent every 24 hours    MEDICATIONS  (PRN):  acetaminophen     Tablet .. 650 milliGRAM(s) Oral every 6 hours PRN Temp greater or equal to 38C (100.4F), Mild Pain (1 - 3)  LORazepam   Injectable 1 milliGRAM(s) IV Push every 2 hours PRN CIWA-Ar score increase by 2 points and a total score of 7 or less      ALLERGIES:  Allergies    No Known Allergies    Intolerances        LABS:                        9.5    20.17 )-----------( 347      ( 29 Nov 2022 05:30 )             28.7     11-29    140  |  107  |  29<H>  ----------------------------<  147<H>  3.4<L>   |  19<L>  |  1.98<H>    Ca    6.9<L>      29 Nov 2022 05:30  Phos  5.4     11-29  Mg     3.3     11-29    TPro  5.1<L>  /  Alb  2.9<L>  /  TBili  0.4  /  DBili  x   /  AST  22  /  ALT  14  /  AlkPhos  115  11-29    PT/INR - ( 29 Nov 2022 05:30 )   PT: 14.7 sec;   INR: 1.23          PTT - ( 29 Nov 2022 05:30 )  PTT:27.1 sec  Urinalysis Basic - ( 28 Nov 2022 11:03 )    Color: Yellow / Appearance: Clear / SG: >=1.030 / pH: x  Gluc: x / Ketone: Trace mg/dL  / Bili: Moderate / Urobili: 1.0 E.U./dL   Blood: x / Protein: Trace mg/dL / Nitrite: NEGATIVE   Leuk Esterase: Trace / RBC: < 5 /HPF / WBC > 10 /HPF   Sq Epi: x / Non Sq Epi: 0-5 /HPF / Bacteria: Present /HPF      CAPILLARY BLOOD GLUCOSE          RADIOLOGY & ADDITIONAL TESTS: Reviewed.

## 2022-11-29 NOTE — DIETITIAN INITIAL EVALUATION ADULT - PERTINENT LABORATORY DATA
11-29    140  |  107  |  29<H>  ----------------------------<  147<H>  3.4<L>   |  19<L>  |  1.98<H>    Ca    6.9<L>      29 Nov 2022 05:30  Phos  5.4     11-29  Mg     3.3     11-29    TPro  5.1<L>  /  Alb  2.9<L>  /  TBili  0.4  /  DBili  x   /  AST  22  /  ALT  14  /  AlkPhos  115  11-29

## 2022-11-29 NOTE — PROGRESS NOTE ADULT - TIME BILLING
Patient seen and examined  Improved mental status  Unclear etiology of increased WBC  Continue antibiotics for now;  Check cultures;

## 2022-11-29 NOTE — CONSULT NOTE ADULT - ASSESSMENT
per Internal Medicine    72 y o male history of metastatic stage IV lung adenocarcinoma, hypothyroidism, depression, anxiety, undergoing chemo brought in by EMS with HHA for altered mental status Monday morning admitted for sepsis and electrolyte derangements.        Problem/Plan - 1:  ·  Problem: Sepsis.   ·  Plan: Patient with encephalopathy and hypotension, meeting 3 SIRS on admission for tachycardia, tachypnea, and leukocytosis.  Source possibly pulmonary and urinary.  Lactate 12.1 --> 2.3, UA trace LE, Bacteria present, hyaline casts, C. Diff negative GI PCR negative.  Lactate cleared, S/p Zosyn 3.375 x 3, Vancomycin 1250mg, NaCl 2200cc bolus in ED.    - F/u Bcx and UCx  - Continue Vanc and Zosyn for now.    Problem/Plan - 2:  ·  Problem: Falls.   ·  Plan: Patient with frequent falls at home, per outpatient nursing notes, patient's HHA have reported falls at home sometimes with head trauma.  He ambulated at baseline with cane.  Etiology likely due to overall decompensation in setting of metastatic cancer. CTH and CT cervical spine without fractures of trauma.    - PT/OT consult.    Problem/Plan - 3:  ·  Problem: ASHLYN (acute kidney injury).   ·  Plan: Patient with ASHLYN on admission BUN 31, Scr 2.35 -->Scr 2.31 (baseline Scr 1.1 10/22).  ASHLYN likely prerenal in setting of hypovolemia due to diarrhea and poor po intake.  Given prostate mets, there may also be a post-renal component.    - bladder scan, trend Cr, avoid nephrotoxic drugs, renally dose meds  - will obtain urine lytes if not improving.    Problem/Plan - 4:  ·  Problem: Adult failure to thrive.   ·  Plan: Patient with underlying metastatic lung adenocarcinoma with chronic diarrhea and frequent falls, overall appears very frail.    - Nutrition Consult   - PT/OT consult.    Problem/Plan - 5:  ·  Problem: Diarrhea.   ·  Plan: Patient with diarrhea described as loose stool, per outpatient notes seems to be chronic for several weeks. Patient was prescribed Loperamide early November.  Etiology is likely to be in setting of metastatic malignancy vs thyroid disorder.  Diarrhea likely not infectious C. diff negative, GI PCR negative.    - Consider GI consult and Loperamide if diarrhea persists.    Problem/Plan - 6:  ·  Problem: Hypokalemia.   ·  Plan: Patient with hypokalemia, likely due to persistent diarrhea.  No EKG changes.   - Replete PRN.    Problem/Plan - 7:  ·  Problem: Hypomagnesemia.   ·  Plan: Patient with hypomagnesemia, likely due to persistent diarrhea.  Likely contributing to overall weakness and falls.  - Replete PRN.    Problem/Plan - 8:  ·  Problem: Hypophosphatemia.   ·  Plan: Patient with hypophosphatemia, likely due to persistent diarrhea.  Likely contributing to overall weakness and falls.  - Replete PRN.    Problem/Plan - 9:  ·  Problem: Hypothyroidism.   ·  Plan: Patient with history of hypothyroidism, home medications Levothyroxine 75mcg daily.   - Continue home medications   - F/u TSH, T4.    Problem/Plan - 10:  ·  Problem: Adenocarcinoma, lung.   ·  Plan; Patient with recently found (08/22) with RUL spiculated nodule with R paratracheal adenopathy and L adrenal nodule. Concern for metastatic cancer because of rectal lesion on PET scan as well as L adrenal area that lit up. Now, s/p FNA of R axillary node, with pathology consistent with metastatic lung adenocarcinoma.  - Could consult heme/onc.    Problem/Plan - 11:  ·  Problem: Anxiety and depression.   ·  Plan: Patient with history of anxiety and depression, home medications Bupropion XL 150mg daily, Gabapentin 300mg TID, Quetiapine 50mg qhs.   - Holding quetiapine and gabapentin in setting of recent encephalopathy and ASHLYN, can restart as clinically appropriate.    Problem/Plan - 12:  ·  Problem: Preventive measure.   ·  Plan: F: s/p 2.2L NS  E: replete prn  N: pureed diet   GI: PPI  DVT: Heparin  Dispo: [facility].

## 2022-11-29 NOTE — PHYSICAL THERAPY INITIAL EVALUATION ADULT - PERTINENT HX OF CURRENT PROBLEM, REHAB EVAL
72M undergoing chemo brought in by EMS with HHA for altered mental status Monday morning. At baseline patient is A&Ox3, per HHA he was AOx1, not responding to questions or commands. Patient was reported to be hypotensive to SBP 50s in the field, IO was placed and given fluids with improvement in BP. Patient lives along, has HHA 2 days per week for 10 hours, per ED note HHA states she spoke to pt 3 days ago and foung him in his bed Monday morning at which time he was confused. Currently being treated for lung cancer, last chemo 1 month ago.  Patient also has had recurrent falls at home, some associated with head trauma but no loc, syncope, bladder/bowel incontinence

## 2022-11-29 NOTE — DIETITIAN INITIAL EVALUATION ADULT - ADD RECOMMEND
1. Continue with current diet order    >>Ensure Enlive ONS TID (350kcal, 20gPRO per serving), vanilla flavor per pt request  >>Magic Cup nourishment, RD will input into food/nutrient data system  >>Consider Banatrol for diarrhea management  2. Encourage pt to meet nutritional needs as able   3. Monitor PO intakes, trend weights (weekly), monitor skin integrity, monitor labs (electrolytes, CMP), monitor GI fxn   4. Encourage adherence to diet education (reinforce as able)   5. MVI supplementation   6. Pain and bowel regimen per team   7. Will continue to assess/honor preferences as able   8. Align nutrition interventions with GOC at all times

## 2022-11-29 NOTE — DIETITIAN INITIAL EVALUATION ADULT - PERTINENT MEDS FT
MEDICATIONS  (STANDING):  buPROPion XL (24-Hour) . 150 milliGRAM(s) Oral daily  heparin   Injectable 5000 Unit(s) SubCutaneous every 8 hours  influenza  Vaccine (HIGH DOSE) 0.7 milliLiter(s) IntraMuscular once  levothyroxine 75 MICROGram(s) Oral daily  piperacillin/tazobactam IVPB.. 3.375 Gram(s) IV Intermittent every 8 hours  sodium chloride 0.9%. 1000 milliLiter(s) (100 mL/Hr) IV Continuous <Continuous>  vancomycin  IVPB 1000 milliGRAM(s) IV Intermittent every 24 hours    MEDICATIONS  (PRN):  acetaminophen     Tablet .. 650 milliGRAM(s) Oral every 6 hours PRN Temp greater or equal to 38C (100.4F), Mild Pain (1 - 3)  LORazepam   Injectable 1 milliGRAM(s) IV Push every 2 hours PRN CIWA-Ar score increase by 2 points and a total score of 7 or less

## 2022-11-29 NOTE — OCCUPATIONAL THERAPY INITIAL EVALUATION ADULT - ADDITIONAL COMMENTS
Pt lives alone in an apartment, with no WANDA, has elevator access. Pt reports that he has a HHA that comes 2x a week for ~10 hours a day. Pt states that he is independent with ADLs, has a tub with shower chair that he only uses sometimes. Pt owns a rollator and a cane, but states that he sometimes ambulates with no AD. Pt reports that he has fallen a few times in the past month, but unable to recall why.

## 2022-11-29 NOTE — OCCUPATIONAL THERAPY INITIAL EVALUATION ADULT - MODIFIED CLINICAL TEST OF SENSORY INTEGRATION IN BALANCE TEST
Pt able to perform ~5 sidesteps towards head of bed with min A x1 person via L HHA, further ambulation deferred 2/2 increasing lightheadedness (+orthostatic hypotension with initial change in position from supine to sit, unable to obtain in standing)

## 2022-11-29 NOTE — DIETITIAN INITIAL EVALUATION ADULT - OTHER CALCULATIONS
Based on Standards of Care pt within % IBW thus actual body weight used for all calculations. Needs adjusted for advanced age, cancer and chemo status, moderate malnutrition. Fluid recs per team.

## 2022-11-29 NOTE — PHYSICAL THERAPY INITIAL EVALUATION ADULT - NS ASR RISK AREAS PT EVAL
----- Message from Nikki Wood sent at 8/11/2022  9:49 AM CDT -----  Regarding: Same Appt Request  Name of Who is Calling: BRITTANY CHILD [[id number]]           What is the request in detail: Patient is requesting a call back to schedule a same day appointment for dehydration and high blood pressure.  Please assist.           Can the clinic reply by [facility]: No           What Number to Call Back if not in [facility]: [phone]    
Please inform patient that Dr. Middleton is out of the office.  Please offer urgent care or the ER to the patient due to her complaint of dehydration.  If she needs to have IV replacement therapy she can be seen and treated in either setting. thanks  
fall

## 2022-11-29 NOTE — CONSULT NOTE ADULT - SUBJECTIVE AND OBJECTIVE BOX
Include Location In Plan?: No     Patient is a 72y old  Male who presents with a chief complaint of AMS (28 Nov 2022 14:56)        HPI:  73 y/o male history of metastatic stage IV lung adenocarcinoma, hypothyroidism, depression, anxiety, undergoing chemo brought in by EMS with HHA for altered mental status Monday morning. At baseline patient is A&Ox3, per HHA he was AOx1, not responding to questions or commands. Patient was reported to be hypotensive to SBP 50s in the field, IO was placed and given fluids with improvement in BP. Patient lives along, has HHA 2 days per week for 10 hours, per ED note HHA states she spoke to pt 3 days ago and foung him in his bed Monday morning at which time he was confused. Currently being treated for lung cancer, last chemo 1 month ago.  Patient also has had recurrent falls at home, some associated with head trauma but no loc, syncope, bladder/bowel incontinence.  Otherwise no reported illness, sick contacts, medical changes.  ROS otherwise negative.      ED Course   Vitals: Temp 98.7,  --> 86, /63, RR 20, SaO2 98% on 4L NC --> 97% room air   Labs: WBC 16.24, Hgb 11.7, Plt 449, Na 143, K 3.2, CO2 18, AG 24, BUN 31, Scr 2.35, Ca 7.6, Ma 1.4, Lactate 12.1 --> 2.3, UA trace LE, Bacteria present, hyaline casts, C. Diff negative GI PCR negative  EKG: sinus tachycardia, 1st degree AV block, narrow QRS, prolonged QTC  CXR: Known right upper lobe pulmonary mass   CT chest, abdomen, pelvis: No acute fractures. Unchanged right upper lobe malignancy. Decreased left adrenal metastasis. Decreased abdominal and pelvic adenopathy.  CTH: No intracranial hemorrhage or acute transcortical infarct.  Generalized volume loss with small vessel ischemic disease.  CT C-spine: No fracture. Levoscoliosis with Grade 1 anterolisthesis of C2 on C3 and C7 on T1. Multilevel cervical spondylosis. Right apical lung spiculated mass.  Interventions: Tylenol ig IV, Ca Gluconate 2g, MgSO4 2g x 2, KCl 40mg po x 1, KCl 10mg IV x 5, Zosyn 3.375 x 3, Vancomycin 1250mg, NaCl 2200cc bolus   (28 Nov 2022 21:33)      PAST MEDICAL & SURGICAL HISTORY:      MEDICATIONS  (STANDING):  buPROPion XL (24-Hour) . 150 milliGRAM(s) Oral daily  heparin   Injectable 5000 Unit(s) SubCutaneous every 8 hours  influenza  Vaccine (HIGH DOSE) 0.7 milliLiter(s) IntraMuscular once  levothyroxine 75 MICROGram(s) Oral daily  piperacillin/tazobactam IVPB.. 3.375 Gram(s) IV Intermittent every 8 hours  sodium chloride 0.9%. 1000 milliLiter(s) (100 mL/Hr) IV Continuous <Continuous>  vancomycin  IVPB 1000 milliGRAM(s) IV Intermittent every 24 hours    MEDICATIONS  (PRN):  acetaminophen     Tablet .. 650 milliGRAM(s) Oral every 6 hours PRN Temp greater or equal to 38C (100.4F), Mild Pain (1 - 3)  LORazepam   Injectable 1 milliGRAM(s) IV Push every 2 hours PRN CIWA-Ar score increase by 2 points and a total score of 7 or less           FAMILY HISTORY:    CBC Full  -  ( 29 Nov 2022 05:30 )  WBC Count : 20.17 K/uL  RBC Count : 3.76 M/uL  Hemoglobin : 9.5 g/dL  Hematocrit : 28.7 %  Platelet Count - Automated : 347 K/uL  Mean Cell Volume : 76.3 fl  Mean Cell Hemoglobin : 25.3 pg  Mean Cell Hemoglobin Concentration : 33.1 gm/dL  Auto Neutrophil # : 16.77 K/uL  Auto Lymphocyte # : 1.75 K/uL  Auto Monocyte # : 1.08 K/uL  Auto Eosinophil # : 0.03 K/uL  Auto Basophil # : 0.02 K/uL  Auto Neutrophil % : 83.1 %  Auto Lymphocyte % : 8.7 %  Auto Monocyte % : 5.4 %  Auto Eosinophil % : 0.1 %  Auto Basophil % : 0.1 %      11-29    140  |  107  |  29<H>  ----------------------------<  147<H>  3.4<L>   |  19<L>  |  1.98<H>    Ca    6.9<L>      29 Nov 2022 05:30  Phos  5.4     11-29  Mg     3.3     11-29    TPro  5.1<L>  /  Alb  2.9<L>  /  TBili  0.4  /  DBili  x   /  AST  22  /  ALT  14  /  AlkPhos  115  11-29      Urinalysis Basic - ( 28 Nov 2022 11:03 )    Color: Yellow / Appearance: Clear / SG: >=1.030 / pH: x  Gluc: x / Ketone: Trace mg/dL  / Bili: Moderate / Urobili: 1.0 E.U./dL   Blood: x / Protein: Trace mg/dL / Nitrite: NEGATIVE   Leuk Esterase: Trace / RBC: < 5 /HPF / WBC > 10 /HPF   Sq Epi: x / Non Sq Epi: 0-5 /HPF / Bacteria: Present /HPF          Radiology :     < from: Xray Chest 1 View-PORTABLE IMMEDIATE (11.28.22 @ 11:07) >  ACC: [id number] EXAM:  XR CHEST PORTABLE IMMED 1V                          PROCEDURE DATE:  11/28/2022          INTERPRETATION:  TECHNIQUE: Single portable view of the chest.    COMPARISON:  3/2/2022    CLINICAL HISTORY: Shortness of Breath, Cough,  Fever, right upper lobe   mass    FINDINGS:    Single frontal view of the chest demonstrates the lungs to be clear. The   cardiomediastinal silhouette is normal. No acute osseous abnormalities.   Overlying EKG leads and wires are noted. Patient's known right upper lobe   mass is better seen on the subsequent chest CT. Please refer to the   subsequent chest CT for further details.    IMPRESSION: Patient's known right upper lobe pulmonary mass is better   seen on the subsequent chest CT.        < from: CT Head No Cont (11.28.22 @ 12:28) >  ACC: [id number] EXAM:  CT BRAIN                          PROCEDURE DATE:  11/28/2022          INTERPRETATION:  PROCEDURE: CT head without intravenous contrast    INDICATION: Altered mental status    TECHNIQUE: Multiple axial images were obtained at 5mm intervals from the   skull base to the vertex. Sagittal and coronal reformatted images were   obtained from the axial data set. The images were reviewed in brain and   bone windows.    COMPARISON: MRI brain 10/7/2022    FINDINGS: The CT examination demonstrates generalized volume loss. There   is no midline shift or extra axial collections. The gray white   differentiation appears within normal limits. There is no intracranial   hemorrhage or acute transcortical infarct. There are patchy areasof   hypodensity within the periventricular white matter which may represent   the sequela of small vessel ischemic disease. The bony windows   demonstrates no fractures. The visualized paranasal sinuses are within   normal limits. The mastoid air cells are well aerated.    IMPRESSION: No intracranial hemorrhage or acute transcortical infarct.   Generalized volume loss with small vessel ischemic disease.      < from: CT Chest No Cont (11.28.22 @ 12:29) >  ACC: [id number] EXAM:  CT CHEST                        ACC: [id number] EXAM:  CT ABDOMEN AND PELVIS                          PROCEDURE DATE:  11/28/2022          INTERPRETATION:  CLINICAL INFORMATION: Trauma. Hypotension. Altered   mental status. History of lung adenocarcinoma with biopsy-proven right   axillary metastatic adenopathy. Former smoker.    COMPARISON: CT abdomen pelvis October 7, 2022. CT chest November 2, 2021.    CONTRAST/COMPLICATIONS:  IV Contrast: None  Oral Contrast: None  Complications: None    PROCEDURE:  CT of the Chest, Abdomen and Pelvis was performed.  Sagittal and coronal reformats were performed.    FINDINGS:  CHEST:  LUNGS AND LARGE AIRWAYS: Patent central airways. Centrilobular and   paraseptal emphysema. Unchanged spiculated mass with coarse calcification   in right upper lobe apical segment, 2.4 x 2.1 cm. Redemonstrated few   scattered micronodules. Mild mucous plugging.  PLEURA: No pleural effusion. No pneumothorax.  VESSELS: Mild coronary artery and aortic atherosclerotic calcifications.  HEART: Heart size is normal. No pericardial effusion.  MEDIASTINUM AND EVA: No enlarged mediastinal or axillary nodes. No bulky   hilar adenopathy.  CHEST WALL AND LOWER NECK: Bilateral gynecomastia.    Limited noncontrast evaluation of visceral organs.  ABDOMEN AND PELVIS:  LIVER: Within normal limits.  BILE DUCTS: Normal caliber.  GALLBLADDER: Within normal limits.  SPLEEN: Within normal limits.  PANCREAS: Within normal limits.  ADRENALS: Slight decreased left adrenal nodule 2 cm, previously up to 2.6   cm.  KIDNEYS/URETERS: Within normal limits.    BLADDER: Minimally fluid distended, decompressed with Galvin catheter in   situ and small intravesicular air likely from recent manipulation.  REPRODUCTIVE ORGANS:Prostate is enlarged.    BOWEL: No bowel obstruction.  PERITONEUM: No ascites.  VESSELS: Atherosclerotic changes.  RETROPERITONEUM/LYMPH NODES: Decreased left periaortic node 0.7 cm short   axis, previously 1.3 cm; decreased right inguinal node 0.5 cm short axis,   previously 1.4 cm.  ABDOMINAL WALL: Within normal limits.  BONES: No acute fractures. No suspicious lesion. Old right rib fractures   and degenerative changes of the spine.    IMPRESSION:  No acute fractures.  Since October 7, 2022, unchanged right upper lobe malignancy.  Decreased left adrenal metastasis.  Decreased abdominal and pelvic adenopathy.          Vital Signs Last 24 Hrs  T(C): 36.6 (29 Nov 2022 05:27), Max: 37.1 (28 Nov 2022 18:56)  T(F): 97.8 (29 Nov 2022 05:27), Max: 98.8 (28 Nov 2022 18:56)  HR: 61 (29 Nov 2022 10:00) (58 - 86)  BP: 111/71 (29 Nov 2022 10:00) (102/60 - 156/65)  BP(mean): --  RR: 18 (29 Nov 2022 05:27) (18 - 20)  SpO2: 99% (29 Nov 2022 05:27) (97% - 100%)    Parameters below as of 29 Nov 2022 05:27  Patient On (Oxygen Delivery Method): room air            REVIEW OF SYSTEMS:  per hpi         Physical Exam:  72 y o man lying comfortably in semi Servin's position , awake , alert , NAD     Head : normocephalic , atraumatic    Eyes : PERRLA , EOMI , no nystagmus , sclera anicteric    ENT : nasal discharge , uvula midline , no oropharyngeal erythema / exudate    Neck : supple , negative JVD , negative carotid bruits , no thyromegaly    Chest : dec BS RUL     Cardiovascular: regular rate and rhythm , neg murmurs / rubs / gallops    Abdomen : soft , non distended , non tender to palpation in all 4 quadrants , negative rebound / guarding , normal bowel sounds    Extremities : WWP , neg cyanosis /clubbing / edema     Neurologic Exam:    Alert and oriented to person , place , speech fluent w/o dysarthria , follows commands     Cranial Nerves:     II :                         pupils equal , round and reactive to light , visual fields intact   III/ IV/VI :              extraocular movements intact , neg nystagmus , neg ptosis  V :                        facial sensation intact , V1-3 normal  VII :                      face symmetric , no droop , normal eye closure and smile  VIII :                     hearing intact to finger rub bilaterally  IX and X :             no hoarseness , gag intact , palate/ uvula rise symmetrically  XI :                       SCM / trapezius strength intact bilateral  XII :                      no tongue deviation    Motor Exam:          Right UE:               no focal weakness ,  > 3+/5 throughout  , no drift                                 Left UE:                 no focal weakness,  > 3+/5 throughout  , no drift          Right LE:                no focal weakness,  > 3+/5 throughout       Left LE:                  no focal weakness,  > 3+/5 throughout            Sensation:         intact to light touch x 4 extremities                       DTR :                     biceps/brachioradialis : equal                                              patella/ankle : equal                                                                               neg Babinski      Gait :  not tested        PM&R Impression :     1) deconditioned    2) no focal weakness      Recommendations / Plan :     1) Physical / Occupational therapy focusing on therapeutic exercises , equipment evaluation , bed mobility/transfer out of bed evaluation , progressive ambulation with assistive devices prn .    2) Current disposition plan recommendation  :    subacute rehab placement              Detail Level: Detailed

## 2022-11-29 NOTE — OCCUPATIONAL THERAPY INITIAL EVALUATION ADULT - GENERAL OBSERVATIONS, REHAB EVAL
Pt received semi-supine in bed, +mark, +heplock, mild c/o headache (RN aware and pt medicated) but agreeable to OT. Cleared by EDD Bowles to see.

## 2022-11-29 NOTE — OCCUPATIONAL THERAPY INITIAL EVALUATION ADULT - DIAGNOSIS, OT EVAL
Pt admitted for AMS 2/2 sepsis and recurrent falls at home presents with impaired balance, generalized deconditioning, and decreased activity tolerance impacting overall ease of completing ADLs and functional mobility tasks at prior level of function.

## 2022-11-30 NOTE — PROGRESS NOTE ADULT - SUBJECTIVE AND OBJECTIVE BOX
INTERVAL HPI/OVERNIGHT EVENTS:  Awake and appears alert      MEDICATIONS  (STANDING):  buPROPion XL (24-Hour) . 150 milliGRAM(s) Oral daily  heparin   Injectable 5000 Unit(s) SubCutaneous every 8 hours  influenza  Vaccine (HIGH DOSE) 0.7 milliLiter(s) IntraMuscular once  levothyroxine 75 MICROGram(s) Oral daily  multivitamin 1 Tablet(s) Oral daily  piperacillin/tazobactam IVPB.. 3.375 Gram(s) IV Intermittent every 8 hours  potassium chloride   Powder 40 milliEquivalent(s) Oral every 4 hours  sodium chloride 0.9%. 1000 milliLiter(s) (100 mL/Hr) IV Continuous <Continuous>  vancomycin  IVPB 1000 milliGRAM(s) IV Intermittent every 24 hours    MEDICATIONS  (PRN):  acetaminophen     Tablet .. 650 milliGRAM(s) Oral every 6 hours PRN Temp greater or equal to 38C (100.4F), Mild Pain (1 - 3)  LORazepam   Injectable 1 milliGRAM(s) IV Push every 2 hours PRN CIWA-Ar score increase by 2 points and a total score of 7 or less      Allergies    No Known Allergies    Intolerances        Vital Signs Last 24 Hrs  T(C): 36.6 (30 Nov 2022 05:34), Max: 36.7 (29 Nov 2022 15:35)  T(F): 97.8 (30 Nov 2022 05:34), Max: 98 (29 Nov 2022 15:35)  HR: 85 (30 Nov 2022 05:34) (60 - 85)  BP: 109/60 (30 Nov 2022 05:34) (109/60 - 139/57)  BP(mean): --  RR: 18 (30 Nov 2022 05:34) (18 - 18)  SpO2: 98% (30 Nov 2022 05:34) (96% - 99%)    Parameters below as of 30 Nov 2022 05:34  Patient On (Oxygen Delivery Method): room air              Constitutional: Awake     Eyes: ARBEN    ENMT: Negative    Neck: Supple    Back:  no tenderness     Respiratory:  clear    Cardiovascular: S1 S2    Gastrointestinal:  soft ; Liver enlarged    Genitourinary:    Extremities: no edema     Vascular:    Neurological:    Skin:    Lymph Nodes:            11-29 @ 07:01  -  11-30 @ 07:00  --------------------------------------------------------  IN: 0 mL / OUT: 400 mL / NET: -400 mL      LABS:                        8.9    16.18 )-----------( 304      ( 30 Nov 2022 05:30 )             27.2     11-30    139  |  110<H>  |  21  ----------------------------<  113<H>  3.1<L>   |  15<L>  |  1.66<H>    Ca    6.8<L>      30 Nov 2022 05:30  Phos  3.5     11-30  Mg     2.5     11-30    TPro  4.9<L>  /  Alb  2.4<L>  /  TBili  0.3  /  DBili  x   /  AST  18  /  ALT  13  /  AlkPhos  113  11-30    PT/INR - ( 29 Nov 2022 05:30 )   PT: 14.7 sec;   INR: 1.23          PTT - ( 29 Nov 2022 05:30 )  PTT:27.1 sec      RADIOLOGY & ADDITIONAL TESTS:

## 2022-11-30 NOTE — PROGRESS NOTE ADULT - SUBJECTIVE AND OBJECTIVE BOX
SUBJECTIVE / INTERVAL HPI: Patient seen and examined at bedside. No overnight events. Pt AAOx4 this morning, though unclear on events that led him to the hospital.  Understands taht he was confused. No complaints this morning. Denies fever, night sweat, chest pain, SOB, dysuria, abdominal pain.    VITAL SIGNS:  Vital Signs Last 24 Hrs  T(C): 36.5 (30 Nov 2022 21:30), Max: 36.6 (30 Nov 2022 05:34)  T(F): 97.7 (30 Nov 2022 21:30), Max: 97.8 (30 Nov 2022 05:34)  HR: 69 (30 Nov 2022 21:30) (69 - 85)  BP: 111/58 (30 Nov 2022 21:30) (105/74 - 111/58)  BP(mean): --  RR: 19 (30 Nov 2022 21:30) (17 - 19)  SpO2: 98% (30 Nov 2022 21:30) (98% - 100%)    Parameters below as of 30 Nov 2022 21:30  Patient On (Oxygen Delivery Method): room air        PHYSICAL EXAM:    General: Resting comfortably in bed; NAD  HEENT: NC/AT, EOMI, anicteric sclera, MMM, neck supple, no nasal discharge  Cardiac: RRR; normal S1/S2, no MRG, no LE edema  Respiratory: CTAB; no wheezes, ronchi, increased work of breathing, retractions  Gastrointestinal: +BSx4, abdomen soft, NT/ND; no rebound or guarding  Extremities: WWP, no clubbing or cyanosis; no peripheral edema  Dermatologic: skin warm, dry and intact; no rashes, open wounds  Neurologic: AAOx3; no focal deficits    MEDICATIONS:  MEDICATIONS  (STANDING):  buPROPion XL (24-Hour) . 150 milliGRAM(s) Oral daily  heparin   Injectable 5000 Unit(s) SubCutaneous every 8 hours  influenza  Vaccine (HIGH DOSE) 0.7 milliLiter(s) IntraMuscular once  levothyroxine 75 MICROGram(s) Oral daily  multivitamin 1 Tablet(s) Oral daily  piperacillin/tazobactam IVPB.. 3.375 Gram(s) IV Intermittent every 8 hours  sodium chloride 0.9%. 1000 milliLiter(s) (100 mL/Hr) IV Continuous <Continuous>  vancomycin  IVPB 1000 milliGRAM(s) IV Intermittent every 24 hours    MEDICATIONS  (PRN):  acetaminophen     Tablet .. 650 milliGRAM(s) Oral every 6 hours PRN Temp greater or equal to 38C (100.4F), Mild Pain (1 - 3)  LORazepam   Injectable 1 milliGRAM(s) IV Push every 2 hours PRN CIWA-Ar score increase by 2 points and a total score of 7 or less      ALLERGIES:  Allergies    No Known Allergies    Intolerances        LABS:                        8.9    16.18 )-----------( 304      ( 30 Nov 2022 05:30 )             27.2     11-30    139  |  110<H>  |  21  ----------------------------<  113<H>  3.1<L>   |  15<L>  |  1.66<H>    Ca    6.8<L>      30 Nov 2022 05:30  Phos  3.5     11-30  Mg     2.5     11-30    TPro  4.9<L>  /  Alb  2.4<L>  /  TBili  0.3  /  DBili  x   /  AST  18  /  ALT  13  /  AlkPhos  113  11-30    PT/INR - ( 29 Nov 2022 05:30 )   PT: 14.7 sec;   INR: 1.23          PTT - ( 29 Nov 2022 05:30 )  PTT:27.1 sec  Urinalysis Basic - ( 30 Nov 2022 14:27 )    Color: Yellow / Appearance: Clear / SG: >=1.030 / pH: x  Gluc: x / Ketone: NEGATIVE  / Bili: Negative / Urobili: 0.2 E.U./dL   Blood: x / Protein: Trace mg/dL / Nitrite: NEGATIVE   Leuk Esterase: NEGATIVE / RBC: 5-10 /HPF / WBC < 5 /HPF   Sq Epi: x / Non Sq Epi: 0-5 /HPF / Bacteria: Present /HPF      CAPILLARY BLOOD GLUCOSE          RADIOLOGY & ADDITIONAL TESTS: Reviewed.

## 2022-11-30 NOTE — PROGRESS NOTE ADULT - PROBLEM SELECTOR PLAN 1
Patient with encephalopathy and hypotension, meeting 3 SIRS on admission for tachycardia, tachypnea, and leukocytosis.  Source possibly pulmonary and urinary.  Lactate 12.1 --> 2.3, UA trace LE, Bacteria present, hyaline casts, C. Diff negative GI PCR negative.  Lactate cleared, S/p Zosyn 3.375 x 3, Vancomycin 1250mg, NaCl 2200cc bolus in ED.    - F/u Bcx and UCx  - Continue Vanc 1000mg qD and Zosyn 3.375 q8 for now  - vanc trough tomorrow 10AM  - f/u urine legionella and staph

## 2022-11-30 NOTE — PROGRESS NOTE ADULT - TIME BILLING
Patient seen and examined;  Still with diarrhea   Appetite poor  Physical therapy  Continue antibiotics for now

## 2022-11-30 NOTE — PROGRESS NOTE ADULT - ASSESSMENT
73 y/o male history of metastatic stage IV lung adenocarcinoma, hypothyroidism, depression, anxiety, undergoing chemo brought in by EMS with HHA for altered mental status Monday morning admitted for sepsis (unclear source) and electrolyte derangements.

## 2022-11-30 NOTE — PROGRESS NOTE ADULT - ASSESSMENT
71 y/o male history of metastatic stage IV lung adenocarcinoma (carboplatin, pemetrexed, pembrolizumab on C1D1 10/27), hypothyroidism, depression, anxiety, undergoing chemo brought in by EMS with HHA for altered mental status. Found to have sepsis and ASHLYN. Oncology consulted given recent treatment of lung cancer.    # metastatic lung adenocarcinoma, AJCC Stage IV  Pt with R axillary LN biopsy positive for metastatic lung adenocarcinoma, with FDG avidity in thoracic, abdominal, inguinal LNs, as well as rectal thickening. Pt has not had any recent colonoscopies to further investigate GI involvement. Repeat CT CAP with increase in primary lung mass, persistent rectal thickening and other sites of disease as previously described. 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 Received Cycle 1 of chemo with carboplatin, pemetrexed, and pembrolizumab on 10/27/22. Now admitted with sepsis and ASHLYN, possibly 2/2 infection vs chemotherapy. Patient states that diarrhea started 1-2 weeks after chemotherapy and has been having a 2-3 bowel movements for the past 2 weeks. 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 Interval CT scan on admission 11/28 showing unchanged right upper lobe malignancy since October 7, 2022. Decreased left adrenal metastasis. Decreased abdominal and pelvic adenopathy.    Recommendations:  - Will hold off on further chemotherapy until outpatient and improvement of ASHLYN/infection  - Infectious work up and fluids/antibiotic management per primary team    Discussed attending Dr. Randall, covering for Dr. Patricia.

## 2022-12-01 NOTE — PROVIDER CONTACT NOTE (OTHER) - SITUATION
Patient appears axox4 with no new changes. RN and PCT went to change absorbant pad and noted loose bowel movement that appeared to be consistent of blood texture (clots) and color (dark red).

## 2022-12-01 NOTE — PROGRESS NOTE ADULT - PROBLEM SELECTOR PLAN 5
Patient with diarrhea described as loose stool, per outpatient notes seems to be chronic for several weeks (later stated for a year). Patient was prescribed Loperamide early November.  Etiology is likely to be in setting of metastatic malignancy vs thyroid disorder.  Diarrhea likely not infectious C. diff negative, GI PCR negative.    - CTM bowel movements  - banatrol added to diet  - lomotil q6 hrs  - no loperamide due to prolonged QTc 508 (12/1/2022 EKG)

## 2022-12-01 NOTE — PROGRESS NOTE ADULT - PROBLEM SELECTOR PLAN 3
Patient with ASHLYN on admission BUN 31, Scr 2.35 -->Scr 2.31 (baseline Scr 1.1 10/22).  ASHLYN likely prerenal in setting of hypovolemia due to diarrhea and poor po intake.  Given prostate mets, there may also be a post-renal component.    - bladder scan, trend Cr, avoid nephrotoxic drugs, renally dose meds  - improving  - mark removed, not retaining

## 2022-12-01 NOTE — PROGRESS NOTE ADULT - SUBJECTIVE AND OBJECTIVE BOX
SUBJECTIVE / INTERVAL HPI: Patient seen and examined at bedside. Pt nauseous overnight, given tigan. Otherwise no complaints this morning. Still have loose bowel movements, small volume, about 3-4 in last 24hrs. AAOx4. Feels at baseline. Continue to feel weak. Goal is OOBTC today. Denies fevers, sweats, abdominal pain, urinary pain. Has been voiding.    VITAL SIGNS:  Vital Signs Last 24 Hrs  T(C): 36.3 (01 Dec 2022 15:38), Max: 37.3 (01 Dec 2022 05:28)  T(F): 97.4 (01 Dec 2022 15:38), Max: 99.2 (01 Dec 2022 05:28)  HR: 75 (01 Dec 2022 15:38) (69 - 86)  BP: 106/55 (01 Dec 2022 15:38) (106/55 - 121/74)  BP(mean): --  RR: 18 (01 Dec 2022 15:38) (18 - 19)  SpO2: 97% (01 Dec 2022 15:38) (97% - 100%)    Parameters below as of 01 Dec 2022 15:38  Patient On (Oxygen Delivery Method): room air        PHYSICAL EXAM:  General: Resting comfortably in bed; NAD  HEENT: NC/AT, EOMI, anicteric sclera, dry MM, neck supple, no nasal discharge, sunken eyes  Cardiac: RRR; normal S1/S2, no MRG, no LE edema  Respiratory: CTAB anteriorly; no wheezes, ronchi, increased work of breathing, retractions  Gastrointestinal: +BSx4, abdomen soft, NT/ND; no rebound or guarding  Extremities: WWP, no clubbing or cyanosis; no peripheral edema  Dermatologic: skin warm, dry and intact; no rashes, open wounds  Neurologic: AAOx3; no focal deficits    MEDICATIONS:  MEDICATIONS  (STANDING):  buPROPion XL (24-Hour) . 150 milliGRAM(s) Oral daily  diphenoxylate/atropine 2 Tablet(s) Oral every 6 hours  heparin   Injectable 5000 Unit(s) SubCutaneous every 8 hours  influenza  Vaccine (HIGH DOSE) 0.7 milliLiter(s) IntraMuscular once  lactated ringers Bolus 1000 milliLiter(s) IV Bolus once  multivitamin 1 Tablet(s) Oral daily  piperacillin/tazobactam IVPB.. 3.375 Gram(s) IV Intermittent every 8 hours  potassium chloride   Powder 40 milliEquivalent(s) Oral once  potassium chloride   Powder 40 milliEquivalent(s) Oral every 4 hours  vancomycin  IVPB 1000 milliGRAM(s) IV Intermittent every 24 hours    MEDICATIONS  (PRN):  acetaminophen     Tablet .. 650 milliGRAM(s) Oral every 6 hours PRN Temp greater or equal to 38C (100.4F), Mild Pain (1 - 3)  LORazepam   Injectable 1 milliGRAM(s) IV Push every 2 hours PRN CIWA-Ar score increase by 2 points and a total score of 7 or less      ALLERGIES:  Allergies    No Known Allergies    Intolerances        LABS:                        8.5    17.49 )-----------( 335      ( 01 Dec 2022 05:30 )             27.6     12-01    138  |  109<H>  |  22  ----------------------------<  120<H>  3.1<L>   |  17<L>  |  2.08<H>    Ca    7.3<L>      01 Dec 2022 15:20  Phos  2.3     12-01  Mg     2.0     12-01    TPro  5.0<L>  /  Alb  2.4<L>  /  TBili  0.3  /  DBili  x   /  AST  19  /  ALT  14  /  AlkPhos  107  12-01      Urinalysis Basic - ( 30 Nov 2022 14:27 )    Color: Yellow / Appearance: Clear / SG: >=1.030 / pH: x  Gluc: x / Ketone: NEGATIVE  / Bili: Negative / Urobili: 0.2 E.U./dL   Blood: x / Protein: Trace mg/dL / Nitrite: NEGATIVE   Leuk Esterase: NEGATIVE / RBC: 5-10 /HPF / WBC < 5 /HPF   Sq Epi: x / Non Sq Epi: 0-5 /HPF / Bacteria: Present /HPF      CAPILLARY BLOOD GLUCOSE          RADIOLOGY & ADDITIONAL TESTS: Reviewed.

## 2022-12-01 NOTE — PROGRESS NOTE ADULT - PROBLEM SELECTOR PLAN 4
Patient with underlying metastatic lung adenocarcinoma with chronic diarrhea and frequent falls, overall appears very frail.    - dietitian consulted - rec ensure enlive TID  - PT/OT consult - rec SASHA  - 1L LR bolus

## 2022-12-01 NOTE — PROGRESS NOTE ADULT - PROBLEM SELECTOR PLAN 1
Patient with encephalopathy and hypotension, meeting 3 SIRS on admission for tachycardia, tachypnea, and leukocytosis.  Source possibly pulmonary and urinary.  Lactate 12.1 --> 2.3, UA trace LE, Bacteria present, hyaline casts, C. Diff negative GI PCR negative.  Lactate cleared, S/p Zosyn 3.375 x 3, Vancomycin 1250mg, NaCl 2200cc bolus in ED.    - UCx no growth final  - BCx NGTD  - ULeg negative  Plan:  - Continue Vanc 1000mg qD and Zosyn 3.375 q8 for now

## 2022-12-01 NOTE — PROVIDER CONTACT NOTE (OTHER) - ACTION/TREATMENT ORDERED:
Dr. Diallo and Dr. Quevedo saw patient at bedside with RN. No new orders, no labs, no changes. No concerns,   Rn will continue to monitor.

## 2022-12-01 NOTE — PROGRESS NOTE ADULT - PROBLEM SELECTOR PLAN 9
Patient with history of hypothyroidism, home medications Levothyroxine 75mcg daily. On 12/1 TSH 10.81, free T4 0.67.  - increase synthroid to 88mcg qD

## 2022-12-01 NOTE — PROGRESS NOTE ADULT - TIME BILLING
Patient seen and examined;  Diarrhea continues;  Will try Lomotil  Potassium replacement   Encourage PO

## 2022-12-01 NOTE — PROGRESS NOTE ADULT - PROBLEM SELECTOR PLAN 2
Patient with frequent falls at home, per outpatient nursing notes, patient's HHA have reported falls at home sometimes with head trauma.  He ambulated at baseline with cane.  Etiology likely due to overall decompensation in setting of metastatic cancer. CTH and CT cervical spine without fractures of trauma.    - PT/OT consult - recommending SASHA

## 2022-12-01 NOTE — PROGRESS NOTE ADULT - SUBJECTIVE AND OBJECTIVE BOX
INTERVAL HPI/OVERNIGHT EVENTS:  Awake and appears alert;  Still having diarrhea   Potassium noted       MEDICATIONS  (STANDING):  buPROPion XL (24-Hour) . 150 milliGRAM(s) Oral daily  diphenoxylate/atropine 2 Tablet(s) Oral every 6 hours  heparin   Injectable 5000 Unit(s) SubCutaneous every 8 hours  influenza  Vaccine (HIGH DOSE) 0.7 milliLiter(s) IntraMuscular once  lactated ringers Bolus 1000 milliLiter(s) IV Bolus once  multivitamin 1 Tablet(s) Oral daily  piperacillin/tazobactam IVPB.. 3.375 Gram(s) IV Intermittent every 8 hours  potassium chloride   Powder 40 milliEquivalent(s) Oral once  potassium chloride   Powder 40 milliEquivalent(s) Oral every 4 hours  vancomycin  IVPB 1000 milliGRAM(s) IV Intermittent every 24 hours    MEDICATIONS  (PRN):  acetaminophen     Tablet .. 650 milliGRAM(s) Oral every 6 hours PRN Temp greater or equal to 38C (100.4F), Mild Pain (1 - 3)  LORazepam   Injectable 1 milliGRAM(s) IV Push every 2 hours PRN CIWA-Ar score increase by 2 points and a total score of 7 or less      Allergies    No Known Allergies    Intolerances        Vital Signs Last 24 Hrs  T(C): 36.3 (01 Dec 2022 15:38), Max: 37.3 (01 Dec 2022 05:28)  T(F): 97.4 (01 Dec 2022 15:38), Max: 99.2 (01 Dec 2022 05:28)  HR: 75 (01 Dec 2022 15:38) (69 - 86)  BP: 106/55 (01 Dec 2022 15:38) (106/55 - 121/74)  BP(mean): --  RR: 18 (01 Dec 2022 15:38) (18 - 19)  SpO2: 97% (01 Dec 2022 15:38) (97% - 100%)    Parameters below as of 01 Dec 2022 15:38  Patient On (Oxygen Delivery Method): room air              Constitutional:  Awake     Eyes: ARBEN    ENMT: Negative    Neck: Supple    Back:  no tenderness     Respiratory:  clear; Decreased breath sounds    Cardiovascular: S1 S2    Gastrointestinal:   soft    Genitourinary:    Extremities: no edema    Vascular:    Neurological:    Skin:    Lymph Nodes:            11-30 @ 07:01  -  12-01 @ 07:00  --------------------------------------------------------  IN: 0 mL / OUT: 500 mL / NET: -500 mL      LABS:                        8.5    17.49 )-----------( 335      ( 01 Dec 2022 05:30 )             27.6     12-01    138  |  109<H>  |  22  ----------------------------<  120<H>  3.1<L>   |  17<L>  |  2.08<H>    Ca    7.3<L>      01 Dec 2022 15:20  Phos  2.3     12-01  Mg     2.0     12-01    TPro  5.0<L>  /  Alb  2.4<L>  /  TBili  0.3  /  DBili  x   /  AST  19  /  ALT  14  /  AlkPhos  107  12-01      Urinalysis Basic - ( 30 Nov 2022 14:27 )    Color: Yellow / Appearance: Clear / SG: >=1.030 / pH: x  Gluc: x / Ketone: NEGATIVE  / Bili: Negative / Urobili: 0.2 E.U./dL   Blood: x / Protein: Trace mg/dL / Nitrite: NEGATIVE   Leuk Esterase: NEGATIVE / RBC: 5-10 /HPF / WBC < 5 /HPF   Sq Epi: x / Non Sq Epi: 0-5 /HPF / Bacteria: Present /HPF        RADIOLOGY & ADDITIONAL TESTS:

## 2022-12-01 NOTE — PROGRESS NOTE ADULT - PROBLEM SELECTOR PLAN 6
Patient with hypokalemia, likely due to persistent diarrhea. No EKG changes. On 12/1 K low to 2.1, repeat EKG possible u waves. Pt asymptomatic, no fasiculations or weakness. Aggressively repeatedly.  - aggressive K repletion  - repeat BMP at 2PM

## 2022-12-01 NOTE — PROGRESS NOTE ADULT - PROBLEM SELECTOR PLAN 10
Patient with recently found (08/22) with RUL spiculated nodule with R paratracheal adenopathy and L adrenal nodule. Concern for metastatic cancer because of rectal lesion on PET scan as well as L adrenal area that lit up. Now, s/p FNA of R axillary node, with pathology consistent with metastatic lung adenocarcinoma.  - hold heme/onc consult for now

## 2022-12-02 NOTE — CONSULT NOTE ADULT - SUBJECTIVE AND OBJECTIVE BOX
GASTROENTEROLOGY CONSULT NOTE  HPI:  73 y/o male history of metastatic stage IV lung adenocarcinoma, hypothyroidism, depression, anxiety, on new chemo as of a few weeks ago ;    Initially brought in by EMS due to altered mental status , as noted by HHA ;   Patient was admitted for SIRS and suspicion for sepsis    GI was consulted for diarrhea    Patient reports that this diarrhea conincided with starting his chemotherapy - prior to this he stated his BM's were once a day. brown, formed.   Now his BM's are dark, loose, 2-3x a day    He does drink a lot of ensures, and is not sure if he is lactose intolerant    He is not interested in a colonoscopy    CT scan of abd did not reveal any acute bowel pathology  GI PCR and C diff were negative    Allergies    No Known Allergies    Intolerances      Home Medications:  buPROPion 150 mg/24 hours (XL) oral tablet, extended release: 1 tab(s) orally every 24 hours (29 Nov 2022 03:43)  gabapentin 300 mg oral capsule: 1 cap(s) orally 3 times a day (29 Nov 2022 03:43)  Incruse Ellipta 62.5 mcg/inh inhalation powder: 1 puff(s) inhaled every 24 hours (29 Nov 2022 03:43)  levothyroxine 75 mcg (0.075 mg) oral tablet: 1 tab(s) orally once a day (29 Nov 2022 03:43)  QUEtiapine 50 mg oral tablet, extended release: 1 tab(s) orally once a day (in the evening) (29 Nov 2022 03:43)    MEDICATIONS:  MEDICATIONS  (STANDING):  buPROPion XL (24-Hour) . 150 milliGRAM(s) Oral daily  diphenoxylate/atropine 2 Tablet(s) Oral every 6 hours  heparin   Injectable 5000 Unit(s) SubCutaneous every 8 hours  influenza  Vaccine (HIGH DOSE) 0.7 milliLiter(s) IntraMuscular once  levothyroxine 88 MICROGram(s) Oral every 24 hours  multivitamin 1 Tablet(s) Oral daily  potassium chloride   Powder 40 milliEquivalent(s) Oral every 4 hours    MEDICATIONS  (PRN):  acetaminophen     Tablet .. 650 milliGRAM(s) Oral every 6 hours PRN Temp greater or equal to 38C (100.4F), Mild Pain (1 - 3)  LORazepam   Injectable 1 milliGRAM(s) IV Push every 2 hours PRN CIWA-Ar score increase by 2 points and a total score of 7 or less    PAST MEDICAL & SURGICAL HISTORY: as above  FAMILY HISTORY: htn    REVIEW OF SYSTEMS:  All other 10 review of systems is negative unless indicated above.    Vital Signs Last 24 Hrs  T(C): 36.9 (02 Dec 2022 09:30), Max: 36.9 (02 Dec 2022 09:30)  T(F): 98.5 (02 Dec 2022 09:30), Max: 98.5 (02 Dec 2022 09:30)  HR: 88 (02 Dec 2022 09:30) (75 - 88)  BP: 117/61 (02 Dec 2022 09:30) (106/55 - 117/61)  BP(mean): --  RR: 18 (02 Dec 2022 09:30) (18 - 18)  SpO2: 97% (02 Dec 2022 09:30) (94% - 97%)    Parameters below as of 02 Dec 2022 09:30  Patient On (Oxygen Delivery Method): room air          PHYSICAL EXAM:    General: lying in bed, in no acute distress  HEENT: Neck supple, mmm, no jvd  Lungs: Normal respiratory effort, no intercostal retractions  Cardiovascular: regular rate  Abdomen: Soft, non-tender non-distended; No rebound or guarding  Extremities: wwp, no cce  Neurological: CORDON, speech fluent  Skin: Warm and dry. No obvious rash    LABS:                        7.9    23.87 )-----------( 344      ( 02 Dec 2022 08:39 )             24.1     12-02    136  |  108  |  17  ----------------------------<  118<H>  2.9<LL>   |  17<L>  |  1.83<H>    Ca    7.6<L>      02 Dec 2022 08:39  Phos  2.1     12-02  Mg     1.9     12-02    TPro  5.4<L>  /  Alb  2.5<L>  /  TBili  0.3  /  DBili  x   /  AST  15  /  ALT  15  /  AlkPhos  107  12-02            RADIOLOGY & ADDITIONAL STUDIES:     Reviewed

## 2022-12-02 NOTE — PROGRESS NOTE ADULT - PROBLEM SELECTOR PLAN 2
Patient with hypokalemia, likely due to persistent diarrhea. No EKG changes. On 12/1 K low to 2.1, repeat EKG possible u waves. Pt asymptomatic, no fasiculations or weakness. Aggressively repleted  - K repleted  - repeat BMP at 4PM

## 2022-12-02 NOTE — CONSULT NOTE ADULT - ASSESSMENT
73 y/o male history of metastatic stage IV lung adenocarcinoma, hypothyroidism, depression, anxiety, on new chemo as of a few weeks ago ;    Initially brought in by EMS due to altered mental status , as noted by HHA ;   Patient was admitted for SIRS and suspicion for sepsis    GI was consulted for subacute diarrhea    Differential includes microscopic colitis versus chemo induced / immune checkpoint colitis    Based on shared decision making with patient - Patient is not interested in Colonoscopic evaluation at this time  Would add Lactaid / reduce lactose in diet to see if this could aid as well    Thank you for allowing us to participate in the care of this patient. GI will sign off the case.  Please call us if you have any further questions or concerns    Lux Donald M.D.  Gastroenterology Fellow  Weekday Pager: [phone]  Weeknights/Weekend Coverage: Please Call the  for contact info   71 y/o male history of metastatic stage IV lung adenocarcinoma, hypothyroidism, depression, anxiety, on new chemo as of a few weeks ago ;    Initially brought in by EMS due to altered mental status , as noted by HHA ;   Patient was admitted for SIRS and suspicion for sepsis    GI was consulted for subacute diarrhea    Differential includes microscopic colitis versus chemo induced / immune checkpoint colitis/diarrhea    Based on shared decision making with patient - Patient is not interested in Colonoscopic evaluation at this time  Would add Lactaid / reduce lactose in diet to see if this could aid as well    Thank you for allowing us to participate in the care of this patient. GI will sign off the case.  Please call us if you have any further questions or concerns    Lux Donald M.D.  Gastroenterology Fellow  Weekday Pager: [phone]  Weeknights/Weekend Coverage: Please Call the  for contact info

## 2022-12-02 NOTE — PROGRESS NOTE ADULT - SUBJECTIVE AND OBJECTIVE BOX
INTERVAL HPI/OVERNIGHT EVENTS:  Awake and alert  Still with diarrhea; Potassium noted;  WBC noted      MEDICATIONS  (STANDING):  buPROPion XL (24-Hour) . 150 milliGRAM(s) Oral daily  diphenoxylate/atropine 2 Tablet(s) Oral every 6 hours  heparin   Injectable 5000 Unit(s) SubCutaneous every 8 hours  influenza  Vaccine (HIGH DOSE) 0.7 milliLiter(s) IntraMuscular once  levothyroxine 88 MICROGram(s) Oral every 24 hours  multivitamin 1 Tablet(s) Oral daily  piperacillin/tazobactam IVPB.. 3.375 Gram(s) IV Intermittent every 8 hours  potassium chloride   Powder 40 milliEquivalent(s) Oral every 4 hours  potassium phosphate IVPB 30 milliMole(s) IV Intermittent once  vancomycin  IVPB 1000 milliGRAM(s) IV Intermittent every 24 hours    MEDICATIONS  (PRN):  acetaminophen     Tablet .. 650 milliGRAM(s) Oral every 6 hours PRN Temp greater or equal to 38C (100.4F), Mild Pain (1 - 3)  LORazepam   Injectable 1 milliGRAM(s) IV Push every 2 hours PRN CIWA-Ar score increase by 2 points and a total score of 7 or less      Allergies    No Known Allergies    Intolerances        Vital Signs Last 24 Hrs  T(C): 36.9 (02 Dec 2022 09:30), Max: 36.9 (02 Dec 2022 09:30)  T(F): 98.5 (02 Dec 2022 09:30), Max: 98.5 (02 Dec 2022 09:30)  HR: 88 (02 Dec 2022 09:30) (75 - 88)  BP: 117/61 (02 Dec 2022 09:30) (106/55 - 117/61)  BP(mean): --  RR: 18 (02 Dec 2022 09:30) (18 - 18)  SpO2: 97% (02 Dec 2022 09:30) (94% - 97%)    Parameters below as of 02 Dec 2022 09:30  Patient On (Oxygen Delivery Method): room air              Constitutional: Awake     Eyes: ARBEN    ENMT: Negative    Neck: Supple    Back:  no tenderness     Respiratory:  clear    Cardiovascular: S1 S2    Gastrointestinal:  soft    Genitourinary:    Extremities:  no edema    Vascular:    Neurological:    Skin:    Lymph Nodes:            LABS:                        7.9    23.87 )-----------( 344      ( 02 Dec 2022 08:39 )             24.1     12-02    136  |  108  |  17  ----------------------------<  118<H>  2.9<LL>   |  17<L>  |  1.83<H>    Ca    7.6<L>      02 Dec 2022 08:39  Phos  2.1     12-02  Mg     1.9     12-02    TPro  5.4<L>  /  Alb  2.5<L>  /  TBili  0.3  /  DBili  x   /  AST  15  /  ALT  15  /  AlkPhos  107  12-02      Urinalysis Basic - ( 30 Nov 2022 14:27 )    Color: Yellow / Appearance: Clear / SG: >=1.030 / pH: x  Gluc: x / Ketone: NEGATIVE  / Bili: Negative / Urobili: 0.2 E.U./dL   Blood: x / Protein: Trace mg/dL / Nitrite: NEGATIVE   Leuk Esterase: NEGATIVE / RBC: 5-10 /HPF / WBC < 5 /HPF   Sq Epi: x / Non Sq Epi: 0-5 /HPF / Bacteria: Present /HPF        RADIOLOGY & ADDITIONAL TESTS:

## 2022-12-02 NOTE — PROGRESS NOTE ADULT - PROBLEM SELECTOR PLAN 5
Patient with history of hypothyroidism, home medications Levothyroxine 75mcg daily. On 12/1 TSH 10.81, free T4 0.67.  - increase synthroid to 88mcg qD on 12/1/22

## 2022-12-02 NOTE — PROGRESS NOTE ADULT - PROBLEM SELECTOR PLAN 1
Patient with diarrhea described as loose stool, per outpatient notes seems to be chronic for several weeks (later stated for a year). Patient was prescribed Loperamide early November.  Unclear etiology. May be in setting of metastatic malignancy, however currently considering inflammatory given chronicity and white count. Diarrhea likely not infectious C. diff negative, GI PCR negative. Pt persistently hypokalemic likely from diarrhea. GI consulted on 12/2.  - CTM bowel movements  - banatrol added to diet  - lomotil q6 hrs  - no loperamide due to prolonged QTc 508 (12/1/2022 EKG)  - f/u GI recs

## 2022-12-02 NOTE — PROGRESS NOTE ADULT - PROBLEM SELECTOR PLAN 3
Patient with encephalopathy and hypotension, meeting 3 SIRS on admission for tachycardia, tachypnea, and leukocytosis.  Source possibly pulmonary and urinary.  Lactate 12.1 --> 2.3, UA trace LE, Bacteria present, hyaline casts, C. Diff negative GI PCR negative.  Lactate cleared, S/p Zosyn 3.375 x 3, Vancomycin 1250mg, NaCl 2200cc bolus in ED.   Still unclear source. AAOx4, mentating well since 11/29.  - UCx no growth final  - BCx NGTD  - ULeg negative  - s/p Vanc 1000mg qD and Zosyn 3.375 q8 (11/28-12/1)  Plan:  - d/c abx today given lack of clear source  - WBC continues to uptrend

## 2022-12-02 NOTE — PROGRESS NOTE ADULT - TIME BILLING
Patient seen and examined;  As above;  Discontinue antibiotics  GI consult re diarrhea   Potassium replacement   Reglan prn hiccup

## 2022-12-02 NOTE — PROGRESS NOTE ADULT - PROBLEM SELECTOR PLAN 6
Patient with frequent falls at home, per outpatient nursing notes, patient's HHA have reported falls at home sometimes with head trauma.  He ambulated at baseline with cane.  Etiology likely due to overall decompensation in setting of metastatic cancer. CTH and CT cervical spine without fractures of trauma.    - PT/OT consult - recommending SASHA  - OOBTC daily w/ supervision

## 2022-12-02 NOTE — PROGRESS NOTE ADULT - PROBLEM SELECTOR PLAN 4
Patient with ASHLYN on admission BUN 31, Scr 2.35 -->Scr 2.31 (baseline Scr 1.1 10/22).  ASHLYN likely prerenal in setting of hypovolemia due to diarrhea and poor po intake.  Given prostate mets, there may also be a post-renal component.    - trend Cr, avoid nephrotoxic drugs, renally dose meds  - improving  - mark removed, not retaining  - d/c bladder scans

## 2022-12-02 NOTE — CHART NOTE - NSCHARTNOTEFT_GEN_A_CORE
Admitting Diagnosis:   Patient is a 72y old  Male who presents with a chief complaint of SEPSIS     (29 Nov 2022 13:22)    Current Nutrition Order: puree + Ensure Enlive TID (1050kcal, 60g pro) + Banatrol TID     GI Issues: pt denies n/v/d/c     Pain: pt denies pain     Skin Integrity: edda 15    Labs:   12-02    136  |  108  |  17  ----------------------------<  118<H>  2.9<LL>   |  17<L>  |  1.83<H>    Ca    7.6<L>      02 Dec 2022 08:39  Phos  2.1     12-02  Mg     1.9     12-02    TPro  5.4<L>  /  Alb  2.5<L>  /  TBili  0.3  /  DBili  x   /  AST  15  /  ALT  15  /  AlkPhos  107  12-02      Medications:  MEDICATIONS  (STANDING):  buPROPion XL (24-Hour) . 150 milliGRAM(s) Oral daily  diphenoxylate/atropine 2 Tablet(s) Oral every 6 hours  heparin   Injectable 5000 Unit(s) SubCutaneous every 8 hours  influenza  Vaccine (HIGH DOSE) 0.7 milliLiter(s) IntraMuscular once  levothyroxine 88 MICROGram(s) Oral every 24 hours  metoclopramide 10 milliGRAM(s) Oral once  multivitamin 1 Tablet(s) Oral daily  potassium chloride   Powder 40 milliEquivalent(s) Oral every 4 hours    MEDICATIONS  (PRN):  acetaminophen     Tablet .. 650 milliGRAM(s) Oral every 6 hours PRN Temp greater or equal to 38C (100.4F), Mild Pain (1 - 3)  LORazepam   Injectable 1 milliGRAM(s) IV Push every 2 hours PRN CIWA-Ar score increase by 2 points and a total score of 7 or less      Weight: (11/28) 72kg; Ht 182.9cm; BMI 21.5; IBW 80.9kg; %IBW 89%     Weight Change: no new weights during admission     Estimated energy needs:   ABW used to calculate energy needs due to pt's current body weight within % IBW (89%).    Needs calculated for age and increased d/t cancer and chemo status, moderate malnutrition.   Energy: 2160-2520kcal (30-35cal/kg)  Protein: 101-115g pro (1.4-1.6g/kg pro)  Fluid: 2160-2520ml (30-35ml/kg)    Subjective:   Pt with past medical history notable for metastatic stage IV lung cancer; receiving chemotherapy.  Pt was brought in by [facility] for A[facility]; being managed for sepsis and electrolyte derangement.    Pt with minimal participation in interview at this time. Pt with poor-fair appetite as of late; consuming mostly liquids and Ensure shakes.  Pt remains with diarrhea, however, seems to be improving since yesterday.  Ordered for Banatrol.  K notable at 3.1 likely 2/2 diarrhea; being repleted as necessary.     Previous Nutrition Diagnosis: Malnutrition of moderate degree in the context of chronic illness r/t inability to meet nutritional demands secondary to pt's clinical conditions AEB mild to moderate muscle and subcutaneous fat loss per NFPE, suspected </=75% PO x>1month PTA    Active [x   ]  Resolved [   ]    Goal:   1. Pt to consistently meet at least 75% of EEE via tolerated route   2. Optimize nutrition and hydration status within the goals of care   3. Pt to exhibit no further s/s of malnutrition     Recommendations:  1. Continue with current diet order    >>Ensure Enlive ONS TID (350kcal, 20gPRO per serving), vanilla flavor per pt request  >>Continue Banatrol for diarrhea management  2. Encourage pt to meet nutritional needs as able   3. Monitor PO intakes, trend weights (weekly), monitor skin integrity, monitor labs (electrolytes, CMP), monitor GI fxn   4. MVI supplementation   5. Pain and bowel regimen per team   7. Align nutrition interventions with GOC at all times    Education: n/a    Risk Level: High [   x] Moderate [   ] Low [   ]

## 2022-12-02 NOTE — PROGRESS NOTE ADULT - SUBJECTIVE AND OBJECTIVE BOX
SUBJECTIVE / INTERVAL HPI: No overnight events. Pt seen and examined at bedside. Patient feels well this chris Continues to have diarrhea 3-4 a day. Yesterday larger volume. Deneis abdominal pain. Feels really tired this morning and says he wasn't able to get a lot of sleep last night. I asked about OOBTC, he said maybe because he feels tired. Denies fever/sweats/chills. No chest pain, SOB, cough. No dysuria. Pt w/ poor appetite, is only really drinking fluids.    VITAL SIGNS:  Vital Signs Last 24 Hrs  T(C): 36.9 (02 Dec 2022 09:30), Max: 36.9 (02 Dec 2022 09:30)  T(F): 98.5 (02 Dec 2022 09:30), Max: 98.5 (02 Dec 2022 09:30)  HR: 88 (02 Dec 2022 09:30) (75 - 88)  BP: 117/61 (02 Dec 2022 09:30) (106/55 - 117/61)  BP(mean): --  RR: 18 (02 Dec 2022 09:30) (18 - 18)  SpO2: 97% (02 Dec 2022 09:30) (94% - 97%)    Parameters below as of 02 Dec 2022 09:30  Patient On (Oxygen Delivery Method): room air        PHYSICAL EXAM:  General: Resting comfortably in bed; NAD; dry pm exam  HEENT: NC/AT, EOMI, anicteric sclera, dry MM, neck supple, no nasal discharge, sunken eyes  Cardiac: RRR; normal S1/S2, no MRG, no LE edema  Respiratory: CTAB anteriorly; no wheezes, ronchi, increased work of breathing, retractions  Gastrointestinal: +BSx4, abdomen soft, NT/ND; no rebound or guarding  Extremities: WWP, no clubbing or cyanosis; no peripheral edema  Dermatologic: skin warm, dry and intact; no rashes, open wounds  Neurologic: AAOx3; no focal deficits    MEDICATIONS:  MEDICATIONS  (STANDING):  buPROPion XL (24-Hour) . 150 milliGRAM(s) Oral daily  diphenoxylate/atropine 2 Tablet(s) Oral every 6 hours  heparin   Injectable 5000 Unit(s) SubCutaneous every 8 hours  influenza  Vaccine (HIGH DOSE) 0.7 milliLiter(s) IntraMuscular once  levothyroxine 88 MICROGram(s) Oral every 24 hours  multivitamin 1 Tablet(s) Oral daily  potassium chloride   Powder 40 milliEquivalent(s) Oral every 4 hours  potassium phosphate IVPB 30 milliMole(s) IV Intermittent once    MEDICATIONS  (PRN):  acetaminophen     Tablet .. 650 milliGRAM(s) Oral every 6 hours PRN Temp greater or equal to 38C (100.4F), Mild Pain (1 - 3)  LORazepam   Injectable 1 milliGRAM(s) IV Push every 2 hours PRN CIWA-Ar score increase by 2 points and a total score of 7 or less      ALLERGIES:  Allergies    No Known Allergies    Intolerances        LABS:                        7.9    23.87 )-----------( 344      ( 02 Dec 2022 08:39 )             24.1     12-02    136  |  108  |  17  ----------------------------<  118<H>  2.9<LL>   |  17<L>  |  1.83<H>    Ca    7.6<L>      02 Dec 2022 08:39  Phos  2.1     12-02  Mg     1.9     12-02    TPro  5.4<L>  /  Alb  2.5<L>  /  TBili  0.3  /  DBili  x   /  AST  15  /  ALT  15  /  AlkPhos  107  12-02      Urinalysis Basic - ( 30 Nov 2022 14:27 )    Color: Yellow / Appearance: Clear / SG: >=1.030 / pH: x  Gluc: x / Ketone: NEGATIVE  / Bili: Negative / Urobili: 0.2 E.U./dL   Blood: x / Protein: Trace mg/dL / Nitrite: NEGATIVE   Leuk Esterase: NEGATIVE / RBC: 5-10 /HPF / WBC < 5 /HPF   Sq Epi: x / Non Sq Epi: 0-5 /HPF / Bacteria: Present /HPF      CAPILLARY BLOOD GLUCOSE          RADIOLOGY & ADDITIONAL TESTS: Reviewed.

## 2022-12-03 NOTE — PROGRESS NOTE ADULT - PROBLEM SELECTOR PLAN 3
Patient with encephalopathy and hypotension, meeting 3 SIRS on admission for tachycardia, tachypnea, and leukocytosis.  Source possibly pulmonary and urinary.  Lactate 12.1 --> 2.3, UA trace LE, Bacteria present, hyaline casts, C. Diff negative GI PCR negative.  Lactate cleared, S/p Zosyn 3.375 x 3, Vancomycin 1250mg, NaCl 2200cc bolus in ED.   Still unclear source. AAOx4, mentating well since 11/29.  - UCx no growth final  - BCx NGTD  - ULeg negative  - s/p Vanc 1000mg qD and Zosyn 3.375 q8 (11/28-12/1)  Plan:  - d/c abx today given lack of clear source  - WBC continues to uptrend Patient with encephalopathy and hypotension, meeting 3 SIRS on admission for tachycardia, tachypnea, and leukocytosis.  Source possibly pulmonary and urinary.  Lactate 12.1 --> 2.3, UA trace LE, Bacteria present, hyaline casts, C. Diff negative GI PCR negative.  Lactate cleared, S/p Zosyn 3.375 x 3, Vancomycin 1250mg, NaCl 2200cc bolus in ED.   Still unclear source. AAOx4, mentating well since 11/29.  - UCx no growth final  - BCx NGTD  - ULeg negative  - s/p Vanc 1000mg qD and Zosyn 3.375 q8 (11/28-12/1)  Plan:  - f/u repeat BCx/UA w/ reflex culture  - WBC continues to uptrend

## 2022-12-03 NOTE — PROGRESS NOTE ADULT - ASSESSMENT
per Internal Medicine    72 y o male history of metastatic stage IV lung adenocarcinoma, hypothyroidism, depression, anxiety, undergoing chemo brought in by EMS with HHA for altered mental status Monday morning admitted for sepsis (unclear source) and electrolyte derangements.      Problem/Plan - 1:  ·  Problem: Diarrhea.   ·  Plan: Patient with diarrhea described as loose stool, per outpatient notes seems to be chronic for several weeks (later stated for a year). Patient was prescribed Loperamide early November.  Unclear etiology. May be in setting of metastatic malignancy, however currently considering inflammatory given chronicity and white count. Diarrhea likely not infectious C. diff negative, GI PCR negative. Pt persistently hypokalemic likely from diarrhea. GI consulted on 12/2.  - CTM bowel movements  - banatrol added to diet  - lomotil q6 hrs  - no loperamide due to prolonged QTc 508 (12/1/2022 EKG)  - appreciate GI recs.    Problem/Plan - 2:  ·  Problem: Hypokalemia.   ·  Plan: Patient with hypokalemia, likely due to persistent diarrhea. No EKG changes. On 12/1 K low to 2.1, repeat EKG possible u waves. Pt asymptomatic, no fasiculations or weakness. Aggressively repleted  - K repleted  - repeat BMP at 4PM was 3.9.    Problem/Plan - 3:  ·  Problem: Sepsis.   ·  Plan: Patient with encephalopathy and hypotension, meeting 3 SIRS on admission for tachycardia, tachypnea, and leukocytosis.  Source possibly pulmonary and urinary.  Lactate 12.1 --> 2.3, UA trace LE, Bacteria present, hyaline casts, C. Diff negative GI PCR negative.  Lactate cleared, S/p Zosyn 3.375 x 3, Vancomycin 1250mg, NaCl 2200cc bolus in ED.   Still unclear source. AAOx4, mentating well since 11/29.  - UCx no growth final  - BCx NGTD  - ULeg negative  - s/p Vanc 1000mg qD and Zosyn 3.375 q8 (11/28-12/1)  Plan:  - d/c abx today given lack of clear source  - WBC continues to uptrend.    Problem/Plan - 4:  ·  Problem: ASHLYN (acute kidney injury).   ·  Plan: Patient with ASHLYN on admission BUN 31, Scr 2.35 -->Scr 2.31 (baseline Scr 1.1 10/22).  ASHLYN likely prerenal in setting of hypovolemia due to diarrhea and poor po intake.  Given prostate mets, there may also be a post-renal component.    - trend Cr, avoid nephrotoxic drugs, renally dose meds  - improving  - mark removed, not retaining  - d/c bladder scans.    Problem/Plan - 5:  ·  Problem: Hypothyroidism.   ·  Plan: Patient with history of hypothyroidism, home medications Levothyroxine 75mcg daily. On 12/1 TSH 10.81, free T4 0.67.  - increase synthroid to 88mcg qD on 12/1/22.    Problem/Plan - 6:  ·  Problem: Falls.   ·  Plan: Patient with frequent falls at home, per outpatient nursing notes, patient's HHA have reported falls at home sometimes with head trauma.  He ambulated at baseline with cane.  Etiology likely due to overall decompensation in setting of metastatic cancer. CTH and CT cervical spine without fractures of trauma.    - PT/OT consult - recommending SASHA  - OOBTC daily w/ supervision.    Problem/Plan - 7:  ·  Problem: Adult failure to thrive.   ·  Plan: Patient with underlying metastatic lung adenocarcinoma with chronic diarrhea and frequent falls, overall appears very frail.    - dietitian consulted - rec ensure enlive TID  - PT/OT consult - rec SASHA  - s/p 1L LR bolus on 12/2.    Problem/Plan - 8:  ·  Problem: Hypomagnesemia.   ·  Plan: Patient with hypomagnesemia, likely due to persistent diarrhea.  Likely contributing to overall weakness and falls.  - Replete PRN.    Problem/Plan - 9:  ·  Problem: Hypophosphatemia.   ·  Plan: Patient with hypophosphatemia, likely due to persistent diarrhea.  Likely contributing to overall weakness and falls.  - Replete PRN.    Problem/Plan - 10:  ·  Problem: Adenocarcinoma, lung.   ·  Plan; Patient with recently found (08/22) with RUL spiculated nodule with R paratracheal adenopathy and L adrenal nodule. Concern for metastatic cancer because of rectal lesion on PET scan as well as L adrenal area that lit up. Now, s/p FNA of R axillary node, with pathology consistent with metastatic lung adenocarcinoma.  - hold heme/onc consult for now.    Problem/Plan - 11:  ·  Problem: Anxiety and depression.   ·  Plan: Patient with history of anxiety and depression, home medications Bupropion XL 150mg daily, Gabapentin 300mg TID, Quetiapine 50mg qhs.   - Holding quetiapine and gabapentin in setting of recent encephalopathy and ASHLYN, can restart as clinically appropriate.    Problem/Plan - 12:  ·  Problem: Preventive measure.   ·  Plan: F: 1L LR bolus  E: replete prn  N: pureed diet   GI: none  DVT: Heparin 5K q8hr  Dispo: F.

## 2022-12-03 NOTE — PROGRESS NOTE ADULT - PROBLEM SELECTOR PLAN 1
Patient with diarrhea described as loose stool, per outpatient notes seems to be chronic for several weeks (later stated for a year). Patient was prescribed Loperamide early November.  Unclear etiology. May be in setting of metastatic malignancy, however currently considering inflammatory given chronicity and white count. Diarrhea likely not infectious C. diff negative, GI PCR negative. Pt persistently hypokalemic likely from diarrhea. GI consulted on 12/2.  - CTM bowel movements  - banatrol added to diet  - lomotil q6 hrs  - no loperamide due to prolonged QTc 508 (12/1/2022 EKG)  - appreciate GI recs

## 2022-12-03 NOTE — PROGRESS NOTE ADULT - SUBJECTIVE AND OBJECTIVE BOX
***INCOMPLETE NOTE    SUBJECTIVE / INTERVAL HPI: Patient seen and examined at bedside. No overnight events.    VITAL SIGNS:  Vital Signs Last 24 Hrs  T(C): 36.7 (03 Dec 2022 08:41), Max: 36.9 (03 Dec 2022 04:51)  T(F): 98.1 (03 Dec 2022 08:41), Max: 98.5 (03 Dec 2022 04:51)  HR: 99 (03 Dec 2022 08:41) (82 - 99)  BP: 133/69 (03 Dec 2022 08:41) (123/53 - 153/72)  BP(mean): --  RR: 16 (03 Dec 2022 08:41) (16 - 18)  SpO2: 98% (03 Dec 2022 08:41) (98% - 100%)    Parameters below as of 03 Dec 2022 08:41  Patient On (Oxygen Delivery Method): room air        PHYSICAL EXAM:    General: Resting comfortably in bed; NAD  HEENT: NC/AT, EOMI, anicteric sclera, MMM, neck supple, no nasal discharge  Cardiac: RRR; normal S1/S2, no MRG, no LE edema, no JVD  Respiratory: CTAB; no wheezes, ronchi, increased work of breathing, retractions  Gastrointestinal: +BSx4, abdomen soft, NT/ND; no rebound or guarding  Genitourinary: no suprapubic tenderness; mark*; normal external genitalia  Extremities: WWP, no clubbing or cyanosis; no peripheral edema  Vascular: 2+ radial and DP pulses bilaterally  Dermatologic: skin warm, dry and intact; no rashes, open wounds  Neurologic: AAOx3; no focal deficits  Psychiatric: affect and characteristics of appearance, verbalizations, behaviors are appropriate    MEDICATIONS:  MEDICATIONS  (STANDING):  buPROPion XL (24-Hour) . 150 milliGRAM(s) Oral daily  diphenoxylate/atropine 2 Tablet(s) Oral every 6 hours  heparin   Injectable 5000 Unit(s) SubCutaneous every 8 hours  influenza  Vaccine (HIGH DOSE) 0.7 milliLiter(s) IntraMuscular once  levothyroxine 88 MICROGram(s) Oral every 24 hours  multivitamin 1 Tablet(s) Oral daily    MEDICATIONS  (PRN):  acetaminophen     Tablet .. 650 milliGRAM(s) Oral every 6 hours PRN Temp greater or equal to 38C (100.4F), Mild Pain (1 - 3)  LORazepam   Injectable 1 milliGRAM(s) IV Push every 2 hours PRN CIWA-Ar score increase by 2 points and a total score of 7 or less      ALLERGIES:  Allergies    No Known Allergies    Intolerances        LABS:                        7.8    23.69 )-----------( 369      ( 03 Dec 2022 05:30 )             24.5     12-03    140  |  110<H>  |  15  ----------------------------<  124<H>  3.5   |  17<L>  |  1.88<H>    Ca    7.9<L>      03 Dec 2022 05:30  Phos  2.8     12-03  Mg     1.8     12-03    TPro  5.4<L>  /  Alb  2.5<L>  /  TBili  0.3  /  DBili  x   /  AST  16  /  ALT  12  /  AlkPhos  135<H>  12-03        CAPILLARY BLOOD GLUCOSE          RADIOLOGY & ADDITIONAL TESTS: Reviewed.   SUBJECTIVE / INTERVAL HPI: Patient seen and examined at bedside. No overnight events. Feeling so so thismorning. Has not been able to sleep well. States he has medicine to help him sleep usually. No fevers, chills, chest pain, shortness of breath.    VITAL SIGNS:  Vital Signs Last 24 Hrs  T(C): 36.7 (03 Dec 2022 08:41), Max: 36.9 (03 Dec 2022 04:51)  T(F): 98.1 (03 Dec 2022 08:41), Max: 98.5 (03 Dec 2022 04:51)  HR: 99 (03 Dec 2022 08:41) (82 - 99)  BP: 133/69 (03 Dec 2022 08:41) (123/53 - 153/72)  BP(mean): --  RR: 16 (03 Dec 2022 08:41) (16 - 18)  SpO2: 98% (03 Dec 2022 08:41) (98% - 100%)    Parameters below as of 03 Dec 2022 08:41  Patient On (Oxygen Delivery Method): room air        PHYSICAL EXAM:    General: Resting comfortably in bed; NAD; dry on exam  HEENT: NC/AT, EOMI, anicteric sclera, dry MM, neck supple, no nasal discharge, sunken eyes  Cardiac: RRR; normal S1/S2, no MRG, no LE edema  Respiratory: CTAB anteriorly; no wheezes, ronchi, increased work of breathing, retractions  Gastrointestinal: +BSx4, abdomen soft, NT/ND; no rebound or guarding  Extremities: WWP, no clubbing or cyanosis; no peripheral edema  Dermatologic: skin warm, dry and intact; no rashes, open wounds  Neurologic: AAOx3; no focal deficits    MEDICATIONS:  MEDICATIONS  (STANDING):  buPROPion XL (24-Hour) . 150 milliGRAM(s) Oral daily  diphenoxylate/atropine 2 Tablet(s) Oral every 6 hours  heparin   Injectable 5000 Unit(s) SubCutaneous every 8 hours  influenza  Vaccine (HIGH DOSE) 0.7 milliLiter(s) IntraMuscular once  levothyroxine 88 MICROGram(s) Oral every 24 hours  multivitamin 1 Tablet(s) Oral daily    MEDICATIONS  (PRN):  acetaminophen     Tablet .. 650 milliGRAM(s) Oral every 6 hours PRN Temp greater or equal to 38C (100.4F), Mild Pain (1 - 3)  LORazepam   Injectable 1 milliGRAM(s) IV Push every 2 hours PRN CIWA-Ar score increase by 2 points and a total score of 7 or less      ALLERGIES:  Allergies    No Known Allergies    Intolerances        LABS:                        7.8    23.69 )-----------( 369      ( 03 Dec 2022 05:30 )             24.5     12-03    140  |  110<H>  |  15  ----------------------------<  124<H>  3.5   |  17<L>  |  1.88<H>    Ca    7.9<L>      03 Dec 2022 05:30  Phos  2.8     12-03  Mg     1.8     12-03    TPro  5.4<L>  /  Alb  2.5<L>  /  TBili  0.3  /  DBili  x   /  AST  16  /  ALT  12  /  AlkPhos  135<H>  12-03        CAPILLARY BLOOD GLUCOSE          RADIOLOGY & ADDITIONAL TESTS: Reviewed.

## 2022-12-03 NOTE — PROGRESS NOTE ADULT - PROBLEM SELECTOR PLAN 2
Patient with hypokalemia, likely due to persistent diarrhea. No EKG changes. On 12/1 K low to 2.1, repeat EKG possible u waves. Pt asymptomatic, no fasiculations or weakness. Aggressively repleted  - K repleted  - repeat BMP at 4PM was 3.9

## 2022-12-03 NOTE — PROGRESS NOTE ADULT - ASSESSMENT
71 y/o male history of metastatic stage IV lung adenocarcinoma, hypothyroidism, depression, anxiety, undergoing chemo brought in by EMS with HHA for altered mental status Monday morning admitted for sepsis (unclear source) and electrolyte derangements.

## 2022-12-03 NOTE — PROGRESS NOTE ADULT - SUBJECTIVE AND OBJECTIVE BOX
Interval Events: Reviewed  Patient seen and examined at bedside.    Patient is a 72y old  Male who presents with a chief complaint of SEPSIS  no acute event overnight, RA 99%      PAST MEDICAL & SURGICAL HISTORY:      MEDICATIONS:  Pulmonary:    Antimicrobials:    Anticoagulants:  heparin   Injectable 5000 Unit(s) SubCutaneous every 8 hours    Cardiac:      Allergies    No Known Allergies    Intolerances        Vital Signs Last 24 Hrs  T(C): 36.9 (03 Dec 2022 04:51), Max: 36.9 (02 Dec 2022 09:30)  T(F): 98.5 (03 Dec 2022 04:51), Max: 98.5 (02 Dec 2022 09:30)  HR: 98 (03 Dec 2022 04:51) (82 - 98)  BP: 138/84 (03 Dec 2022 04:51) (115/64 - 153/72)  BP(mean): --  RR: 18 (03 Dec 2022 04:51) (17 - 18)  SpO2: 99% (03 Dec 2022 04:51) (94% - 100%)    Parameters below as of 03 Dec 2022 04:51  Patient On (Oxygen Delivery Method): room air            Review of Systems:   •	General: negative  •	Skin/Breast: negative  •	Ophthalmologic: negative  •	ENMT: negative  •	Respiratory and Thorax: negative  •	Cardiovascular: negative  •	Gastrointestinal: negative  •	Genitourinary: negative  •	Musculoskeletal: negative  •	Neurological: negative  •	Psychiatric: negative  •	Hematology/Lymphatics: negative  •	Endocrine: negative  •	Allergic/Immunologic: negative    Physical Exam:   • Constitutional:	elderly female, NAD  • Eyes:	EOMI; PERRL; no drainage or redness  • ENMT:	No oral lesions; no gross abnormalities  • Neck	no thyromegaly or nodules  • Breasts:	not examined  • Back:	No deformity or limitation of movement  • Respiratory:	Breath Sounds equal & clear to auscultation, no accessory muscle use  • Cardiovascular:	Regular rate & rhythm, normal S1, S2; no murmurs, gallops or rubs; no S3, S4  • Gastrointestinal:	Soft, non-tender, no hepatosplenomegaly, normal bowel sounds  • Genitourinary:	not examined  • Rectal: not examined  • Extremities:	No cyanosis, clubbing or edema  • Vascular:	Equal and normal pulses (dorsalis pedis)  • Neurologica:l	not examined  • Skin:	No lesions; no rash  • Lymph Nodes:	No lymphadedenopathy  • Musculoskeletal:	No joint pain, swelling or deformity; no limitation of movement        LABS:      CBC Full  -  ( 02 Dec 2022 08:39 )  WBC Count : 23.87 K/uL  RBC Count : 3.11 M/uL  Hemoglobin : 7.9 g/dL  Hematocrit : 24.1 %  Platelet Count - Automated : 344 K/uL  Mean Cell Volume : 77.5 fl  Mean Cell Hemoglobin : 25.4 pg  Mean Cell Hemoglobin Concentration : 32.8 gm/dL  Auto Neutrophil # : 22.01 K/uL  Auto Lymphocyte # : 1.46 K/uL  Auto Monocyte # : 0.41 K/uL  Auto Eosinophil # : 0.00 K/uL  Auto Basophil # : 0.00 K/uL  Auto Neutrophil % : 92.2 %  Auto Lymphocyte % : 6.1 %  Auto Monocyte % : 1.7 %  Auto Eosinophil % : 0.0 %  Auto Basophil % : 0.0 %    12-02    139  |  108  |  16  ----------------------------<  124<H>  3.9   |  17<L>  |  1.87<H>    Ca    7.7<L>      02 Dec 2022 17:37  Phos  2.1     12-02  Mg     1.9     12-02    TPro  5.4<L>  /  Alb  2.5<L>  /  TBili  0.3  /  DBili  x   /  AST  15  /  ALT  15  /  AlkPhos  107  12-02                        RADIOLOGY & ADDITIONAL STUDIES (The following images were personally reviewed):  Galvin:                                     No  Urine output:                       adequate  DVT prophylaxis:                 Yes  Flattus:                                  Yes  Bowel movement:              No

## 2022-12-03 NOTE — PROGRESS NOTE ADULT - SUBJECTIVE AND OBJECTIVE BOX
Physical Medicine and Rehabilitation Progress Note :       Patient is a 72y old  Male who presents with a chief complaint of AMS (03 Dec 2022 06:43)      HPI:  71 y/o male history of metastatic stage IV lung adenocarcinoma, hypothyroidism, depression, anxiety, undergoing chemo brought in by EMS with HHA for altered mental status Monday morning. At baseline patient is A&Ox3, per HHA he was AOx1, not responding to questions or commands. Patient was reported to be hypotensive to SBP 50s in the field, IO was placed and given fluids with improvement in BP. Patient lives along, has HHA 2 days per week for 10 hours, per ED note HHA states she spoke to pt 3 days ago and foung him in his bed Monday morning at which time he was confused. Currently being treated for lung cancer, last chemo 1 month ago.  Patient also has had recurrent falls at home, some associated with head trauma but no loc, syncope, bladder/bowel incontinence.  Otherwise no reported illness, sick contacts, medical changes.  ROS otherwise negative.      ED Course   Vitals: Temp 98.7,  --> 86, /63, RR 20, SaO2 98% on 4L NC --> 97% room air   Labs: WBC 16.24, Hgb 11.7, Plt 449, Na 143, K 3.2, CO2 18, AG 24, BUN 31, Scr 2.35, Ca 7.6, Ma 1.4, Lactate 12.1 --> 2.3, UA trace LE, Bacteria present, hyaline casts, C. Diff negative GI PCR negative  EKG: sinus tachycardia, 1st degree AV block, narrow QRS, prolonged QTC  CXR: Known right upper lobe pulmonary mass   CT chest, abdomen, pelvis: No acute fractures. Unchanged right upper lobe malignancy. Decreased left adrenal metastasis. Decreased abdominal and pelvic adenopathy.  CTH: No intracranial hemorrhage or acute transcortical infarct.  Generalized volume loss with small vessel ischemic disease.  CT C-spine: No fracture. Levoscoliosis with Grade 1 anterolisthesis of C2 on C3 and C7 on T1. Multilevel cervical spondylosis. Right apical lung spiculated mass.  Interventions: Tylenol ig IV, Ca Gluconate 2g, MgSO4 2g x 2, KCl 40mg po x 1, KCl 10mg IV x 5, Zosyn 3.375 x 3, Vancomycin 1250mg, NaCl 2200cc bolus   (28 Nov 2022 21:33)                            7.8    23.69 )-----------( 369      ( 03 Dec 2022 05:30 )             24.5       12-03    140  |  110<H>  |  15  ----------------------------<  124<H>  3.5   |  17<L>  |  1.88<H>    Ca    7.9<L>      03 Dec 2022 05:30  Phos  2.8     12-03  Mg     1.8     12-03    TPro  5.4<L>  /  Alb  2.5<L>  /  TBili  0.3  /  DBili  x   /  AST  16  /  ALT  12  /  AlkPhos  135<H>  12-03    Vital Signs Last 24 Hrs  T(C): 36.7 (03 Dec 2022 08:41), Max: 36.9 (03 Dec 2022 04:51)  T(F): 98.1 (03 Dec 2022 08:41), Max: 98.5 (03 Dec 2022 04:51)  HR: 99 (03 Dec 2022 08:41) (82 - 99)  BP: 133/69 (03 Dec 2022 08:41) (123/53 - 153/72)  BP(mean): --  RR: 16 (03 Dec 2022 08:41) (16 - 18)  SpO2: 98% (03 Dec 2022 08:41) (98% - 100%)    Parameters below as of 03 Dec 2022 08:41  Patient On (Oxygen Delivery Method): room air        MEDICATIONS  (STANDING):  buPROPion XL (24-Hour) . 150 milliGRAM(s) Oral daily  diphenoxylate/atropine 2 Tablet(s) Oral every 6 hours  heparin   Injectable 5000 Unit(s) SubCutaneous every 8 hours  influenza  Vaccine (HIGH DOSE) 0.7 milliLiter(s) IntraMuscular once  levothyroxine 88 MICROGram(s) Oral every 24 hours  multivitamin 1 Tablet(s) Oral daily    MEDICATIONS  (PRN):  acetaminophen     Tablet .. 650 milliGRAM(s) Oral every 6 hours PRN Temp greater or equal to 38C (100.4F), Mild Pain (1 - 3)  LORazepam   Injectable 1 milliGRAM(s) IV Push every 2 hours PRN CIWA-Ar score increase by 2 points and a total score of 7 or less       Physical / Occupational Therapy Functional Status Assessment :   12/2/2022     Cognitive/Neuro/Behavioral  Cognitive/Neuro/Behavioral [WDL Definition: Alert; opens eyes spontaneously; arouses to voice or touch; oriented x 4; follows commands; speech spontaneous, logical; purposeful motor response; behavior appropriate to situation]: WDL    Language Assistance  Preferred Language to Address Healthcare Preferred Language to Address Healthcare: English    Therapeutic Interventions      Bed Mobility  Bed Mobility Training Rolling/Turning: contact guard  Bed Mobility Training Scooting: contact guard  Bed Mobility Training Bridging: contact guard  Bed Mobility Training Sit-to-Supine: contact guard  Bed Mobility Training Supine-to-Sit: contact guard  Bed Mobility Training Limitations: decreased ability to use legs for bridging/pushing;  impaired ability to control trunk for mobility;  decreased strength;  impaired postural control    Sit-Stand Transfer Training  Transfer Training Sit-to-Stand Transfer: rolling walker;  1 person assist;  minimum assist (75% patient effort)  Transfer Training Stand-to-Sit Transfer: minimum assist (75% patient effort);  rolling walker  Sit-to-Stand Transfer Training Transfer Safety Analysis: decreased weight-shifting ability;  decreased sequencing ability;  decreased balance;  impaired balance;  decreased strength;  impaired postural control    Gait Training  Gait Training: contact guard;  rolling walker;  6 side steps  Gait Analysis: 3-point gait   decreased vanessa;  increased time in double stance;  decreased step length;  decreased toe clearance;  decreased weight-shifting ability;  Pt slightly unsteady w/ dec weight shifting abilities. Dec step length and vanessa noted. Adequate BUE strength to manevuer RW for goal of side steps. Dec endurance, tolerating only minimal side steps however no SOB/coughs or acute distress noted.;  decreased strength;  impaired balance;  impaired postural control    Therapeutic Exercise  Therapeutic Exercise Detail: Supine ankle pumps, supine knee flex/ext, supine bridges, Seated LAQ      Therapeutic Exercise  Therapeutic Exercise Detail: ther ex sitting EOB: bilateral shoulder flexion x10 reps, requiring Supervision to maintain upright posture throughout.     Lower Body Dressing Training  Lower Body Dressing Training Assistance: moderate assist (50% patient effort);  1 person assist;  verbal cues;  doff/don bilateral socks sitting EOB;  decreased strength;  impaired balance;  impaired postural control;  decreased flexibility            PM&R Impression : as above    Current Disposition Plan Recommendations :    subacute rehab placement

## 2022-12-04 NOTE — PROGRESS NOTE ADULT - PROBLEM SELECTOR PLAN 2
Patient with hypokalemia, likely due to persistent diarrhea. No EKG changes. On 12/1 K low to 2.1, repeat EKG possible u waves. Pt asymptomatic, no fasiculations or weakness. Aggressively repleted  - K repleted  - f/u bmp

## 2022-12-04 NOTE — PROGRESS NOTE ADULT - SUBJECTIVE AND OBJECTIVE BOX
Interval Events: Reviewed  Patient seen and examined at bedside.    Patient is a 72y old  Male who presents with a chief complaint of AMS (03 Dec 2022 06:43)  no acute event overnight, RA 99%      PAST MEDICAL & SURGICAL HISTORY:      MEDICATIONS:  Pulmonary:    Antimicrobials:    Anticoagulants:  heparin   Injectable 5000 Unit(s) SubCutaneous every 8 hours    Cardiac:      Allergies    No Known Allergies    Intolerances        Vital Signs Last 24 Hrs  T(C): 36.7 (04 Dec 2022 05:34), Max: 36.9 (03 Dec 2022 15:54)  T(F): 98.1 (04 Dec 2022 05:34), Max: 98.5 (03 Dec 2022 15:54)  HR: 98 (04 Dec 2022 05:34) (92 - 99)  BP: 140/68 (04 Dec 2022 05:34) (115/56 - 142/68)  BP(mean): --  RR: 18 (04 Dec 2022 05:34) (16 - 18)  SpO2: 98% (04 Dec 2022 05:34) (98% - 100%)    Parameters below as of 04 Dec 2022 05:34  Patient On (Oxygen Delivery Method): room air        12-03 @ 07:01  -  12-04 @ 06:41  --------------------------------------------------------  IN: 1000 mL / OUT: 0 mL / NET: 1000 mL          Review of Systems:   •	General: negative  •	Skin/Breast: negative  •	Ophthalmologic: negative  •	ENMT: negative  •	Respiratory and Thorax: negative  •	Cardiovascular: negative  •	Gastrointestinal: negative  •	Genitourinary: negative  •	Musculoskeletal: negative  •	Neurological: negative  •	Psychiatric: negative  •	Hematology/Lymphatics: negative  •	Endocrine: negative  •	Allergic/Immunologic: negative    Physical Exam:   • Constitutional:	elderly, frail, thin male, NAD  • Eyes:	EOMI; PERRL; no drainage or redness  • ENMT:	No oral lesions; no gross abnormalities  • Neck	no thyromegaly or nodules  • Breasts:	not examined  • Back:	No deformity or limitation of movement  • Respiratory:	Breath Sounds equal & clear to auscultation, no accessory muscle use  • Cardiovascular:	Regular rate & rhythm, normal S1, S2; no murmurs, gallops or rubs; no S3, S4  • Gastrointestinal:	Soft, non-tender, no hepatosplenomegaly, normal bowel sounds  • Genitourinary:	not examined  • Rectal: not examined  • Extremities:	No cyanosis, clubbing or edema  • Vascular:	Equal and normal pulses (dorsalis pedis)  • Neurologica:l	not examined  • Skin:	No lesions; no rash  • Lymph Nodes:	No lymphadedenopathy  • Musculoskeletal:	No joint pain, swelling or deformity; no limitation of movement        LABS:      CBC Full  -  ( 03 Dec 2022 05:30 )  WBC Count : 23.69 K/uL  RBC Count : 3.04 M/uL  Hemoglobin : 7.8 g/dL  Hematocrit : 24.5 %  Platelet Count - Automated : 369 K/uL  Mean Cell Volume : 80.6 fl  Mean Cell Hemoglobin : 25.7 pg  Mean Cell Hemoglobin Concentration : 31.8 gm/dL  Auto Neutrophil # : 20.40 K/uL  Auto Lymphocyte # : 2.68 K/uL  Auto Monocyte # : 0.62 K/uL  Auto Eosinophil # : 0.00 K/uL  Auto Basophil # : 0.00 K/uL  Auto Neutrophil % : 85.2 %  Auto Lymphocyte % : 11.3 %  Auto Monocyte % : 2.6 %  Auto Eosinophil % : 0.0 %  Auto Basophil % : 0.0 %    12-03    140  |  110<H>  |  15  ----------------------------<  124<H>  3.5   |  17<L>  |  1.88<H>    Ca    7.9<L>      03 Dec 2022 05:30  Phos  2.8     12-03  Mg     1.8     12-03    TPro  5.4<L>  /  Alb  2.5<L>  /  TBili  0.3  /  DBili  x   /  AST  16  /  ALT  12  /  AlkPhos  135<H>  12-03          Urinalysis Basic - ( 03 Dec 2022 16:09 )    Color: Yellow / Appearance: Cloudy / SG: >=1.030 / pH: x  Gluc: x / Ketone: Trace mg/dL  / Bili: Small / Urobili: 0.2 E.U./dL   Blood: x / Protein: 30 mg/dL / Nitrite: NEGATIVE   Leuk Esterase: Trace / RBC: 5-10 /HPF / WBC 5-10 /HPF   Sq Epi: x / Non Sq Epi: 0-5 /HPF / Bacteria: Present /HPF                  RADIOLOGY & ADDITIONAL STUDIES (The following images were personally reviewed):  Galvin:                                     No  Urine output:                       adequate  DVT prophylaxis:                 Yes  Flattus:                                  Yes  Bowel movement:              No

## 2022-12-05 NOTE — PROGRESS NOTE ADULT - PROBLEM SELECTOR PLAN 1
Pt with anemia on admission to 9.1. On 12/5, Hb 5.7. Pt asx, mild tachycardia, but normal BP. Pt w/ multiple dark green bowel movements, no overt melena observed. No hemoptysis, no hematuria. Significant bruising on R flank, c/f retroperitoneal bleed but r/o on imaging. Possible GI bleed, GI consulted. Heme onc consulted.  - CTAP w/o contrast 12/5/22: No retroperitoneal or extraperitoneal hemorrhageSevere ileus.    Plan:  - 1 unit pRBCs  - repeat CBC post transfusion, possible additional unit  - monitor vitals  - f/u LDH, Haptoglobin, Reticulocyte count, iron studies, ferritin, folate, B12  - f/u GI recs  - f/u heme onc recs

## 2022-12-05 NOTE — CHART NOTE - NSCHARTNOTEFT_GEN_A_CORE
PALLIATIVE MEDICINE COORDINATION OF CARE NOTE FOR [facility]  [  ] ED Trigger   [  ] MICU Trigger     [x  ] Consult    Patient last assessed: ___12/5/22__  to manage: GOC/AD, Symptoms, and Support was recommended:__12/5/22____    ___40___ Minutes; Start: __1000___  End: _1040___, of non-face-to-face prolonged service provided that relates to (face-to-face) care that has or will occur and ongoing patient management, including one or more of the following:   - Reviewed records from other physicians or other health care professional services, including one or more of the following: other medical records and diagnostic / radiology study results     HPI:  73 y/o male history of metastatic stage IV lung adenocarcinoma, hypothyroidism, depression, anxiety, undergoing chemo brought in by EMS with HHA for altered mental status Monday morning. At baseline patient is A&Ox3, per HHA he was AOx1, not responding to questions or commands. Patient was reported to be hypotensive to SBP 50s in the field, IO was placed and given fluids with improvement in BP. Patient lives along, has HHA 2 days per week for 10 hours, per ED note HHA states she spoke to pt 3 days ago and foung him in his bed Monday morning at which time he was confused. Currently being treated for lung cancer, last chemo 1 month ago.  Patient also has had recurrent falls at home, some associated with head trauma but no loc, syncope, bladder/bowel incontinence.  Otherwise no reported illness, sick contacts, medical changes.  ROS otherwise negative.      ED Course   Vitals: Temp 98.7,  --> 86, /63, RR 20, SaO2 98% on 4L NC --> 97% room air   Labs: WBC 16.24, Hgb 11.7, Plt 449, Na 143, K 3.2, CO2 18, AG 24, BUN 31, Scr 2.35, Ca 7.6, Ma 1.4, Lactate 12.1 --> 2.3, UA trace LE, Bacteria present, hyaline casts, C. Diff negative GI PCR negative  EKG: sinus tachycardia, 1st degree AV block, narrow QRS, prolonged QTC  CXR: Known right upper lobe pulmonary mass   CT chest, abdomen, pelvis: No acute fractures. Unchanged right upper lobe malignancy. Decreased left adrenal metastasis. Decreased abdominal and pelvic adenopathy.  CTH: No intracranial hemorrhage or acute transcortical infarct.  Generalized volume loss with small vessel ischemic disease.  CT C-spine: No fracture. Levoscoliosis with Grade 1 anterolisthesis of C2 on C3 and C7 on T1. Multilevel cervical spondylosis. Right apical lung spiculated mass.  Interventions: Tylenol ig IV, Ca Gluconate 2g, MgSO4 2g x 2, KCl 40mg po x 1, KCl 10mg IV x 5, Zosyn 3.375 x 3, Vancomycin 1250mg, NaCl 2200cc bolus   (28 Nov 2022 21:33)      - Other: iStop reviewed.    - Other: Medication reviewed.    The patient HAS NOT used PRN's in the last 24h.    MEDICATIONS  (STANDING):  atovaquone  Suspension 1500 milliGRAM(s) Oral daily  bacitracin   Ointment 1 Application(s) Topical daily  buPROPion XL (24-Hour) . 150 milliGRAM(s) Oral every 24 hours  dextrose 5% + lactated ringers. 1200 milliLiter(s) (100 mL/Hr) IV Continuous <Continuous>  heparin   Injectable 5000 Unit(s) SubCutaneous every 8 hours  levothyroxine 88 MICROGram(s) Oral daily  magnesium sulfate  IVPB 2 Gram(s) IV Intermittent once  multivitamin 1 Tablet(s) Oral daily  pantoprazole   Suspension 40 milliGRAM(s) Oral two times a day  potassium phosphate / sodium phosphate Powder (PHOS-NaK) 1 Packet(s) Oral every 2 hours  predniSONE   Tablet 60 milliGRAM(s) Oral daily  sucralfate suspension 1 Gram(s) Oral every 6 hours    MEDICATIONS  (PRN):  acetaminophen     Tablet .. 650 milliGRAM(s) Oral every 6 hours PRN Moderate Pain (4 - 6)  trimethobenzamide Injectable 200 milliGRAM(s) IntraMuscular every 12 hours PRN Nausea and/or Vomiting      - Other: Advanced directives     Full Code     No documented MOLST form found on [facility]     No documented HCP form found on [facility]     No Living will / POA / Advance directives found on [facility] / [facility].     No documented GOC notes on [facility]    - Other: Coordination/Plan of care     _2___ admissions in 1 year     Current admission LOS: _7__ days     LACE score: ___15_ ADVANCE ILLNESS PATIENT.     Patient NOT previously seen by palliative medicine consult service.      Discussed with  Consult request for: " Stage IV Lung Cancer  "    Patient is an Advanced Illness patient hence the primary team will need to complete GOC document of any prior GOC discussions that were done during this admission so far as well as information available for Proxy/surrogates/NOK/guardians prior to a palliative contact.    Full consult to follow within 24h.

## 2022-12-05 NOTE — PROGRESS NOTE ADULT - SUBJECTIVE AND OBJECTIVE BOX
Hematology Oncology Progress Note      HPI:  73 y/o male history of metastatic stage IV lung adenocarcinoma, hypothyroidism, depression, anxiety, undergoing chemo brought in by EMS with HHA for altered mental status Monday morning. At baseline patient is A&Ox3, per HHA he was AOx1, not responding to questions or commands. Patient was reported to be hypotensive to SBP 50s in the field, IO was placed and given fluids with improvement in BP. Patient lives along, has HHA 2 days per week for 10 hours, per ED note HHA states she spoke to pt 3 days ago and foung him in his bed Monday morning at which time he was confused. Currently being treated for lung cancer, last chemo 1 month ago.  Patient also has had recurrent falls at home, some associated with head trauma but no loc, syncope, bladder/bowel incontinence.  Otherwise no reported illness, sick contacts, medical changes.  ROS otherwise negative.      ED Course   Vitals: Temp 98.7,  --> 86, /63, RR 20, SaO2 98% on 4L NC --> 97% room air   Labs: WBC 16.24, Hgb 11.7, Plt 449, Na 143, K 3.2, CO2 18, AG 24, BUN 31, Scr 2.35, Ca 7.6, Ma 1.4, Lactate 12.1 --> 2.3, UA trace LE, Bacteria present, hyaline casts, C. Diff negative GI PCR negative  EKG: sinus tachycardia, 1st degree AV block, narrow QRS, prolonged QTC  CXR: Known right upper lobe pulmonary mass   CT chest, abdomen, pelvis: No acute fractures. Unchanged right upper lobe malignancy. Decreased left adrenal metastasis. Decreased abdominal and pelvic adenopathy.  CTH: No intracranial hemorrhage or acute transcortical infarct.  Generalized volume loss with small vessel ischemic disease.  CT C-spine: No fracture. Levoscoliosis with Grade 1 anterolisthesis of C2 on C3 and C7 on T1. Multilevel cervical spondylosis. Right apical lung spiculated mass.  Interventions: Tylenol ig IV, Ca Gluconate 2g, MgSO4 2g x 2, KCl 40mg po x 1, KCl 10mg IV x 5, Zosyn 3.375 x 3, Vancomycin 1250mg, NaCl 2200cc bolus   (28 Nov 2022 21:33)      Interval History: Course complicated by persistent leukocytosis and worsening renal function.    SUBJECTIVE: Patient seen and examined at bedside.    OBJECTIVE:    VITAL SIGNS:  ICU Vital Signs Last 24 Hrs  T(C): 36.7 (05 Dec 2022 05:58), Max: 36.7 (04 Dec 2022 21:13)  T(F): 98 (05 Dec 2022 05:58), Max: 98 (04 Dec 2022 21:13)  HR: 99 (05 Dec 2022 05:58) (99 - 106)  BP: 132/78 (05 Dec 2022 05:58) (116/67 - 132/78)  BP(mean): --  ABP: --  ABP(mean): --  RR: 18 (05 Dec 2022 05:58) (18 - 18)  SpO2: 97% (05 Dec 2022 05:58) (97% - 99%)    O2 Parameters below as of 05 Dec 2022 05:58  Patient On (Oxygen Delivery Method): room air              CAPILLARY BLOOD GLUCOSE          PHYSICAL EXAM:  General: elderly, thin, NAD, answering questions, pleasantly conversant  HEENT: NC/AT; PERRL, clear conjunctiva  Neck: supple  Respiratory: CTA b/l  Cardiovascular: +S1/S2; RRR  Abdomen: soft, NT/ND; +BS x4  Extremities: WWP, 2+ peripheral pulses b/l; no LE edema  Skin: normal color and turgor; no rash  Neurological: AAOx3    MEDICATIONS:  MEDICATIONS  (STANDING):  baclofen 2.5 milliGRAM(s) Oral every 8 hours  buPROPion XL (24-Hour) . 150 milliGRAM(s) Oral daily  diphenoxylate/atropine 2 Tablet(s) Oral every 6 hours  levothyroxine 88 MICROGram(s) Oral every 24 hours  multivitamin 1 Tablet(s) Oral daily  potassium chloride    Tablet ER 40 milliEquivalent(s) Oral once  QUEtiapine 200 milliGRAM(s) Oral at bedtime    MEDICATIONS  (PRN):  acetaminophen     Tablet .. 650 milliGRAM(s) Oral every 6 hours PRN Temp greater or equal to 38C (100.4F), Mild Pain (1 - 3)  trimethobenzamide Injectable 200 milliGRAM(s) IntraMuscular every 12 hours PRN Nausea and/or Vomiting      ALLERGIES:  Allergies    No Known Allergies    Intolerances        LABS:                        5.7    19.07 )-----------( 403      ( 05 Dec 2022 05:30 )             17.3     12-05    140  |  107  |  28<H>  ----------------------------<  116<H>  3.6   |  17<L>  |  3.48<H>    Ca    7.8<L>      05 Dec 2022 05:30  Phos  2.8     12-05  Mg     2.2     12-05    TPro  5.6<L>  /  Alb  2.4<L>  /  TBili  0.2  /  DBili  x   /  AST  23  /  ALT  9<L>  /  AlkPhos  153<H>  12-05      Urinalysis Basic - ( 03 Dec 2022 16:09 )    Color: Yellow / Appearance: Cloudy / SG: >=1.030 / pH: x  Gluc: x / Ketone: Trace mg/dL  / Bili: Small / Urobili: 0.2 E.U./dL   Blood: x / Protein: 30 mg/dL / Nitrite: NEGATIVE   Leuk Esterase: Trace / RBC: 5-10 /HPF / WBC 5-10 /HPF   Sq Epi: x / Non Sq Epi: 0-5 /HPF / Bacteria: Present /HPF            Culture - Blood (collected 12-04-22 @ 01:01)  Source: .Blood Blood  Preliminary Report (12-05-22 @ 02:01):    No growth at 1 day.            RADIOLOGY & ADDITIONAL TESTS: Reviewed.   Hematology Oncology Progress Note      HPI:  71 y/o male history of metastatic stage IV lung adenocarcinoma, hypothyroidism, depression, anxiety, undergoing chemo brought in by EMS with HHA for altered mental status Monday morning. At baseline patient is A&Ox3, per HHA he was AOx1, not responding to questions or commands. Patient was reported to be hypotensive to SBP 50s in the field, IO was placed and given fluids with improvement in BP. Patient lives along, has HHA 2 days per week for 10 hours, per ED note HHA states she spoke to pt 3 days ago and foung him in his bed Monday morning at which time he was confused. Currently being treated for lung cancer, last chemo 1 month ago.  Patient also has had recurrent falls at home, some associated with head trauma but no loc, syncope, bladder/bowel incontinence.  Otherwise no reported illness, sick contacts, medical changes.  ROS otherwise negative.      ED Course   Vitals: Temp 98.7,  --> 86, /63, RR 20, SaO2 98% on 4L NC --> 97% room air   Labs: WBC 16.24, Hgb 11.7, Plt 449, Na 143, K 3.2, CO2 18, AG 24, BUN 31, Scr 2.35, Ca 7.6, Ma 1.4, Lactate 12.1 --> 2.3, UA trace LE, Bacteria present, hyaline casts, C. Diff negative GI PCR negative  EKG: sinus tachycardia, 1st degree AV block, narrow QRS, prolonged QTC  CXR: Known right upper lobe pulmonary mass   CT chest, abdomen, pelvis: No acute fractures. Unchanged right upper lobe malignancy. Decreased left adrenal metastasis. Decreased abdominal and pelvic adenopathy.  CTH: No intracranial hemorrhage or acute transcortical infarct.  Generalized volume loss with small vessel ischemic disease.  CT C-spine: No fracture. Levoscoliosis with Grade 1 anterolisthesis of C2 on C3 and C7 on T1. Multilevel cervical spondylosis. Right apical lung spiculated mass.  Interventions: Tylenol ig IV, Ca Gluconate 2g, MgSO4 2g x 2, KCl 40mg po x 1, KCl 10mg IV x 5, Zosyn 3.375 x 3, Vancomycin 1250mg, NaCl 2200cc bolus   (28 Nov 2022 21:33)      Interval History: Course complicated by persistent leukocytosis and worsening renal function.    SUBJECTIVE: Patient seen and examined at bedside. Feels about the same as prior days. Endorses slight cough, denies abd pain, still has peristent greenish loose bowel movements 2-3 times per day.    OBJECTIVE:    VITAL SIGNS:  ICU Vital Signs Last 24 Hrs  T(C): 36.7 (05 Dec 2022 05:58), Max: 36.7 (04 Dec 2022 21:13)  T(F): 98 (05 Dec 2022 05:58), Max: 98 (04 Dec 2022 21:13)  HR: 99 (05 Dec 2022 05:58) (99 - 106)  BP: 132/78 (05 Dec 2022 05:58) (116/67 - 132/78)  BP(mean): --  ABP: --  ABP(mean): --  RR: 18 (05 Dec 2022 05:58) (18 - 18)  SpO2: 97% (05 Dec 2022 05:58) (97% - 99%)    O2 Parameters below as of 05 Dec 2022 05:58  Patient On (Oxygen Delivery Method): room air              CAPILLARY BLOOD GLUCOSE          PHYSICAL EXAM:  General: elderly, thin, pale, NAD, answering questions, pleasantly conversant  HEENT: NC/AT; PERRL, clear conjunctiva  Neck: supple  Respiratory: CTA b/l  Cardiovascular: +S1/S2; RRR  Abdomen: slightly distended, tympanic, non tender, right abdomen slightly firm with area of ecchymosis  Extremities: WWP, 2+ peripheral pulses b/l; no LE edema  Skin: normal color and turgor; no rash  Neurological: AAOx3    MEDICATIONS:  MEDICATIONS  (STANDING):  baclofen 2.5 milliGRAM(s) Oral every 8 hours  buPROPion XL (24-Hour) . 150 milliGRAM(s) Oral daily  diphenoxylate/atropine 2 Tablet(s) Oral every 6 hours  levothyroxine 88 MICROGram(s) Oral every 24 hours  multivitamin 1 Tablet(s) Oral daily  potassium chloride    Tablet ER 40 milliEquivalent(s) Oral once  QUEtiapine 200 milliGRAM(s) Oral at bedtime    MEDICATIONS  (PRN):  acetaminophen     Tablet .. 650 milliGRAM(s) Oral every 6 hours PRN Temp greater or equal to 38C (100.4F), Mild Pain (1 - 3)  trimethobenzamide Injectable 200 milliGRAM(s) IntraMuscular every 12 hours PRN Nausea and/or Vomiting      ALLERGIES:  Allergies    No Known Allergies    Intolerances        LABS:                        5.7    19.07 )-----------( 403      ( 05 Dec 2022 05:30 )             17.3     12-05    140  |  107  |  28<H>  ----------------------------<  116<H>  3.6   |  17<L>  |  3.48<H>    Ca    7.8<L>      05 Dec 2022 05:30  Phos  2.8     12-05  Mg     2.2     12-05    TPro  5.6<L>  /  Alb  2.4<L>  /  TBili  0.2  /  DBili  x   /  AST  23  /  ALT  9<L>  /  AlkPhos  153<H>  12-05      Urinalysis Basic - ( 03 Dec 2022 16:09 )    Color: Yellow / Appearance: Cloudy / SG: >=1.030 / pH: x  Gluc: x / Ketone: Trace mg/dL  / Bili: Small / Urobili: 0.2 E.U./dL   Blood: x / Protein: 30 mg/dL / Nitrite: NEGATIVE   Leuk Esterase: Trace / RBC: 5-10 /HPF / WBC 5-10 /HPF   Sq Epi: x / Non Sq Epi: 0-5 /HPF / Bacteria: Present /HPF            Culture - Blood (collected 12-04-22 @ 01:01)  Source: .Blood Blood  Preliminary Report (12-05-22 @ 02:01):    No growth at 1 day.            RADIOLOGY & ADDITIONAL TESTS: Reviewed.

## 2022-12-05 NOTE — PROGRESS NOTE ADULT - ASSESSMENT
per Internal Medicine    72 y o male history of metastatic stage IV lung adenocarcinoma, hypothyroidism, depression, anxiety, undergoing chemo brought in by EMS with HHA for altered mental status Monday morning admitted for sepsis (unclear source) and electrolyte derangements.       Problem/Plan - 1:  ·  Problem: Diarrhea.   ·  Plan: Patient with diarrhea described as loose stool, per outpatient notes seems to be chronic for several weeks (later stated for a year). Patient was prescribed Loperamide early November.  Unclear etiology. May be in setting of metastatic malignancy, however currently considering inflammatory given chronicity and white count. Diarrhea likely not infectious C. diff negative, GI PCR negative. Pt persistently hypokalemic likely from diarrhea. GI consulted on 12/2.  - CTM bowel movements  - banatrol added to diet  - lomotil q6 hrs  - no loperamide due to prolonged QTc 508 (12/1/2022 EKG)  - appreciate GI recs.    Problem/Plan - 2:  ·  Problem: Hypokalemia.   ·  Plan: Patient with hypokalemia, likely due to persistent diarrhea. No EKG changes. On 12/1 K low to 2.1, repeat EKG possible u waves. Pt asymptomatic, no fasiculations or weakness. Aggressively repleted  - K repleted  - f/u bmp.    Problem/Plan - 3:  ·  Problem: Sepsis.   ·  Plan: Patient with encephalopathy and hypotension, meeting 3 SIRS on admission for tachycardia, tachypnea, and leukocytosis.  Source possibly pulmonary and urinary.  Lactate 12.1 --> 2.3, UA trace LE, Bacteria present, hyaline casts, C. Diff negative GI PCR negative.  Lactate cleared, S/p Zosyn 3.375 x 3, Vancomycin 1250mg, NaCl 2200cc bolus in ED.   Still unclear source. AAOx4, mentating well since 11/29.  - UCx no growth final  - BCx NGTD  - ULeg negative  - s/p Vanc 1000mg qD and Zosyn 3.375 q8 (11/28-12/1)  Plan:  - d/c abx today given lack of clear source  - WBC continues to uptrend.    Problem/Plan - 4:  ·  Problem: ASHLYN (acute kidney injury).   ·  Plan: Patient with ASHLYN on admission BUN 31, Scr 2.35 -->Scr 2.31 (baseline Scr 1.1 10/22).  ASHLYN likely prerenal in setting of hypovolemia due to diarrhea and poor po intake.  Given prostate mets, there may also be a post-renal component.    - trend Cr, avoid nephrotoxic drugs, renally dose meds  - improving  - mark removed, not retaining  - d/c bladder scans.    Problem/Plan - 5:  ·  Problem: Hypothyroidism.   ·  Plan: Patient with history of hypothyroidism, home medications Levothyroxine 75mcg daily. On 12/1 TSH 10.81, free T4 0.67.  - increase synthroid to 88mcg qD on 12/1/22.    Problem/Plan - 6:  ·  Problem: Falls.   ·  Plan: Patient with frequent falls at home, per outpatient nursing notes, patient's HHA have reported falls at home sometimes with head trauma.  He ambulated at baseline with cane.  Etiology likely due to overall decompensation in setting of metastatic cancer. CTH and CT cervical spine without fractures of trauma.    - PT/OT consult - recommending SASHA  - OOBTC daily w/ supervision.    Problem/Plan - 7:  ·  Problem: Adult failure to thrive.   ·  Plan: Patient with underlying metastatic lung adenocarcinoma with chronic diarrhea and frequent falls, overall appears very frail.    - dietitian consulted - rec ensure enlive TID  - PT/OT consult - rec SASHA  - s/p 1L LR bolus on 12/2.    Problem/Plan - 8:  ·  Problem: Hypomagnesemia.   ·  Plan: Patient with hypomagnesemia, likely due to persistent diarrhea.  Likely contributing to overall weakness and falls.  - Replete PRN.    Problem/Plan - 9:  ·  Problem: Hypophosphatemia.   ·  Plan: Patient with hypophosphatemia, likely due to persistent diarrhea.  Likely contributing to overall weakness and falls.  - Replete PRN.    Problem/Plan - 10:  ·  Problem: Adenocarcinoma, lung.   ·  Plan; Patient with recently found (08/22) with RUL spiculated nodule with R paratracheal adenopathy and L adrenal nodule. Concern for metastatic cancer because of rectal lesion on PET scan as well as L adrenal area that lit up. Now, s/p FNA of R axillary node, with pathology consistent with metastatic lung adenocarcinoma.  - hold heme/onc consult for now.    Problem/Plan - 11:  ·  Problem: Anxiety and depression.   ·  Plan: Patient with history of anxiety and depression, home medications Bupropion XL 150mg daily, Gabapentin 300mg TID, Quetiapine 50mg qhs.   - Holding quetiapine and gabapentin in setting of recent encephalopathy and ASHLYN, can restart as clinically appropriate.    Problem/Plan - 12:  ·  Problem: Preventive measure.   ·  Plan: F: 1L LR bolus  E: replete prn  N: pureed diet   GI: none  DVT: Heparin 5K q8hr  Dispo: [facility].

## 2022-12-05 NOTE — CONSULT NOTE ADULT - ASSESSMENT
CHIEF RESIDENT ADDENDUM  Agree with above. 72M PMHx stage 4 lung adenocarcinoma currently undergoing chemotherapy admitted with altered mental status, likely sepsis 2/2 pneumonia, blood cultures and urine cultures negative, with worsening anemia and ASHLYN and persistent leukocytosis during this hospital stay. Surgery consulted for abdominal distention this morning. No nausea or vomiting; unclear if passing flatus, but per nursing, had a soft green bowel movement this morning. On exam, distended but soft, nontender, no rebound or guarding. Not peritoneal. WBC 20. Hgb 6 from 11 on admission. Creatinine 3.5. CT with distended stomach, small bowel and large bowel. Adynamic ileus. Not a surgical diagnosis. Recommend renal consult for ASHLYN, fluid resuscitation as appropriate. Electrolyte repletion for goal K > 4, Mg > 2, Phos > 3. Surgery will perform serial abdominal exams. If develops nausea or vomiting, would consider NGT decompression. Surgery Team 4C will continue to follow. Please page Team 4 with questions/clinical changes. [phone]  72-year-old male with PMHx of Stage IV metastatic lung carcinoma with last chemotherapy treatment 1 month ago, hypothyroidism s/p Graves thyroid iodine ablation, anxiety, depression, hemorrhoids s/p hemorrhoidectomy (~20 years ago), and bowel/bladder incontinence is admitted for AMS which has since resolved.  Patient consulted to General Surgery for concerns regarding ileus as PO intake is decreased and is associated with emesis.  Patient is nonperitonitic, passing flatus and having bowel movements.  CT imaging demonstrate ileus.      PLAN  - No indication for surgical intervention  - Serial abdominal examinations  - May consider NGT for decompression  - Surgery Team 4C will continue to follow  - Please page Team 4 at [phone] with any questions and/or clinical changes        CHIEF RESIDENT ADDENDUM  Agree with above. 72M PMHx stage 4 lung adenocarcinoma currently undergoing chemotherapy admitted with altered mental status, likely sepsis 2/2 pneumonia, blood cultures and urine cultures negative, with worsening anemia and ASHLYN and persistent leukocytosis during this hospital stay. Surgery consulted for abdominal distention this morning. No nausea or vomiting; unclear if passing flatus, but per nursing, had a soft green bowel movement this morning. On exam, distended but soft, nontender, no rebound or guarding. Not peritoneal. WBC 20. Hgb 6 from 11 on admission. Creatinine 3.5. CT with distended stomach, small bowel and large bowel. Adynamic ileus. Not a surgical diagnosis. Recommend renal consult for ASHLYN, fluid resuscitation as appropriate. Electrolyte repletion for goal K > 4, Mg > 2, Phos > 3. Surgery will perform serial abdominal exams. If develops nausea or vomiting, would consider NGT decompression. Surgery Team 4C will continue to follow. Please page Team 4 with questions/clinical changes. [phone]

## 2022-12-05 NOTE — PROGRESS NOTE ADULT - PROBLEM SELECTOR PLAN 5
Patient with hypokalemia, likely due to persistent diarrhea. No EKG changes. On 12/1 K low to 2.1, repeat EKG possible u waves. Pt asymptomatic, no fasiculations or weakness. Aggressively repleted  - K repleted, goal >4

## 2022-12-05 NOTE — PROGRESS NOTE ADULT - PROBLEM SELECTOR PLAN 9
Patient with underlying metastatic lung adenocarcinoma with chronic diarrhea and frequent falls, overall appears very frail.    - dietitian consulted - rec ensure enlive TID  - PT/OT consult - rec SASHA  - s/p 1L LR bolus on 12/2

## 2022-12-05 NOTE — PROGRESS NOTE ADULT - PROBLEM SELECTOR PLAN 2
Patient with diarrhea described as loose stool, per outpatient notes seems to be chronic for several weeks (later stated for a year). Patient was prescribed Loperamide early November.  Unclear etiology. May be in setting of metastatic malignancy, however currently considering inflammatory given chronicity and white count. Diarrhea likely not infectious C. diff negative, GI PCR negative. Pt persistently hypokalemic likely from diarrhea. GI consulted on 12/2. 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  - f/u stool calprotectin stool calprotectin, tissue transglutaminase (TTG) IgA-antibody, total IgA level, and a serum inflammatory marker (eg, C-reactive protein [CRP])  - pt initially refusing colonoscopy, but now may be amenable  - CTM bowel movements  - banatrol added to diet  - lomotil q6 hrs  - no loperamide due to prolonged QTc 508 (12/1/2022 EKG)  - f/u GI recs

## 2022-12-05 NOTE — PROGRESS NOTE ADULT - TIME BILLING
Patient seen and examined;  Events noted;  Patient with nausea and vomiting  Drop in H/H  Also abdominal distention  Would transfuse patient  Also obtain CT of abdomen and pelvis   Rule out Intraperitoneal bleed Patient seen and examined;  Events noted;  Patient with nausea and vomiting  Drop in H/H  Also abdominal distention  Would transfuse patient  Also obtain CT of abdomen and pelvis   Rule out Intraperitoneal bleed  Also creatine noted ; Bladder scan  May need Galvin

## 2022-12-05 NOTE — PROGRESS NOTE ADULT - PROBLEM SELECTOR PLAN 6
Patient with encephalopathy and hypotension, meeting 3 SIRS on admission for tachycardia, tachypnea, and leukocytosis.  Source possibly pulmonary and urinary.  Lactate 12.1 --> 2.3, UA trace LE, Bacteria present, hyaline casts, C. Diff negative GI PCR negative.  Lactate cleared, S/p Zosyn 3.375 x 3, Vancomycin 1250mg, NaCl 2200cc bolus in ED.   Still unclear source. AAOx4, mentating well since 11/29.  - UCx no growth final  - BCx NGTD  - ULeg negative  - s/p Vanc 1000mg qD and Zosyn 3.375 q8 (11/28-12/1)  Plan:  - repeat BCx/UA w/ reflex culture NGTD  - continue to trend WBC

## 2022-12-05 NOTE — PROGRESS NOTE ADULT - SUBJECTIVE AND OBJECTIVE BOX
Physical Medicine and Rehabilitation Progress Note :       Patient is a 72y old  Male who presents with a chief complaint of AMS (04 Dec 2022 06:41)      HPI:  73 y/o male history of metastatic stage IV lung adenocarcinoma, hypothyroidism, depression, anxiety, undergoing chemo brought in by EMS with HHA for altered mental status Monday morning. At baseline patient is A&Ox3, per HHA he was AOx1, not responding to questions or commands. Patient was reported to be hypotensive to SBP 50s in the field, IO was placed and given fluids with improvement in BP. Patient lives along, has HHA 2 days per week for 10 hours, per ED note HHA states she spoke to pt 3 days ago and foung him in his bed Monday morning at which time he was confused. Currently being treated for lung cancer, last chemo 1 month ago.  Patient also has had recurrent falls at home, some associated with head trauma but no loc, syncope, bladder/bowel incontinence.  Otherwise no reported illness, sick contacts, medical changes.  ROS otherwise negative.      ED Course   Vitals: Temp 98.7,  --> 86, /63, RR 20, SaO2 98% on 4L NC --> 97% room air   Labs: WBC 16.24, Hgb 11.7, Plt 449, Na 143, K 3.2, CO2 18, AG 24, BUN 31, Scr 2.35, Ca 7.6, Ma 1.4, Lactate 12.1 --> 2.3, UA trace LE, Bacteria present, hyaline casts, C. Diff negative GI PCR negative  EKG: sinus tachycardia, 1st degree AV block, narrow QRS, prolonged QTC  CXR: Known right upper lobe pulmonary mass   CT chest, abdomen, pelvis: No acute fractures. Unchanged right upper lobe malignancy. Decreased left adrenal metastasis. Decreased abdominal and pelvic adenopathy.  CTH: No intracranial hemorrhage or acute transcortical infarct.  Generalized volume loss with small vessel ischemic disease.  CT C-spine: No fracture. Levoscoliosis with Grade 1 anterolisthesis of C2 on C3 and C7 on T1. Multilevel cervical spondylosis. Right apical lung spiculated mass.  Interventions: Tylenol ig IV, Ca Gluconate 2g, MgSO4 2g x 2, KCl 40mg po x 1, KCl 10mg IV x 5, Zosyn 3.375 x 3, Vancomycin 1250mg, NaCl 2200cc bolus   (28 Nov 2022 21:33)                            5.7    19.07 )-----------( 403      ( 05 Dec 2022 05:30 )             17.3       12-05    140  |  107  |  28<H>  ----------------------------<  116<H>  3.6   |  17<L>  |  3.48<H>    Ca    7.8<L>      05 Dec 2022 05:30  Phos  2.8     12-05  Mg     2.2     12-05    TPro  5.6<L>  /  Alb  2.4<L>  /  TBili  0.2  /  DBili  x   /  AST  23  /  ALT  9<L>  /  AlkPhos  153<H>  12-05    Vital Signs Last 24 Hrs  T(C): 36.3 (05 Dec 2022 10:40), Max: 36.7 (04 Dec 2022 21:13)  T(F): 97.4 (05 Dec 2022 10:40), Max: 98 (04 Dec 2022 21:13)  HR: 103 (05 Dec 2022 10:40) (99 - 106)  BP: 133/73 (05 Dec 2022 10:40) (116/67 - 133/73)  BP(mean): --  RR: 19 (05 Dec 2022 10:40) (18 - 19)  SpO2: 96% (05 Dec 2022 10:40) (96% - 99%)    Parameters below as of 05 Dec 2022 10:40  Patient On (Oxygen Delivery Method): room air        MEDICATIONS  (STANDING):  baclofen 2.5 milliGRAM(s) Oral every 8 hours  buPROPion XL (24-Hour) . 150 milliGRAM(s) Oral daily  diphenoxylate/atropine 2 Tablet(s) Oral every 6 hours  levothyroxine 88 MICROGram(s) Oral every 24 hours  multivitamin 1 Tablet(s) Oral daily  QUEtiapine 200 milliGRAM(s) Oral at bedtime    MEDICATIONS  (PRN):  acetaminophen     Tablet .. 650 milliGRAM(s) Oral every 6 hours PRN Temp greater or equal to 38C (100.4F), Mild Pain (1 - 3)  trimethobenzamide Injectable 200 milliGRAM(s) IntraMuscular every 12 hours PRN Nausea and/or Vomiting       Physical Therapy Functional Status Assessment :       Therapeutic Interventions      Bed Mobility  Bed Mobility Training Rolling/Turning: moderate assist (50% patient effort);  1 person assist  Bed Mobility Training Scooting: moderate assist (50% patient effort);  1 person assist  Bed Mobility Training Sit-to-Supine: moderate assist (50% patient effort);  1 person assist  Bed Mobility Training Supine-to-Sit: moderate assist (50% patient effort);  1 person assist  Bed Mobility Training Limitations: decreased flexibility;  decreased ROM;  decreased strength;  impaired balance    Sit-Stand Transfer Training  Transfer Training Sit-to-Stand Transfer: moderate assist (50% patient effort);  1 person assist;  weight-bearing as tolerated  Transfer Training Stand-to-Sit Transfer: moderate assist (50% patient effort);  1 person assist;  weight-bearing as tolerated  Sit-to-Stand Transfer Training Transfer Safety Analysis: decreased flexibility;  decreased ROM;  decreased strength;  impaired balance    Therapeutic Exercise  Therapeutic Exercise Detail: Pt participated in bridging, scooting side to side and ue reaching.             PM&R Impression : as above    Current Disposition Plan Recommendations :    subacute rehab placement

## 2022-12-05 NOTE — CONSULT NOTE ADULT - SUBJECTIVE AND OBJECTIVE BOX
72-year-old male with PMHx of Stage IV metastatic lung carcinoma with last chemotherapy treatment 1 month ago, hypothyroidism s/p Graves thyroid iodine ablation, anxiety, depression, hemorrhoids s/p hemorrhoidectomy (~20 years ago), and bowel/bladder incontinence is admitted for AMS which has since resolved.  Presenting symptoms likely attributable to sepsis.  Patient consulted to General Surgery for concerns regarding ileus.  Patient has had poor PO intake and report nausea with 2 episodes of emesis per day.  The  72-year-old male with PMHx of Stage IV metastatic lung carcinoma with last chemotherapy treatment 1 month ago, hypothyroidism s/p Graves thyroid iodine ablation, anxiety, depression, hemorrhoids s/p hemorrhoidectomy (~20 years ago), and bowel/bladder incontinence is admitted for AMS which has since resolved.  Presenting symptoms likely attributable to sepsis.  Patient consulted to General Surgery for concerns regarding ileus.  Patient has had poor PO intake and reports nausea with 2 episodes of emesis per day.  He endorses passing flatus an approximately 10lbs of unintentional weight loss in the past 2 weeks.  His last bowel movement was last night and green in color without blood.      ROS  Constitutional: (-) fever, (+) chills  Eyes/ENT: (-) blurry vision, (-) dizziness (+) sore throat  Cardiovascular: (-) chest pain, (-) palpitations  Respiratory: (+) productive cough (whitish sputum), (-) shortness of breath  Gastrointestinal: (-) abdominal pain, (+) nausea, (+) vomiting, (-) diarrhea, (-) constipation  Integumentary: (+) sores on legs, (-) rash, (-) edema  Neurological: (-) headache        PAST MEDICAL HISTORY:  - Anxiety  - Bladder and Bowel Incontinence  - Depression  - Graves Disease s/p radioactive iodine ablation  - Hemorrhoids  - Hydrocele  - Hypothyroidism  - Metastatic Stage IV Lung Carcinoma  - Recurrent falls    PAST SURGICAL HISTORY:  - Hemorrhoidectomy (~20 years ago)  - Hydrocele Open Surgical Repair  - Tonsillectomy      MEDICATIONS (HOME):  - Bupropion 150mg QD  - Gabapentin 300mg TID  - Incruse Ellipta Inhaler 625mcg 2 puffs QD  - Levothyroxine 75mcg QD  - Quetiapine 50mg QHS  - Ventolin HFA Albuterol Inhaler 2 puffs Q4-6H PRN      MEDICATIONS  (STANDING):  baclofen 2.5 milliGRAM(s) Oral every 8 hours  buPROPion XL (24-Hour) . 150 milliGRAM(s) Oral daily  diphenoxylate/atropine 2 Tablet(s) Oral every 6 hours  levothyroxine 88 MICROGram(s) Oral every 24 hours  multivitamin 1 Tablet(s) Oral daily  QUEtiapine 200 milliGRAM(s) Oral at bedtime    MEDICATIONS  (PRN):  acetaminophen     Tablet .. 650 milliGRAM(s) Oral every 6 hours PRN Temp greater or equal to 38C (100.4F), Mild Pain (1 - 3)  trimethobenzamide Injectable 200 milliGRAM(s) IntraMuscular every 12 hours PRN Nausea and/or Vomiting    Allergies:  No Known Allergies    Intolerances: None      FAMILY HISTORY:  Patient resides alone and has a HHA who visits for 10 hours two days per week (20 total hours/week).  He smokes cigarettes 0.5 pack/week for the past 60 years and consumed EtOH infrequently socially.  The patient denies any recreational/illicit drug use.      T(C): 36.3 (12-05-22 @ 10:40), Max: 36.7 (12-04-22 @ 21:13)  HR: 103 (12-05-22 @ 10:40) (99 - 106)  BP: 133/73 (12-05-22 @ 10:40) (116/67 - 133/73)  RR: 19 (12-05-22 @ 10:40) (18 - 19)  SpO2: 96% (12-05-22 @ 10:40) (96% - 99%)    GENERAL: NAD, Resting comfortably in bed, awake, opens eyes spontaneously  HEENT: NCAT, MMM, Normal conjunctiva, Well-healed scar on the left portion of his cortez (reportedly from a fall approximately a month ago)  RESP: Nonlabored breathing on room air, No respiratory distress  CARD: Normal rate, Normal peripheral perfusion  GI: Firm, mildly distended, RLQ tenderness to deep palpation, No guarding, No rebound tenderness  EXTREM: WWP, No edema, No gross deformity of extremities  SKIN: No rashes, no lesions  NEURO: AAOx3, No focal motor or sensory deficits  PSYCH: Affect and characteristics of appearance, verbalizations, and behaviors are appropriate    LABS:                        5.7    19.07 )-----------( 403      ( 05 Dec 2022 05:30 )             17.3     12-05    140  |  107  |  28<H>  ----------------------------<  116<H>  3.6   |  17<L>  |  3.48<H>    Ca    7.8<L>      05 Dec 2022 05:30  Phos  2.8     12-05  Mg     2.2     12-05    TPro  5.6<L>  /  Alb  2.4<L>  /  TBili  0.2  /  DBili  x   /  AST  23  /  ALT  9<L>  /  AlkPhos  153<H>  12-05      LIVER FUNCTIONS - ( 05 Dec 2022 05:30 )  Alb: 2.4 g/dL / Pro: 5.6 g/dL / ALK PHOS: 153 U/L / ALT: 9 U/L / AST: 23 U/L / GGT: x             RADIOLOGY & ADDITIONAL STUDIES:  CT Abdomen and Pelvis No Cont:   ACC: [id number] EXAM:  CT ABDOMEN AND PELVIS                          PROCEDURE DATE:  12/05/2022          INTERPRETATION:  CT of the ABDOMEN and PELVIS without intravenous   contrast dated 12/5/2022 1:09 PM    INDICATION: assess for retroperitoneal bleed    TECHNIQUE: CT of the abdomen and pelvis was performed. Intravenous and   oral contrast material were not administered.  Axial and coronal and   sagittal images were produced and reviewed.    PRIOR STUDIES: CT abdomen pelvis 11/20/2022.    FINDINGS: Images of the lower chest demonstrate centrilobular emphysema.   Subsegmental atelectasis bilateral lower lobes. Trace bilateral pleural   effusions. Low density of cardiac chambers relative to the myocardium   suggesting anemia. Dilated fluid-filled esophagus. Mild bilateral   gynecomastia.    Images of the upper abdomen demonstrate no focal hepatic abnormalities.     No radiopaque gallstones are seen.     The pancreas is normal in appearance.    No splenic abnormalities are seen.    Normal right adrenal. Nodular thickening left adrenal measuring 1.3 x 1.9   x 2.0 cm, unchanged..     The kidneys are normal in size bilaterally.  No renal stones are seen.    No hydronephrosis is evident.    No abdominal aortic aneurysm is seen. No evidence of retroperitoneal   hemorrhage.     No retroperitoneal lymphadenopathy is seen.    Evaluation of bowel is limited without oral contrast. Within that   limitation, there is marked fluid and air dilatation of stomach,   duodenum, small bowel and large bowel suggestive of adynamic ileus..   Normal appendix.     No ascites is evident.    Images of the pelvis demonstrate under distended bladder..   No filling   defects are seen in the urinary bladder. There has been interval removal   of Galvin catheter. Unremarkable seminal vesicles and prostate. The   prostate measures 4.8 x 3.1 x 3.5 cm.    No pelvic lymphadenopathy is seen.    Evaluation of the osseous structures demonstrates degenerative disc   disease L5-S1.  Anasarca noted in relation to the wall of the lower abdomen and pelvis.      IMPRESSION:  No retroperitoneal or extraperitoneal hemorrhage.  Severe ileus.  Anemia.    --- End of Report ---    JOYCE PITTS MD; Attending Radiologist  This document has been electronically signed. Dec  5 2022  1:45PM (12-05-22 @ 13:09)

## 2022-12-05 NOTE — PROGRESS NOTE ADULT - SUBJECTIVE AND OBJECTIVE BOX
SUBJECTIVE / INTERVAL HPI: Patient seen and examined at bedside. Patient nauseous yesterday and overnight, used PRN tigan x1 yesterday late afternoon. Patient states he does not feel actively nauseous but had a small vomiting episode this morning. No hematemesis. Pt with drop in Hb this AM to 5.7. Asymptomatic, HD stable with mild tachycardia to 103. 1L LR bolus given in AM (before Hb labs came back), then 1 unit pRBCs given. Pt remained HD stable w/ no dizziness. Pt feels worried about his status and feels tired.    VITAL SIGNS:  Vital Signs Last 24 Hrs  T(C): 36.7 (05 Dec 2022 13:11), Max: 36.7 (04 Dec 2022 21:13)  T(F): 98.1 (05 Dec 2022 13:11), Max: 98.1 (05 Dec 2022 12:36)  HR: 106 (05 Dec 2022 13:11) (98 - 106)  BP: 127/70 (05 Dec 2022 13:11) (116/67 - 133/73)  BP(mean): --  RR: 18 (05 Dec 2022 13:11) (16 - 19)  SpO2: 98% (05 Dec 2022 13:11) (96% - 99%)    Parameters below as of 05 Dec 2022 13:11  Patient On (Oxygen Delivery Method): room air        PHYSICAL EXAM:    General: Resting comfortably in bed; NAD  HEENT: NC/AT, EOMI, anicteric sclera, dry  MM, neck supple, no nasal discharge  Cardiac: RRR; normal S1/S2, no MRG, no LE edema  Respiratory: CTAB; no wheezes, ronchi, increased work of breathing, retractions  Gastrointestinal: +BSx4, abdomen soft, NT/ND; no rebound or guarding  Extremities: WWP, no clubbing or cyanosis; no peripheral edema  Dermatologic: skin warm, dry and intact; no rashes, open wounds; significant bruising R side lower rib cage/upper abdomen  Neurologic: AAOx3; no focal deficits    MEDICATIONS:  MEDICATIONS  (STANDING):  dextrose 5% + lactated ringers. 1000 milliLiter(s) (110 mL/Hr) IV Continuous <Continuous>  levothyroxine Injectable 50 MICROGram(s) IV Push <User Schedule>    MEDICATIONS  (PRN):  trimethobenzamide Injectable 200 milliGRAM(s) IntraMuscular every 12 hours PRN Nausea and/or Vomiting      ALLERGIES:  Allergies    No Known Allergies    Intolerances        LABS:                        5.7    19.07 )-----------( 403      ( 05 Dec 2022 05:30 )             17.3     12-05    140  |  107  |  28<H>  ----------------------------<  116<H>  3.6   |  17<L>  |  3.48<H>    Ca    7.8<L>      05 Dec 2022 05:30  Phos  2.8     12-05  Mg     2.2     12-05    TPro  5.6<L>  /  Alb  2.4<L>  /  TBili  0.2  /  DBili  x   /  AST  23  /  ALT  9<L>  /  AlkPhos  153<H>  12-05        CAPILLARY BLOOD GLUCOSE          RADIOLOGY & ADDITIONAL TESTS: Reviewed.

## 2022-12-05 NOTE — PROGRESS NOTE ADULT - SUBJECTIVE AND OBJECTIVE BOX
INTERVAL HPI/OVERNIGHT EVENTS:  Events noted;  Patient wih nausea and vomiting  Also CBC noted with marked drop in H/H      MEDICATIONS  (STANDING):  baclofen 2.5 milliGRAM(s) Oral every 8 hours  buPROPion XL (24-Hour) . 150 milliGRAM(s) Oral daily  diphenoxylate/atropine 2 Tablet(s) Oral every 6 hours  levothyroxine 88 MICROGram(s) Oral every 24 hours  multivitamin 1 Tablet(s) Oral daily  potassium chloride    Tablet ER 40 milliEquivalent(s) Oral once  QUEtiapine 200 milliGRAM(s) Oral at bedtime    MEDICATIONS  (PRN):  acetaminophen     Tablet .. 650 milliGRAM(s) Oral every 6 hours PRN Temp greater or equal to 38C (100.4F), Mild Pain (1 - 3)  trimethobenzamide Injectable 200 milliGRAM(s) IntraMuscular every 12 hours PRN Nausea and/or Vomiting      Allergies    No Known Allergies    Intolerances        Vital Signs Last 24 Hrs  T(C): 36.7 (05 Dec 2022 05:58), Max: 36.7 (04 Dec 2022 21:13)  T(F): 98 (05 Dec 2022 05:58), Max: 98 (04 Dec 2022 21:13)  HR: 99 (05 Dec 2022 05:58) (99 - 106)  BP: 132/78 (05 Dec 2022 05:58) (116/67 - 132/78)  BP(mean): --  RR: 18 (05 Dec 2022 05:58) (18 - 18)  SpO2: 97% (05 Dec 2022 05:58) (97% - 99%)    Parameters below as of 05 Dec 2022 05:58  Patient On (Oxygen Delivery Method): room air              Constitutional: Awake      Eyes: ARBEN    ENMT: Negative    Neck: Supple    Back:  no tenderness     Respiratory:  clear    Cardiovascular: S1 S2    Gastrointestinal:  distended and tender    Genitourinary:    Extremities: no edema    Vascular:    Neurological:    Skin:    Lymph Nodes:            LABS:                        5.7    19.07 )-----------( 403      ( 05 Dec 2022 05:30 )             17.3     12-05    140  |  107  |  28<H>  ----------------------------<  116<H>  3.6   |  17<L>  |  3.48<H>    Ca    7.8<L>      05 Dec 2022 05:30  Phos  2.8     12-05  Mg     2.2     12-05    TPro  5.6<L>  /  Alb  2.4<L>  /  TBili  0.2  /  DBili  x   /  AST  23  /  ALT  9<L>  /  AlkPhos  153<H>  12-05      Urinalysis Basic - ( 03 Dec 2022 16:09 )    Color: Yellow / Appearance: Cloudy / SG: >=1.030 / pH: x  Gluc: x / Ketone: Trace mg/dL  / Bili: Small / Urobili: 0.2 E.U./dL   Blood: x / Protein: 30 mg/dL / Nitrite: NEGATIVE   Leuk Esterase: Trace / RBC: 5-10 /HPF / WBC 5-10 /HPF   Sq Epi: x / Non Sq Epi: 0-5 /HPF / Bacteria: Present /HPF        RADIOLOGY & ADDITIONAL TESTS:

## 2022-12-05 NOTE — PROGRESS NOTE ADULT - PROBLEM SELECTOR PLAN 3
On 12/5 pt found to have distended abdomen. CTAP w/o contrast on 12/5 showing severe ileus. Surgery was formally consulted. Decided not to staff with attending but will continue to follow for serial abdominal exams. Primary team placed sump tube, set to suction. GI also following.  - place sump tube  - NPO except medications  - bladder scan on 12/5, not retaining

## 2022-12-05 NOTE — PROGRESS NOTE ADULT - ASSESSMENT
73 y/o male history of metastatic stage IV lung adenocarcinoma (carboplatin, pemetrexed, pembrolizumab on C1D1 10/27), hypothyroidism, depression, anxiety, undergoing chemo brought in by EMS with HHA for altered mental status. Found to have sepsis and ASHLYN. Oncology consulted given recent treatment of lung cancer and possible therapy releated side effects.    # Metastatic lung adenocarcinoma, AJCC Stage IV  Pt with R axillary LN biopsy positive for metastatic lung adenocarcinoma, with FDG avidity in thoracic, abdominal, inguinal LNs, as well as rectal thickening. Pt has not had any recent colonoscopies to further investigate GI involvement. Repeat CT CAP with increase in primary lung mass, persistent rectal thickening and other sites of disease as previously described. 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 Received Cycle 1 of chemo with carboplatin, pemetrexed, and pembrolizumab on 10/27/22. Now admitted with sepsis and ASHLYN, possibly 2/2 infection vs chemotherapy. Patient states that diarrhea started 1-2 weeks after chemotherapy and has been having a 2-3 bowel movements for the past 2 weeks. 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 Interval CT scan on admission 11/28 showing unchanged right upper lobe malignancy since October 7, 2022. Decreased left adrenal metastasis. Decreased abdominal and pelvic adenopathy.  - Will hold off on further chemotherapy until outpatient and improvement of ASHLYN/infection    # Leukocytosis  Persistent neutrophilic predominant leukocytosis from admission, prior to systemic chemoimmunotherapy WBC was normal in October 2022. Reported about 1 month of diarrhea post treatment. WBC count fluctuating between 16-25 during current admission, afebrile and with negative infectious work up. 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  - Would check stool calprotectin, tissue transglutaminase (TTG) IgA-antibody, total IgA level, and a serum inflammatory marker (eg, C-reactive protein [CRP])  - GI following, but patient declined colonoscopic evaluation    # Microcytic anemia  Hemoglobin baseline August-October 2022 was 13-15, in 2021 was ~10. During current hospitalization, patient has a downtrend in hemoglobin from 11.7 on admission 11/28 (possibly concentrated) to 7.4 on 12/4. On 12/5 with drop in hemoglobin to 5.7. Would be concerned for acute losses. CT Abd/Pelvis admission without acute abdominal findings, but patient has had persistent diarrhea/decrease in hgb.  - Please check LDH, Haptoglobin, Reticulocyte count  - Would check iron studies, ferritin, folate, B12  - Would consider repeat CT abd/pelvis given drop in Hgb and to rule out complications of immune checkpoint inhibitor colitis  - Maintain active type and screen  - Transfuse for hemoglobin <7 or active bleeding  - GI following, would consider re-discussion with patient regarding endoscopic evaluation    Pending discussion with Dr. Patricia. 71 y/o male history of metastatic stage IV lung adenocarcinoma (carboplatin, pemetrexed, pembrolizumab on C1D1 10/27), hypothyroidism, depression, anxiety, undergoing chemo brought in by EMS with HHA for altered mental status. Found to have sepsis and ASHLYN. Oncology consulted given recent treatment of lung cancer and possible therapy releated side effects.    # Metastatic lung adenocarcinoma, AJCC Stage IV  Pt with R axillary LN biopsy positive for metastatic lung adenocarcinoma, with FDG avidity in thoracic, abdominal, inguinal LNs, as well as rectal thickening. Pt has not had any recent colonoscopies to further investigate GI involvement. Repeat CT CAP with increase in primary lung mass, persistent rectal thickening and other sites of disease as previously described. 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 Received Cycle 1 of chemo with carboplatin, pemetrexed, and pembrolizumab on 10/27/22. Now admitted with sepsis and ASHLYN, possibly 2/2 infection vs chemotherapy. Patient states that diarrhea started 1-2 weeks after chemotherapy and has been having a 2-3 bowel movements for the past 2 weeks. 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 Interval CT scan on admission 11/28 showing unchanged right upper lobe malignancy since October 7, 2022. Decreased left adrenal metastasis. Decreased abdominal and pelvic adenopathy.  - Will hold off on further chemotherapy until outpatient and improvement of ASHLYN/infection    # Leukocytosis  Persistent neutrophilic predominant leukocytosis from admission, prior to systemic chemoimmunotherapy WBC was normal in October 2022. Reported about 1 month of diarrhea post treatment. WBC count fluctuating between 16-25 during current admission, afebrile and with negative infectious work up. 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  - Would check stool calprotectin, tissue transglutaminase (TTG) IgA-antibody, total IgA level, and a serum inflammatory marker (eg, C-reactive protein [CRP])  - GI following, but patient declined colonoscopic evaluation    # Microcytic anemia  Hemoglobin baseline August-October 2022 was 13-15, in 2021 was ~10. During current hospitalization, patient has a downtrend in hemoglobin from 11.7 on admission 11/28 (possibly concentrated) to 7.4 on 12/4. On 12/5 with drop in hemoglobin to 5.7. Would be concerned for acute losses. CT Abd/Pelvis admission without acute abdominal findings, but patient has had persistent diarrhea/decrease in hgb.  - Please check LDH, Haptoglobin, Reticulocyte count  - Would check iron studies, ferritin, folate, B12  - Would consider repeat CT abd/pelvis given drop in Hgb and to rule out complications of immune checkpoint inhibitor colitis  - Maintain active type and screen  - Transfuse for hemoglobin <7 or active bleeding  - GI following, would consider re-discussion with patient regarding endoscopic evaluation    Discussed with Dr. Patricia.

## 2022-12-06 NOTE — CONSULT NOTE ADULT - PROBLEM SELECTOR RECOMMENDATION 2
Patient with recently found (08/22) with RUL spiculated nodule with R paratracheal adenopathy and L adrenal nodule. Concern for metastatic cancer because of rectal lesion on PET scan as well as L adrenal area that lit up.   Now, s/p FNA of R axillary node, with pathology consistent with metastatic lung adenocarcinoma. Follows w/ Dr. Patricia outpatient.  heme/on following, no plan for chemo IP.

## 2022-12-06 NOTE — CONSULT NOTE ADULT - PROBLEM SELECTOR RECOMMENDATION 7
palliative care was consulted for [facility], we did talk about code status and he said there are times he would like to pass away peacefully and other times where he would like to remain full code, risks and benefits explained. Will broach the topic once again tomorrow.

## 2022-12-06 NOTE — PROGRESS NOTE ADULT - ASSESSMENT
72-year-old male with PMHx of Stage IV metastatic lung carcinoma with last chemotherapy treatment 1 month ago, hypothyroidism s/p Graves thyroid iodine ablation, anxiety, depression, hemorrhoids s/p hemorrhoidectomy (~20 years ago), and bowel/bladder incontinence is admitted for AMS which has since resolved.  Patient consulted to General Surgery for concerns regarding ileus as PO intake is decreased and is associated with emesis.  Patient is nonperitonitic, passing flatus and having bowel movements.  CT imaging demonstrate ileus.    Recommendations:    - No indication for surgical intervention  - NPO  - Keep NGT for decompression  - Monitor bowel function  - Surgery Team 4C will continue to follow. Please page Team 4 with questions/clinical changes. [phone]

## 2022-12-06 NOTE — CONSULT NOTE ADULT - CONSULT REQUESTED DATE/TIME
28-Nov-2022 13:00
02-Dec-2022 15:25
05-Dec-2022 16:51
29-Nov-2022 08:58
29-Nov-2022 09:45
06-Dec-2022 12:36

## 2022-12-06 NOTE — CONSULT NOTE ADULT - PROBLEM SELECTOR RECOMMENDATION 9
Patient with encephalopathy and hypotension, meeting 3 SIRS on admission for tachycardia, tachypnea, and leukocytosis.  Source possibly pulmonary and urinary.  Lactate 12.1 --> 2.3, UA trace LE, Bacteria present, hyaline casts, C. Diff negative GI PCR negative.  Lactate cleared, S/p Zosyn and  Vancomycin.    Still unclear source.   AAOx4, mentating well since 11/29.  - UCx no growth final  - BCx NGTD  - ULeg negative  - s/p Vanc 1000mg qD and Zosyn 3.375 q8 (11/28-12/1)

## 2022-12-06 NOTE — PROGRESS NOTE ADULT - PROBLEM SELECTOR PLAN 5
Patient with hypokalemia, likely due to persistent diarrhea. No EKG changes. On 12/1 K low to 2.1, repeat EKG possible u waves. Pt asymptomatic, no fasiculations or weakness. Aggressively repleted  - K repleted, goal >4 Patient with hypokalemia, likely due to persistent diarrhea. No EKG changes. On 12/1 K low to 2.1, repeat EKG possible u waves. Pt asymptomatic, no fasiculations or weakness. Aggressively repleted.  - K repleted, goal >4  - diarrhea resolving, K improving

## 2022-12-06 NOTE — PROGRESS NOTE ADULT - PROBLEM SELECTOR PLAN 9
Patient with underlying metastatic lung adenocarcinoma with chronic diarrhea and frequent falls, overall appears very frail.    - dietitian consulted - rec ensure enlive TID  - PT/OT consult - rec SASHA  - s/p 1L LR bolus on 12/2 Patient with underlying metastatic lung adenocarcinoma with chronic diarrhea and frequent falls, overall appears very frail.    - dietitian consulted - rec ensure enlive TID  - PT/OT consult - rec SASHA  - s/p 1L LR bolus on 12/2  - palliative consult  - contact family

## 2022-12-06 NOTE — PROGRESS NOTE ADULT - ASSESSMENT
71 y/o male history of metastatic stage IV lung adenocarcinoma, hypothyroidism, depression, anxiety, undergoing chemo brought in by EMS with HHA for altered mental status Monday morning admitted for sepsis (unclear source) and electrolyte derangements. Now found to have anemia on 12/5. 71 y/o male history of metastatic stage IV lung adenocarcinoma, hypothyroidism, depression, anxiety, undergoing chemo brought in by EMS with HHA for altered mental status Monday morning admitted for sepsis (unclear source) and electrolyte derangements. Now found to have anemia and severe ileus on 12/6.

## 2022-12-06 NOTE — PROGRESS NOTE ADULT - SUBJECTIVE AND OBJECTIVE BOX
***INCOMPLETE NOTE    SUBJECTIVE / INTERVAL HPI: Patient seen and examined at bedside. No overnight events.    VITAL SIGNS:  Vital Signs Last 24 Hrs  T(C): 37 (06 Dec 2022 04:38), Max: 37 (06 Dec 2022 04:38)  T(F): 98.6 (06 Dec 2022 04:38), Max: 98.6 (06 Dec 2022 04:38)  HR: 104 (06 Dec 2022 04:38) (98 - 109)  BP: 124/77 (06 Dec 2022 04:38) (116/68 - 133/73)  BP(mean): --  RR: 20 (06 Dec 2022 04:38) (16 - 20)  SpO2: 97% (06 Dec 2022 04:38) (96% - 98%)    Parameters below as of 06 Dec 2022 04:38  Patient On (Oxygen Delivery Method): room air        PHYSICAL EXAM:    General: Resting comfortably in bed; NAD  HEENT: NC/AT, EOMI, anicteric sclera, MMM, neck supple, no nasal discharge  Cardiac: RRR; normal S1/S2, no MRG, no LE edema, no JVD  Respiratory: CTAB; no wheezes, ronchi, increased work of breathing, retractions  Gastrointestinal: +BSx4, abdomen soft, NT/ND; no rebound or guarding  Genitourinary: no suprapubic tenderness; mark*; normal external genitalia  Extremities: WWP, no clubbing or cyanosis; no peripheral edema  Vascular: 2+ radial and DP pulses bilaterally  Dermatologic: skin warm, dry and intact; no rashes, open wounds  Neurologic: AAOx3; no focal deficits  Psychiatric: affect and characteristics of appearance, verbalizations, behaviors are appropriate    MEDICATIONS:  MEDICATIONS  (STANDING):  dextrose 5% + lactated ringers. 1000 milliLiter(s) (110 mL/Hr) IV Continuous <Continuous>  levothyroxine Injectable 50 MICROGram(s) IV Push <User Schedule>  potassium chloride  10 mEq/100 mL IVPB 10 milliEquivalent(s) IV Intermittent every 2 hours    MEDICATIONS  (PRN):  trimethobenzamide Injectable 200 milliGRAM(s) IntraMuscular every 12 hours PRN Nausea and/or Vomiting      ALLERGIES:  Allergies    No Known Allergies    Intolerances        LABS:                        7.1    17.54 )-----------( 390      ( 05 Dec 2022 19:22 )             22.3     12-05    136  |  105  |  30<H>  ----------------------------<  106<H>  3.4<L>   |  16<L>  |  3.35<H>    Ca    8.0<L>      05 Dec 2022 21:32  Phos  1.9     12-05  Mg     2.0     12-05    TPro  5.6<L>  /  Alb  2.4<L>  /  TBili  0.2  /  DBili  x   /  AST  23  /  ALT  9<L>  /  AlkPhos  153<H>  12-05        CAPILLARY BLOOD GLUCOSE      POCT Blood Glucose.: 101 mg/dL (06 Dec 2022 06:15)      RADIOLOGY & ADDITIONAL TESTS: Reviewed.   SUBJECTIVE / INTERVAL HPI: Patient seen and examined at bedside. Sump placed last evening, initially with poor output, but then with increased output. Pt is not feeling well this morning. He feels anxious about his state and is feeling miserable because he is thirsty. Otherwise has a little nausea, no vomiting. No BMs reported overnight, per nursing staff.     VITAL SIGNS:  Vital Signs Last 24 Hrs  T(C): 37 (06 Dec 2022 04:38), Max: 37 (06 Dec 2022 04:38)  T(F): 98.6 (06 Dec 2022 04:38), Max: 98.6 (06 Dec 2022 04:38)  HR: 104 (06 Dec 2022 04:38) (98 - 109)  BP: 124/77 (06 Dec 2022 04:38) (116/68 - 133/73)  BP(mean): --  RR: 20 (06 Dec 2022 04:38) (16 - 20)  SpO2: 97% (06 Dec 2022 04:38) (96% - 98%)    Parameters below as of 06 Dec 2022 04:38  Patient On (Oxygen Delivery Method): room air        PHYSICAL EXAM:    General: Resting in bed, anxious  HEENT: NC/AT, EOMI, dry MM, neck supple, no nasal discharge  Cardiac: RRR; normal S1/S2, no MRG, no LE edema  Respiratory: CTAB anteriorly; no wheezes, ronchi, increased work of breathing, retractions  Gastrointestinal: +BSx4, abdomen distended, improved from yesterday; no rebound or guarding; NGtube in place, dark green output  Extremities: WWP x4; no peripheral edema  Dermatologic: skin warm, dry and intact; no rashes, open wounds  Neurologic: AAOx3; no focal deficits    MEDICATIONS:  MEDICATIONS  (STANDING):  dextrose 5% + lactated ringers. 1000 milliLiter(s) (110 mL/Hr) IV Continuous <Continuous>  levothyroxine Injectable 50 MICROGram(s) IV Push <User Schedule>  potassium chloride  10 mEq/100 mL IVPB 10 milliEquivalent(s) IV Intermittent every 2 hours    MEDICATIONS  (PRN):  trimethobenzamide Injectable 200 milliGRAM(s) IntraMuscular every 12 hours PRN Nausea and/or Vomiting      ALLERGIES:  Allergies    No Known Allergies    Intolerances        LABS:                        7.1    17.54 )-----------( 390      ( 05 Dec 2022 19:22 )             22.3     12-05    136  |  105  |  30<H>  ----------------------------<  106<H>  3.4<L>   |  16<L>  |  3.35<H>    Ca    8.0<L>      05 Dec 2022 21:32  Phos  1.9     12-05  Mg     2.0     12-05    TPro  5.6<L>  /  Alb  2.4<L>  /  TBili  0.2  /  DBili  x   /  AST  23  /  ALT  9<L>  /  AlkPhos  153<H>  12-05        CAPILLARY BLOOD GLUCOSE      POCT Blood Glucose.: 101 mg/dL (06 Dec 2022 06:15)      RADIOLOGY & ADDITIONAL TESTS: Reviewed.

## 2022-12-06 NOTE — CONSULT NOTE ADULT - PROBLEM SELECTOR RECOMMENDATION 5
On 12/5 pt found to have distended abdomen. CTAP w/o contrast on 12/5 showing severe ileus. Surgery was formally consulted, no surgical intervention but will continue to follow for serial abdominal exams. on NG tube connected to wall suction.   - NPO except medications, ice chips

## 2022-12-06 NOTE — PROGRESS NOTE ADULT - PROBLEM SELECTOR PLAN 4
[facility] Progress Note           Tamica Weber Patient Status:  Inpatient    1942 MRN [id number]   Location [facility] Attending Florencio Richardson MD   Hosp Day # 6 PCP Juan Diego Gallo MD     Subjective:      Patient reporting well.  Denies any chest pain or shortness of breath.  Denies any PND or orthopnea.  Family at bedside.    Review of Systems    General: No fever or chills, No loss of appetite.    RS: No hemoptysis  GI: No melena or hematochezia  : No urinary disturbance  Derm: No skin disorders   Heme: No blood dyscrasias.  Remainder of 12 point systems reviewed and negative (or as mentioned in HPI)      Objective:     Medications:  Current Facility-Administered Medications   Medication Dose Route Frequency Provider Last Rate Last Admin   • potassium CHLORIDE (KLOR-CON) packet 40 mEq  40 mEq Oral Once Florencio Richardson MD       • magnesium sulfate 2 g in 50 mL premix IVPB  2 g Intravenous Once Florencio Richardson MD       • magnesium oxide (MAG-OX) tablet 400 mg  400 mg Oral Daily Florencio Richardson MD       • polyethylene glycol (MIRALAX) packet 17 g  17 g Oral Daily Florencio Richardson MD       • furosemide (LASIX) tablet 40 mg  40 mg Oral BID Florencio Richardson MD   40 mg at 22 1734   • amLODIPine (NORVASC) tablet 5 mg  5 mg Oral Daily Mandy Hannon DO   5 mg at 22 0857   • insulin lispro (ADMELOG,HumaLOG) - Correction Dose   Subcutaneous TID  Carolyn Peralta DO   2 Units at 22 0859   • insulin lispro (ADMELOG,HumaLOG) - Correction Dose   Subcutaneous Nightly Carolyn Peralta DO       • famotidine (PEPCID) tablet 20 mg  20 mg Oral Nightly José Anne MD   20 mg at 22   • metoPROLOL tartrate (LOPRESSOR) tablet 25 mg  25 mg Per NG tube 2 times per day Sunday Osman PA-C   25 mg at 22   • aspirin chewable 324 mg  324 mg Per NG tube Daily Sunday Osman PA-C   324 mg at 22 0856   • atorvastatin  (LIPITOR) tablet 40 mg  40 mg Oral Nightly Sunday Osman PA-C   40 mg at 08/21/22 2022   • docusate sodium-sennosides (SENOKOT S) 50-8.6 MG 2 tablet  2 tablet Oral BID Sunday Osman PA-C   2 tablet at 08/21/22 0856   • sodium chloride (PF) 0.9 % injection 2 mL  2 mL Intracatheter 2 times per day Sunday Osman PA-C   2 mL at 08/21/22 2022      Current Facility-Administered Medications   Medication Dose Route Frequency Provider Last Rate Last Admin   • dextrose 5 % / sodium chloride 0.45% infusion   Intravenous Continuous Sunday Osman PA-C          Current Facility-Administered Medications   Medication Dose Route Frequency Provider Last Rate Last Admin   • dextrose 50 % injection 25 g  25 g Intravenous PRN Sunday Osman PA-C       • dextrose 50 % injection 12.5 g  12.5 g Intravenous PRN Sunday Osman PA-C       • glucagon (GLUCAGEN) injection 1 mg  1 mg Intramuscular PRN Sunday Osman PA-C       • dextrose (GLUTOSE) 40 % gel 15 g  15 g Oral PRN Sunday Osman PA-C       • dextrose (GLUTOSE) 40 % gel 30 g  30 g Oral PRN Sunday Osman PA-C       • acetaminophen (TYLENOL) tablet 650 mg  650 mg Oral Q6H PRN Sunday Osman PA-C   650 mg at 08/21/22 1210   • ondansetron (ZOFRAN ODT) disintegrating tablet 4 mg  4 mg Oral Q12H PRN Sunday Osman PA-C       • ALPRAZolam (XANAX) tablet 0.25 mg  0.25 mg Oral Q12H PRN Sunday Osman PA-C   0.25 mg at 08/17/22 1157   • magnesium hydroxide (MILK OF MAGNESIA) 400 MG/5ML suspension 30 mL  30 mL Oral Q12H PRN Sunday Osman PA-C            Allergies:   ALLERGIES:  No Known Allergies     Physical Exam:  Vital Last Value 24 Hour Range   Temperature 98.1 °F (36.7 °C) (08/22/22 0820) Temp  Min: 97.8 °F (36.6 °C)  Max: 99.1 °F (37.3 °C)   Pulse 87 (08/22/22 0820) Pulse  Min: 80  Max: 93   Respiratory 17 (08/22/22 0820) Resp  Min: 16  Max: 18   Non-Invasive  Blood Pressure 139/78 (08/22/22 0820) BP  Min: 113/67  Max: 153/78   Pulse Oximetry 94 % (08/22/22 0820) SpO2   Min: 91 %  Max: 94 %   Arterial   Blood Pressure   No data recorded     Tele: SR     Intake/Output:     Intake/Output Summary (Last 24 hours) at 8/22/2022 0909  Last data filed at 8/22/2022 0857  Gross per 24 hour   Intake 1920 ml   Output 2500 ml   Net -580 ml       Weight    08/18/22 0600 08/19/22 0600 08/20/22 0700 08/21/22 0500   Weight: 82 kg (180 lb 12.4 oz) 82.4 kg (181 lb 10.5 oz) 81.6 kg (179 lb 14.3 oz) 81.3 kg (179 lb 3.7 oz)          GENERAL: No apparent distress  HEENT: Normocephalic.  Neck:  Supple neck.   Oral mucosa : Pink and moist.    Endocrine: There is no goiter.  CVS: Regular rate and rhythm.  Normal first and second heart tones.  Sternotomy CDI with clean dressing.  Lung fields: Clear to auscultation bilaterally.   GI: Soft. Nontender, nondistended.    Lower extremity: No cyanosis, clubbing or edema.   Peripheral vascular: Both lower extremities are warm and well perfused.    Neuro: Awake and alert.  Nonfocal examination.  Psych: Appropriate mood and affect  Integumentary: Warm and Dry      Clinical Data:   (PERSONALLY REVIEWED)    Labs    CBC  Recent Labs   Lab 08/19/22  0519 08/18/22  0339 08/17/22  1158 08/17/22  0316   WBC 5.6 6.3  --  5.6   HCT 27.1* 26.8* 26.6* 29.2*   HGB 9.3* 9.3* 9.1* 10.1*   PLT 70* 61*  --  86*       CMP  Recent Labs   Lab 08/22/22  0502 08/21/22  1052 08/20/22  0515   SODIUM 137 140 137   POTASSIUM 3.1* 3.1* 4.5   CHLORIDE 100 101 107   CO2 29 32 25   GLUCOSE 158* 142* 128*   BUN 20 26* 28*   CREATININE 0.75 0.80 0.84   CALCIUM 9.5 9.1 8.7   TOTPROTEIN 6.7 6.3* 5.5*   ALBUMIN 2.8* 2.5* 2.4*   BILIRUBIN 0.9 0.8 0.8   AST 23 17 24   GPT 14 12 10   ALKPT 50 48 42*       Cardiac Labs  Recent Labs   Lab 08/19/22  0413 08/18/22  0339 08/17/22  0315   NTPROB 1,372* 2,174* 1,625*       Lipid Panel  No results found    Coags  Recent Labs   Lab 08/18/22  0339 08/17/22  0315 08/16/22  1749   INR 1.1 1.0 1.1   PTT 26 24 29       ABG  Recent Labs   Lab 08/17/22  0232  08/16/22 2242 08/16/22 2032   RAPH 7.43 7.39 7.39   RAPCO2 34 41 42   RAPO2 68* 98 85   RAHCO3 23 25 25   RASAT 97 99 99       Imaging    ECG:   Encounter Date: 08/16/22   Electrocardiogram 12-Lead   Result Value    Ventricular Rate EKG/Min (BPM) 67    Atrial Rate (BPM) 67    MS-Interval (MSEC) 180    QRS-Interval (MSEC) 80    QT-Interval (MSEC) 462    QTc 488    P Axis (Degrees) 56    R Axis (Degrees) 50    T Axis (Degrees) 64    REPORT TEXT      Sinus rhythm  with marked sinus arrhythmia  Low voltage QRS  Borderline ECG  When compared with ECG of  31-MAR-2022 11:36,  MS interval  has decreased  Questionable change in  QRS duration  Confirmed by JULITO STEPHENS MD ([id number]) on 8/17/2022 10:13:27 AM          TTE 8/4/22  SUMMARY:  1. Left ventricle: The cavity size is normal. Wall thickness is mildly     increased. There is concentric hypertrophy. The ejection fraction was     measured by single plane method of disks. Doppler parameters are     consistent with abnormal left ventricular relaxation (grade 1 diastolic     dysfunction). The ejection fraction is 62%.  2. Aortic valve: There is severe stenosis. Mild regurgitation.  3. Left atrium: The atrium is mildly dilated.  4. Right ventricle: The cavity size is normal. Systolic function is     normal.    Us Vas Carotid 8/4/22  Impression:   No hemodynamically significant (greater than 50%) stenoses of the bilateral  internal and common carotid arteries.    TTE 1/10/22  SUMMARY:  1. Left ventricle: The cavity size is normal. Wall thickness is mildly     increased. There is concentric hypertrophy. The ejection fraction was     measured by biplane method of disks. Doppler parameters are consistent     with abnormal left ventricular relaxation (grade 1 diastolic     dysfunction). The ejection fraction is 62%.  2. Aortic valve: Transvalvular velocity is increased. There is severe     stenosis. The mean systolic gradient is 54mm Hg. The ratio of LVOT to     aortic valve  peak velocity is 0.23.  3. Left atrium: The atrium is mildly to moderately dilated.  4. Right ventricle: Systolic function is normal.         Cardiac cath:   Results for orders placed or performed during the hospital encounter of 22   Cath/PV Case    Addendum: 3/31/2022      · Mid LM lesion with 20% stenosis.  · Ost LAD to Prox LAD lesion with 100% stenosis.        [facility] [place]TER[place]    Tamica Weber     1942 MRN [id number]   [facility] CATH LAB PCP Juan Diego Gallo MD       INTERVENTIONAL CARDIOLOGIST:  Ryley Candelario MD    BRIEF MEDICAL HISTORY:      Tamica Weber is a 79 year old female with past medical history of essential hypertension, hypercholesterolemia, peripheral vascular disease with prior interventions of both carotid arteries, three-vessel CABG 2003, redo CABG 2008, history of TIA/stroke who was referred [place] structural [facility] [place] for evaluation of aortic valve stenosis.     The patient reports symptoms of exertional dyspnea.  The patient reports her symptoms are chronic in nature.  She infrequently experiences a chest pain syndrome.  She otherwise denies palpitations, presyncope, syncope, orthopnea, and paroxysmal nocturnal dyspnea.     The patient recently completed a transthoracic echo demonstrating preserved LV function with severe aortic valve stenosis.  The patient was being referred for a right and left heart catheterization as part of her valvular evaluation     PREPROCEDURE DIAGNOSES:    1. Severe aortic stenosis  2.  History of CABG  3.  Essential hypertension  4.  Hypercholesterolemia    PROCEDURES PERFORMED:    1. Right common femoral arterial access.  2. Right common femoral venous access.  3. Left heart catheterization.  4. Selective coronary angiography.  5. Fluoroscopy.  6. Moderate conscious sedation for 59 minutes.  The patient received 2 mg of Versed, 75 mcg of fentanyl and 0 mg of Benadryl.  A dedicated  staff member was charged with the responsibility of observing the patient's level of consciousness, O2 saturation, end-tidal CO2, blood pressure, and heart rate.   7. Right heart catheterization.  8. Wallace-Mercedes catheter.  9.  Bypass graft angiography    POSTPROCEDURE DIAGNOSES:    1.  Left heart catheterization demonstrates severe native coronary arterial disease in a right dominant system.   2.  Bypass graft angiography demonstrates patent LIMA to LAD  3.  Native coronary angiography demonstrates patent stent in the left main with moderate atherosclerotic disease in the left circumflex distribution.  Native coronary angiography also demonstrates patent stents in the RCA territory.     BRIEF DESCRIPTION OF PROCEDURE:    After informed consent was obtained, the patient was brought to the cardiac cath lab in a fasting state.  The risks and benefits were explained to the patient and the patient was agreed to proceed.      The patient received supervised moderate conscious sedation for 59 minutes.  A dedicated staff member was charged with responsibility of observing the patient's level of consciousness, O2 saturation, end-tidal CO2, blood pressure, and heart rate.     Local anesthetic in the form of lidocaine was administered in the region of the right common femoral artery.  Using a micropuncture needle, access was obtained in the right common femoral artery using ultrasound.  A 4-Prydeinig micro sheath was inserted without complications and an angiogram was performed in the MIRAMONTES projection demonstrating as adequate access.  The micro sheath was subsequently upsized to a 5-Prydeinig standard sheath.    Next, a micropuncture needle was used to obtain access into the right common femoral vein with the use of an ultrasound and a 4-Prydeinig micro sheath was inserted without complications.  This was subsequently upsized to a 7-Prydeinig sheath.    Next, a 7-Prydeinig Wallace-Mercedes catheter was prepped and advanced and used to transduce  pressures in the right atrium, right ventricle, pulmonary artery, and the pulmonary capillary wedge position.  A PA saturation was obtained.       An aortic saturation was obtained.  The cardiac output and index were calculated via the Tracy method.    The pigtail catheter was exchanged for a JR4 followed by JL4 to perform selective coronary angiography.  At the conclusion of the procedure, the last catheter was removed over a a 0.035 J-wire.  The sheaths were sutured in place to be removed in the holding area via manual compression.    CORONARY ANATOMY:    1. Left main artery is a large caliber vessel that bifurcates into the LAD and left circumflex.  There is a stent present in the mid left main artery that has mild in-stent stenosis of 10 to 20%.  2. Left anterior descending artery demonstrates 100% proximal chronic total occlusion.   3. Left circumflex artery is a moderate caliber vessel.  The mid left circumflex demonstrates a focal 70% stenosis.  The first is marginal is high rising and demonstrates a proximal 20% stenosis.  4. Right coronary artery is a moderate caliber vessel that is dominant.  There was difficulty engaging the right coronary artery as the patient has stents previously placed covering the proximal and ostial RCA that are hanging out into the aorta.  No significant coronary disease was demonstrated.                    BYPASS ANGIOGRAPHY  1.  LIMA to LAD: Large-caliber vessel whose proximal and distal anastomosis sites are patent and free of disease.  The distal LAD is visualized as it wraps left ventricular apex with no significant disease.  2.  Vein graft sequential to left circumflex and RCA: 100% ostial occlusion        HEMODYNAMICS:    1. RA pressure 5  2. RV pressure 32/8  3. PA pressure 28/5/15  4. Pulmonary capillary wedge pressure 5  5. Opening aortic pressure 125/47  6.  The annulus of the aortic valve appears to be heavily calcified  7. The cardiac output is calculated as 6.1 L/min  and the cardiac index is calculated as 3.3 L/min/m² via the Tracy method.    8.  PA saturation 69%  9.  AO saturation 89%      ASSESSMENT AND PLAN:    The patient is an 79 year old female who was referred for a right and left heart catheterization for evaluation of her valvular heart disease-aortic valve stenosis.  Right heart catheterization demonstrates preserved intracardiac filling pressures.  Left heart catheterization as described above.  The patient will be referred for a TAVR CT followed by structural heart clinic.    Thank you for allowing me to participate in the care of your patient.      Ryley Candelario MD, [facility] Structural and Interventional Cardiology    3/31/2022 10:00 AM        [facility] CARDIOLOGY CARDIAC CATHETERIZATION REPORT    Tamica Weber     1942 MRN [id number]   [facility] CATH LAB PCP Juan Diego Gallo MD       INTERVENTIONAL CARDIOLOGIST:  Ryley Candelario MD    BRIEF MEDICAL HISTORY:      Tamica Weber is a 79 year old female with past medical history of   essential hypertension, hypercholesterolemia, peripheral vascular disease   with prior interventions of both carotid arteries, three-vessel CABG 2003, redo CABG 2008, history of TIA/stroke who was referred to   structural [facility] cardiology for evaluation of aortic valve stenosis.     The patient reports symptoms of exertional dyspnea.  The patient reports   her symptoms are chronic in nature.  She infrequently experiences a chest   pain syndrome.  She otherwise denies palpitations, presyncope, syncope,   orthopnea, and paroxysmal nocturnal dyspnea.     The patient recently completed a transthoracic echo demonstrating   preserved LV function with severe aortic valve stenosis.  The patient was   being referred for a right and left heart catheterization as part of her   valvular evaluation     PREPROCEDURE DIAGNOSES:    1. Severe aortic stenosis  2.  History of CABG  3.  Essential  hypertension  4.  Hypercholesterolemia    PROCEDURES PERFORMED:    1. Right common femoral arterial access.  2. Right common femoral venous access.  3. Left heart catheterization.  4. Selective coronary angiography.  5. Fluoroscopy.  6. Moderate conscious sedation for 59 minutes.  The patient received 2 mg   of Versed, 75 mcg of fentanyl and 0 mg of Benadryl.  A dedicated staff   member was charged with the responsibility of observing the patient's   level of consciousness, O2 saturation, end-tidal CO2, blood pressure, and   heart rate.   7. Right heart catheterization.  8. [place]-Mercedes catheter.  9.  Bypass graft angiography    POSTPROCEDURE DIAGNOSES:    1.  Left heart catheterization demonstrates severe native coronary   arterial disease in a right dominant system.   2.  Bypass graft angiography demonstrates patent LIMA to LAD  3.  Native coronary angiography demonstrates patent stent in the left main   with moderate atherosclerotic disease in the left circumflex distribution.    Native coronary angiography also demonstrates patent stents in the RCA   territory.     BRIEF DESCRIPTION OF PROCEDURE:    After informed consent was obtained, the patient was brought to the   cardiac cath lab in a fasting state.  The risks and benefits were   explained to the patient and the patient was agreed to proceed.      The patient received supervised moderate conscious sedation for 59   minutes.  A dedicated staff member was charged with responsibility of   observing the patient's level of consciousness, O2 saturation, end-tidal   CO2, blood pressure, and heart rate.     Local anesthetic in the form of lidocaine was administered in the region   of the right common femoral artery.  Using a micropuncture needle, access   was obtained in the right common femoral artery using ultrasound.  A   4-Bhutanese micro sheath was inserted without complications and an angiogram   was performed in the MIRAMONTES projection demonstrating as adequate access.   The   micro sheath was subsequently upsized to a 5-[place] Zealander standard sheath.    Next, a micropuncture needle was used to obtain access into the right   common femoral vein with the use of an ultrasound and a 4-[place] micro   sheath was inserted without complications.  This was subsequently upsized   to a 7-[place] sheath.    Next, a 7-[place]ander [place]-Mercedes catheter was prepped and advanced and used to   transduce pressures in the right atrium, right ventricle, pulmonary   artery, and the pulmonary capillary wedge position.  A PA saturation was   obtained.       An aortic saturation was obtained.  The cardiac output and index were   calculated via the Tracy method.    The pigtail catheter was exchanged for a JR4 followed by JL4 to perform   selective coronary angiography.  At the conclusion of the procedure, the   last catheter was removed over a a 0.035 J-wire.  The sheaths were sutured   in place to be removed in the holding area via manual compression.    CORONARY ANATOMY:    1. Left main artery is a large caliber vessel that bifurcates into the LAD   and left circumflex.  There is a stent present in the mid left main artery   that has mild in-stent stenosis of 10 to 20%.  2. Left anterior descending artery demonstrates 100% proximal chronic   total occlusion.   3. Left circumflex artery is a moderate caliber vessel.  The mid left   circumflex demonstrates a focal 70% stenosis.  The first is marginal is   high rising and demonstrates a proximal 20% stenosis.  4. Right coronary artery is a moderate caliber vessel that is dominant.    There was difficulty engaging the right coronary artery as the patient has   stents previously placed covering the proximal and ostial RCA that are   hanging out into the aorta.  No significant coronary disease was   demonstrated.                    BYPASS ANGIOGRAPHY  1.  LIMA to LAD: Large-caliber vessel whose proximal and distal   anastomosis sites are patent and free of disease.  The  distal LAD is   visualized as it wraps left ventricular apex with no significant disease.  2.  Vein graft sequential to left circumflex and RCA: 100% ostial   occlusion        HEMODYNAMICS:    1. RA pressure 5  2. RV pressure 32/8  3. PA pressure 28/5/15  4. Pulmonary capillary wedge pressure 5  5. Opening aortic pressure 125/47  6.  The annulus of the aortic valve appears to be heavily calcified  7. The cardiac output is calculated as 6.1 L/min and the cardiac index is   calculated as 3.3 L/min/m² via the Tracy method.    8.  PA saturation 69%  9.  AO saturation 89%      ASSESSMENT AND PLAN:    The patient is an 79 year old female who was referred for a right and left   heart catheterization for evaluation of her valvular heart disease-aortic   valve stenosis.  Right heart catheterization demonstrates preserved   intracardiac filling pressures.  Left heart catheterization as described   above.  The patient will be referred for a TAVR CT followed by structural   heart clinic.    Thank you for allowing me to participate in the care of your patient.      Ryley Candelario MD, [facility]  [facility] Structural and Interventional Cardiology    3/31/2022 10:00 AM       Assessment and Plan:      Nonrheumatic aortic valve stenosis  -echo in 1/2022 showed normal LV function with severe aortic stenosis with mean systolic gradient is 54mm Hg. The ratio of LVOT to aortic valve peak velocity is 0.23.  -s/p AVR # 21 Inspiris tissue valve on 8/16/22  -Postop mgmt per CV surgery  -Chest tubes per CV surgery  -Encourage incentive spirometer     S/P CABG  -initial CABG performed March 2003 which was three-vessel bypass. Patient had redo CABG performed January 2008.    -repeat cardiac cath 3/2022 showed mid LM lesion with 20% stenosis and Ost LAD to Prox LAD lesion with 100% stenosis.  -on ASA, BB and statin  -consider increasing metoprolol if HR and BP remain high     Bilateral carotid artery stenosis  -status post carotid endarterectomy.  The  patient being followed by her primary cardiologist with routine surveillance via carotid duplex studies.  -repeat doppler 8/2022 showed no hemodynamically significant (greater than 50%) stenoses of the bilateral internal and common carotid arteries.  -on Asa and statin    Essential hypertension  -BP relatively well controlled   -Home anti-hypertensive medications: Toprol  mg PO QD and Lisinopril-hydrochlorothiazide 20-25 mg PO QD   -Currently on Lopressor 25 mg PO BID and Lasix 40 mg PO BID, Norvasc 5 mg PO QD     Hyperlipidemia  -on statin    Thrombocytopenia  -Monitor CBC  -No signs of bleeding    Plan discussed with patient and family at the bedside.    Thank you for allowing us to participate in this patient's care.  Please do not hesitate to call with any questions or concerns.    This note was created utilizing speech recognition software. Grammatical errors, random word insertions, pronoun errors, and incomplete sentences are an occasional consequence of this system due to software limitations, ambient noise, and hardware issues. Any formal questions or concerns about the content, text or information contained within the body of this dictation should be directly addressed to this provider for clarification so that it can be rectified in a timely fashion.          Patient with ASHLYN on admission BUN 31, Scr 2.35 -->Scr 2.31 (baseline Scr 1.1 10/22).  ASHLYN likely prerenal in setting of hypovolemia due to diarrhea and poor po intake. Given prostate mets, there may also be a post-renal component. Pt w/ mark initially, then removed after passing TOV.  - worsening Cr 12/5  - 1L LR bolus  - trend Cr, avoid nephrotoxic drugs, renally dose meds Patient with ASHLYN on admission BUN 31, Scr 2.35 -->Scr 2.31 (baseline Scr 1.1 10/22). ASHLYN likely prerenal in setting of hypovolemia due to diarrhea and poor po intake. Given prostate mets, there may also be a post-renal component. Pt w/ mark initially, then removed after passing TOV.   - worsening Cr 12/5, may have ATN component  - c/w maintenance fluids  - blood transfusion as above  - strict I/Os  - trend Cr, avoid nephrotoxic drugs, renally dose meds

## 2022-12-06 NOTE — CONSULT NOTE ADULT - SUBJECTIVE AND OBJECTIVE BOX
HPI:  73 y/o male history of metastatic stage IV lung adenocarcinoma, hypothyroidism, depression, anxiety, undergoing chemo brought in by EMS with HHA for altered mental status Monday morning. At baseline patient is A&Ox3, per HHA he was AOx1, not responding to questions or commands. Patient was reported to be hypotensive to SBP 50s in the field, IO was placed and given fluids with improvement in BP. Patient lives along, has HHA 2 days per week for 10 hours, per ED note HHA states she spoke to pt 3 days ago and foung him in his bed Monday morning at which time he was confused. Currently being treated for lung cancer, last chemo 1 month ago.  Patient also has had recurrent falls at home, some associated with head trauma but no loc, syncope, bladder/bowel incontinence.  Otherwise no reported illness, sick contacts, medical changes.  ROS otherwise negative.    Since admission he also has sudden drop in Hb, CT a/p-negative for retroperitoneal bleed, failure to thrive and now has ileus with NGtube placement. He was seen and examined at bedside, he's very anxious, states his mouth is dry and would like water , says NG tube is hurting his throat.     FAMILY HISTORY:    Family Hx substance abuse [ ]yes [ x]no  ITEMS NOT CHECKED ARE NOT PRESENT    SOCIAL HISTORY:   Significant other/partner[ ]  Children[ ]  Restorationist/Spirituality:  Substance hx:  [ ]   Tobacco hx:  [ ]   Alcohol hx: [ ]   Home Opioid hx:  [ ] I-Stop Reference No:  Living Situation: [x ]Home  [ ]Long term care  [ ]Rehab [ ]Other    ADVANCE DIRECTIVES:    DNR/MOLST  [ ]  Living Will  [ ]   DECISION MAKER(s):  [ ] Health Care Proxy(s)  [x ] Surrogate(s)  [ ] Guardian           Name(s): Phone Number(s): joleen Johnston     BASELINE (I)ADL(s) (prior to admission):  [place]: [ ]Total  [x ] Moderate [ ]Dependent    Allergies    No Known Allergies    Intolerances    MEDICATIONS  (STANDING):  dextrose 5% + lactated ringers. 1000 milliLiter(s) (110 mL/Hr) IV Continuous <Continuous>  iron sucrose Injectable 100 milliGRAM(s) IV Push once  levothyroxine Injectable 50 MICROGram(s) IV Push <User Schedule>    MEDICATIONS  (PRN):  trimethobenzamide Injectable 200 milliGRAM(s) IntraMuscular every 12 hours PRN Nausea and/or Vomiting    PRESENT SYMPTOMS: [ ]Unable to self-report  [ ] CPOT [ ] PAINADs [ ] RDOS  Source if other than patient:  [ ]Family   [ ]Team     Pain: [ ]yes [ x]no  QOL impact -   Location -                    Aggravating factors -  Quality -  Radiation -  Timing-  Severity (0-10 scale):  Minimal acceptable level (0-10 scale):     CPOT:    https://www.[facility].org/getattachment/[email or web address]/Critical-Care-Pain-Observation-Tool-(CPOT)    PAIN AD Score:   http://geriatrictoolkit.[facility]/cog/painad.pdf (press ctrl +  left click to view)    Dyspnea:                           [ ]Mild [ ]Moderate [ ]Severe      RDOS:  0 to 2  minimal or no respiratory distress   3  mild distress  4 to 6 moderate distress  >7 severe distress  https://[email or web address].net/handouts/hen/Respiratory_Distress_Observation_Scale.pdf (Ctrl +  left click to view)     Anxiety:                             [ ]Mild [ ]Moderate [ ]Severe  Fatigue:                             [ ]Mild [ ]Moderate [ ]Severe  Nausea:                             [ ]Mild [ ]Moderate [ ]Severe  Loss of appetite:              [ ]Mild [ ]Moderate [ ]Severe  Constipation:                    [ ]Mild [ ]Moderate [ ]Severe    PCSSQ[Palliative Care Spiritual Screening Question]   Severity (0-10):  Score of 4 or > indicate consideration of Chaplaincy referral.  Chaplaincy Referral: [ ] yes [ ] refused [ ] following [x ] Deferred     Caregiver [place]? : [x ] yes [ ] no [ ] Deferred [ ] Declined             Social work referral [x ] Patient & Family Centered Care Referral [ ]     Anticipatory Grief present?:  [ ] yes [x ] no  [ ] Deferred                  Social work referral [ ] Chaplaincy Referral[ ]      Other Symptoms:  [x ]All other review of systems negative     Palliative Performance Status Version 2:     40    %    http://npcrc.org/files/news/palliative_performance_scale_ppsv2.pdf  PHYSICAL EXAM:  Vital Signs Last 24 Hrs  T(C): 37.2 (06 Dec 2022 12:30), Max: 37.2 (06 Dec 2022 12:15)  T(F): 98.9 (06 Dec 2022 12:30), Max: 98.9 (06 Dec 2022 12:15)  HR: 112 (06 Dec 2022 12:30) (104 - 112)  BP: 152/71 (06 Dec 2022 12:30) (116/68 - 152/71)  BP(mean): --  RR: 20 (06 Dec 2022 12:30) (16 - 22)  SpO2: 97% (06 Dec 2022 12:30) (96% - 98%)    Parameters below as of 06 Dec 2022 12:30  Patient On (Oxygen Delivery Method): room air     I&O's Summary    05 Dec 2022 07:01  -  06 Dec 2022 07:00  --------------------------------------------------------  IN: 0 mL / OUT: 150 mL / NET: -150 mL      GENERAL: [ ]Cachexia    [x ]Alert  [x ]Oriented x 4  [ ]Lethargic  [ ]Unarousable  [x ]Verbal  [ ]Non-Verbal  Behavioral:   [ x] Anxiety  [ ] Delirium [ ] Agitation [ ] Other  HEENT:  [ ]Normal   [x ]Dry mouth   [ ]ET Tube/Trach  [ ]Oral lesions, NG tube connected to suction  PULMONARY:   [x ]Clear [ ]Tachypnea  [ ]Audible excessive secretions   [ ]Rhonchi        [ ]Right [ ]Left [ ]Bilateral  [ ]Crackles        [ ]Right [ ]Left [ ]Bilateral  [ ]Wheezing     [ ]Right [ ]Left [ ]Bilateral  [ ]Diminished breath sounds [ ]right [ ]left [ ]bilateral  CARDIOVASCULAR:    [x ]Regular [ ]Irregular [ ]Tachy  [ ]Sam [ ]Murmur [ ]Other  GASTROINTESTINAL:  [x ]Soft  [ ]Distended   [ ]+BS  [x ]Non tender [ ]Tender  [ ]Other [ ]PEG [x ]OGT/ NGT  Last BM: dec 3rd  GENITOURINARY:  [x ]Normal [ ] Incontinent   [ ]Oliguria/Anuria   [ ]Galvin  MUSCULOSKELETAL:   [ ]Normal   [ ]Weakness  [x ]Bed/Wheelchair bound [ ]Edema  NEUROLOGIC:   [x ]No focal deficits  [ ]Cognitive impairment  [ ]Dysphagia [ ]Dysarthria [ ]Paresis [ ]Other   SKIN:   [x ]Normal  [ ]Rash  [ ]Other  [ ]Pressure ulcer(s)       Present on admission [ ]y [ ]n    CRITICAL CARE:  [ ] Shock Present  [ ]Septic [ ]Cardiogenic [ ]Neurologic [ ]Hypovolemic  [ ]  Vasopressors [ ]  Inotropes   [ ]Respiratory failure present [ ]Mechanical ventilation [ ]Non-invasive ventilatory support [ ]High flow    [ ]Acute  [ ]Chronic [ ]Hypoxic  [ ]Hypercarbic [ ]Other  [ ]Other organ failure     LABS:                        7.0    12.32 )-----------( 338      ( 06 Dec 2022 08:29 )             21.7   12-06    137  |  108  |  30<H>  ----------------------------<  91  4.5   |  15<L>  |  3.23<H>    Ca    7.8<L>      06 Dec 2022 08:29  Phos  4.3     12-06  Mg     2.0     12-06    TPro  5.4<L>  /  Alb  2.5<L>  /  TBili  0.4  /  DBili  x   /  AST  25  /  ALT  12  /  AlkPhos  112  12-06        RADIOLOGY & ADDITIONAL STUDIES: < from: CT Abdomen and Pelvis No Cont (12.05.22 @ 13:09) >  No retroperitoneal or extraperitoneal hemorrhage.  Severe ileus.  Anemia.      < end of copied text >  < from: CT Chest No Cont (11.28.22 @ 12:29) >  No acute fractures.  Since October 7, 2022, unchanged right upper lobe malignancy.  Decreased left adrenal metastasis.  Decreased abdominal and pelvic adenopathy.      < end of copied text >      PROTEIN CALORIE MALNUTRITION PRESENT: [ ]mild [ ]moderate [ ]severe [ ]underweight [ ]morbid obesity  https://www.andeal.org/vault/2440/web/files/ONC/Table_Clinical%20Characteristics%20to%20Document%20Malnutrition-White%20JV%20et%20al%202012.pdf    Height (cm): 182.9 (11-28-22 @ 10:16), 182.9 (10-27-22 @ 10:55)  Weight (kg): 72 (11-28-22 @ 11:02), 67.132 (10-27-22 @ 10:55)  BMI (kg/m2): 21.5 (11-28-22 @ 11:02), 20.1 (11-28-22 @ 10:16), 20.1 (10-27-22 @ 10:55)    [ ]PPSV2 < or = to 30% [ ]significant weight loss  [ ]poor nutritional intake  [ ]anasarca[ ]Artificial Nutrition      Other REFERRALS:  [ ]Hospice  [ ]Child Life  [x ]Social Work  [x ]Case management [ ]Holistic Therapy     Goals of Care Document:  HPI:  71 y/o male history of metastatic stage IV lung adenocarcinoma, hypothyroidism, depression, anxiety, undergoing chemo brought in by EMS with HHA for altered mental status for 2 days.  At baseline patient is A&Ox3,  he ;lives alone with HHA, has HHA for 10 hrs for 2 daysa week. per HHA he was AOx1, not responding to questions or commands. Patient was reported to be hypotensive to SBP 50s in the field, IO was placed and given fluids with improvement in BP.  Currently being treated for lung cancer first diagnosed with cancer in August 2022, last chemo 1 month ago.    Patient also has had recurrent falls at home, some associated with head trauma but no loc, syncope, bladder/bowel incontinence.  Otherwise no reported illness, sick contacts, medical changes.  ROS otherwise negative.    Since admission he also has sudden drop in Hb, CT a/p-negative for retroperitoneal bleed, failure to thrive and now has ileus with NG tube placement, no surgical intervention at this time.  He was seen and examined at bedside, he's very anxious, states his mouth is dry and would like water , says NG tube is hurting his throat. Does not complain or SOB.     FAMILY HISTORY:    Family Hx substance abuse [ ]yes [ x]no  ITEMS NOT CHECKED ARE NOT PRESENT    SOCIAL HISTORY:   Significant other/partner[ ]  Children[ ]  Jainism/Spirituality:  Substance hx:  [ ]   Tobacco hx:  [ ]   Alcohol hx: [ ]   Home Opioid hx:  [ ] I-Stop Reference No:  Living Situation: [x ]Home  [ ]Long term care  [ ]Rehab [ ]Other    ADVANCE DIRECTIVES:    DNR/MOLST  [ ]  Living Will  [ ]   DECISION MAKER(s):  [ ] Health Care Proxy(s)  [x ] Surrogate(s)  [ ] Guardian           Name(s): Phone Number(s): Gordon Johnston , does not recall her telephone number, no number listed on file either.     BASELINE (I)ADL(s) (prior to admission):  [place]: [ ]Total  [x ] Moderate [ ]Dependent    Allergies    No Known Allergies    Intolerances    MEDICATIONS  (STANDING):  dextrose 5% + lactated ringers. 1000 milliLiter(s) (110 mL/Hr) IV Continuous <Continuous>  iron sucrose Injectable 100 milliGRAM(s) IV Push once  levothyroxine Injectable 50 MICROGram(s) IV Push <User Schedule>    MEDICATIONS  (PRN):  trimethobenzamide Injectable 200 milliGRAM(s) IntraMuscular every 12 hours PRN Nausea and/or Vomiting    PRESENT SYMPTOMS: [ ]Unable to self-report  [ ] CPOT [ ] PAINADs [ ] RDOS  Source if other than patient:  [ ]Family   [ ]Team     Pain: [ ]yes [ x]no  QOL impact -   Location -                    Aggravating factors -  Quality -  Radiation -  Timing-  Severity (0-10 scale):  Minimal acceptable level (0-10 scale):     CPOT:    https://www.[facility].org/getattachment/[email or web address]/Critical-Care-Pain-Observation-Tool-(CPOT)    PAIN AD Score:   http://geriatrictoolkit.[facility]/cog/painad.pdf (press ctrl +  left click to view)    Dyspnea:                           [ ]Mild [ ]Moderate [ ]Severe      RDOS:  0 to 2  minimal or no respiratory distress   3  mild distress  4 to 6 moderate distress  >7 severe distress  https://[email or web address].net/handouts/hen/Respiratory_Distress_Observation_Scale.pdf (Ctrl +  left click to view)     Anxiety:                             [ ]Mild [ ]Moderate [ ]Severe  Fatigue:                             [ ]Mild [ ]Moderate [ ]Severe  Nausea:                             [ ]Mild [ ]Moderate [ ]Severe  Loss of appetite:              [ ]Mild [ ]Moderate [ ]Severe  Constipation:                    [ ]Mild [ ]Moderate [ ]Severe    PCSSQ[Palliative Care Spiritual Screening Question]   Severity (0-10):  Score of 4 or > indicate consideration of Chaplaincy referral.  Chaplaincy Referral: [ ] yes [ ] refused [ ] following [x ] Deferred     Caregiver [place]? : [x ] yes [ ] no [ ] Deferred [ ] Declined             Social work referral [x ] Patient & Family Centered Care Referral [ ]     Anticipatory Grief present?:  [ ] yes [x ] no  [ ] Deferred                  Social work referral [ ] Chaplaincy Referral[ ]      Other Symptoms:  [x ]All other review of systems negative     Palliative Performance Status Version 2:     40    %    http://[facility].org/files/news/palliative_performance_scale_ppsv2.pdf  PHYSICAL EXAM:  Vital Signs Last 24 Hrs  T(C): 37.2 (06 Dec 2022 12:30), Max: 37.2 (06 Dec 2022 12:15)  T(F): 98.9 (06 Dec 2022 12:30), Max: 98.9 (06 Dec 2022 12:15)  HR: 112 (06 Dec 2022 12:30) (104 - 112)  BP: 152/71 (06 Dec 2022 12:30) (116/68 - 152/71)  BP(mean): --  RR: 20 (06 Dec 2022 12:30) (16 - 22)  SpO2: 97% (06 Dec 2022 12:30) (96% - 98%)    Parameters below as of 06 Dec 2022 12:30  Patient On (Oxygen Delivery Method): room air     I&O's Summary    05 Dec 2022 07:01  -  06 Dec 2022 07:00  --------------------------------------------------------  IN: 0 mL / OUT: 150 mL / NET: -150 mL      GENERAL: [ ]Cachexia    [x ]Alert  [x ]Oriented x 4  [ ]Lethargic  [ ]Unarousable  [x ]Verbal  [ ]Non-Verbal  Behavioral:   [ x] Anxiety  [ ] Delirium [ ] Agitation [ ] Other  HEENT:  [ ]Normal   [x ]Dry mouth   [ ]ET Tube/Trach  [ ]Oral lesions, NG tube connected to suction  PULMONARY:   [x ]Clear [ ]Tachypnea  [ ]Audible excessive secretions   [ ]Rhonchi        [ ]Right [ ]Left [ ]Bilateral  [ ]Crackles        [ ]Right [ ]Left [ ]Bilateral  [ ]Wheezing     [ ]Right [ ]Left [ ]Bilateral  [ ]Diminished breath sounds [ ]right [ ]left [ ]bilateral  CARDIOVASCULAR:    [x ]Regular [ ]Irregular [ ]Tachy  [ ]Sam [ ]Murmur [ ]Other  GASTROINTESTINAL:  [x ]Soft  [ ]Distended   [ ]+BS  [x ]Non tender [ ]Tender  [ ]Other [ ]PEG [x ]OGT/ NGT  Last BM: dec 3rd  GENITOURINARY:  [x ]Normal [ ] Incontinent   [ ]Oliguria/Anuria   [ ]Galvin  MUSCULOSKELETAL:   [ ]Normal   [ ]Weakness  [x ]Bed/Wheelchair bound [ ]Edema  NEUROLOGIC:   [x ]No focal deficits  [ ]Cognitive impairment  [ ]Dysphagia [ ]Dysarthria [ ]Paresis [ ]Other   SKIN:   [x ]Normal  [ ]Rash  [ ]Other  [ ]Pressure ulcer(s)       Present on admission [ ]y [ ]n    CRITICAL CARE:  [ ] Shock Present  [ ]Septic [ ]Cardiogenic [ ]Neurologic [ ]Hypovolemic  [ ]  Vasopressors [ ]  Inotropes   [ ]Respiratory failure present [ ]Mechanical ventilation [ ]Non-invasive ventilatory support [ ]High flow    [ ]Acute  [ ]Chronic [ ]Hypoxic  [ ]Hypercarbic [ ]Other  [ ]Other organ failure     LABS:                        7.0    12.32 )-----------( 338      ( 06 Dec 2022 08:29 )             21.7   12-06    137  |  108  |  30<H>  ----------------------------<  91  4.5   |  15<L>  |  3.23<H>    Ca    7.8<L>      06 Dec 2022 08:29  Phos  4.3     12-06  Mg     2.0     12-06    TPro  5.4<L>  /  Alb  2.5<L>  /  TBili  0.4  /  DBili  x   /  AST  25  /  ALT  12  /  AlkPhos  112  12-06        RADIOLOGY & ADDITIONAL STUDIES: < from: CT Abdomen and Pelvis No Cont (12.05.22 @ 13:09) >  No retroperitoneal or extraperitoneal hemorrhage.  Severe ileus.  Anemia.      < end of copied text >  < from: CT Chest No Cont (11.28.22 @ 12:29) >  No acute fractures.  Since October 7, 2022, unchanged right upper lobe malignancy.  Decreased left adrenal metastasis.  Decreased abdominal and pelvic adenopathy.      < end of copied text >      PROTEIN CALORIE MALNUTRITION PRESENT: [ ]mild [ ]moderate [ ]severe [ ]underweight [ ]morbid obesity  https://www.andeal.org/vault/2440/web/files/ONC/Table_Clinical%20Characteristics%20to%20Document%20Malnutrition-White%20JV%20et%20al%202012.pdf    Height (cm): 182.9 (11-28-22 @ 10:16), 182.9 (10-27-22 @ 10:55)  Weight (kg): 72 (11-28-22 @ 11:02), 67.132 (10-27-22 @ 10:55)  BMI (kg/m2): 21.5 (11-28-22 @ 11:02), 20.1 (11-28-22 @ 10:16), 20.1 (10-27-22 @ 10:55)    [ ]PPSV2 < or = to 30% [ ]significant weight loss  [ ]poor nutritional intake  [ ]anasarca[ ]Artificial Nutrition      Other REFERRALS:  [ ]Hospice  [ ]Child Life  [x ]Social Work  [x ]Case management [ ]Holistic Therapy     Goals of Care Document:

## 2022-12-06 NOTE — PROGRESS NOTE ADULT - PROBLEM SELECTOR PLAN 3
On 12/5 pt found to have distended abdomen. CTAP w/o contrast on 12/5 showing severe ileus. Surgery was formally consulted. Decided not to staff with attending but will continue to follow for serial abdominal exams. Primary team placed sump tube, set to suction. GI also following.  - place sump tube  - NPO except medications  - bladder scan on 12/5, not retaining On 12/5 pt found to have distended abdomen. CTAP w/o contrast on 12/5 showing severe ileus. Surgery was formally consulted. Decided not to staff with attending but will continue to follow for serial abdominal exams. Primary team placed sump tube, set to suction. GI also following.  - placed sump tube 12/5  - bladder scan on 12/5, not retaining  Plan:  - NPO except medications, ice chips okay  - f/u repeat abdominal XR, consider pull sump if XR improved

## 2022-12-06 NOTE — CONSULT NOTE ADULT - ATTENDING COMMENTS
Patient seen and examined with Dr. Donald.  Patient reports 2-3 loose stool per day following chemotherapy. As this is a grade 1 sx per NCI CTCAE and patient declined further endoscopic evaluation, would start with dietary changes as above. Agree with plan above.
Patient seen and examined.  Agree with fellow note.  Meds, labs and vitals all reviewed.  Patient with stage IV lung cancer.   Initiation of goals of care discussion today.  HCP identified.   Code status still remains full.  Patient is unclear about next steps whether to continue to disease modifying interventions vs. transition to hospice.

## 2022-12-06 NOTE — CONSULT NOTE ADULT - PROBLEM SELECTOR RECOMMENDATION 6
Code status -full code  HCP- he would like his niece  Dorys Johnston to be HCP however there is no number on file and no way of contacting her. Will reach out to MIGUEL. 20 minutes spent with patient at bedside.  Code status -full code  HCP- he would like his niece  Dorys Johnston to be HCP however there is no number on file and no way of contacting her. Will reach out to .

## 2022-12-06 NOTE — CHART NOTE - NSCHARTNOTEFT_GEN_A_CORE
Serial Abdominal Exam @ 0000    S: Pt complains of feeling very dry and waiting to drink fluids. He notes his abdominal discomfort is not bad. NGT tube placed ON d/t emesis. Denies any current flatus/BM. Denies CP, SOB, calf tenderness.     O:  T(C): 36.6 (12-05-22 @ 21:24), Max: 36.6 (12-05-22 @ 21:24)  T(F): 97.9 (12-05-22 @ 21:24), Max: 97.9 (12-05-22 @ 21:24)  HR: 109 (12-05-22 @ 21:24) (109 - 109)  BP: 116/68 (12-05-22 @ 21:24) (116/68 - 116/68)  RR: 16 (12-05-22 @ 21:24) (16 - 16)  SpO2: 96% (12-05-22 @ 21:24) (96% - 96%)  Wt(kg): --                        7.1    17.54 )-----------( 390      ( 05 Dec 2022 19:22 )             22.3     12-05    136  |  105  |  30<H>  ----------------------------<  106<H>  3.4<L>   |  16<L>  |  3.35<H>    Ca    8.0<L>      05 Dec 2022 21:32  Phos  1.9     12-05  Mg     2.0     12-05    TPro  5.6<L>  /  Alb  2.4<L>  /  TBili  0.2  /  DBili  x   /  AST  23  /  ALT  9<L>  /  AlkPhos  153<H>  12-05    Neuro: Alert and Oriented X 4. No apparent focal neural deficits  HEENT: NCAT. EOMI, NGT in place - no fluid in canister/tube  Respiratory:  No respiratory distress  Cardiovascular: Non tachy/jaylen  Gastrointestinal: soft, NT/moderately distended. No rebound/guarding  Extremities: wwp    Exam unremarkable, consistent with ileus. NGT in place.    NGT - LIWS  NPO/IVF  Encourage ambulation - monitor for bowel function  Will continue to monitor. No acute surgical intervention indicated.

## 2022-12-06 NOTE — CONSULT NOTE ADULT - PROBLEM SELECTOR RECOMMENDATION 4
Patient with underlying metastatic lung adenocarcinoma with chronic diarrhea and frequent falls, overall appears very frail and cachectic   - dietitian consulted - rec ensure enlive TID  - PT/OT consult - rec SASHA

## 2022-12-06 NOTE — CONSULT NOTE ADULT - CONSULT REASON
AMS
Diarrhea
Lung cancer
PM&R evaluation
ileus
FTT, multiple co morbidities in setting of metastatic lung cancer

## 2022-12-06 NOTE — PROGRESS NOTE ADULT - PROBLEM SELECTOR PLAN 1
Pt with anemia on admission to 9.1. On 12/5, Hb 5.7. Pt asx, mild tachycardia, but normal BP. Pt w/ multiple dark green bowel movements, no overt melena observed. No hemoptysis, no hematuria. Significant bruising on R flank, c/f retroperitoneal bleed but r/o on imaging. Possible GI bleed, GI consulted. Heme onc consulted.  - CTAP w/o contrast 12/5/22: No retroperitoneal or extraperitoneal hemorrhageSevere ileus.    Plan:  - 1 unit pRBCs  - repeat CBC post transfusion, possible additional unit  - monitor vitals  - f/u LDH, Haptoglobin, Reticulocyte count, iron studies, ferritin, folate, B12  - f/u GI recs  - f/u heme onc recs Pt with anemia on admission to 9.1. On 12/5, Hb 5.7. Pt asx, mild tachycardia, but normal BP. Unclear source. Pt w/ multiple dark green bowel movements, no overt melena observed. No hemoptysis, no hematuria. Significant bruising on R flank, c/f retroperitoneal bleed but r/o on imaging. Possible GI bleed, GI consulted. Heme onc consulted.  - CTAP w/o contrast 12/5/22: No retroperitoneal or extraperitoneal hemorrhage. Severe ileus.  - S/p 1 unit pRBC 12/5 w/ good response  - retic index: 1.1 (hypoproliferative), elevated hapto/LDH (hemolysis unlikely), Fe studies showing  Plan:  - additional 1 unit pRBC  - repeat CBC post transfusion  - monitor vitals  - f/u folate, B12  - f/u GI recs  - f/u heme onc recs

## 2022-12-06 NOTE — PROGRESS NOTE ADULT - TIME BILLING
Patient seen and examined;   Iron levels low; Will give iron infusion  Allow ice chips   Supportive measures;  Abdomen improved;  Repeat abdominal film

## 2022-12-06 NOTE — PROGRESS NOTE ADULT - SUBJECTIVE AND OBJECTIVE BOX
GASTROENTEROLOGY PROGRESS NOTE  Patient seen and examined at bedside. Feels unwell, reports dark green stool. NG tube with dark material.    PERTINENT REVIEW OF SYSTEMS:  CONSTITUTIONAL: No fevers or chills  HEENT: No visual changes; No vertigo or throat pain   GASTROINTESTINAL: As above.  NEUROLOGICAL: No numbness or weakness  SKIN: No itching, burning, rashes, or lesions     Allergies    No Known Allergies    Intolerances      MEDICATIONS:  MEDICATIONS  (STANDING):  dextrose 5% + lactated ringers. 1000 milliLiter(s) (110 mL/Hr) IV Continuous <Continuous>  iron sucrose Injectable 100 milliGRAM(s) IV Push once  levothyroxine Injectable 50 MICROGram(s) IV Push <User Schedule>    MEDICATIONS  (PRN):  trimethobenzamide Injectable 200 milliGRAM(s) IntraMuscular every 12 hours PRN Nausea and/or Vomiting    Vital Signs Last 24 Hrs  T(C): 37.2 (06 Dec 2022 12:30), Max: 37.2 (06 Dec 2022 12:15)  T(F): 98.9 (06 Dec 2022 12:30), Max: 98.9 (06 Dec 2022 12:15)  HR: 112 (06 Dec 2022 12:30) (104 - 112)  BP: 152/71 (06 Dec 2022 12:30) (116/68 - 152/71)  BP(mean): --  RR: 20 (06 Dec 2022 12:30) (16 - 22)  SpO2: 97% (06 Dec 2022 12:30) (96% - 98%)    Parameters below as of 06 Dec 2022 12:30  Patient On (Oxygen Delivery Method): room air        12-05 @ 07:01 - 12-06 @ 07:00  --------------------------------------------------------  IN: 0 mL / OUT: 150 mL / NET: -150 mL    12-06 @ 07:01  -  12-06 @ 14:36  --------------------------------------------------------  IN: 330 mL / OUT: 650 mL / NET: -320 mL      PHYSICAL EXAM:    General: lying in bed, in no acute distress, appears chronically ill  HEENT: MMM, conjunctiva and sclera clear  Gastrointestinal: Soft non-tender non-distended; No rebound or guarding  Skin: Warm and dry. No obvious rash    LABS:                        7.0    12.32 )-----------( 338      ( 06 Dec 2022 08:29 )             21.7     12-06    137  |  108  |  30<H>  ----------------------------<  91  4.5   |  15<L>  |  3.23<H>    Ca    7.8<L>      06 Dec 2022 08:29  Phos  4.3     12-06  Mg     2.0     12-06    TPro  5.4<L>  /  Alb  2.5<L>  /  TBili  0.4  /  DBili  x   /  AST  25  /  ALT  12  /  AlkPhos  112  12-06                      Culture - Blood (collected 04 Dec 2022 01:01)  Source: .Blood Blood  Preliminary Report (06 Dec 2022 02:00):    No growth at 2 days.    Urinalysis with Rflx Culture (collected 03 Dec 2022 16:09)      RADIOLOGY & ADDITIONAL STUDIES:  Reviewed

## 2022-12-06 NOTE — PROGRESS NOTE ADULT - PROBLEM SELECTOR PLAN 12
Patient with recently found (08/22) with RUL spiculated nodule with R paratracheal adenopathy and L adrenal nodule. Concern for metastatic cancer because of rectal lesion on PET scan as well as L adrenal area that lit up. Now, s/p FNA of R axillary node, with pathology consistent with metastatic lung adenocarcinoma.  - hold heme/onc consult for now Patient with recently found (08/22) with RUL spiculated nodule with R paratracheal adenopathy and L adrenal nodule. Concern for metastatic cancer because of rectal lesion on PET scan as well as L adrenal area that lit up. Now, s/p FNA of R axillary node, with pathology consistent with metastatic lung adenocarcinoma. Follows w/ Dr. Patricia outpatient.  - f/u heme/onc consult  - palliative consulted

## 2022-12-06 NOTE — PROGRESS NOTE ADULT - ASSESSMENT
71 y/o male history of metastatic stage IV lung adenocarcinoma, hypothyroidism, depression, anxiety, on new chemo as of a few weeks ago ;    Initially brought in by EMS due to altered mental status , as noted by HHA ;   Patient was admitted for SIRS and suspicion for sepsis    GI was consulted for subacute diarrhea, as well as drop in HgB    Differential includes microscopic colitis versus chemo induced / immune checkpoint colitis/diarrhea , as well as UGIB given dark material in NG tube    At bedside, patient is deferring to make a decision on EGD / Flex Sig until tomorrow    Will re-engage patient in shared decision making re: endoscopy tomorrow  Agree with Pall Care Consult    Thank you for the courtesy of this consult. We will follow along with you.    Lux Donald M.D.  Gastroenterology Fellow  Weekday Pager: [phone]  Weeknights/Weekend Coverage: Please Call the  for contact info

## 2022-12-06 NOTE — CONSULT NOTE ADULT - PROBLEM SELECTOR RECOMMENDATION 3
Patient with frequent falls at home, per outpatient nursing notes, patient's HHA have reported falls at home sometimes with head trauma.  He ambulated at baseline with cane.    Etiology likely due to overall decompensation in setting of metastatic cancer: CTH and CT cervical spine without fractures of trauma.    - PT/OT consult - recommending SASHA  - OOBTC daily w/ supervision.

## 2022-12-06 NOTE — CONSULT NOTE ADULT - ASSESSMENT
72 yr old male with recent diagnosis of stage 4 R sided lung cancer admitted for sepsis, FTT , c/c diarrhea and now Ileus. Palliative care was consulted for GOC discussion.

## 2022-12-06 NOTE — PROGRESS NOTE ADULT - SUBJECTIVE AND OBJECTIVE BOX
INTERVAL HPI/OVERNIGHT EVENTS:  Awake and wants ice chips         MEDICATIONS  (STANDING):  dextrose 5% + lactated ringers. 1000 milliLiter(s) (110 mL/Hr) IV Continuous <Continuous>  iron sucrose Injectable 100 milliGRAM(s) IV Push once  levothyroxine Injectable 50 MICROGram(s) IV Push <User Schedule>    MEDICATIONS  (PRN):  trimethobenzamide Injectable 200 milliGRAM(s) IntraMuscular every 12 hours PRN Nausea and/or Vomiting      Allergies    No Known Allergies    Intolerances        Vital Signs Last 24 Hrs  T(C): 36.9 (06 Dec 2022 09:30), Max: 37 (06 Dec 2022 04:38)  T(F): 98.4 (06 Dec 2022 09:30), Max: 98.6 (06 Dec 2022 04:38)  HR: 105 (06 Dec 2022 09:30) (98 - 109)  BP: 128/71 (06 Dec 2022 09:30) (116/68 - 133/73)  BP(mean): --  RR: 18 (06 Dec 2022 09:30) (16 - 20)  SpO2: 97% (06 Dec 2022 09:30) (96% - 98%)    Parameters below as of 06 Dec 2022 09:30  Patient On (Oxygen Delivery Method): room air              Constitutional:  Awake     Eyes: ARBEN    ENMT: Negative    Neck: Supple    Back:  no tenderness     Respiratory:  clear    Cardiovascular: S1 S2    Gastrointestinal:  soft less distended    Genitourinary:    Extremities:  no edema     Vascular:    Neurological:    Skin:    Lymph Nodes:            12-05 @ 07:01  -  12-06 @ 07:00  --------------------------------------------------------  IN: 0 mL / OUT: 150 mL / NET: -150 mL      LABS:                        7.0    12.32 )-----------( 338      ( 06 Dec 2022 08:29 )             21.7     12-06    137  |  108  |  30<H>  ----------------------------<  91  4.5   |  15<L>  |  3.23<H>    Ca    7.8<L>      06 Dec 2022 08:29  Phos  4.3     12-06  Mg     2.0     12-06    TPro  5.4<L>  /  Alb  2.5<L>  /  TBili  0.4  /  DBili  x   /  AST  25  /  ALT  12  /  AlkPhos  112  12-06          RADIOLOGY & ADDITIONAL TESTS:

## 2022-12-07 NOTE — PROGRESS NOTE ADULT - PROBLEM SELECTOR PLAN 12
Patient with recently found (08/22) with RUL spiculated nodule with R paratracheal adenopathy and L adrenal nodule. Concern for metastatic cancer because of rectal lesion on PET scan as well as L adrenal area that lit up. Now, s/p FNA of R axillary node, with pathology consistent with metastatic lung adenocarcinoma. Follows w/ Dr. Patricia outpatient.  - f/u heme/onc consult  - palliative consulted Patient with recently found (08/22) with RUL spiculated nodule with R paratracheal adenopathy and L adrenal nodule. Concern for metastatic cancer because of rectal lesion on PET scan as well as L adrenal area that lit up. Now, s/p FNA of R axillary node, with pathology consistent with metastatic lung adenocarcinoma. Follows w/ Dr. Patricia outpatient.  - f/u heme/onc consult  - palliative consulted: DNR/DNI on 12/7  - attempt family member contact, no available contacts

## 2022-12-07 NOTE — PROGRESS NOTE ADULT - PROBLEM SELECTOR PLAN 5
Patient with hypokalemia, likely due to persistent diarrhea. No EKG changes. On 12/1 K low to 2.1, repeat EKG possible u waves. Pt asymptomatic, no fasiculations or weakness. Aggressively repleted.  - K repleted, goal >4  - diarrhea resolving, K improving

## 2022-12-07 NOTE — PROGRESS NOTE ADULT - SUBJECTIVE AND OBJECTIVE BOX
INTERVAL HPI/OVERNIGHT EVENTS:  Patient agrees to endoscopy  Dr Sampson contacted  Abdomen film noted; Ileus  Patient chief complaint thirsty       MEDICATIONS  (STANDING):  dextrose 5% + lactated ringers. 1000 milliLiter(s) (110 mL/Hr) IV Continuous <Continuous>  levothyroxine Injectable 50 MICROGram(s) IV Push <User Schedule>    MEDICATIONS  (PRN):  trimethobenzamide Injectable 200 milliGRAM(s) IntraMuscular every 12 hours PRN Nausea and/or Vomiting      Allergies    No Known Allergies    Intolerances        Vital Signs Last 24 Hrs  T(C): 36.6 (07 Dec 2022 09:10), Max: 37.2 (06 Dec 2022 12:15)  T(F): 97.8 (07 Dec 2022 09:10), Max: 98.9 (06 Dec 2022 12:15)  HR: 92 (07 Dec 2022 09:10) (92 - 112)  BP: 125/60 (07 Dec 2022 09:10) (102/67 - 152/71)  BP(mean): --  RR: 18 (07 Dec 2022 09:10) (17 - 22)  SpO2: 98% (07 Dec 2022 09:10) (96% - 98%)    Parameters below as of 07 Dec 2022 09:10  Patient On (Oxygen Delivery Method): room air              Constitutional:  Awake and alert     Eyes: ARBEN    ENMT: Negative    Neck: Supple    Back:  no tenderness     Respiratory:  clear    Cardiovascular: S1 S2    Gastrointestinal:  soft    Genitourinary:    Extremities:  no edema    Vascular:    Neurological:    Skin:    Lymph Nodes:            12-06 @ 07:01  -  12-07 @ 07:00  --------------------------------------------------------  IN: 740 mL / OUT: 1001 mL / NET: -261 mL      LABS:                        8.0    11.47 )-----------( 293      ( 06 Dec 2022 16:59 )             25.5     12-06    139  |  113<H>  |  28<H>  ----------------------------<  86  4.5   |  14<L>  |  2.97<H>    Ca    7.6<L>      06 Dec 2022 16:59  Phos  3.1     12-06  Mg     1.8     12-06    TPro  5.4<L>  /  Alb  2.0<L>  /  TBili  0.7  /  DBili  x   /  AST  28  /  ALT  11  /  AlkPhos  113  12-06          RADIOLOGY & ADDITIONAL TESTS:

## 2022-12-07 NOTE — PROGRESS NOTE ADULT - PROBLEM SELECTOR PLAN 1
Recommendation: Patient with encephalopathy and hypotension, meeting 3 SIRS on admission for tachycardia, tachypnea, and leukocytosis.  Source possibly pulmonary and urinary.  Lactate 12.1 --> 2.3, UA trace LE, Bacteria present, hyaline casts, C. Diff negative GI PCR negative.  Lactate cleared, S/p Zosyn and  Vancomycin.    Still unclear source.   AAOx4, mentating well since 11/29.  - UCx no growth final  - BCx NGTD  - ULeg negative  - s/p Vanc 1000mg qD and Zosyn 3.375 q8 (11/28-12/1).

## 2022-12-07 NOTE — PROGRESS NOTE ADULT - PROBLEM SELECTOR PLAN 1
Pt with anemia on admission to 9.1. On 12/5, Hb 5.7. Pt asx, mild tachycardia, but normal BP. Unclear source. Pt w/ multiple dark green bowel movements, no overt melena observed. No hemoptysis, no hematuria. Significant bruising on R flank, c/f retroperitoneal bleed but r/o on imaging. Possible GI bleed, GI consulted. Heme onc consulted.  - CTAP w/o contrast 12/5/22: No retroperitoneal or extraperitoneal hemorrhage. Severe ileus.  - S/p 1 unit pRBC 12/5 w/ good response  - retic index: 1.1 (hypoproliferative), elevated hapto/LDH (hemolysis unlikely), Fe studies showing  Plan:  - additional 1 unit pRBC  - repeat CBC post transfusion  - monitor vitals  - f/u folate, B12  - f/u GI recs  - f/u heme onc recs Pt with anemia on admission to 9.1. On 12/5, Hb 5.7. Pt asx, mild tachycardia, but normal BP. Unclear source. Pt w/ multiple dark green bowel movements, no overt melena observed. No hemoptysis, no hematuria. Significant bruising on R flank, c/f retroperitoneal bleed but r/o on imaging. Possible GI bleed, GI consulted. Heme onc consulted.  - CTAP w/o contrast 12/5/22: No retroperitoneal or extraperitoneal hemorrhage. Severe ileus.  - S/p 1 unit pRBC 12/5 w/ good response, additional 1 unit pRBC on 12/6  - retic index: 1.1 (hypoproliferative), elevated hapto/LDH (hemolysis unlikely), Fe studies showing  Plan:  - 4pm CBC  - monitor vitals  - plan for endoscopy/flex sig w/ GI on 12/7, keep NPO, enema tomorrow  - f/u folate, B12  - f/u GI recs  - f/u heme onc recs

## 2022-12-07 NOTE — PROGRESS NOTE ADULT - ASSESSMENT
71 y/o male history of metastatic stage IV lung adenocarcinoma, hypothyroidism, depression, anxiety, undergoing chemo brought in by EMS with HHA for altered mental status Monday morning admitted for sepsis (unclear source) and electrolyte derangements. Now found to have anemia and severe ileus on 12/6. 73 y/o male history of metastatic stage IV lung adenocarcinoma, hypothyroidism, depression, anxiety, undergoing chemo brought in by EMS with HHA for altered mental status Monday morning admitted for sepsis (unclear source) and electrolyte derangements. Now found to have anemia and severe ileus on 12/6, plan for scope on 12/8 w/ GI.

## 2022-12-07 NOTE — PROGRESS NOTE ADULT - NS ATTEST RISK PROBLEM GEN_ALL_CORE FT
Stage IV advancing lung cancer.  Decision made for DNR/DNI.  Discussions about no escalation of care.

## 2022-12-07 NOTE — PROGRESS NOTE ADULT - SUBJECTIVE AND OBJECTIVE BOX
SUBJECTIVE AND OBJECTIVE:  Indication for Geriatrics and Palliative Care Services/INTERVAL HPI: 73 y/o male history of metastatic stage IV lung adenocarcinoma, hypothyroidism, depression, anxiety, undergoing chemo brought in by EMS with HHA for altered mental status for 2 days.  At baseline patient is A&Ox3,  he ;lives alone with HHA, has HHA for 10 hrs for 2 daysa week. per HHA he was AOx1, not responding to questions or commands. Patient was reported to be hypotensive to SBP 50s in the field, IO was placed and given fluids with improvement in BP.  Currently being treated for lung cancer first diagnosed with cancer in August 2022, last chemo 1 month ago.    Patient also has had recurrent falls at home, some associated with head trauma but no loc, syncope, bladder/bowel incontinence.  Otherwise no reported illness, sick contacts, medical changes.  ROS otherwise negative.    Since admission he also has sudden drop in Hb, CT a/p-negative for retroperitoneal bleed, failure to thrive and now has ileus with NG tube placement, no surgical intervention at this time.  He was seen and examined at bedside, he's very anxious, states his mouth is dry and would like water , says NG tube is hurting his throat. Does not complain or SOB.       OVERNIGHT EVENTS: patient seen and examined at bedside, he's very upset that he's NPO and has to eat ice chips all day, he still continues to have diarrhea 4 times  a day.    DNR on chart:Yes  Yes      Allergies    No Known Allergies    Intolerances    MEDICATIONS  (STANDING):  dextrose 5% + lactated ringers. 1000 milliLiter(s) (110 mL/Hr) IV Continuous <Continuous>  levothyroxine Injectable 50 MICROGram(s) IV Push <User Schedule>    MEDICATIONS  (PRN):  trimethobenzamide Injectable 200 milliGRAM(s) IntraMuscular every 12 hours PRN Nausea and/or Vomiting      ITEMS UNCHECKED ARE NOT PRESENT    PRESENT SYMPTOMS: [ ]Unable to self-report - see [ ] CPOT [ ] PAINADS [ ] RDOS  Source if other than patient:  [ ]Family   [ ]Team     Pain:  [ ]yes [x ]no  QOL impact -   Location -                    Aggravating factors -  Quality -  Radiation -  Timing-  Severity (0-10 scale):  Minimal acceptable level (0-10 scale):     CPOT:    https://www.[facility].org/getattachment/fvc37w96-7x4b-9a7j-0n8u-1582e9729d7q/Critical-Care-Pain-Observation-Tool-(CPOT)    PAIN AD Score:	  [email or web address]/painad.pdf (Ctrl + left click to view)    Dyspnea:                           [ ]Mild [ ]Moderate [ ]Severe    RDOS:  0 to 2  minimal or no respiratory distress   3  mild distress  4 to 6 moderate distress  >7 severe distress  https://[email or web address].[email or web address]/handouts/hen/Respiratory_Distress_Observation_Scale.pdf (Ctrl +  left click to view)     Anxiety:                             [ ]Mild [ ]Moderate [ ]Severe  Fatigue:                             [ ]Mild [ ]Moderate [ ]Severe  Nausea:                             [ ]Mild [ ]Moderate [ ]Severe  Loss of appetite:              [ ]Mild [ ]Moderate [ ]Severe  Constipation:                    [ ]Mild [ ]Moderate [ ]Severe    PCSSQ[Palliative Care Spiritual Screening Question]   Severity (0-10):  Score of 4 or > indicate consideration of Chaplaincy referral.  Chaplaincy Referral: [ ] yes [ ] refused [ ] following [ ] Deferred     Caregiver [place]? : [ ] yes [ ] no [ ] Deferred [ ] Declined             Social work referral [ ] Patient & Family Centered Care Referral [ ]     Anticipatory Grief present?:  [ ] yes [ ] no  [ ] Deferred                  Social work referral [ ] Chaplaincy Referral[ ]      Other Symptoms:  [x ]All other review of systems negative     Palliative Performance Status Version 2:    40     %      [email or web address]/files/[email or web address]_performance_[email or web address]_[email or web address].pdf  PHYSICAL EXAM:  Vital Signs Last 24 Hrs  T(C): 36.6 (07 Dec 2022 09:10), Max: 36.7 (07 Dec 2022 04:59)  T(F): 97.8 (07 Dec 2022 09:10), Max: 98 (07 Dec 2022 04:59)  HR: 92 (07 Dec 2022 09:10) (92 - 107)  BP: 125/60 (07 Dec 2022 09:10) (102/67 - 151/82)  BP(mean): --  RR: 18 (07 Dec 2022 09:10) (17 - 18)  SpO2: 98% (07 Dec 2022 09:10) (96% - 98%)    Parameters below as of 07 Dec 2022 09:10  Patient On (Oxygen Delivery Method): room air     I&O's Summary    06 Dec 2022 07:01  -  07 Dec 2022 07:00  --------------------------------------------------------  IN: 740 mL / OUT: 1001 mL / NET: -261 mL       GENERAL: [ ]Cachexia    [x ]Alert  [x ]Oriented x 4  [ ]Lethargic  [ ]Unarousable  [ x]Verbal  [ ]Non-Verbal  Behavioral:   [x ]Anxiety  [ ]Delirium [ ]Agitation [ ]Other  HEENT:  [ ]Normal   [x ]Dry mouth   [ ]ET Tube/Trach  [ ]Oral lesions  PULMONARY:   [x ]Clear [ ]Tachypnea  [ ]Audible excessive secretions   [ ]Rhonchi        [ ]Right [ ]Left [ ]Bilateral  [ ]Crackles        [ ]Right [ ]Left [ ]Bilateral  [ ]Wheezing     [ ]Right [ ]Left [ ]Bilateral  [ ]Diminished BS [ ] Right [ ]Left [ ]Bilateral  CARDIOVASCULAR:    [x ]Regular [ ]Irregular [ ]Tachy  [ ]Sam [ ]Murmur [ ]Other  GASTROINTESTINAL:  [x ]Soft  [ ]Distended   [ x]+BS  [ x]Non tender [ ]Tender  [ ]Other [ ]PEG [x ]OGT/ NGT   Last BM:  dec 3rd.   GENITOURINARY:  [x ]Normal [ ]Incontinent   [ ]Oliguria/Anuria   [ ]Galvin  MUSCULOSKELETAL:   [ ]Normal   [ ]Weakness  [x ]Bed/Wheelchair bound [ ]Edema  NEUROLOGIC:   [x ]No focal deficits  [ ] Cognitive impairment  [ ] Dysphagia [ ]Dysarthria [ ] Paresis [ ]Other   SKIN:   [x ]Normal  [ ]Rash  [ ]Other  [ ]Pressure ulcer(s) [ ]y [ ]n present on admission    CRITICAL CARE:  [ ]Shock Present  [ ]Septic [ ]Cardiogenic [ ]Neurologic [ ]Hypovolemic  [ ]Vasopressors [ ]Inotropes  [ ]Respiratory failure present [ ]Mechanical Ventilation [ ]Non-invasive ventilatory support [ ]High-Flow   [ ]Acute  [ ]Chronic [ ]Hypoxic  [ ]Hypercarbic [ ]Other  [ ]Other organ failure     LABS:                        7.2    7.80  )-----------( 250      ( 07 Dec 2022 11:43 )             22.4   12-07    144  |  114<H>  |  23  ----------------------------<  100<H>  4.4   |  19<L>  |  2.68<H>    Ca    7.4<L>      07 Dec 2022 11:43  Phos  2.8     12-07  Mg     1.7     12-07    TPro  5.0<L>  /  Alb  1.9<L>  /  TBili  0.3  /  DBili  x   /  AST  26  /  ALT  10  /  AlkPhos  115  12-07        RADIOLOGY & ADDITIONAL STUDIES: < from: Xray Kidney Ureter Bladder (12.07.22 @ 10:42) >  supine views demonstrate improved bowel distention   consistent with improving ileus. NG tube in the stomach. Evaluation for   pneumoperitoneum is limited without upright views.    < end of copied text >      Protein Calorie Malnutrition Present: [ ]mild [ ]moderate [ ]severe [ ]underweight [ ]morbid obesity  https://www.andeal.org/vault/2440/web/files/ONC/Table_Clinical%20Characteristics%20to%20Document%20Malnutrition-White%20JV%20et%20al%202012.pdf    Height (cm): 182.9 (11-28-22 @ 10:16), 182.9 (10-27-22 @ 10:55)  Weight (kg): 72 (11-28-22 @ 11:02), 67.132 (10-27-22 @ 10:55)  BMI (kg/m2): 21.5 (11-28-22 @ 11:02), 20.1 (11-28-22 @ 10:16), 20.1 (10-27-22 @ 10:55)    [ ]PPSV2 < or = 30%  [ ]significant weight loss [ ]poor nutritional intake [ ]anasarca[ ]Artificial Nutrition    Other REFERRALS:  [ ]Hospice  [ ]Child Life  [x ]Social Work  [x ]Case management [ ]Holistic Therapy     Goals of Care Document:

## 2022-12-07 NOTE — PROGRESS NOTE ADULT - TIME BILLING
evaluation and management of ileus, review of labs and imaging, discussion with patient, documentation

## 2022-12-07 NOTE — PROGRESS NOTE ADULT - ASSESSMENT
73 y/o male history of metastatic stage IV lung adenocarcinoma (carboplatin, pemetrexed, pembrolizumab on C1D1 10/27), hypothyroidism, depression, anxiety, undergoing chemo brought in by EMS with HHA for altered mental status. Found to have sepsis and ASHLYN. Oncology consulted given recent treatment of lung cancer and possible therapy releated side effects.    # Metastatic lung adenocarcinoma, AJCC Stage IV  Pt with R axillary LN biopsy positive for metastatic lung adenocarcinoma, with FDG avidity in thoracic, abdominal, inguinal LNs, as well as rectal thickening. Pt has not had any recent colonoscopies to further investigate GI involvement. Repeat CT CAP with increase in primary lung mass, persistent rectal thickening and other sites of disease as previously described. 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 Received Cycle 1 of chemo with carboplatin, pemetrexed, and pembrolizumab on 10/27/22. Now admitted with sepsis and ASHLYN, possibly 2/2 infection vs chemotherapy. Patient states that diarrhea started 1-2 weeks after chemotherapy and has been having a 2-3 bowel movements for the past 2 weeks. 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 Interval CT scan on admission 11/28 showing unchanged right upper lobe malignancy since October 7, 2022. Decreased left adrenal metastasis. Decreased abdominal and pelvic adenopathy.  - Will hold off on further chemotherapy until outpatient and improvement of ASHLYN    # Leukocytosis  Persistent neutrophilic predominant leukocytosis from admission, prior to systemic chemoimmunotherapy WBC was normal in October 2022. Reported about 1 month of diarrhea post treatment. WBC count fluctuating between 16-25 during current admission, afebrile and with negative infectious work up. 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  - Would check stool calprotectin, tissue transglutaminase (TTG) IgA-antibody, total IgA level, and a serum inflammatory marker (eg, C-reactive protein [CRP])  - GI following for EGD/Colonoscopy discussion    # Microcytic anemia  Hemoglobin baseline August-October 2022 was 13-15, in 2021 was ~10. During current hospitalization, patient has a downtrend in hemoglobin from 11.7 on admission 11/28 (possibly concentrated) to 7.4 on 12/4. On 12/5 with drop in hemoglobin to 5.7. Would be concerned for acute losses. CT Abd/Pelvis admission without acute abdominal findings, but patient has had persistent diarrhea/decrease in hgb. Given dark brown/black gastric contents and persistent drop in Hgb, would be concerned for acute GI bleed. Ferritin normal and no iron deficiency (iron sat 31%), Unlikely hemolysis given elevated hapto, slightly elevated LDH.  - Would consider repeat CT abd/pelvis given drop in Hgb and to rule out complications of immune checkpoint inhibitor colitis  - Would supplement with multivitamin when able to tolerate PO  - Maintain active type and screen  - Transfuse for hemoglobin <7 or active bleeding  - GI following for EGD/Colonoscopy discussion    Discussed with Dr. Patricia.

## 2022-12-07 NOTE — PROGRESS NOTE ADULT - SUBJECTIVE AND OBJECTIVE BOX
Hematology Oncology Progress Note      HPI:  71 y/o male history of metastatic stage IV lung adenocarcinoma, hypothyroidism, depression, anxiety, undergoing chemo brought in by EMS with HHA for altered mental status Monday morning. At baseline patient is A&Ox3, per HHA he was AOx1, not responding to questions or commands. Patient was reported to be hypotensive to SBP 50s in the field, IO was placed and given fluids with improvement in BP. Patient lives along, has HHA 2 days per week for 10 hours, per ED note HHA states she spoke to pt 3 days ago and foung him in his bed Monday morning at which time he was confused. Currently being treated for lung cancer, last chemo 1 month ago.  Patient also has had recurrent falls at home, some associated with head trauma but no loc, syncope, bladder/bowel incontinence.  Otherwise no reported illness, sick contacts, medical changes.  ROS otherwise negative.      ED Course   Vitals: Temp 98.7,  --> 86, /63, RR 20, SaO2 98% on 4L NC --> 97% room air   Labs: WBC 16.24, Hgb 11.7, Plt 449, Na 143, K 3.2, CO2 18, AG 24, BUN 31, Scr 2.35, Ca 7.6, Ma 1.4, Lactate 12.1 --> 2.3, UA trace LE, Bacteria present, hyaline casts, C. Diff negative GI PCR negative  EKG: sinus tachycardia, 1st degree AV block, narrow QRS, prolonged QTC  CXR: Known right upper lobe pulmonary mass   CT chest, abdomen, pelvis: No acute fractures. Unchanged right upper lobe malignancy. Decreased left adrenal metastasis. Decreased abdominal and pelvic adenopathy.  CTH: No intracranial hemorrhage or acute transcortical infarct.  Generalized volume loss with small vessel ischemic disease.  CT C-spine: No fracture. Levoscoliosis with Grade 1 anterolisthesis of C2 on C3 and C7 on T1. Multilevel cervical spondylosis. Right apical lung spiculated mass.  Interventions: Tylenol ig IV, Ca Gluconate 2g, MgSO4 2g x 2, KCl 40mg po x 1, KCl 10mg IV x 5, Zosyn 3.375 x 3, Vancomycin 1250mg, NaCl 2200cc bolus   (28 Nov 2022 21:33)      Interval History: NG tube placement with dark gastric contents    SUBJECTIVE: Patient seen and examined at bedside. Anxious to do endoscopy, but knows that he needs to find if there is bleeding. Currently NPO and thirsty.    OBJECTIVE:    VITAL SIGNS:  ICU Vital Signs Last 24 Hrs  T(C): 36.6 (07 Dec 2022 09:10), Max: 36.7 (07 Dec 2022 04:59)  T(F): 97.8 (07 Dec 2022 09:10), Max: 98 (07 Dec 2022 04:59)  HR: 92 (07 Dec 2022 09:10) (92 - 107)  BP: 125/60 (07 Dec 2022 09:10) (102/67 - 151/82)  BP(mean): --  ABP: --  ABP(mean): --  RR: 18 (07 Dec 2022 09:10) (17 - 18)  SpO2: 98% (07 Dec 2022 09:10) (96% - 98%)    O2 Parameters below as of 07 Dec 2022 09:10  Patient On (Oxygen Delivery Method): room air              12-06 @ 07:01  -  12-07 @ 07:00  --------------------------------------------------------  IN: 740 mL / OUT: 1001 mL / NET: -261 mL      CAPILLARY BLOOD GLUCOSE      POCT Blood Glucose.: 102 mg/dL (07 Dec 2022 12:38)      PHYSICAL EXAM:  General: elderly, thin, pale, NAD, answering questions, pleasantly conversant  HEENT: NC/AT; PERRL, clear conjunctiva, NGT tube draining dark gastric contents  Neck: supple  Respiratory: CTA b/l  Cardiovascular: +S1/S2; RRR  Abdomen: slightly distended, tympanic, non tender, slightly improved from prior exams  Extremities: WWP, 2+ peripheral pulses b/l; no LE edema  Skin: normal color and turgor; no rash  Neurological: AAOx3    MEDICATIONS:  MEDICATIONS  (STANDING):  dextrose 5% + lactated ringers. 1000 milliLiter(s) (110 mL/Hr) IV Continuous <Continuous>  levothyroxine Injectable 50 MICROGram(s) IV Push <User Schedule>    MEDICATIONS  (PRN):  trimethobenzamide Injectable 200 milliGRAM(s) IntraMuscular every 12 hours PRN Nausea and/or Vomiting      ALLERGIES:  Allergies    No Known Allergies    Intolerances        LABS:                        7.2    7.80  )-----------( 250      ( 07 Dec 2022 11:43 )             22.4     12-07    144  |  114<H>  |  23  ----------------------------<  100<H>  4.4   |  19<L>  |  2.68<H>    Ca    7.4<L>      07 Dec 2022 11:43  Phos  2.8     12-07  Mg     1.7     12-07    TPro  5.0<L>  /  Alb  1.9<L>  /  TBili  0.3  /  DBili  x   /  AST  26  /  ALT  10  /  AlkPhos  115  12-07                    RADIOLOGY & ADDITIONAL TESTS: Reviewed.

## 2022-12-07 NOTE — PROGRESS NOTE ADULT - PROBLEM SELECTOR PLAN 9
Patient with underlying metastatic lung adenocarcinoma with chronic diarrhea and frequent falls, overall appears very frail.    - dietitian consulted - rec ensure enlive TID  - PT/OT consult - rec SASHA  - s/p 1L LR bolus on 12/2  - palliative consult  - contact family

## 2022-12-07 NOTE — PROGRESS NOTE ADULT - SUBJECTIVE AND OBJECTIVE BOX
***INCOMPLETE NOTE    SUBJECTIVE / INTERVAL HPI: Patient seen and examined at bedside. No overnight events.    VITAL SIGNS:  Vital Signs Last 24 Hrs  T(C): 36.7 (07 Dec 2022 04:59), Max: 37.2 (06 Dec 2022 12:15)  T(F): 98 (07 Dec 2022 04:59), Max: 98.9 (06 Dec 2022 12:15)  HR: 100 (07 Dec 2022 04:59) (99 - 112)  BP: 102/67 (07 Dec 2022 04:59) (102/67 - 152/71)  BP(mean): --  RR: 17 (07 Dec 2022 04:59) (17 - 22)  SpO2: 97% (07 Dec 2022 04:59) (96% - 98%)    Parameters below as of 07 Dec 2022 04:59  Patient On (Oxygen Delivery Method): room air        PHYSICAL EXAM:    General: Resting comfortably in bed; NAD  HEENT: NC/AT, EOMI, anicteric sclera, MMM, neck supple, no nasal discharge  Cardiac: RRR; normal S1/S2, no MRG, no LE edema, no JVD  Respiratory: CTAB; no wheezes, ronchi, increased work of breathing, retractions  Gastrointestinal: +BSx4, abdomen soft, NT/ND; no rebound or guarding  Genitourinary: no suprapubic tenderness; mark*; normal external genitalia  Extremities: WWP, no clubbing or cyanosis; no peripheral edema  Vascular: 2+ radial and DP pulses bilaterally  Dermatologic: skin warm, dry and intact; no rashes, open wounds  Neurologic: AAOx3; no focal deficits  Psychiatric: affect and characteristics of appearance, verbalizations, behaviors are appropriate    MEDICATIONS:  MEDICATIONS  (STANDING):  dextrose 5% + lactated ringers. 1000 milliLiter(s) (110 mL/Hr) IV Continuous <Continuous>  levothyroxine Injectable 50 MICROGram(s) IV Push <User Schedule>    MEDICATIONS  (PRN):  trimethobenzamide Injectable 200 milliGRAM(s) IntraMuscular every 12 hours PRN Nausea and/or Vomiting      ALLERGIES:  Allergies    No Known Allergies    Intolerances        LABS:                        8.0    11.47 )-----------( 293      ( 06 Dec 2022 16:59 )             25.5     12-06    139  |  113<H>  |  28<H>  ----------------------------<  86  4.5   |  14<L>  |  2.97<H>    Ca    7.6<L>      06 Dec 2022 16:59  Phos  3.1     12-06  Mg     1.8     12-06    TPro  5.4<L>  /  Alb  2.0<L>  /  TBili  0.7  /  DBili  x   /  AST  28  /  ALT  11  /  AlkPhos  113  12-06        CAPILLARY BLOOD GLUCOSE      POCT Blood Glucose.: 106 mg/dL (06 Dec 2022 12:05)      RADIOLOGY & ADDITIONAL TESTS: Reviewed.   SUBJECTIVE / INTERVAL HPI: Patient seen and examined at bedside. No overnight events. Patient feels thirsty this morning. Agreeable to endoscopy/colonoscopy. Denies fevers, shortness of breath, chest pain, difficulty breathing. No abdominal pain. Nausea improving though still present. No emesis. 1 BM in last 24 hrs. Patient anxious about his condition.    VITAL SIGNS:  Vital Signs Last 24 Hrs  T(C): 36.7 (07 Dec 2022 04:59), Max: 37.2 (06 Dec 2022 12:15)  T(F): 98 (07 Dec 2022 04:59), Max: 98.9 (06 Dec 2022 12:15)  HR: 100 (07 Dec 2022 04:59) (99 - 112)  BP: 102/67 (07 Dec 2022 04:59) (102/67 - 152/71)  BP(mean): --  RR: 17 (07 Dec 2022 04:59) (17 - 22)  SpO2: 97% (07 Dec 2022 04:59) (96% - 98%)    Parameters below as of 07 Dec 2022 04:59  Patient On (Oxygen Delivery Method): room air        PHYSICAL EXAM:  General: Resting in bed, anxious  HEENT: NC/AT, EOMI, dry MM, neck supple, no nasal discharge  Cardiac: RRR; normal S1/S2, no MRG, no LE edema  Respiratory: CTAB anteriorly; no wheezes, ronchi, increased work of breathing, retractions  Gastrointestinal: +BSx4, abdomen distended, improved from yesterday; no rebound or guarding; NGtube in place, dark green output  Extremities: WWP x4; no peripheral edema  Dermatologic: skin warm, dry and intact; no rashes, open wounds  Neurologic: AAOx3; no focal     MEDICATIONS:  MEDICATIONS  (STANDING):  dextrose 5% + lactated ringers. 1000 milliLiter(s) (110 mL/Hr) IV Continuous <Continuous>  levothyroxine Injectable 50 MICROGram(s) IV Push <User Schedule>    MEDICATIONS  (PRN):  trimethobenzamide Injectable 200 milliGRAM(s) IntraMuscular every 12 hours PRN Nausea and/or Vomiting      ALLERGIES:  Allergies    No Known Allergies    Intolerances        LABS:                        8.0    11.47 )-----------( 293      ( 06 Dec 2022 16:59 )             25.5     12-06    139  |  113<H>  |  28<H>  ----------------------------<  86  4.5   |  14<L>  |  2.97<H>    Ca    7.6<L>      06 Dec 2022 16:59  Phos  3.1     12-06  Mg     1.8     12-06    TPro  5.4<L>  /  Alb  2.0<L>  /  TBili  0.7  /  DBili  x   /  AST  28  /  ALT  11  /  AlkPhos  113  12-06        CAPILLARY BLOOD GLUCOSE      POCT Blood Glucose.: 106 mg/dL (06 Dec 2022 12:05)      RADIOLOGY & ADDITIONAL TESTS: Reviewed.   SUBJECTIVE / INTERVAL HPI: Patient seen and examined at bedside. No overnight events. Patient feels thirsty this morning. Agreeable to endoscopy/colonoscopy. Denies fevers, shortness of breath, chest pain, difficulty breathing. No abdominal pain. Nausea improving though still present. No emesis. 1 BM in last 24 hrs. Patient anxious about his condition.    VITAL SIGNS:  Vital Signs Last 24 Hrs  T(C): 36.7 (07 Dec 2022 04:59), Max: 37.2 (06 Dec 2022 12:15)  T(F): 98 (07 Dec 2022 04:59), Max: 98.9 (06 Dec 2022 12:15)  HR: 100 (07 Dec 2022 04:59) (99 - 112)  BP: 102/67 (07 Dec 2022 04:59) (102/67 - 152/71)  BP(mean): --  RR: 17 (07 Dec 2022 04:59) (17 - 22)  SpO2: 97% (07 Dec 2022 04:59) (96% - 98%)    Parameters below as of 07 Dec 2022 04:59  Patient On (Oxygen Delivery Method): room air        PHYSICAL EXAM:  General: Resting in bed, anxious  HEENT: NC/AT, EOMI, dry MM, chapped lips neck supple, no nasal discharge  Cardiac: RRR; normal S1/S2, no MRG, no LE edema  Respiratory: CTAB anteriorly; no wheezes, ronchi, increased work of breathing, retractions  Gastrointestinal: +BSx4, abdomen distended, tympanic, improved from yesterday; no rebound or guarding; NGtube in place, dark green output (1 L total)  Extremities: WWP x4; no peripheral edema  Dermatologic: skin warm, dry and intact; no rashes, open wounds  Neurologic: AAOx3; no focal     MEDICATIONS:  MEDICATIONS  (STANDING):  dextrose 5% + lactated ringers. 1000 milliLiter(s) (110 mL/Hr) IV Continuous <Continuous>  levothyroxine Injectable 50 MICROGram(s) IV Push <User Schedule>    MEDICATIONS  (PRN):  trimethobenzamide Injectable 200 milliGRAM(s) IntraMuscular every 12 hours PRN Nausea and/or Vomiting      ALLERGIES:  Allergies    No Known Allergies    Intolerances        LABS:                        8.0    11.47 )-----------( 293      ( 06 Dec 2022 16:59 )             25.5     12-06    139  |  113<H>  |  28<H>  ----------------------------<  86  4.5   |  14<L>  |  2.97<H>    Ca    7.6<L>      06 Dec 2022 16:59  Phos  3.1     12-06  Mg     1.8     12-06    TPro  5.4<L>  /  Alb  2.0<L>  /  TBili  0.7  /  DBili  x   /  AST  28  /  ALT  11  /  AlkPhos  113  12-06        CAPILLARY BLOOD GLUCOSE      POCT Blood Glucose.: 106 mg/dL (06 Dec 2022 12:05)      RADIOLOGY & ADDITIONAL TESTS: Reviewed.

## 2022-12-07 NOTE — PROGRESS NOTE ADULT - SUBJECTIVE AND OBJECTIVE BOX
GASTROENTEROLOGY PROGRESS NOTE  Patient seen and examined at bedside. NG tube still putting out dark bilious fluid. Patient unsure whether he wants endoscopy. He met with Palliative Care    PERTINENT REVIEW OF SYSTEMS:  CONSTITUTIONAL: No weakness, fevers or chills  HEENT: No visual changes; No vertigo or throat pain   GASTROINTESTINAL: As above.  NEUROLOGICAL: No numbness or weakness  SKIN: No itching, burning, rashes, or lesions     Allergies    No Known Allergies    Intolerances      MEDICATIONS:  MEDICATIONS  (STANDING):  dextrose 5% + lactated ringers. 1000 milliLiter(s) (110 mL/Hr) IV Continuous <Continuous>  levothyroxine Injectable 50 MICROGram(s) IV Push <User Schedule>    MEDICATIONS  (PRN):  trimethobenzamide Injectable 200 milliGRAM(s) IntraMuscular every 12 hours PRN Nausea and/or Vomiting    Vital Signs Last 24 Hrs  T(C): 36.7 (07 Dec 2022 04:59), Max: 37.2 (06 Dec 2022 12:15)  T(F): 98 (07 Dec 2022 04:59), Max: 98.9 (06 Dec 2022 12:15)  HR: 100 (07 Dec 2022 04:59) (99 - 112)  BP: 102/67 (07 Dec 2022 04:59) (102/67 - 152/71)  BP(mean): --  RR: 17 (07 Dec 2022 04:59) (17 - 22)  SpO2: 97% (07 Dec 2022 04:59) (96% - 98%)    Parameters below as of 07 Dec 2022 04:59  Patient On (Oxygen Delivery Method): room air        12-06 @ 07:01  -  12-07 @ 07:00  --------------------------------------------------------  IN: 740 mL / OUT: 1001 mL / NET: -261 mL      PHYSICAL EXAM:    General: lying in bed, in no acute distress, NG tube in place, appears chronically ill  HEENT: dry MM, conjunctiva and sclera clear, +ng   Gastrointestinal: Soft non-tender non-distended; No rebound or guarding  Skin: Warm and dry. No obvious rash    LABS:                        8.0    11.47 )-----------( 293      ( 06 Dec 2022 16:59 )             25.5     12-06    139  |  113<H>  |  28<H>  ----------------------------<  86  4.5   |  14<L>  |  2.97<H>    Ca    7.6<L>      06 Dec 2022 16:59  Phos  3.1     12-06  Mg     1.8     12-06    TPro  5.4<L>  /  Alb  2.0<L>  /  TBili  0.7  /  DBili  x   /  AST  28  /  ALT  11  /  AlkPhos  113  12-06                      RADIOLOGY & ADDITIONAL STUDIES:  Reviewed

## 2022-12-07 NOTE — PROGRESS NOTE ADULT - ASSESSMENT
72-year-old male with PMHx of Stage IV metastatic lung carcinoma with last chemotherapy treatment 1 month ago, hypothyroidism s/p Graves thyroid iodine ablation, anxiety, depression, hemorrhoids s/p hemorrhoidectomy (~20 years ago), and bowel/bladder incontinence is admitted for AMS which has since resolved.  Patient consulted to General Surgery for concerns regarding ileus as PO intake is decreased and is associated with emesis.  Patient is nonperitonitic, passing flatus and having bowel movements.  CT imaging demonstrate ileus.    Recommendations:    - No indication for surgical intervention  - NPO  - Keep NGT for decompression  - Monitor bowel function  - Surgery Team 4C will continue to follow. Please page Team 4 with questions/clinical changes. [phone]

## 2022-12-07 NOTE — PROGRESS NOTE ADULT - PROBLEM SELECTOR PLAN 4
Patient with ASHLYN on admission BUN 31, Scr 2.35 -->Scr 2.31 (baseline Scr 1.1 10/22). ASHLYN likely prerenal in setting of hypovolemia due to diarrhea and poor po intake. Given prostate mets, there may also be a post-renal component. Pt w/ mark initially, then removed after passing TOV.   - worsening Cr 12/5, may have ATN component  - c/w maintenance fluids  - blood transfusion as above  - strict I/Os  - trend Cr, avoid nephrotoxic drugs, renally dose meds Patient with ASHLYN on admission BUN 31, Scr 2.35 -->Scr 2.31 (baseline Scr 1.1 10/22). ASHLYN likely prerenal in setting of hypovolemia due to diarrhea and poor po intake. Given prostate mets, there may also be a post-renal component. Pt w/ mark initially, then removed after passing TOV.   - worsening Cr 12/5, may have ATN component  - c/w maintenance fluids  - blood transfusion as above  - strict I/Os  - c/w maintenance fluids  - trend Cr, avoid nephrotoxic drugs, renally dose meds

## 2022-12-07 NOTE — PROGRESS NOTE ADULT - TIME BILLING
Patient seen and examined;  Spoke to GI  Will agree to endoscopy  Continue NG tube  Renal function improved

## 2022-12-07 NOTE — CHART NOTE - NSCHARTNOTEFT_GEN_A_CORE
Admitting Diagnosis:   Patient is a 72y old  Male who presents with a chief complaint of sepsis (07 Dec 2022 12:34)    Current Nutrition Order: NPO status (except medications); previous diet order (prior to 12/05/22) was Puree diet with Ensure Enlive TID (1050kcal, 60gPRO) + Banatrol TID      PO Intake: Good (%) [   ]  Fair (50-75%) [   ] Poor (<25%) [   ] *NPO [ x ] \    GI Issues: c/o minimal nausea, pt is currently receiving medication for N/V management; most recent BM today per pt (loose)    Pain: denies pain/discomfort (0)     Skin Integrity: redness blanchable (ecchymosis) per chart; no edema noted; Casey: 14; no PIs noted     Labs:   12-07    144  |  114<H>  |  23  ----------------------------<  100<H>  4.4   |  19<L>  |  2.68<H>    Ca    7.4<L>      07 Dec 2022 11:43  Phos  2.8     12-07  Mg     1.7     12-07    TPro  5.0<L>  /  Alb  1.9<L>  /  TBili  0.3  /  DBili  x   /  AST  26  /  ALT  10  /  AlkPhos  115  12-07    CAPILLARY BLOOD GLUCOSE    POCT Blood Glucose.: 102 mg/dL (07 Dec 2022 12:38)  POCT Blood Glucose.: 101 mg/dL (07 Dec 2022 12:27)    Medications:  MEDICATIONS  (STANDING):  dextrose 5% + lactated ringers. 1000 milliLiter(s) (110 mL/Hr) IV Continuous <Continuous>  levothyroxine Injectable 50 MICROGram(s) IV Push <User Schedule>  pantoprazole  Injectable 40 milliGRAM(s) IV Push every 12 hours    MEDICATIONS  (PRN):  trimethobenzamide Injectable 200 milliGRAM(s) IntraMuscular every 12 hours PRN Nausea and/or Vomiting    Anthropometrics:   (11/28) 72kg; Ht 182.9cm; BMI 21.5; IBW 80.9kg; %IBW 89%    Weight changes: no new weights during admission; recommend that nursing obtain updated weights biweekly weights as able    Estimated energy needs:   ABW used to calculate energy needs due to pt's current body weight within % IBW (89%).    Needs calculated for age and increased d/t cancer and chemo status, moderate malnutrition.   Energy: 2160-2520kcal (30-35cal/kg)  Protein: 101-115g pro (1.4-1.6g/kg pro)  Fluid: 2160-2520ml (30-35ml/kg)    Subjective:   Pt with past medical history notable for metastatic stage IV lung cancer; receiving chemotherapy.  Pt was brought in by [facility] for AMS; being managed for sepsis and electrolyte derangement. 12/5/22 o/n: K 3.4, Phos 1.9, KPhos ordered. Abd distended. Sump in place (low intermittent suction). 12/06/22: fluids renewed.     Pt with minimal participation in interview at this time. Pt with fair appetite as of late, endorses "I am hungry." NPO status remains as of 12/05/22. Pt's PO intakes were fair to poor prior to NPO status initiation. GI: pt c/o minimal nausea, pt is currently receiving medication for N/V management; most recent BM today per pt (loose). Skin: redness blanchable (ecchymosis) per chart; no edema noted; Casey: 14; no PIs noted. Denies pain/discomfort. Labs reviewed: elevated BUN, Creat, POCT BG slightly elevated, trending downward; will monitor trends. RD to remain available per protocol. Additional nutrition recs below.      Previous Nutrition Diagnosis: Malnutrition of moderate degree in the context of chronic illness r/t inability to meet nutritional demands secondary to pt's clinical conditions AEB mild to moderate muscle and subcutaneous fat loss per NFPE, suspected </=75% PO x>1month PTA    Active [  x  ]  Resolved [   ]    Goal:   1. Pt to consistently meet at least 75% of EEE via tolerated route   2. Optimize nutrition and hydration status within the goals of care   3. Pt to exhibit no further s/s of malnutrition     Recommendations:  1. Continue with current diet order    >>Ensure Enlive ONS TID (350kcal, 20gPRO per serving), vanilla flavor per pt request  >>Continue Banatrol for diarrhea management  2. Encourage pt to meet nutritional needs as able   3. Monitor PO intakes, trend weights (weekly), monitor skin integrity, monitor labs (electrolytes, CMP), monitor GI fxn   4. MVI supplementation   5. Pain and bowel regimen per team   7. Align nutrition interventions with GOC at all times    Education: n/a    Risk Level: High [  x  ] Moderate [   ] Low [   ].

## 2022-12-07 NOTE — PROGRESS NOTE ADULT - PROBLEM SELECTOR PLAN 3
On 12/5 pt found to have distended abdomen. CTAP w/o contrast on 12/5 showing severe ileus. Surgery was formally consulted. Decided not to staff with attending but will continue to follow for serial abdominal exams. Primary team placed sump tube, set to suction. GI also following.  - placed sump tube 12/5  - bladder scan on 12/5, not retaining  Plan:  - NPO except medications, ice chips okay  - f/u repeat abdominal XR, consider pull sump if XR improved On 12/5 pt found to have distended abdomen. CTAP w/o contrast on 12/5 showing severe ileus. Surgery was formally consulted. Decided not to staff with attending but will continue to follow for serial abdominal exams. Primary team placed sump tube, set to suction. GI also following.  - placed sump tube 12/5  - bladder scan on 12/5, not retaining  Plan:  - NPO except medications, ice chips okay  - repeat abdominal XR today  - monitor output

## 2022-12-07 NOTE — PROGRESS NOTE ADULT - SUBJECTIVE AND OBJECTIVE BOX
SUBJECTIVE: Patient seen and examined bedside; no events overnight, HD stable, c/o pain around nose and throat likely 2/2 to NGT, NGT 800cc/24hrs, flushed this am with immediate return of 400cc brown fluid, last BM yesterday, - flatus.        Vital Signs Last 24 Hrs  T(C): 36.6 (07 Dec 2022 09:10), Max: 37.2 (06 Dec 2022 12:15)  T(F): 97.8 (07 Dec 2022 09:10), Max: 98.9 (06 Dec 2022 12:15)  HR: 92 (07 Dec 2022 09:10) (92 - 112)  BP: 125/60 (07 Dec 2022 09:10) (102/67 - 152/71)  BP(mean): --  RR: 18 (07 Dec 2022 09:10) (17 - 22)  SpO2: 98% (07 Dec 2022 09:10) (96% - 98%)    Parameters below as of 07 Dec 2022 09:10  Patient On (Oxygen Delivery Method): room air      I&O's Detail    06 Dec 2022 07:01  -  07 Dec 2022 07:00  --------------------------------------------------------  IN:    dextrose 5% + lactated ringers: 440 mL    PRBCs (Packed Red Blood Cells): 300 mL  Total IN: 740 mL    OUT:    Incontinent per Condom Catheter (mL): 151 mL    Nasogastric/Oral tube (mL): 700 mL    Voided (mL): 150 mL  Total OUT: 1001 mL    Total NET: -261 mL      PE:    General: NAD, resting comfortably in bed  C/V: S1 s2, RRR  Pulm: Nonlabored breathing, no respiratory distress  Abd: Soft, NT, mildly distended  Extrem: [facility]        LABS:                        8.0    11.47 )-----------( 293      ( 06 Dec 2022 16:59 )             25.5     12-06    139  |  113<H>  |  28<H>  ----------------------------<  86  4.5   |  14<L>  |  2.97<H>    Ca    7.6<L>      06 Dec 2022 16:59  Phos  3.1     12-06  Mg     1.8     12-06    TPro  5.4<L>  /  Alb  2.0<L>  /  TBili  0.7  /  DBili  x   /  AST  28  /  ALT  11  /  AlkPhos  113  12-06          RADIOLOGY & ADDITIONAL STUDIES:

## 2022-12-07 NOTE — PROGRESS NOTE ADULT - PROBLEM SELECTOR PLAN 4
Patient with underlying metastatic lung adenocarcinoma with chronic diarrhea and frequent falls, overall appears very frail and cachectic   - dietitian consulted - rec ensure enlive TID  - PT/OT consult - rec SASHA.

## 2022-12-07 NOTE — PROGRESS NOTE ADULT - PROBLEM SELECTOR PLAN 2
Patient with diarrhea described as loose stool, per outpatient notes seems to be chronic for several weeks (later stated for a year). Patient was prescribed Loperamide early November.  Unclear etiology. May be in setting of metastatic malignancy, however currently considering inflammatory given chronicity and white count. Diarrhea likely not infectious C. diff negative, GI PCR negative. Pt persistently hypokalemic likely from diarrhea. GI consulted on 12/2. 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  - f/u stool calprotectin stool calprotectin, tissue transglutaminase (TTG) IgA-antibody, total IgA level, and a serum inflammatory marker (eg, C-reactive protein [CRP])  - pt initially refusing colonoscopy, but now may be amenable  - CTM bowel movements  - banatrol added to diet  - lomotil q6 hrs  - no loperamide due to prolonged QTc 508 (12/1/2022 EKG)  - f/u GI recs Patient with diarrhea described as loose stool, per outpatient notes seems to be chronic for several weeks (later stated for a year). Patient was prescribed Loperamide early November.  Unclear etiology. May be in setting of metastatic malignancy, however currently considering inflammatory given chronicity and white count. Diarrhea likely not infectious C. diff negative, GI PCR negative. Pt persistently hypokalemic likely from diarrhea. GI consulted on 12/2. 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  - CRP high 132; quantitative IgA normal; tissue transglutaminase (TTG) IgA-antibody  Plan:  - f/u stool calprotectin stool calprotectin,  - CTM bowel movements, improving  - banatrol added to diet  - lomotil q6 hrs  - no loperamide due to prolonged QTc 508 (12/1/2022 EKG)  - f/u GI recs

## 2022-12-07 NOTE — PROGRESS NOTE ADULT - ASSESSMENT
71 y/o male history of metastatic stage IV lung adenocarcinoma, hypothyroidism, depression, anxiety, on new chemo as of a few weeks ago ;    Initially brought in by EMS due to altered mental status , as noted by HHA ;   Patient was admitted for SIRS and suspicion for sepsis    GI was consulted for subacute diarrhea, as well as drop in HgB    Differential includes microscopic colitis versus chemo induced / immune checkpoint colitis/diarrhea , as well as UGIB given dark material in NG tube    At bedside, patient is deferring to make a decision on EGD / Flex Sig until tomorrow    Will re-engage patient in shared decision making re: endoscopy tomorrow  Agree with Pall Care Consult  Appreciate medical optimization if potential endoscopic procedures are to be considered    Thank you for the courtesy of this consult.     Lux Donald M.D.  Gastroenterology Fellow  Weekday Pager: [phone]  Weeknights/Weekend Coverage: Please Call the  for contact info   73 y/o male history of metastatic stage IV lung adenocarcinoma, hypothyroidism, depression, anxiety, on new chemo as of a few weeks ago ;    Initially brought in by EMS due to altered mental status , as noted by HHA ;   Patient was admitted for SIRS and suspicion for sepsis    GI was consulted for subacute diarrhea, as well as drop in HgB    Differential includes microscopic colitis versus chemo induced / immune checkpoint colitis/diarrhea , as well as UGIB given dark material in NG tube    Recommendations:  PPI BID  EGD / flex sig tomorrow  enema on call to endoscopy tomorrow  Agree with Pall Care Consult  Appreciate medical optimization    Thank you for the courtesy of this consult.     Lux Donald M.D.  Gastroenterology Fellow  Weekday Pager: [phone]  Weeknights/Weekend Coverage: Please Call the  for contact info

## 2022-12-07 NOTE — PROGRESS NOTE ADULT - PROBLEM SELECTOR PLAN 5
On 12/5 pt found to have distended abdomen. CTAP w/o contrast on 12/5 showing severe ileus. Surgery was formally consulted, no surgical intervention but will continue to follow for serial abdominal exams. on NG tube connected to wall suction.   - NPO except medications, ice chips.  -kub done on 12/7- improving ileus

## 2022-12-07 NOTE — PROGRESS NOTE ADULT - CONVERSATION DETAILS
I spoke to patient face to face at bedside, he was very upset about the course of his illness, he expressed good understanding of his lung cancer, c/c diarrhea, frequent falls, anemia and now new development of ileus, he's NPO for now and is frustrated that he can't have ginger ale.   After a lengthy discussion about code status, he said that he feels it's better to allow for a natural death rather than undergo any aggressive interventions like CPR and mechanical ventilation in light of multiple co morbidities.    He said that he keeps in touch with his niece who lives in [place], her name is Dorys Johnston , however he does not have her number and does not know anyone who has her number. He's  and has no other emergency contacts listed.   MOLST form completed and on file.

## 2022-12-08 NOTE — PROGRESS NOTE ADULT - ASSESSMENT
72-year-old male with PMHx of Stage IV metastatic lung carcinoma with last chemotherapy treatment 1 month ago, hypothyroidism s/p Graves thyroid iodine ablation, anxiety, depression, hemorrhoids s/p hemorrhoidectomy (~20 years ago), and bowel/bladder incontinence is admitted for AMS which has since resolved.  Patient consulted to General Surgery for concerns regarding ileus as PO intake is decreased and is associated with emesis.  Patient is nonperitonitic, passing flatus and having bowel movements.  CT imaging demonstrate ileus.    Recommendations:    - No indication for surgical intervention  - If GI plans to do endoscopy keep NGT and wait for endoscopy results  - If no plans for endoscopy removed NGT and can start sips  - Continue to monitor bowel function  - Surgery Team 4C will continue to follow. Please page Team 4 with questions/clinical changes. [phone]   72-year-old male with PMHx of Stage IV metastatic lung carcinoma with last chemotherapy treatment 1 month ago, hypothyroidism s/p Graves thyroid iodine ablation, anxiety, depression, hemorrhoids s/p hemorrhoidectomy (~20 years ago), and bowel/bladder incontinence is admitted for AMS which has since resolved.  Patient consulted to General Surgery for concerns regarding ileus as PO intake is decreased and is associated with emesis.  Patient is nonperitonitic, passing flatus and having bowel movements.  CT imaging demonstrate ileus.    Recommendations:    - No indication for surgical intervention  - If GI plans to do endoscopy keep NGT and wait for endoscopy results  - If no plans for endoscopy remove NGT and can start sips  - Continue to monitor bowel function  - Surgery Team 4C will continue to follow. Please page Team 4 with questions/clinical changes. [phone]

## 2022-12-08 NOTE — PROGRESS NOTE ADULT - SUBJECTIVE AND OBJECTIVE BOX
Physical Medicine and Rehabilitation Progress Note :       Patient is a 72y old  Male who presents with a chief complaint of sepsis (07 Dec 2022 12:34)      HPI:  71 y/o male history of metastatic stage IV lung adenocarcinoma, hypothyroidism, depression, anxiety, undergoing chemo brought in by EMS with HHA for altered mental status Monday morning. At baseline patient is A&Ox3, per HHA he was AOx1, not responding to questions or commands. Patient was reported to be hypotensive to SBP 50s in the field, IO was placed and given fluids with improvement in BP. Patient lives along, has HHA 2 days per week for 10 hours, per ED note HHA states she spoke to pt 3 days ago and foung him in his bed Monday morning at which time he was confused. Currently being treated for lung cancer, last chemo 1 month ago.  Patient also has had recurrent falls at home, some associated with head trauma but no loc, syncope, bladder/bowel incontinence.  Otherwise no reported illness, sick contacts, medical changes.  ROS otherwise negative.      ED Course   Vitals: Temp 98.7,  --> 86, /63, RR 20, SaO2 98% on 4L NC --> 97% room air   Labs: WBC 16.24, Hgb 11.7, Plt 449, Na 143, K 3.2, CO2 18, AG 24, BUN 31, Scr 2.35, Ca 7.6, Ma 1.4, Lactate 12.1 --> 2.3, UA trace LE, Bacteria present, hyaline casts, C. Diff negative GI PCR negative  EKG: sinus tachycardia, 1st degree AV block, narrow QRS, prolonged QTC  CXR: Known right upper lobe pulmonary mass   CT chest, abdomen, pelvis: No acute fractures. Unchanged right upper lobe malignancy. Decreased left adrenal metastasis. Decreased abdominal and pelvic adenopathy.  CTH: No intracranial hemorrhage or acute transcortical infarct.  Generalized volume loss with small vessel ischemic disease.  CT C-spine: No fracture. Levoscoliosis with Grade 1 anterolisthesis of C2 on C3 and C7 on T1. Multilevel cervical spondylosis. Right apical lung spiculated mass.  Interventions: Tylenol ig IV, Ca Gluconate 2g, MgSO4 2g x 2, KCl 40mg po x 1, KCl 10mg IV x 5, Zosyn 3.375 x 3, Vancomycin 1250mg, NaCl 2200cc bolus   (28 Nov 2022 21:33)                            8.0    8.35  )-----------( 241      ( 07 Dec 2022 16:59 )             25.3       12-07    144  |  114<H>  |  23  ----------------------------<  100<H>  4.4   |  19<L>  |  2.68<H>    Ca    7.4<L>      07 Dec 2022 11:43  Phos  2.8     12-07  Mg     1.7     12-07    TPro  5.0<L>  /  Alb  1.9<L>  /  TBili  0.3  /  DBili  x   /  AST  26  /  ALT  10  /  AlkPhos  115  12-07    Vital Signs Last 24 Hrs  T(C): 36.9 (08 Dec 2022 12:04), Max: 37 (07 Dec 2022 21:09)  T(F): 97.8 (08 Dec 2022 08:40), Max: 98.6 (07 Dec 2022 21:09)  HR: 100 (08 Dec 2022 12:04) (86 - 107)  BP: 110/60 (08 Dec 2022 12:04) (110/60 - 132/71)  BP(mean): --  RR: 17 (08 Dec 2022 12:04) (17 - 18)  SpO2: 97% (08 Dec 2022 12:04) (95% - 100%)    Parameters below as of 08 Dec 2022 12:04    O2 Flow (L/min): 15      MEDICATIONS  (STANDING):  levothyroxine Injectable 50 MICROGram(s) IV Push <User Schedule>  pantoprazole  Injectable 40 milliGRAM(s) IV Push every 12 hours    MEDICATIONS  (PRN):  trimethobenzamide Injectable 200 milliGRAM(s) IntraMuscular every 12 hours PRN Nausea and/or Vomiting       Occupational Therapy Functional Status Assessment :       Cognitive/Neuro/Behavioral  Cognitive/Neuro/Behavioral [WDL Definition: Alert; opens eyes spontaneously; arouses to voice or touch; oriented x 4; follows commands; speech spontaneous, logical; purposeful motor response; behavior appropriate to situation]: WDL    Language Assistance  Preferred Language to Address Healthcare Preferred Language to Address Healthcare: English    Therapeutic Interventions      Bed Mobility  Bed Mobility Training Rolling/Turning: minimum assist (75% patient effort);  1 person assist;  verbal cues  Bed Mobility Training Scooting: minimum assist (75% patient effort);  1 person assist;  verbal cues  Bed Mobility Training Sit-to-Supine: minimum assist (75% patient effort);  1 person assist;  verbal cues  Bed Mobility Training Supine-to-Sit: minimum assist (75% patient effort);  1 person assist;  verbal cues  Bed Mobility Training Limitations: decreased ability to use arms for pushing/pulling;  decreased ability to use legs for bridging/pushing;  cognitive, decreased safety awareness;  impaired postural control;  impaired coordination;  impaired balance;  decreased strength;  decreased flexibility    Sit-Stand Transfer Training  Transfer Training Sit-to-Stand Transfer: minimum assist (75% patient effort);  1 person assist;  verbal cues;  rolling walker  Transfer Training Stand-to-Sit Transfer: minimum assist (75% patient effort);  1 person assist;  verbal cues;  rolling walker  Sit-to-Stand Transfer Training Transfer Safety Analysis: decreased balance;  decreased sequencing ability;  decreased strength;  impaired balance;  impaired coordination;  decreased flexibility;  rolling walker    Therapeutic Exercise  Therapeutic Exercise Symptoms Noted During/After Treatment: fatigue  Therapeutic Exercise Charges: Pt took ~10 steps EOB w/ Mod<>Min Ax1, RW, depleted i n energy   Therapeutic Exercise Detail: NELSY WALLS ROMs     Upper Body Dressing Training  Upper Body Dressing Training Symptoms Noted During/After Treatment: fatigue  Upper Body Dressing Training Assistance: moderate assist (50% patient effort);  1 person assist;  verbal cues;  decreased strength;  impaired balance;  impaired coordination              PM&R Impression : as above    Current Disposition Plan Recommendations :    subacute rehab placement

## 2022-12-08 NOTE — PROGRESS NOTE ADULT - SUBJECTIVE AND OBJECTIVE BOX
SUBJECTIVE: Patient seen and examined bedside; no events overnight, minimal NGT in past 12hrs approximately 50cc, flushed this am and 50cc of clear fluid return, passing gas, last BM yesterday, no nausea/vomit.        Vital Signs Last 24 Hrs  T(C): 36.9 (08 Dec 2022 05:34), Max: 37 (07 Dec 2022 21:09)  T(F): 98.5 (08 Dec 2022 05:34), Max: 98.6 (07 Dec 2022 21:09)  HR: 100 (08 Dec 2022 05:34) (86 - 107)  BP: 110/60 (08 Dec 2022 05:34) (110/60 - 132/71)  BP(mean): --  RR: 17 (08 Dec 2022 05:34) (17 - 17)  SpO2: 97% (08 Dec 2022 05:34) (95% - 100%)    Parameters below as of 08 Dec 2022 05:34  Patient On (Oxygen Delivery Method): room air      I&O's Detail    07 Dec 2022 07:01  -  08 Dec 2022 07:00  --------------------------------------------------------  IN:  Total IN: 0 mL    OUT:    Voided (mL): 100 mL  Total OUT: 100 mL    Total NET: -100 mL      PE:    General: NAD, resting comfortably in bed  C/V: S1 s2, RRR  HEENT: NGT appropriately positioned, minimal clear output in canister  Pulm: Nonlabored breathing, no respiratory distress  Abd: Soft, NT, mildly distended improved from before  Extrem: [facility]        LABS:                        8.0    8.35  )-----------( 241      ( 07 Dec 2022 16:59 )             25.3     12-07    144  |  114<H>  |  23  ----------------------------<  100<H>  4.4   |  19<L>  |  2.68<H>    Ca    7.4<L>      07 Dec 2022 11:43  Phos  2.8     12-07  Mg     1.7     12-07    TPro  5.0<L>  /  Alb  1.9<L>  /  TBili  0.3  /  DBili  x   /  AST  26  /  ALT  10  /  AlkPhos  115  12-07          RADIOLOGY & ADDITIONAL STUDIES:

## 2022-12-08 NOTE — CHART NOTE - NSCHARTNOTEFT_GEN_A_CORE
S/p EGD + Flex sig for anemia/ melena/ diarrhea with myself and Dr. Weller    EGD:   - Severe circumferential esophagitis s/p bx  - Hiatal hernia  - Ulcer in hernia sac s/p bx  - Ulcer in cardia s/p bx  - atrophic gastritis s/p bx    Flex sig:  - grossly normal mucosa s/p bx  - poor rectal tone    Rec:  - Clear Liquid Diet    - Await pathology results  - Carafate Suspension 1g po qid   - Protonix 40 mg IV q 12 h    -Return to floor for further management

## 2022-12-08 NOTE — PROGRESS NOTE ADULT - SUBJECTIVE AND OBJECTIVE BOX
SUBJECTIVE / INTERVAL HPI: Patient seen and examined at bedside. No overnight events. Patient feeling improved this AM. Continues to have minor nausea, though no episodes of emesis. 1BM overnight and 1 yesterday AM. Plan for scope with GI today, patient amenable. Denies fevers, sweats, shortness of breath, chest pain, abdominal pain. Would like to eat today if possible.    VITAL SIGNS:  Vital Signs Last 24 Hrs  T(C): 36.9 (08 Dec 2022 05:34), Max: 37 (07 Dec 2022 21:09)  T(F): 98.5 (08 Dec 2022 05:34), Max: 98.6 (07 Dec 2022 21:09)  HR: 100 (08 Dec 2022 05:34) (86 - 107)  BP: 110/60 (08 Dec 2022 05:34) (110/60 - 132/71)  BP(mean): --  RR: 17 (08 Dec 2022 05:34) (17 - 18)  SpO2: 97% (08 Dec 2022 05:34) (95% - 100%)    Parameters below as of 08 Dec 2022 05:34  Patient On (Oxygen Delivery Method): room air        PHYSICAL EXAM:    General: Resting comfortably in bed; NAD  HEENT: NC/AT, EOMI, anicteric sclera, dry mucous membranes, neck supple  Cardiac: RRR; normal S1/S2, no MRG, mild LE edema  Respiratory: bibasilar crackles; no wheezes, ronchi, increased work of breathing, retractions  Gastrointestinal: +BSx4, tympanic, abdomen distended but soft w/o guarding or rebound (distention improved)  Extremities: WWP x4 extremities  Dermatologic: skin warm, dry and intact; no rashes, open wounds  Neurologic: AAOx3; no focal deficits    MEDICATIONS:  MEDICATIONS  (STANDING):  acetaminophen   IVPB .. 1000 milliGRAM(s) IV Intermittent once  levothyroxine Injectable 50 MICROGram(s) IV Push <User Schedule>  pantoprazole  Injectable 40 milliGRAM(s) IV Push every 12 hours    MEDICATIONS  (PRN):  trimethobenzamide Injectable 200 milliGRAM(s) IntraMuscular every 12 hours PRN Nausea and/or Vomiting      ALLERGIES:  Allergies    No Known Allergies    Intolerances        LABS:                        8.0    8.35  )-----------( 241      ( 07 Dec 2022 16:59 )             25.3     12-07    144  |  114<H>  |  23  ----------------------------<  100<H>  4.4   |  19<L>  |  2.68<H>    Ca    7.4<L>      07 Dec 2022 11:43  Phos  2.8     12-07  Mg     1.7     12-07    TPro  5.0<L>  /  Alb  1.9<L>  /  TBili  0.3  /  DBili  x   /  AST  26  /  ALT  10  /  AlkPhos  115  12-07        CAPILLARY BLOOD GLUCOSE      POCT Blood Glucose.: 102 mg/dL (07 Dec 2022 12:38)      RADIOLOGY & ADDITIONAL TESTS: Reviewed.

## 2022-12-08 NOTE — PROGRESS NOTE ADULT - ASSESSMENT
per Internal Medicine    72 y o male history of metastatic stage IV lung adenocarcinoma, hypothyroidism, depression, anxiety, undergoing chemo brought in by EMS with HHA for altered mental status Monday morning admitted for sepsis (unclear source) and electrolyte derangements. Now found to have anemia and severe ileus on 12/6, plan for scope on 12/8 w/ GI.     Problem/Plan - 1:  ·  Problem: Anemia.   ·  Plan: Pt with anemia on admission to 9.1. On 12/5, Hb 5.7. Pt asx, mild tachycardia, but normal BP. Unclear source. Pt w/ multiple dark green bowel movements, no overt melena observed. No hemoptysis, no hematuria. Significant bruising on R flank, c/f retroperitoneal bleed but r/o on imaging. Possible GI bleed, GI consulted. Heme onc consulted.  - CTAP w/o contrast 12/5/22: No retroperitoneal or extraperitoneal hemorrhage. Severe ileus.  - S/p 1 unit pRBC 12/5 w/ good response, additional 1 unit pRBC on 12/6  - retic index: 1.1 (hypoproliferative), elevated hapto/LDH (hemolysis unlikely), Fe studies showing  - folate/B12 normal  Plan:  - monitor vitals  - endoscopy/flex sig today w/ GI, keep NPO, enema on call to endoscopy  - pantoprazole 40 IV BID  - f/u GI recs  - f/u heme onc recs.    Problem/Plan - 2:  ·  Problem: Diarrhea.   ·  Plan: Patient with diarrhea described as loose stool, per outpatient notes seems to be chronic for several weeks (later stated for a year). Patient was prescribed Loperamide early November.  Unclear etiology. May be in setting of metastatic malignancy, however currently considering inflammatory given chronicity and white count. Diarrhea likely not infectious C. diff negative, GI PCR negative. Pt persistently hypokalemic likely from diarrhea. GI consulted on 12/2. 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  - CRP high 132; quantitative IgA normal; tissue transglutaminase (TTG) IgA-antibody  Plan:  - f/u stool calprotectin stool calprotectin,  - CTM bowel movements, improving  - banatrol added to diet  - lomotil q6 hrs  - no loperamide due to prolonged QTc 508 (12/1/2022 EKG)  - f/u GI recs.    Problem/Plan - 3:  ·  Problem: Ileus.   ·  Plan: On 12/5 pt found to have distended abdomen. CTAP w/o contrast on 12/5 showing severe ileus. Surgery was formally consulted. Decided not to staff with attending but will continue to follow for serial abdominal exams. Primary team placed sump tube, set to suction. GI also following.  - placed sump tube 12/5  - bladder scan on 12/5, not retaining  - repeat abdominal XR 12/7: abdominal distention w/ improving ileus  Plan:  - NPO except medications, ice chips okay  - monitor output.    Problem/Plan - 4:  ·  Problem: ASHLYN (acute kidney injury).   ·  Plan: Patient with ASHLYN on admission BUN 31, Scr 2.35 -->Scr 2.31 (baseline Scr 1.1 10/22). ASHLYN likely prerenal in setting of hypovolemia due to diarrhea and poor po intake. Given prostate mets, there may also be a post-renal component. Pt w/ mark initially, then removed after passing TOV.   - worsening Cr 12/5, may have ATN component  - d/c maintenance fluids i/s/o crackles  - strict I/Os  - c/w maintenance fluids  - trend Cr, avoid nephrotoxic drugs, renally dose meds.    Problem/Plan - 5:  ·  Problem: Hypokalemia.   ·  Plan: Patient with hypokalemia, likely due to persistent diarrhea. No EKG changes. On 12/1 K low to 2.1, repeat EKG possible u waves. Pt asymptomatic, no fasiculations or weakness. Aggressively repleted.  - K repleted, goal >4  - diarrhea resolving, K improving.    Problem/Plan - 6:  ·  Problem: Sepsis.   ·  Plan: Patient with encephalopathy and hypotension, meeting 3 SIRS on admission for tachycardia, tachypnea, and leukocytosis.  Source possibly pulmonary and urinary.  Lactate 12.1 --> 2.3, UA trace LE, Bacteria present, hyaline casts, C. Diff negative GI PCR negative.  Lactate cleared, S/p Zosyn 3.375 x 3, Vancomycin 1250mg, NaCl 2200cc bolus in ED.   Still unclear source. AAOx4, mentating well since 11/29.  - UCx no growth final  - BCx NGTD  - ULeg negative  - s/p Vanc 1000mg qD and Zosyn 3.375 q8 (11/28-12/1)  Plan:  - repeat BCx/UA w/ reflex culture NGTD  - continue to trend WBC.    Problem/Plan - 7:  ·  Problem: Hypothyroidism.   ·  Plan: Patient with history of hypothyroidism, home medications Levothyroxine 75mcg daily. On 12/1 TSH 10.81, free T4 0.67.  - increase synthroid to 88mcg qD on 12/1/22.    Problem/Plan - 8:  ·  Problem: Falls.   ·  Plan: Patient with frequent falls at home, per outpatient nursing notes, patient's HHA have reported falls at home sometimes with head trauma.  He ambulated at baseline with cane.  Etiology likely due to overall decompensation in setting of metastatic cancer. CTH and CT cervical spine without fractures of trauma.    - PT/OT consult - recommending SASHA  - OOBTC daily w/ supervision.    Problem/Plan - 9:  ·  Problem: Adult failure to thrive.   ·  Plan: Patient with underlying metastatic lung adenocarcinoma with chronic diarrhea and frequent falls, overall appears very frail.    - dietitian consulted - rec ensure enlive TID  - PT/OT consult - rec SASHA  - palliative consult: DNR/DNI  - contact family  - incentive spirometry.    Problem/Plan - 10:  ·  Problem: Hypomagnesemia.   ·  Plan; Patient with hypomagnesemia, likely due to persistent diarrhea.  Likely contributing to overall weakness and falls.  - Replete PRN.    Problem/Plan - 11:  ·  Problem: Hypophosphatemia.   ·  Plan: Patient with hypophosphatemia, likely due to persistent diarrhea.  Likely contributing to overall weakness and falls.  - Replete PRN.    Problem/Plan - 12:  ·  Problem: Adenocarcinoma, lung.   ·  Plan: Patient with recently found (08/22) with RUL spiculated nodule with R paratracheal adenopathy and L adrenal nodule. Concern for metastatic cancer because of rectal lesion on PET scan as well as L adrenal area that lit up. Now, s/p FNA of R axillary node, with pathology consistent with metastatic lung adenocarcinoma. Follows w/ Dr. Patricia outpatient.  - f/u heme/onc consult  - palliative consulted: DNR/DNI on 12/7  - attempt family member contact, no available contacts    #Prophylactic measures  F: d/c maintenance fluids  E: replete K<4, Mg<1.8, Phos<3, Ca<8.5   N: NPO except ice chips    VTE Prophylaxis: hold i/s/o anemia  GI: pantoprazole 40 IV BID  C: DNR/DNI  D: SASHA.

## 2022-12-08 NOTE — PROGRESS NOTE ADULT - PROBLEM SELECTOR PLAN 3
On 12/5 pt found to have distended abdomen. CTAP w/o contrast on 12/5 showing severe ileus. Surgery was formally consulted. Decided not to staff with attending but will continue to follow for serial abdominal exams. Primary team placed sump tube, set to suction. GI also following.  - placed sump tube 12/5  - bladder scan on 12/5, not retaining  - repeat abdominal XR 12/7: abdominal distention w/ improving ileus  Plan:  - NPO except medications, ice chips okay  - monitor output

## 2022-12-08 NOTE — PROGRESS NOTE ADULT - TIME BILLING
Patient seen and examined;  GI findings noted  Likely bleed from these areas  To go on Carafate and Protonix IV  Clear liquid diet

## 2022-12-08 NOTE — PROGRESS NOTE ADULT - PROBLEM SELECTOR PLAN 4
Patient with ASHLYN on admission BUN 31, Scr 2.35 -->Scr 2.31 (baseline Scr 1.1 10/22). ASHLYN likely prerenal in setting of hypovolemia due to diarrhea and poor po intake. Given prostate mets, there may also be a post-renal component. Pt w/ mark initially, then removed after passing TOV.   - worsening Cr 12/5, may have ATN component  - d/c maintenance fluids i/s/o crackles  - strict I/Os  - c/w maintenance fluids  - trend Cr, avoid nephrotoxic drugs, renally dose meds

## 2022-12-08 NOTE — PROGRESS NOTE ADULT - PROBLEM SELECTOR PLAN 9
Patient with underlying metastatic lung adenocarcinoma with chronic diarrhea and frequent falls, overall appears very frail.    - dietitian consulted - rec ensure enlive TID  - PT/OT consult - rec SASHA  - palliative consult: DNR/DNI  - contact family  - incentive spirometry

## 2022-12-08 NOTE — PROGRESS NOTE ADULT - PROBLEM SELECTOR PLAN 1
Pt with anemia on admission to 9.1. On 12/5, Hb 5.7. Pt asx, mild tachycardia, but normal BP. Unclear source. Pt w/ multiple dark green bowel movements, no overt melena observed. No hemoptysis, no hematuria. Significant bruising on R flank, c/f retroperitoneal bleed but r/o on imaging. Possible GI bleed, GI consulted. Heme onc consulted.  - CTAP w/o contrast 12/5/22: No retroperitoneal or extraperitoneal hemorrhage. Severe ileus.  - S/p 1 unit pRBC 12/5 w/ good response, additional 1 unit pRBC on 12/6  - retic index: 1.1 (hypoproliferative), elevated hapto/LDH (hemolysis unlikely), Fe studies showing  - folate/B12 normal  Plan:  - monitor vitals  - endoscopy/flex sig today w/ GI, keep NPO, enema on call to endoscopy  - pantoprazole 40 IV BID  - f/u GI recs  - f/u heme onc recs

## 2022-12-08 NOTE — PROGRESS NOTE ADULT - SUBJECTIVE AND OBJECTIVE BOX
***INCOMPLETE NOTE    SUBJECTIVE / INTERVAL HPI: Patient seen and examined at bedside. No overnight events.    VITAL SIGNS:  Vital Signs Last 24 Hrs  T(C): 36.9 (08 Dec 2022 05:34), Max: 37 (07 Dec 2022 21:09)  T(F): 98.5 (08 Dec 2022 05:34), Max: 98.6 (07 Dec 2022 21:09)  HR: 100 (08 Dec 2022 05:34) (86 - 107)  BP: 110/60 (08 Dec 2022 05:34) (110/60 - 132/71)  BP(mean): --  RR: 17 (08 Dec 2022 05:34) (17 - 18)  SpO2: 97% (08 Dec 2022 05:34) (95% - 100%)    Parameters below as of 08 Dec 2022 05:34  Patient On (Oxygen Delivery Method): room air        PHYSICAL EXAM:    General: Resting comfortably in bed; NAD  HEENT: NC/AT, EOMI, anicteric sclera, MMM, neck supple, no nasal discharge  Cardiac: RRR; normal S1/S2, no MRG, no LE edema, no JVD  Respiratory: CTAB; no wheezes, ronchi, increased work of breathing, retractions  Gastrointestinal: +BSx4, abdomen soft, NT/ND; no rebound or guarding  Genitourinary: no suprapubic tenderness; mark*; normal external genitalia  Extremities: WWP, no clubbing or cyanosis; no peripheral edema  Vascular: 2+ radial and DP pulses bilaterally  Dermatologic: skin warm, dry and intact; no rashes, open wounds  Neurologic: AAOx3; no focal deficits  Psychiatric: affect and characteristics of appearance, verbalizations, behaviors are appropriate    MEDICATIONS:  MEDICATIONS  (STANDING):  dextrose 5% + lactated ringers. 1000 milliLiter(s) (110 mL/Hr) IV Continuous <Continuous>  levothyroxine Injectable 50 MICROGram(s) IV Push <User Schedule>  pantoprazole  Injectable 40 milliGRAM(s) IV Push every 12 hours    MEDICATIONS  (PRN):  trimethobenzamide Injectable 200 milliGRAM(s) IntraMuscular every 12 hours PRN Nausea and/or Vomiting      ALLERGIES:  Allergies    No Known Allergies    Intolerances        LABS:                        8.0    8.35  )-----------( 241      ( 07 Dec 2022 16:59 )             25.3     12-07    144  |  114<H>  |  23  ----------------------------<  100<H>  4.4   |  19<L>  |  2.68<H>    Ca    7.4<L>      07 Dec 2022 11:43  Phos  2.8     12-07  Mg     1.7     12-07    TPro  5.0<L>  /  Alb  1.9<L>  /  TBili  0.3  /  DBili  x   /  AST  26  /  ALT  10  /  AlkPhos  115  12-07        CAPILLARY BLOOD GLUCOSE      POCT Blood Glucose.: 102 mg/dL (07 Dec 2022 12:38)      RADIOLOGY & ADDITIONAL TESTS: Reviewed.

## 2022-12-08 NOTE — PROGRESS NOTE ADULT - PROBLEM SELECTOR PLAN 2
Patient with diarrhea described as loose stool, per outpatient notes seems to be chronic for several weeks (later stated for a year). Patient was prescribed Loperamide early November.  Unclear etiology. May be in setting of metastatic malignancy, however currently considering inflammatory given chronicity and white count. Diarrhea likely not infectious C. diff negative, GI PCR negative. Pt persistently hypokalemic likely from diarrhea. GI consulted on 12/2. 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  - CRP high 132; quantitative IgA normal; tissue transglutaminase (TTG) IgA-antibody  Plan:  - f/u stool calprotectin stool calprotectin,  - CTM bowel movements, improving  - banatrol added to diet  - lomotil q6 hrs  - no loperamide due to prolonged QTc 508 (12/1/2022 EKG)  - f/u GI recs

## 2022-12-08 NOTE — PROGRESS NOTE ADULT - ASSESSMENT
71 y/o male history of metastatic stage IV lung adenocarcinoma, hypothyroidism, depression, anxiety, undergoing chemo brought in by EMS with HHA for altered mental status Monday morning admitted for sepsis (unclear source) and electrolyte derangements. Now found to have anemia and severe ileus on 12/6, plan for scope on 12/8 w/ GI.

## 2022-12-08 NOTE — PROGRESS NOTE ADULT - PROBLEM SELECTOR PLAN 12
Patient with recently found (08/22) with RUL spiculated nodule with R paratracheal adenopathy and L adrenal nodule. Concern for metastatic cancer because of rectal lesion on PET scan as well as L adrenal area that lit up. Now, s/p FNA of R axillary node, with pathology consistent with metastatic lung adenocarcinoma. Follows w/ Dr. Patricia outpatient.  - f/u heme/onc consult  - palliative consulted: DNR/DNI on 12/7  - attempt family member contact, no available contacts    #Prophylactic measures  F: d/c maintenance fluids  E: replete K<4, Mg<1.8, Phos<3, Ca<8.5   N: NPO except ice chips    VTE Prophylaxis: hold i/s/o anemia  GI: pantoprazole 40 IV BID  C: DNR/DNI  D: SASHA

## 2022-12-09 NOTE — PROGRESS NOTE ADULT - PROBLEM SELECTOR PLAN 4
Patient with ASHLYN on admission BUN 31, Scr 2.35 -->Scr 2.31 (baseline Scr 1.1 10/22). ASHLYN likely prerenal in setting of hypovolemia due to diarrhea and poor po intake. Given prostate mets, there may also be a post-renal component. Pt w/ mark initially, then removed after passing TOV.   - worsening Cr 12/5, may have ATN component  - d/c maintenance fluids i/s/o crackles  - strict I/Os  - trend Cr, avoid nephrotoxic drugs, renally dose meds

## 2022-12-09 NOTE — PROGRESS NOTE ADULT - PROBLEM SELECTOR PLAN 12
Patient with recently found (08/22) with RUL spiculated nodule with R paratracheal adenopathy and L adrenal nodule. Concern for metastatic cancer because of rectal lesion on PET scan as well as L adrenal area that lit up. Now, s/p FNA of R axillary node, with pathology consistent with metastatic lung adenocarcinoma. Follows w/ Dr. Patricia outpatient.  - f/u heme/onc consult  - palliative consulted: DNR/DNI on 12/7  - attempt family member contact, no available contacts    #Prophylactic measures  F: d/c maintenance fluids  E: replete K<4, Mg<1.8, Phos<3, Ca<8.5   N: full liquid    VTE Prophylaxis: hold i/s/o anemia  GI: pantoprazole 40 IV BID  C: DNR/DNI  D: SASHA

## 2022-12-09 NOTE — PROGRESS NOTE ADULT - ASSESSMENT
71 y/o male history of metastatic stage IV lung adenocarcinoma, hypothyroidism, depression, anxiety, on new chemo as of a few weeks ago ;    Initially brought in by EMS due to altered mental status , as noted by HHA ;   Patient was admitted for SIRS and suspicion for sepsis    GI was consulted for subacute diarrhea, as well as drop in HgB    S/p EGD + Flex sig 12/8/22  EGD:   - Severe circumferential esophagitis s/p bx  - Hiatal hernia  - Ulcer in hernia sac s/p bx  - Ulcer in cardia s/p bx  - atrophic gastritis s/p bx    Flex sig:  - grossly normal mucosa s/p bx  - poor rectal tone    Recommendations:  PPI BID  f/u path  Advance diet  Carafate Suspension 1g po qid   Outpatient GI follow up, repeat EGD / full colonoscopy based on patient preference , medical status     Thank you for allowing us to participate in the care of this patient. GI will sign off the case.  Please call us if you have any further questions or concerns    Lux Donald M.D.  Gastroenterology Fellow  Weekday Pager: [phone]  Weeknights/Weekend Coverage: Please Call the  for contact info

## 2022-12-09 NOTE — PROGRESS NOTE ADULT - SUBJECTIVE AND OBJECTIVE BOX
Hematology Oncology Progress Note      HPI:  73 y/o male history of metastatic stage IV lung adenocarcinoma, hypothyroidism, depression, anxiety, undergoing chemo brought in by EMS with HHA for altered mental status Monday morning. At baseline patient is A&Ox3, per HHA he was AOx1, not responding to questions or commands. Patient was reported to be hypotensive to SBP 50s in the field, IO was placed and given fluids with improvement in BP. Patient lives along, has HHA 2 days per week for 10 hours, per ED note HHA states she spoke to pt 3 days ago and foung him in his bed Monday morning at which time he was confused. Currently being treated for lung cancer, last chemo 1 month ago.  Patient also has had recurrent falls at home, some associated with head trauma but no loc, syncope, bladder/bowel incontinence.  Otherwise no reported illness, sick contacts, medical changes.  ROS otherwise negative.      ED Course   Vitals: Temp 98.7,  --> 86, /63, RR 20, SaO2 98% on 4L NC --> 97% room air   Labs: WBC 16.24, Hgb 11.7, Plt 449, Na 143, K 3.2, CO2 18, AG 24, BUN 31, Scr 2.35, Ca 7.6, Ma 1.4, Lactate 12.1 --> 2.3, UA trace LE, Bacteria present, hyaline casts, C. Diff negative GI PCR negative  EKG: sinus tachycardia, 1st degree AV block, narrow QRS, prolonged QTC  CXR: Known right upper lobe pulmonary mass   CT chest, abdomen, pelvis: No acute fractures. Unchanged right upper lobe malignancy. Decreased left adrenal metastasis. Decreased abdominal and pelvic adenopathy.  CTH: No intracranial hemorrhage or acute transcortical infarct.  Generalized volume loss with small vessel ischemic disease.  CT C-spine: No fracture. Levoscoliosis with Grade 1 anterolisthesis of C2 on C3 and C7 on T1. Multilevel cervical spondylosis. Right apical lung spiculated mass.  Interventions: Tylenol ig IV, Ca Gluconate 2g, MgSO4 2g x 2, KCl 40mg po x 1, KCl 10mg IV x 5, Zosyn 3.375 x 3, Vancomycin 1250mg, NaCl 2200cc bolus   (28 Nov 2022 21:33)      Interval History: s/p EGD/flex sig    SUBJECTIVE: Patient seen and examined at bedside. Feels slightly better today, but still having 2-3 loose stools. Abd tenderness improved. Drinking fluids.    OBJECTIVE:    VITAL SIGNS:  ICU Vital Signs Last 24 Hrs  T(C): 36.8 (09 Dec 2022 15:31), Max: 36.9 (09 Dec 2022 09:36)  T(F): 98.3 (09 Dec 2022 15:31), Max: 98.4 (09 Dec 2022 09:36)  HR: 98 (09 Dec 2022 15:31) (97 - 104)  BP: 112/67 (09 Dec 2022 15:31) (109/58 - 141/71)  BP(mean): --  ABP: --  ABP(mean): --  RR: 18 (09 Dec 2022 15:31) (17 - 18)  SpO2: 99% (09 Dec 2022 15:31) (96% - 99%)    O2 Parameters below as of 09 Dec 2022 15:31  Patient On (Oxygen Delivery Method): room air              12-08 @ 07:01  -  12-09 @ 07:00  --------------------------------------------------------  IN: 0 mL / OUT: 200 mL / NET: -200 mL      CAPILLARY BLOOD GLUCOSE      POCT Blood Glucose.: 109 mg/dL (09 Dec 2022 12:07)      PHYSICAL EXAM:  General: elderly, thin, pale, NAD, answering questions, pleasantly conversant  HEENT: NC/AT; PERRL, clear conjunctiva  Neck: supple  Respiratory: CTA b/l  Cardiovascular: +S1/S2; RRR  Abdomen: slightly distended, tympanic, non tender, slightly improved from prior exams  Extremities: WWP, 2+ peripheral pulses b/l; no LE edema  Skin: normal color and turgor; no rash  Neurological: AAOx3    MEDICATIONS:  MEDICATIONS  (STANDING):  levothyroxine Injectable 50 MICROGram(s) IV Push <User Schedule>  pantoprazole  Injectable 40 milliGRAM(s) IV Push every 12 hours  sucralfate 1 Gram(s) Oral every 6 hours    MEDICATIONS  (PRN):  trimethobenzamide Injectable 200 milliGRAM(s) IntraMuscular every 12 hours PRN Nausea and/or Vomiting      ALLERGIES:  Allergies    No Known Allergies    Intolerances        LABS:                        7.7    5.97  )-----------( 166      ( 09 Dec 2022 08:25 )             23.0     12-09    137  |  109<H>  |  15  ----------------------------<  116<H>  3.3<L>   |  18<L>  |  2.18<H>    Ca    7.0<L>      09 Dec 2022 08:25  Phos  2.2     12-09  Mg     1.7     12-09    TPro  4.8<L>  /  Alb  1.9<L>  /  TBili  0.4  /  DBili  x   /  AST  25  /  ALT  11  /  AlkPhos  116  12-09                    RADIOLOGY & ADDITIONAL TESTS: Reviewed.

## 2022-12-09 NOTE — PROGRESS NOTE ADULT - SUBJECTIVE AND OBJECTIVE BOX
SUBJECTIVE: Patient seen and examined bedside; s/p EGD with GI on 12/08 with findings consistent with hiatal hernia and multiple ulcers, no active bleeding, flex sig wnl; NGT was removed and pt advanced to CLD which has been tolerating, no nausea/vomit; passing flatus, having multiple soft BMs.        Vital Signs Last 24 Hrs  T(C): 36.7 (09 Dec 2022 06:08), Max: 36.9 (08 Dec 2022 12:04)  T(F): 98.1 (09 Dec 2022 06:08), Max: 98.3 (08 Dec 2022 20:46)  HR: 97 (09 Dec 2022 06:08) (92 - 104)  BP: 119/67 (09 Dec 2022 06:08) (110/60 - 141/71)  BP(mean): --  RR: 17 (09 Dec 2022 06:08) (17 - 18)  SpO2: 96% (09 Dec 2022 06:08) (96% - 100%)    Parameters below as of 09 Dec 2022 06:08  Patient On (Oxygen Delivery Method): room air      I&O's Detail    08 Dec 2022 07:01  -  09 Dec 2022 07:00  --------------------------------------------------------  IN:  Total IN: 0 mL    OUT:    Voided (mL): 200 mL  Total OUT: 200 mL    Total NET: -200 mL      PE:    General: NAD, resting comfortably in bed  C/V: S1 s2, RRR  Pulm: Nonlabored breathing, no respiratory distress  Abd: Soft, NTND  Extrem: [facility]      LABS:                        7.7    5.97  )-----------( 166      ( 09 Dec 2022 08:25 )             23.0     12-09    137  |  109<H>  |  15  ----------------------------<  116<H>  3.3<L>   |  18<L>  |  2.18<H>    Ca    7.0<L>      09 Dec 2022 08:25  Phos  2.2     12-09  Mg     1.7     12-09    TPro  4.8<L>  /  Alb  1.9<L>  /  TBili  0.4  /  DBili  x   /  AST  25  /  ALT  11  /  AlkPhos  116  12-09          RADIOLOGY & ADDITIONAL STUDIES:

## 2022-12-09 NOTE — PROGRESS NOTE ADULT - PROBLEM SELECTOR PLAN 2
Patient with diarrhea described as loose stool, per outpatient notes seems to be chronic for several weeks (later stated for a year). Patient was prescribed Loperamide early November.  Unclear etiology. May be in setting of metastatic malignancy, however currently considering inflammatory given chronicity and white count. Diarrhea likely not infectious C. diff negative, GI PCR negative. Pt persistently hypokalemic likely from diarrhea. GI consulted on 12/2. 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 EGD on 12/9 showing poor rectal tone.   - CRP high 132; quantitative IgA normal; tissue transglutaminase (TTG) IgA-antibody  Plan:  - f/u stool calprotectin  - CTM bowel movements (having large volume watery)  - banatrol added to diet  - lomotil 2 tabs q6 hrs  - no loperamide due to prolonged QTc 508 (12/1/2022 EKG)  - f/u GI recs

## 2022-12-09 NOTE — PROGRESS NOTE ADULT - SUBJECTIVE AND OBJECTIVE BOX
GASTROENTEROLOGY PROGRESS NOTE  Patient seen and examined at bedside. NG tube out, tolerating liquids. loose stool    PERTINENT REVIEW OF SYSTEMS:  CONSTITUTIONAL: No weakness, fevers or chills  HEENT: No visual changes; No vertigo or throat pain   GASTROINTESTINAL: As above.  NEUROLOGICAL: No numbness or weakness  SKIN: No itching, burning, rashes, or lesions     Allergies    No Known Allergies    Intolerances      MEDICATIONS:  MEDICATIONS  (STANDING):  levothyroxine Injectable 50 MICROGram(s) IV Push <User Schedule>  pantoprazole  Injectable 40 milliGRAM(s) IV Push every 12 hours  sucralfate 1 Gram(s) Oral every 6 hours    MEDICATIONS  (PRN):  trimethobenzamide Injectable 200 milliGRAM(s) IntraMuscular every 12 hours PRN Nausea and/or Vomiting    Vital Signs Last 24 Hrs  T(C): 36.7 (09 Dec 2022 06:08), Max: 36.9 (08 Dec 2022 12:04)  T(F): 98.1 (09 Dec 2022 06:08), Max: 98.3 (08 Dec 2022 20:46)  HR: 97 (09 Dec 2022 06:08) (92 - 104)  BP: 119/67 (09 Dec 2022 06:08) (110/60 - 141/71)  BP(mean): --  RR: 17 (09 Dec 2022 06:08) (17 - 18)  SpO2: 96% (09 Dec 2022 06:08) (95% - 100%)    Parameters below as of 09 Dec 2022 06:08  Patient On (Oxygen Delivery Method): room air        12-08 @ 07:01  -  12-09 @ 07:00  --------------------------------------------------------  IN: 0 mL / OUT: 200 mL / NET: -200 mL      PHYSICAL EXAM:    General: lying in bed, in no acute distress, chronically ill appearing  HEENT: MMM, conjunctiva and sclera clear  Gastrointestinal: Soft non-tender non-distended; No rebound or guarding  Skin: Warm and dry. No obvious rash    LABS:                        7.6    7.07  )-----------( 193      ( 08 Dec 2022 15:23 )             23.2     12-08    141  |  114<H>  |  13  ----------------------------<  112<H>  3.9   |  19<L>  |  2.28<H>    Ca    7.4<L>      08 Dec 2022 15:23  Phos  3.0     12-08  Mg     1.4     12-08    TPro  4.6<L>  /  Alb  1.9<L>  /  TBili  0.3  /  DBili  x   /  AST  24  /  ALT  12  /  AlkPhos  107  12-08                      RADIOLOGY & ADDITIONAL STUDIES:  Reviewed

## 2022-12-09 NOTE — PROGRESS NOTE ADULT - PROBLEM SELECTOR PLAN 3
On 12/5 pt found to have distended abdomen. CTAP w/o contrast on 12/5 showing severe ileus. Surgery was formally consulted. Decided not to staff with attending but will continue to follow for serial abdominal exams. Primary team placed sump tube, set to suction. GI also following.  - placed sump tube 12/5  - bladder scan on 12/5, not retaining  - repeat abdominal XR 12/7: abdominal distention w/ improving ileus  - surgery signed off 12/9  Plan:  - clear liequid diet, advance as tolerated  - f/u GI recs

## 2022-12-09 NOTE — PROGRESS NOTE ADULT - ASSESSMENT
71 y/o male history of metastatic stage IV lung adenocarcinoma (carboplatin, pemetrexed, pembrolizumab on C1D1 10/27), hypothyroidism, depression, anxiety, undergoing chemo brought in by EMS with HHA for altered mental status. Found to have sepsis and ASHLYN. Oncology consulted given recent treatment of lung cancer and possible therapy releated side effects.    # Metastatic lung adenocarcinoma, AJCC Stage IV  Pt with R axillary LN biopsy positive for metastatic lung adenocarcinoma, with FDG avidity in thoracic, abdominal, inguinal LNs, as well as rectal thickening. Pt has not had any recent colonoscopies to further investigate GI involvement. Repeat CT CAP with increase in primary lung mass, persistent rectal thickening and other sites of disease as previously described. 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 Received Cycle 1 of chemo with carboplatin, pemetrexed, and pembrolizumab on 10/27/22. Now admitted with sepsis and ASHLYN, possibly 2/2 infection vs chemotherapy. Patient states that diarrhea started 1-2 weeks after chemotherapy and has been having a 2-3 bowel movements for the past 2 weeks. 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 Interval CT scan on admission 11/28 showing unchanged right upper lobe malignancy since October 7, 2022. Decreased left adrenal metastasis. Decreased abdominal and pelvic adenopathy.  - Will hold off on further chemotherapy until outpatient and improvement of ASHLYN    # Leukocytosis, improved  Persistent neutrophilic predominant leukocytosis from admission, prior to systemic chemoimmunotherapy WBC was normal in October 2022. Reported about 1 month of diarrhea post treatment. WBC count fluctuating between 16-25 during current admission, afebrile and with negative infectious work up. 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    # Microcytic anemia  Hemoglobin baseline August-October 2022 was 13-15, in 2021 was ~10. During current hospitalization, patient has a downtrend in hemoglobin from 11.7 on admission 11/28 (possibly concentrated) to 7.4 on 12/4. On 12/5 with drop in hemoglobin to 5.7. Would be concerned for acute losses. CT Abd/Pelvis admission without acute abdominal findings, but patient has had persistent diarrhea/decrease in hgb. Given dark brown/black gastric contents and persistent drop in Hgb, would be concerned for acute GI bleed. Ferritin normal and no iron deficiency (iron sat 31%), Unlikely hemolysis given elevated hapto, slightly elevated LDH.  - Would consider repeat CT abd/pelvis given drop in Hgb and to rule out complications of immune checkpoint inhibitor colitis  - Would supplement with multivitamin when able to tolerate PO  - Would check stool calprotectin, tissue transglutaminase (TTG) IgA-antibody, total IgA level, and a serum inflammatory marker (eg, C-reactive protein [CRP])  - GI following for EGD/Flex sig showing ulcers, no active bleed identified  - Maintain active type and screen  - Transfuse for hemoglobin <7 or active bleeding    Discussed with Dr. Patricia.

## 2022-12-09 NOTE — PROGRESS NOTE ADULT - PROBLEM SELECTOR PLAN 1
Pt with anemia on admission to 9.1. On 12/5, Hb 5.7. Pt asx, mild tachycardia, but normal BP. Unclear source. Pt w/ multiple dark green bowel movements, no overt melena observed. No hemoptysis, no hematuria. Significant bruising on R flank, c/f retroperitoneal bleed but r/o on imaging. Possible GI bleed, GI consulted. Heme onc consulted.  - CTAP w/o contrast 12/5/22: No retroperitoneal or extraperitoneal hemorrhage. Severe ileus.  - S/p 1 unit pRBC 12/5 w/ good response, additional 1 unit pRBC on 12/6  - retic index: 1.1 (hypoproliferative), elevated hapto/LDH (hemolysis unlikely), Fe studies showing  - folate/B12 normal  - EGD 12/9: Severe circumferential esophagitis s/p bx, Hiatal hernia, Ulcer in hernia sac s/p bx, Ulcer in cardia s/p bx, atrophic gastritis s/p bx  - Biopsy report: ulcers, no H pylori  Plan:  - monitor vitals  - advance diet to full liquids  - pantoprazole 40 IV BID  - f/u GI recs  - f/u heme onc recs

## 2022-12-09 NOTE — PROGRESS NOTE ADULT - ASSESSMENT
72-year-old male with PMHx of Stage IV metastatic lung carcinoma with last chemotherapy treatment 1 month ago, hypothyroidism s/p Graves thyroid iodine ablation, anxiety, depression, hemorrhoids s/p hemorrhoidectomy (~20 years ago), and bowel/bladder incontinence is admitted for AMS which has since resolved.  Patient consulted to General Surgery for concerns regarding ileus as PO intake is decreased and is associated with emesis.  Patient is nonperitonitic, passing flatus and having bowel movements.  CT imaging demonstrate ileus.    Recommendations:    - No indication for surgical intervention  - Patient tolerating CLD, passing flatus, having regular BMs  - Advance diet as tolerated  - Surgery Team 4C will sign off. Please reconsult as needed.

## 2022-12-09 NOTE — PROGRESS NOTE ADULT - ASSESSMENT
73 y/o male history of metastatic stage IV lung adenocarcinoma, hypothyroidism, depression, anxiety, undergoing chemo brought in by EMS with HHA for altered mental status Monday morning admitted for sepsis (unclear source) and electrolyte derangements. Now found to have anemia and severe ileus on 12/6, s/p EGD/flex sig 12/9 w/ ulcers.

## 2022-12-09 NOTE — PROGRESS NOTE ADULT - SUBJECTIVE AND OBJECTIVE BOX
INTERVAL HPI/OVERNIGHT EVENTS:  All notes reviewed;  Feels stronger  Awke and alert      MEDICATIONS  (STANDING):  levothyroxine Injectable 50 MICROGram(s) IV Push <User Schedule>  pantoprazole  Injectable 40 milliGRAM(s) IV Push every 12 hours  potassium chloride    Tablet ER 40 milliEquivalent(s) Oral every 4 hours  sucralfate 1 Gram(s) Oral every 6 hours    MEDICATIONS  (PRN):  trimethobenzamide Injectable 200 milliGRAM(s) IntraMuscular every 12 hours PRN Nausea and/or Vomiting      Allergies    No Known Allergies    Intolerances        Vital Signs Last 24 Hrs  T(C): 36.7 (09 Dec 2022 06:08), Max: 36.9 (08 Dec 2022 12:04)  T(F): 98.1 (09 Dec 2022 06:08), Max: 98.3 (08 Dec 2022 20:46)  HR: 97 (09 Dec 2022 06:08) (92 - 104)  BP: 119/67 (09 Dec 2022 06:08) (110/60 - 141/71)  BP(mean): --  RR: 17 (09 Dec 2022 06:08) (17 - 18)  SpO2: 96% (09 Dec 2022 06:08) (96% - 100%)    Parameters below as of 09 Dec 2022 06:08  Patient On (Oxygen Delivery Method): room air              Constitutional:  Awake     Eyes: ARBEN    ENMT: Negative    Neck: Supple    Back:  no tenderness     Respiratory:  c;lear    Cardiovascular: S1 S2    Gastrointestinal: soft    Genitourinary:    Extremities: no edema     Vascular:    Neurological:    Skin:    Lymph Nodes:            12-08 @ 07:01  -  12-09 @ 07:00  --------------------------------------------------------  IN: 0 mL / OUT: 200 mL / NET: -200 mL      LABS:                        7.7    5.97  )-----------( 166      ( 09 Dec 2022 08:25 )             23.0     12-09    137  |  109<H>  |  15  ----------------------------<  116<H>  3.3<L>   |  18<L>  |  2.18<H>    Ca    7.0<L>      09 Dec 2022 08:25  Phos  2.2     12-09  Mg     1.7     12-09    TPro  4.8<L>  /  Alb  1.9<L>  /  TBili  0.4  /  DBili  x   /  AST  25  /  ALT  11  /  AlkPhos  116  12-09          RADIOLOGY & ADDITIONAL TESTS:

## 2022-12-09 NOTE — PROGRESS NOTE ADULT - SUBJECTIVE AND OBJECTIVE BOX
SUBJECTIVE / INTERVAL HPI: Patient seen and examined at bedside. No overnight events. Patient with EGD yesterday, no active bleed but with ulcers. Patient is feeling better this morning. Would like to eat. Denies fever, sweats, abdominal pain. Pt continues to have large volume watery bowel movements.    VITAL SIGNS:  Vital Signs Last 24 Hrs  T(C): 36.8 (09 Dec 2022 15:31), Max: 36.9 (09 Dec 2022 09:36)  T(F): 98.3 (09 Dec 2022 15:31), Max: 98.4 (09 Dec 2022 09:36)  HR: 98 (09 Dec 2022 15:31) (97 - 104)  BP: 112/67 (09 Dec 2022 15:31) (109/58 - 141/71)  BP(mean): --  RR: 18 (09 Dec 2022 15:31) (17 - 18)  SpO2: 99% (09 Dec 2022 15:31) (96% - 99%)    Parameters below as of 09 Dec 2022 15:31  Patient On (Oxygen Delivery Method): room air        PHYSICAL EXAM:  General: Resting comfortably in bed; NAD  HEENT: NC/AT, EOMI, anicteric sclera, dry mucous membranes, neck supple  Cardiac: RRR; normal S1/S2, no MRG, mild LE edema  Respiratory: bibasilar crackles; no wheezes, ronchi, increased work of breathing, retractions  Gastrointestinal: +BSx4, tympanic, abdomen soft w/o guarding or rebound (distention improved)  Extremities: WWP x4 extremities  Dermatologic: skin warm, dry and intact; no rashes, open wounds  Neurologic: AAOx3; no focal deficits    MEDICATIONS:  MEDICATIONS  (STANDING):  diphenoxylate/atropine 2 Tablet(s) Oral every 6 hours  levothyroxine Injectable 50 MICROGram(s) IV Push <User Schedule>  pantoprazole  Injectable 40 milliGRAM(s) IV Push every 12 hours  sucralfate 1 Gram(s) Oral every 6 hours    MEDICATIONS  (PRN):  trimethobenzamide Injectable 200 milliGRAM(s) IntraMuscular every 12 hours PRN Nausea and/or Vomiting      ALLERGIES:  Allergies    No Known Allergies    Intolerances        LABS:                        7.7    5.97  )-----------( 166      ( 09 Dec 2022 08:25 )             23.0     12-09    137  |  109<H>  |  15  ----------------------------<  116<H>  3.3<L>   |  18<L>  |  2.18<H>    Ca    7.0<L>      09 Dec 2022 08:25  Phos  2.2     12-09  Mg     1.7     12-09    TPro  4.8<L>  /  Alb  1.9<L>  /  TBili  0.4  /  DBili  x   /  AST  25  /  ALT  11  /  AlkPhos  116  12-09        CAPILLARY BLOOD GLUCOSE      POCT Blood Glucose.: 96 mg/dL (09 Dec 2022 17:22)      RADIOLOGY & ADDITIONAL TESTS: Reviewed.

## 2022-12-09 NOTE — PROGRESS NOTE ADULT - PROBLEM SELECTOR PLAN 5
Patient with hypokalemia, likely due to persistent diarrhea. No EKG changes. On 12/1 K low to 2.1, repeat EKG possible u waves. Pt asymptomatic, no fasiculations or weakness. Aggressively repleted. Diarrhea stopped when pt NPO, and K improved, now worsening w/ worsening diarrhea after diet restarted.  - K replete, goal >4

## 2022-12-10 NOTE — PROGRESS NOTE ADULT - PROBLEM SELECTOR PLAN 8
Patient with frequent falls at home, per outpatient nursing notes, patient's HHA have reported falls at home sometimes with head trauma.  He ambulated at baseline with cane.  Etiology likely due to overall decompensation in setting of metastatic cancer. CTH and CT cervical spine without fractures of trauma.    - PT/OT consult - recommending SASHA  - OOBTC daily w/ supervision Patient with frequent falls at home, per outpatient nursing notes, patient's HHA have reported falls at home sometimes with head trauma.  He ambulated at baseline with cane.  Etiology likely due to overall decompensation in setting of metastatic cancer. CTH and CT cervical spine without fractures of trauma.    - PT/OT consulted - recommending SASHA  - OOBTC daily w/ supervision

## 2022-12-10 NOTE — PROGRESS NOTE ADULT - PROBLEM SELECTOR PLAN 9
Patient with underlying metastatic lung adenocarcinoma with chronic diarrhea and frequent falls, overall appears very frail.    - dietitian consulted - rec ensure enlive TID  - PT/OT consult - rec SASHA  - palliative consult: DNR/DNI  - contact family  - incentive spirometry Patient with underlying metastatic lung adenocarcinoma with chronic diarrhea and frequent falls, overall appears very frail.    - dietitian consulted - rec ensure enlive TID  - PT/OT consulted, rec SASHA  - palliative consult: DNR/DNI  - contact family  - incentive spirometry

## 2022-12-10 NOTE — PROGRESS NOTE ADULT - SUBJECTIVE AND OBJECTIVE BOX
Physical Medicine and Rehabilitation Progress Note :       Patient is a 72y old  Male who presents with a chief complaint of sepsis (07 Dec 2022 12:34)      HPI:  73 y/o male history of metastatic stage IV lung adenocarcinoma, hypothyroidism, depression, anxiety, undergoing chemo brought in by EMS with HHA for altered mental status Monday morning. At baseline patient is A&Ox3, per HHA he was AOx1, not responding to questions or commands. Patient was reported to be hypotensive to SBP 50s in the field, IO was placed and given fluids with improvement in BP. Patient lives along, has HHA 2 days per week for 10 hours, per ED note HHA states she spoke to pt 3 days ago and foung him in his bed Monday morning at which time he was confused. Currently being treated for lung cancer, last chemo 1 month ago.  Patient also has had recurrent falls at home, some associated with head trauma but no loc, syncope, bladder/bowel incontinence.  Otherwise no reported illness, sick contacts, medical changes.  ROS otherwise negative.      ED Course   Vitals: Temp 98.7,  --> 86, /63, RR 20, SaO2 98% on 4L NC --> 97% room air   Labs: WBC 16.24, Hgb 11.7, Plt 449, Na 143, K 3.2, CO2 18, AG 24, BUN 31, Scr 2.35, Ca 7.6, Ma 1.4, Lactate 12.1 --> 2.3, UA trace LE, Bacteria present, hyaline casts, C. Diff negative GI PCR negative  EKG: sinus tachycardia, 1st degree AV block, narrow QRS, prolonged QTC  CXR: Known right upper lobe pulmonary mass   CT chest, abdomen, pelvis: No acute fractures. Unchanged right upper lobe malignancy. Decreased left adrenal metastasis. Decreased abdominal and pelvic adenopathy.  CTH: No intracranial hemorrhage or acute transcortical infarct.  Generalized volume loss with small vessel ischemic disease.  CT C-spine: No fracture. Levoscoliosis with Grade 1 anterolisthesis of C2 on C3 and C7 on T1. Multilevel cervical spondylosis. Right apical lung spiculated mass.  Interventions: Tylenol ig IV, Ca Gluconate 2g, MgSO4 2g x 2, KCl 40mg po x 1, KCl 10mg IV x 5, Zosyn 3.375 x 3, Vancomycin 1250mg, NaCl 2200cc bolus   (28 Nov 2022 21:33)                            7.7    5.97  )-----------( 166      ( 09 Dec 2022 08:25 )             23.0       12-09    137  |  109<H>  |  15  ----------------------------<  116<H>  3.3<L>   |  18<L>  |  2.18<H>    Ca    7.0<L>      09 Dec 2022 08:25  Phos  2.2     12-09  Mg     1.7     12-09    TPro  4.8<L>  /  Alb  1.9<L>  /  TBili  0.4  /  DBili  x   /  AST  25  /  ALT  11  /  AlkPhos  116  12-09    Vital Signs Last 24 Hrs  T(C): 36.5 (10 Dec 2022 09:09), Max: 36.8 (09 Dec 2022 15:31)  T(F): 97.7 (10 Dec 2022 09:09), Max: 98.3 (09 Dec 2022 15:31)  HR: 100 (10 Dec 2022 09:09) (80 - 111)  BP: 109/65 (10 Dec 2022 09:09) (109/65 - 112/67)  BP(mean): --  RR: 18 (10 Dec 2022 09:09) (17 - 18)  SpO2: 99% (10 Dec 2022 09:09) (95% - 99%)    Parameters below as of 10 Dec 2022 09:09  Patient On (Oxygen Delivery Method): room air        MEDICATIONS  (STANDING):  diphenoxylate/atropine 2 Tablet(s) Oral every 6 hours  levothyroxine Injectable 50 MICROGram(s) IV Push <User Schedule>  multivitamin 1 Tablet(s) Oral daily  pantoprazole  Injectable 40 milliGRAM(s) IV Push every 12 hours  sucralfate 1 Gram(s) Oral every 6 hours    MEDICATIONS  (PRN):  trimethobenzamide Injectable 200 milliGRAM(s) IntraMuscular every 12 hours PRN Nausea and/or Vomiting       Physical Therapy Functional Status Assessment :   12/9/2022     Cognitive/Neuro/Behavioral  Cognitive/Neuro/Behavioral [WDL Definition: Alert; opens eyes spontaneously; arouses to voice or touch; oriented x 4; follows commands; speech spontaneous, logical; purposeful motor response; behavior appropriate to situation]: WDL    Language Assistance  Preferred Language to Address Healthcare Preferred Language to Address Healthcare: English    Therapeutic Interventions      Bed Mobility  Bed Mobility Training Rolling/Turning: minimum assist (75% patient effort);  moderate assist (50% patient effort);  2 person assist;  bed rails  Bed Mobility Training Scooting: moderate assist (50% patient effort);  1 person assist;  bed rails  Bed Mobility Training Sit-to-Supine: minimum assist (75% patient effort);  1 person assist;  bed rails  Bed Mobility Training Supine-to-Sit: minimum assist (75% patient effort);  2 person assist;  bed rails  Bed Mobility Training Limitations: decreased ability to use legs for bridging/pushing;  decreased flexibility;  decreased strength;  impaired balance;  impaired postural control;  pain    Sit-Stand Transfer Training  Transfer Training Sit-to-Stand Transfer: minimum assist (75% patient effort);  2 person assist;  weight-bearing as tolerated   rolling walker  Transfer Training Stand-to-Sit Transfer: minimum assist (75% patient effort);  1 person assist;  weight-bearing as tolerated   rolling walker  Sit-to-Stand Transfer Training Transfer Safety Analysis: decreased balance;  decreased weight-shifting ability;  impaired balance;  impaired postural control;  rolling walker    Gait Training  Gait Training: minimum assist (75% patient effort);  2 person assist;  weight-bearing as tolerated   rolling walker;  5 side steps at EOB  Gait Analysis: 3-point gait   crouch;  decreased vanessa;  decreased strength;  impaired balance;  impaired postural control;  5 side steps at EOB;  rolling walker              PM&R Impression : as above    Current Disposition Plan Recommendations :    subacute rehab placement

## 2022-12-10 NOTE — PROGRESS NOTE ADULT - PROBLEM SELECTOR PLAN 4
Patient with ASHLYN on admission BUN 31, Scr 2.35 -->Scr 2.31 (baseline Scr 1.1 10/22). ASHLYN likely prerenal in setting of hypovolemia due to diarrhea and poor po intake. Given prostate mets, there may also be a post-renal component. Pt w/ mark initially, then removed after passing TOV.   - worsening Cr 12/5, may have ATN component  - d/c maintenance fluids i/s/o crackles  - strict I/Os  - trend Cr, avoid nephrotoxic drugs, renally dose meds Patient with ASHLYN on admission BUN 31, Scr 2.35 -->Scr 2.31 (baseline Scr 1.1 10/22). ASHLYN likely prerenal i/s/o hypovolemia due to diarrhea and poor po intake. Given prostate mets, there may also be a post-renal component. Pt w/ mark initially, then removed after passing TOV.   - downtrending Cr 12/9  - d/c maintenance fluids i/s/o crackles  - strict I/Os  - trend Cr, avoid nephrotoxic drugs, renally dose meds

## 2022-12-10 NOTE — DISCHARGE NOTE PROVIDER - CARE PROVIDER_API CALL
Joy Patricia; PATRICK)  Internal Medicine  [address], [address] [place] [address]  Phone: [phone]  Fax: [phone]  Scheduled Appointment: 12/29/2022 11:20 AM   Joy Patricia; PATRICK)  Internal Medicine  [address] [place] [address]  Phone: [phone]  Fax: [phone]  Established Patient  Scheduled Appointment: 12/29/2022 11:20 AM    Dulce Asencio)  Critical Care Medicine; Internal Medicine  [address], [address]  [place] [address]  Phone: [phone]  Fax: [phone]  Established Patient  Follow Up Time: 2 weeks

## 2022-12-10 NOTE — PROGRESS NOTE ADULT - PROBLEM SELECTOR PLAN 5
Patient with hypokalemia, likely due to persistent diarrhea. No EKG changes. On 12/1 K low to 2.1, repeat EKG possible u waves. Pt asymptomatic, no fasiculations or weakness. Aggressively repleted. Diarrhea stopped when pt NPO, and K improved, now worsening w/ worsening diarrhea after diet restarted.  - K replete, goal >4 Patient with hypokalemia, likely due to persistent diarrhea. No EKG changes. On 12/1 K low to 2.1, repeat EKG possible u waves. Pt asymptomatic, no fasiculations or weakness. Aggressively repleted. Diarrhea stopped when pt NPO, and K improved, now worsening w/ worsening diarrhea after diet restarted.  - K replenish, goal >4

## 2022-12-10 NOTE — PROGRESS NOTE ADULT - PROBLEM SELECTOR PLAN 3
On 12/5 pt found to have distended abdomen. CTAP w/o contrast on 12/5 showing severe ileus. Surgery was formally consulted. Decided not to staff with attending but will continue to follow for serial abdominal exams. Primary team placed sump tube, set to suction. GI also following.  - placed sump tube 12/5  - bladder scan on 12/5, not retaining  - repeat abdominal XR 12/7: abdominal distention w/ improving ileus  - surgery signed off 12/9  Plan:  - clear liequid diet, advance as tolerated  - f/u GI recs On 12/5 pt found to have distended abdomen. CTAP w/o contrast on 12/5 showing severe ileus. Surgery was formally consulted. Decided not to staff with attending but will continue to follow for serial abdominal exams. Primary team placed sump tube, set to suction. GI also following.  - placed sump tube 12/5  - bladder scan on 12/5, not retaining  - repeat abdominal XR 12/7: abdominal distention w/ improving ileus  - surgery signed off 12/9  Plan:  - minced and moist diet, advance as tolerated  - f/u GI recs

## 2022-12-10 NOTE — DISCHARGE NOTE PROVIDER - NSDCMRMEDTOKEN_GEN_ALL_CORE_FT
albuterol 90 mcg/inh inhalation aerosol: 2 puff(s) inhaled every 6 hours, As Needed  buPROPion 150 mg/24 hours (XL) oral tablet, extended release: 1 tab(s) orally every 24 hours  gabapentin 300 mg oral capsule: 1 cap(s) orally 3 times a day  Incruse Ellipta 62.5 mcg/inh inhalation powder: 1 puff(s) inhaled every 24 hours  levothyroxine 75 mcg (0.075 mg) oral tablet: 1 tab(s) orally once a day  QUEtiapine: 250 milligram(s) orally once a day (at bedtime), As Needed for sleep   albuterol 90 mcg/inh inhalation aerosol: 2 puff(s) inhaled every 6 hours, As Needed  buPROPion 150 mg/24 hours (XL) oral tablet, extended release: 1 tab(s) orally every 24 hours  gabapentin 300 mg oral capsule: 1 cap(s) orally 3 times a day  Incruse Ellipta 62.5 mcg/inh inhalation powder: 1 puff(s) inhaled every 24 hours  pantoprazole 40 mg oral granule, delayed release: 1 tab(s) orally once a day   predniSONE 20 mg oral tablet: 2 tab(s) orally once a day   QUEtiapine: 250 milligram(s) orally once a day (at bedtime), As Needed for sleep  Synthroid 88 mcg (0.088 mg) oral tablet: 1 tab(s) orally once a day   albuterol 90 mcg/inh inhalation aerosol: 2 puff(s) inhaled every 6 hours, As Needed  buPROPion 150 mg/24 hours (XL) oral tablet, extended release: 1 tab(s) orally every 24 hours  gabapentin 300 mg oral capsule: 1 cap(s) orally 3 times a day  Incruse Ellipta 62.5 mcg/inh inhalation powder: 1 puff(s) inhaled every 24 hours  pantoprazole 40 mg oral granule, delayed release: 1 tab(s) orally every 12 hours   predniSONE 20 mg oral tablet: 2 tab(s) orally once a day   QUEtiapine: 250 milligram(s) orally once a day (at bedtime), As Needed for sleep  Synthroid 88 mcg (0.088 mg) oral tablet: 1 tab(s) orally once a day   albuterol 90 mcg/inh inhalation aerosol: 2 puff(s) inhaled every 6 hours, As Needed  buPROPion 150 mg/24 hours (XL) oral tablet, extended release: 1 tab(s) orally every 24 hours  Carafate 1 g/10 mL oral suspension: 10 milliliter(s) orally every 6 hours  gabapentin 300 mg oral capsule: 1 cap(s) orally 3 times a day  Incruse Ellipta 62.5 mcg/inh inhalation powder: 1 puff(s) inhaled every 24 hours  pantoprazole 40 mg oral delayed release tablet: 1 tab(s) orally every 12 hours   predniSONE 20 mg oral tablet: 2 tab(s) orally once a day   QUEtiapine: 250 milligram(s) orally once a day (at bedtime), As Needed for sleep  Synthroid 88 mcg (0.088 mg) oral tablet: 1 tab(s) orally once a day   albuterol 90 mcg/inh inhalation aerosol: 2 puff(s) inhaled every 6 hours, As Needed  buPROPion 150 mg/24 hours (XL) oral tablet, extended release: 1 tab(s) orally every 24 hours  Carafate 1 g oral tablet: 1 tab(s) orally every 6 hours   gabapentin 300 mg oral capsule: 1 cap(s) orally 3 times a day  Incruse Ellipta 62.5 mcg/inh inhalation powder: 1 puff(s) inhaled every 24 hours  pantoprazole 40 mg oral delayed release tablet: 1 tab(s) orally every 12 hours   predniSONE 20 mg oral tablet: 2 tab(s) orally once a day   QUEtiapine: 250 milligram(s) orally once a day (at bedtime), As Needed for sleep  Synthroid 88 mcg (0.088 mg) oral tablet: 1 tab(s) orally once a day

## 2022-12-10 NOTE — DISCHARGE NOTE PROVIDER - NSDCCPCAREPLAN_GEN_ALL_CORE_FT
PRINCIPAL DISCHARGE DIAGNOSIS  Diagnosis: Anemia  Assessment and Plan of Treatment: Anemia is the medical term for when a person has too few red blood cells. Red blood cells are the cells in your blood that carry oxygen. If you have too few red blood cells, your body does not get all the oxygen it needs. Most people with anemia have no symptoms. They find out they have it after their doctor does blood tests for another reason. People who do have symptoms might feel tired or weak, especially if they try to exercise or have headaches. If you experience these symptoms you should see your primary care provider for further evaluation.  You were found to have anemia with a low hemoglobin during your hospital stay, which required two blood transfusions. We obtained imaging and performed an endoscopic procedure with our gastroenterology team to figure out the cause of your bleeding. You  were found to have ulcers in your stomach that are likely the cause of your bleeding, and were started on a medication called protonix to help prevent further bleeding and development of additional ulcers.  *****************************  - Take pantoprazole 40mg 1 tab by mouth twice a day. It is important that you do not skip a dose.  - Follow up outpatient with your primary care provider.        SECONDARY DISCHARGE DIAGNOSES  Diagnosis: Diarrhea  Assessment and Plan of Treatment: You have been having persistent chronic diarrhea. You were found to have poor rectal tone when our GI doctors scoped you, which is likely  contributing to your diarrhea. We also tested you for infectious causes, but these results were negative. Your diarrhea is also likely causing you to have a low potassium. You were started on potassium supplementation. Very low potassium can cause heart conduction abnormalities, so it is very important that you continue to take your potassium supplements.  ******************************  - Take lomotil ________, as needed to help with your diarrhea.  - Take potassium chloride 40mEq 1 tab by mouth twice a day for your low potassium.  - Follow up outpatient with your primary care provider, for repeat labs    Diagnosis: Ileus  Assessment and Plan of Treatment: You were found to have severe ileus, or slowing down of your digestive tract. This caused your bowels to become very distended. To treat this, we placed a nasogastric tube to decompress your stomach and bowels, and had you not eat to give your bowels a rest. The slowing of your bowels improved, and your diet was advanced.    Diagnosis: Hypothyroidism  Assessment and Plan of Treatment: You have a history of hypothyroidism. We performed a laboratory test that indicated that the dosage of your synthroid medication may be too low. We increased the amount of synthroid you are taking.  ****************************  - STOP taking your old synthroid medication.  - Please take synthroid 88mcg 1 tab per day.  - Follow up outpatient with your primary care provider to repeat labs and further titrate your synthroid medication.    Diagnosis: Adenocarcinoma, lung  Assessment and Plan of Treatment:      PRINCIPAL DISCHARGE DIAGNOSIS  Diagnosis: Anemia  Assessment and Plan of Treatment: Anemia is the medical term for when a person has too few red blood cells. Red blood cells are the cells in your blood that carry oxygen. If you have too few red blood cells, your body does not get all the oxygen it needs. Most people with anemia have no symptoms. They find out they have it after their doctor does blood tests for another reason. People who do have symptoms might feel tired or weak, especially if they try to exercise or have headaches. If you experience these symptoms you should see your primary care provider for further evaluation.  You were found to have anemia with a low hemoglobin during your hospital stay, which required two blood transfusions. We obtained imaging and performed an endoscopic procedure with our gastroenterology team to figure out the cause of your bleeding. You  were found to have ulcers in your stomach that are likely the cause of your bleeding, and were started on a medication called protonix to help prevent further bleeding and development of additional ulcers.  *****************************  - Take pantoprazole 40mg 1 tab by mouth twice a day. It is important that you do not skip a dose.  - Follow up outpatient with your primary care provider.        SECONDARY DISCHARGE DIAGNOSES  Diagnosis: Diarrhea  Assessment and Plan of Treatment: You have been having persistent chronic diarrhea. You were found to have poor rectal tone when our GI doctors scoped you, which is likely  contributing to your diarrhea. We also tested you for infectious causes, but these results were negative. Your diarrhea is also likely causing you to have a low potassium. You were started on potassium supplementation. Very low potassium can cause heart conduction abnormalities, so it is very important that you continue to take your potassium supplements.  ******************************  - Take potassium chloride 40mEq 1 tab by mouth twice a day for your low potassium.  - Follow up outpatient with your primary care provider, for repeat labs    Diagnosis: Hypothyroidism  Assessment and Plan of Treatment: You have a history of hypothyroidism. We performed a laboratory test that indicated that the dosage of your synthroid medication may be too low. We increased the amount of synthroid you are taking.  ****************************  - STOP taking your old synthroid medication.  - Please take synthroid 88mcg 1 tab per day.  - Follow up outpatient with your primary care provider to repeat labs and further titrate your synthroid medication.    Diagnosis: Ileus  Assessment and Plan of Treatment: You were found to have severe ileus, or slowing down of your digestive tract. This caused your bowels to become very distended. To treat this, we placed a nasogastric tube to decompress your stomach and bowels, and had you not eat to give your bowels a rest. The slowing of your bowels improved, and your diet was advanced.    Diagnosis: Adenocarcinoma, lung  Assessment and Plan of Treatment:      PRINCIPAL DISCHARGE DIAGNOSIS  Diagnosis: Anemia  Assessment and Plan of Treatment: Anemia is the medical term for when a person has too few red blood cells. Red blood cells are the cells in your blood that carry oxygen. If you have too few red blood cells, your body does not get all the oxygen it needs. Most people with anemia have no symptoms. They find out they have it after their doctor does blood tests for another reason. People who do have symptoms might feel tired or weak, especially if they try to exercise or have headaches. If you experience these symptoms you should see your primary care provider for further evaluation.  You were found to have anemia with a low hemoglobin during your hospital stay, which required two blood transfusions. We obtained imaging and performed an endoscopic procedure with our gastroenterology team to figure out the cause of your bleeding. You  were found to have ulcers in your stomach that are likely the cause of your bleeding, and were started on a medication called protonix to help prevent further bleeding and development of additional ulcers.  *****************************  - Take pantoprazole 40mg 1 tab by mouth twice a day. It is important that you do not skip a dose.  - Follow up outpatient with Dr. Jyo Patricia on 12/29/2022 at 11:20 AM to continue to monitor your anemia.        SECONDARY DISCHARGE DIAGNOSES  Diagnosis: Diarrhea  Assessment and Plan of Treatment: You have been having persistent chronic diarrhea. You were found to have poor rectal tone when our GI doctors scoped you, which is likely contributing to your diarrhea. 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 We also tested you for infectious causes, but these results were negative. Your diarrhea is also likely causing you to have a low potassium. You were started on potassium supplementation. Very low potassium can cause heart conduction abnormalities, so it is very important that you continue to take your potassium supplements.  ******************************  - Follow up outpatient with Dr. Asencio, for repeat labs. 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 Currently you are being tapered off of the prednisone. You are being discharged on prednisone 40mg once daily, as part of your prednisone taper. Please continue taking the prednisone until you see Dr. Asencio in office to continue further tapering the prednisone.  - Please make sure you continue taking pantoprazole 40mg every 12 hours for at least 8 weeks and the carafate solution every 6 hours for 2 weeks. Please follow up with Dr. Asencio to determine when you can get a repeat EGD, likely within the next 2 weeks.    Diagnosis: Hypothyroidism  Assessment and Plan of Treatment: You have a history of hypothyroidism. We performed a laboratory test that indicated that the dosage of your synthroid medication may be too low. We increased the amount of synthroid you are taking.  ****************************  - STOP taking your old synthroid medication.  - Please take synthroid 88mcg 1 tab per day.  - Follow up outpatient with Dr. Asencio to repeat labs and further titrate your synthroid medication as needed.    Diagnosis: Ileus  Assessment and Plan of Treatment: You were found to have severe ileus, or slowing down of your digestive tract. This caused your bowels to become very distended. To treat this, we placed a nasogastric tube to decompress your stomach and bowels, and had you not eat to give your bowels a rest. The slowing of your bowels improved, and your diet was advanced.    Diagnosis: Adenocarcinoma, lung  Assessment and Plan of Treatment: Please continue to follow up with Dr. Patricia on 11/29/2022 at 11:20 AM to follow up on the lung adenomarcinoma and your therapy options.     PRINCIPAL DISCHARGE DIAGNOSIS  Diagnosis: Anemia  Assessment and Plan of Treatment: Anemia is the medical term for when a person has too few red blood cells. Red blood cells are the cells in your blood that carry oxygen. If you have too few red blood cells, your body does not get all the oxygen it needs. Most people with anemia have no symptoms. They find out they have it after their doctor does blood tests for another reason. People who do have symptoms might feel tired or weak, especially if they try to exercise or have headaches. If you experience these symptoms you should see your primary care provider for further evaluation.  You were found to have anemia with a low hemoglobin during your hospital stay, which required two blood transfusions. We obtained imaging and performed an endoscopic procedure with our gastroenterology team to figure out the cause of your bleeding. You  were found to have ulcers in your stomach that are likely the cause of your bleeding, and were started on a medication called protonix to help prevent further bleeding and development of additional ulcers.  *****************************  - Take pantoprazole 40mg 1 tab by mouth twice a day. It is important that you do not skip a dose.  - Follow up outpatient with Dr. Joy Patricia on 12/29/2022 at 11:20 AM to continue to monitor your anemia.        SECONDARY DISCHARGE DIAGNOSES  Diagnosis: Diarrhea  Assessment and Plan of Treatment: You have been having persistent chronic diarrhea. You were found to have poor rectal tone when our GI doctors scoped you, which is likely contributing to your diarrhea. 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 We also tested you for infectious causes, but these results were negative. Your diarrhea is also likely causing you to have a low potassium. You were started on potassium supplementation. Very low potassium can cause heart conduction abnormalities, so it is very important that you continue to take your potassium supplements.  ******************************  - Follow up outpatient with Dr. Asenico, for repeat labs. 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 Currently you are being tapered off of the prednisone. You are being discharged on prednisone 40mg once daily, as part of your prednisone taper. Please continue taking the prednisone until you see Dr. Asencio in office to continue further tapering the prednisone.  - Please make sure you continue taking pantoprazole 40mg every 12 hours for at least 8 weeks and sucralfate 1g every 6 hours for 2 weeks. Please follow up with Dr. Asencio to determine when you can get a repeat EGD, likely within the next 2 weeks. It is very important that you continue taking the pantoprazole and sucralfate given all the other medications that you are taking, especially the prednisone.    Diagnosis: Hypothyroidism  Assessment and Plan of Treatment: You have a history of hypothyroidism. We performed a laboratory test that indicated that the dosage of your synthroid medication may be too low. We increased the amount of synthroid you are taking.  ****************************  - STOP taking your old synthroid medication.  - Please take synthroid 88mcg 1 tab per day.  - Follow up outpatient with Dr. Asencio to repeat labs and further titrate your synthroid medication as needed.    Diagnosis: Ileus  Assessment and Plan of Treatment: You were found to have severe ileus, or slowing down of your digestive tract. This caused your bowels to become very distended. To treat this, we placed a nasogastric tube to decompress your stomach and bowels, and had you not eat to give your bowels a rest. The slowing of your bowels improved, and your diet was advanced.    Diagnosis: Adenocarcinoma, lung  Assessment and Plan of Treatment: Please continue to follow up with Dr. Patricia on 11/29/2022 at 11:20 AM to follow up on the lung adenomarcinoma and your therapy options.

## 2022-12-10 NOTE — PROGRESS NOTE ADULT - PROBLEM SELECTOR PLAN 11
Patient with hypophosphatemia, likely due to persistent diarrhea.  Likely contributing to overall weakness and falls.  - Replete PRN Patient with hypophosphatemia, likely due to persistent diarrhea.  Likely contributing to overall weakness and falls.  - Replenish PRN

## 2022-12-10 NOTE — DISCHARGE NOTE PROVIDER - HOSPITAL COURSE
71 y/o male history of metastatic stage IV lung adenocarcinoma (undergoing chemo), hypothyroidism, depression and anxiety brought in by EMS with HHA for altered mental status, admitted for sepsis (unclear source) and electrolyte derangements. Pt w/ return to baseline mental status w/i 24hrs and received 3 days abx, which were d/anuradha due to lack of clear source. Hospital stay was c/b severe ileus, anemia (requiring transfusion s/p EGD/flex sig w/ GI), and persistent diarrhea and hypokalemia. Patient was discharged in stable medical condition to rehab.    #Anemia.   Pt with anemia on admission to 9.1. On 12/5, Hb 5.7. Pt asx, mild tachycardia, but normal BP. Unclear source. Pt w/ multiple dark green bowel movements, no overt melena observed. No hemoptysis, no hematuria. Significant bruising on R flank, initial c/f retroperitoneal bleed but ruled out on imaging. GI consulted and performed EGD/flex sig revealing ulcers. Heme onc also consulted i/s/o active malignancy. Pt was started on pantoprazole 40 IV BID, ____switched to PO.  - CTAP w/o contrast 12/5/22: No retroperitoneal or extraperitoneal hemorrhage. Severe ileus.  - S/p 1 unit pRBC 12/5 w/ good response, additional 1 unit pRBC on 12/6  - retic index: 1.1 (hypoproliferative), elevated hapto/LDH (hemolysis unlikely), folate/B12 normal  - EGD 12/9: Severe circumferential esophagitis s/p bx, Hiatal hernia, Ulcer in hernia sac s/p bx, Ulcer in cardia s/p bx, atrophic gastritis s/p bx  - Biopsy report: ulcers, no H pylori  Plan:  - pantoprazole 40 IV BID  - f/u GI recs  - f/u heme onc recs.    #Diarrhea.   Patient with chronic diarrhea described for several weeks. Patient was prescribed Loperamide early November. Unclear etiology. May be in setting of metastatic malignancy, however currently considering inflammatory given chronicity and white count. Diarrhea likely not infectious C. diff negative, GI PCR negative. Pt persistently hypokalemic likely from diarrhea. GI consulted on 12/2. 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 EGD on 12/9 showing poor rectal tone. Added banatrol and lomotil.  - CRP high 132; quantitative IgA normal; tissue transglutaminase (TTG) IgA-antibody  Plan:  - CTM bowel movements (having large volume watery)  - c/w banatrol added to diet  - lomotil 2 tabs q6 hrs  - no loperamide due to prolonged QTc 508 (12/1/2022 EKG)  - f/u GI recs.    #Severe Ileus   On 12/5 pt found to have distended abdomen. CTAP w/o contrast on 12/5 showing severe ileus. Surgery was formally consulted. Decided not to staff with attending but will continue to follow for serial abdominal exams. Primary team placed sump tube, set to suction. GI also followed.  - placed sump tube 12/5  - bladder scan on 12/5, not retaining  - repeat abdominal XR 12/7: abdominal distention w/ improving ileus  - diet was advanced as tolerated  Plan:  - clear liquid diet, advance as tolerated  - f/u GI recs.    #ASHLYN (acute kidney injury).   Patient with ASHLYN on admission BUN 31, Scr 2.35 -->Scr 2.31 (baseline Cr 1.1 10/22). ASHLYN likely prerenal in setting of hypovolemia due to diarrhea and poor po intake. Pt w/ mark initially, then removed after passing TOV. Worsening Cr 12/5, may have ATN component. Cr improved w/ fluid boluses and maintenance fluids.  - encourage PO intake  - f/u outpatient    #Hypokalemia.   Patient with hypokalemia, likely due to persistent diarrhea. No EKG changes. On 12/1 K low to 2.1, repeat EKG w/o u waves. Pt asymptomatic, no fasciculations or weakness. Aggressively repleted. Diarrhea stopped when pt NPO due to ileus , and K improved. Pt continued to be hypokalemic w/ persistent diarrhea after diet was restarted.   - K replete, goal >4.    #Sepsis - RESOLVED  Patient p/w encephalopathy and hypotension, meeting 3 SIRS on admission for tachycardia, tachypnea, and leukocytosis. Lactate initially 12.1, later cleared. UA/UCx, BCx, Urine Legionella, C diff, GI PCR all negative. Pt was recultured UCx and BCx, also negative. No clear source identified, AAOx4, mentating well since 11/29. S/p Vanc 1000mg qD and Zosyn 3.375 q8 (11/28-12/1), d/anuradha due to lack of source.  - no acute intervention    #Hypothyroidism.   Patient with history of hypothyroidism. Home medication: Levothyroxine 75mcg daily. On 12/1 TSH 10.81, free T4 0.67. Synthroid increased to 88mcg qD on 12/1/22.  - f/u TSH outpatient for medication titration    #Falls.   Patient with frequent falls at home, per outpatient nursing notes, patient's HHA have reported falls at home sometimes with head trauma.  He ambulates at baseline with cane.  Etiology likely due to overall decompensation in setting of metastatic cancer. CTH and CT cervical spine without fractures of trauma. PT/OT consult - recommending [facility].  - OOBTC daily w/ supervision  - c/w physical therapy    #Adult failure to thrive.   Patient with underlying metastatic lung adenocarcinoma with chronic diarrhea and frequent falls, overall appears very frail.    - dietitian consulted: rec ensure enlive TID  - PT/OT consult - rec [facility]  - palliative consulted: DNR/DNI (MOLST in chart)    #Hypomagnesemia - RESOLVED  Patient with hypomagnesemia, likely due to persistent diarrhea.  Likely contributing to overall weakness and falls. Mg was repleted PRN.  - no acute intervention  - f/u outpt    #Hypophosphatemia.   Patient with hypophosphatemia, likely due to persistent diarrhea.  Likely contributing to overall weakness and falls. Phos was repleted PRN.  - no acute intervention  - f/u outpt    #Adenocarcinoma, lung.   Patient with recently found (08/22) with RUL spiculated nodule with R paratracheal adenopathy and L adrenal nodule. Concern for metastatic cancer because of rectal lesion on PET scan as well as L adrenal area that lit up. Now, s/p FNA of R axillary node, with pathology consistent with metastatic lung adenocarcinoma. Follows w/ Dr. Patricia outpatient. Heme onc was consulted inpatient.  - palliative consulted: DNR/DNI on 12/7/22  - f/u outpt w/ Dr. Patricia    Patient was discharged to: [facility]    New medications: pantoprazole 40 PO BID, potassium, lomotil  Changes to old medications: synthroid increased to 88mcg  Medications that were stopped: none  Items to follow up as outpatient: f/u TSH for further synthroid medication titration, f/u outpt w/ heme onc Dr. Patricia, f/u w/ PCP for repeat labs d/t electrolyte abnormalities    Physical exam at the time of discharge:  *** 73 y/o male history of metastatic stage IV lung adenocarcinoma (undergoing chemo), hypothyroidism, depression and anxiety brought in by EMS with HHA for altered mental status, admitted for sepsis (unclear source) and electrolyte derangements. Pt w/ return to baseline mental status w/i 24hrs and received 3 days abx, which were d/anuradha due to lack of clear source. Hospital stay was c/b severe ileus, anemia (requiring transfusion s/p EGD/flex sig w/ GI), and persistent diarrhea and hypokalemia. Patient was discharged in stable medical condition to ________    #Anemia.   Pt with anemia on admission to 9.1. On 12/5, Hb 5.7. Pt asx, mild tachycardia, but normal BP. Unclear source. Pt w/ multiple dark green bowel movements, no overt melena observed. No hemoptysis, no hematuria. Significant bruising on R flank, initial c/f retroperitoneal bleed but ruled out on imaging. GI consulted and performed EGD/flex sig revealing ulcers. Heme onc also consulted i/s/o active malignancy. Pt was started on pantoprazole 40 IV BID, ____switched to PO.  - CTAP w/o contrast 12/5/22: No retroperitoneal or extraperitoneal hemorrhage. Severe ileus.  - S/p 1 unit pRBC 12/5 w/ good response, additional 1 unit pRBC on 12/6  - retic index: 1.1 (hypoproliferative), elevated hapto/LDH (hemolysis unlikely), folate/B12 normal  - EGD 12/9: Severe circumferential esophagitis s/p bx, Hiatal hernia, Ulcer in hernia sac s/p bx, Ulcer in cardia s/p bx, atrophic gastritis s/p bx  - Biopsy report: ulcers, no H pylori  Plan:  - pantoprazole 40 IV BID  - f/u GI recs  - f/u heme onc recs.    #Diarrhea.   Patient with chronic diarrhea described for several weeks. Patient was prescribed Loperamide early November. Unclear etiology. May be in setting of metastatic malignancy, however currently considering inflammatory given chronicity and white count. Diarrhea likely not infectious C. diff negative, GI PCR negative. Pt persistently hypokalemic likely from diarrhea. GI consulted on 12/2. 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 EGD on 12/9 showing poor rectal tone. Added banatrol and lomotil.  - CRP high 132; quantitative IgA normal; tissue transglutaminase (TTG) IgA-antibody  Plan:  - CTM bowel movements (having large volume watery)  - c/w banatrol added to diet  - lomotil 2 tabs q6 hrs  - no loperamide due to prolonged QTc 508 (12/1/2022 EKG)  - f/u GI recs.    #Severe Ileus   On 12/5 pt found to have distended abdomen. CTAP w/o contrast on 12/5 showing severe ileus. Surgery was formally consulted. Decided not to staff with attending but will continue to follow for serial abdominal exams. Primary team placed sump tube, set to suction. GI also followed.  - placed sump tube 12/5  - bladder scan on 12/5, not retaining  - repeat abdominal XR 12/7: abdominal distention w/ improving ileus  - diet was advanced as tolerated  Plan:  - clear liquid diet, advance as tolerated  - f/u GI recs.    #ASHLYN (acute kidney injury).   Patient with ASHLYN on admission BUN 31, Scr 2.35 -->Scr 2.31 (baseline Cr 1.1 10/22). ASHLYN likely prerenal in setting of hypovolemia due to diarrhea and poor po intake. Pt w/ mark initially, then removed after passing TOV. Worsening Cr 12/5, may have ATN component. Cr improved w/ fluid boluses and maintenance fluids.  - encourage PO intake  - f/u outpatient    #Hypokalemia.   Patient with hypokalemia, likely due to persistent diarrhea. No EKG changes. On 12/1 K low to 2.1, repeat EKG w/o u waves. Pt asymptomatic, no fasciculations or weakness. Aggressively repleted. Diarrhea stopped when pt NPO due to ileus , and K improved. Pt continued to be hypokalemic w/ persistent diarrhea after diet was restarted.   - K replete, goal >4.    #Sepsis - RESOLVED  Patient p/w encephalopathy and hypotension, meeting 3 SIRS on admission for tachycardia, tachypnea, and leukocytosis. Lactate initially 12.1, later cleared. UA/UCx, BCx, Urine Legionella, C diff, GI PCR all negative. Pt was recultured UCx and BCx, also negative. No clear source identified, AAOx4, mentating well since 11/29. S/p Vanc 1000mg qD and Zosyn 3.375 q8 (11/28-12/1), d/anuradha due to lack of source.  - no acute intervention    #Hypothyroidism.   Patient with history of hypothyroidism. Home medication: Levothyroxine 75mcg daily. On 12/1 TSH 10.81, free T4 0.67. Synthroid increased to 88mcg qD on 12/1/22.  - f/u TSH outpatient for medication titration    #Falls.   Patient with frequent falls at home, per outpatient nursing notes, patient's HHA have reported falls at home sometimes with head trauma.  He ambulates at baseline with cane.  Etiology likely due to overall decompensation in setting of metastatic cancer. CTH and CT cervical spine without fractures of trauma. PT/OT consult - recommending [facility].  - OOBTC daily w/ supervision  - c/w physical therapy    #Adult failure to thrive.   Patient with underlying metastatic lung adenocarcinoma with chronic diarrhea and frequent falls, overall appears very frail.    - dietitian consulted: rec ensure enlive TID  - PT/OT consult - rec [facility]  - palliative consulted: DNR/DNI (MOLST in chart)    #Hypomagnesemia - RESOLVED  Patient with hypomagnesemia, likely due to persistent diarrhea.  Likely contributing to overall weakness and falls. Mg was repleted PRN.  - no acute intervention  - f/u outpt    #Hypophosphatemia.   Patient with hypophosphatemia, likely due to persistent diarrhea.  Likely contributing to overall weakness and falls. Phos was repleted PRN.  - no acute intervention  - f/u outpt    #Adenocarcinoma, lung.   Patient with recently found (08/22) with RUL spiculated nodule with R paratracheal adenopathy and L adrenal nodule. Concern for metastatic cancer because of rectal lesion on PET scan as well as L adrenal area that lit up. Now, s/p FNA of R axillary node, with pathology consistent with metastatic lung adenocarcinoma. Follows w/ Dr. Patricia outpatient. Heme onc was consulted inpatient.  - palliative consulted: DNR/DNI on 12/7/22  - f/u outpt w/ Dr. Patricia    Patient was discharged to: [facility]    New medications: pantoprazole 40 PO BID, potassium, lomotil  Changes to old medications: synthroid increased to 88mcg  Medications that were stopped: none  Items to follow up as outpatient: f/u TSH for further synthroid medication titration, f/u outpt w/ heme onc Dr. Patricia, f/u w/ PCP for repeat labs d/t electrolyte abnormalities    Physical exam at the time of discharge:  *** #Discharge:    73 y/o male history of metastatic stage IV lung adenocarcinoma, hypothyroidism, depression, anxiety, undergoing chemo brought in by EMS with HHA for altered mental status Monday morning admitted for sepsis (unclear source) and electrolyte derangements. Now found to have anemia and severe ileus on 12/6, s/p EGD/flex sig 12/9 w/ ulcers, w/ ongoing GOC discussions.      Hospital course:   #Sepsis.   Patient complaining of pain in his LUE, new onset swelling  - LUE doppler showed evidence of DVT in L brachial vein  - holding off AC as per Dr. Asencio due to increased risk of bleeding  - tylenol PRN for pain, which patient states has been relieving his pain to some extent.    #ASHLYN (acute kidney injury).   Pt with anemia on admission to 9.1. On 12/5, Hb 5.7. Pt asx, mild tachycardia, but normal BP. Unclear source. Pt w/ multiple dark green bowel movements, no overt melena observed. No hemoptysis, no hematuria. Significant bruising on R flank, c/f retroperitoneal bleed but r/o on imaging. Possible GI bleed, GI consulted. Heme onc consulted. Recurrent bleeding.  - CTAP w/o contrast 12/5/22: No retroperitoneal or extraperitoneal hemorrhage. Severe ileus.  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   - c/w solumedrol 40mg IV BID (started 12/15)  - EGD done 12/16: small hiatal hernia, grade C esophagitis (biopsied r/o checkpoint inhibitor esophagitis) Single cratered non-bleeding healing in the cardia, a few cratered non-bleeding healing ulcers in the pylorus, multiple cratered non-bleeding ulcers in the duodenal bulb (biopsied to r/o checkpoint inhibitor duodenitis).   - GI recommending:       - PPI BID for 8 weeks       - repeat EGD in 2 months        - Carafate Suspension 1g po qid for 2 weeks.    #Diarrhea.   Patient with diarrhea described as loose stool, per outpatient notes seems to be chronic for several weeks (later stated for a year). Patient was prescribed Loperamide early November.  Unclear etiology. May be in setting of metastatic malignancy, however currently considering inflammatory given chronicity and white count. Diarrhea likely not infectious C. diff negative, GI PCR negative. Pt persistently hypokalemic likely from diarrhea. GI consulted on 12/2. 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 EGD on 12/9 showing poor rectal tone.  - CRP high 132; quantitative IgA normal; tissue transglutaminase (TTG) IgA-antibody nml  Plan - RESOLVED.    #Hypokalemia.   On 12/5 pt found to have distended abdomen. CTAP w/o contrast on 12/5 showing severe ileus. Surgery was formally consulted. Decided not to staff with attending but will continue to follow for serial abdominal exams. Primary team placed sump tube, set to suction. GI also following.  - placed sump tube 12/5  - bladder scan on 12/5, not retaining  - repeat abdominal XR 12/7: abdominal distention w/ improving ileus  - surgery signed off 12/9  Plan:  - restarting minced and moist diet, advance as tolerated.    #Hypomagnesemia.   Patient with ASHLYN on admission BUN 31, Scr 2.35 -->Scr 2.31 (baseline Scr 1.1 10/22). ASHLYN likely prerenal i/s/o hypovolemia due to diarrhea and poor po intake. Given prostate mets, there may also be a post-renal component. Pt w/ mark initially, then removed after passing TOV.   - downtrending Cr 12/9    Hypothyroidism.   Patient with hypokalemia, likely due to persistent diarrhea. No EKG changes. On 12/1 K low to 2.1, repeat EKG possible u waves. Pt asymptomatic, no fasciculations or weakness. Aggressively repleted. Diarrhea stopped when pt NPO, and K improved, now worsening w/ worsening diarrhea after diet restarted.  - sent Rx for synthroid 88mcg qd    Adenocarcinoma, lung.   Patient with encephalopathy and hypotension, meeting 3 SIRS on admission for tachycardia, tachypnea, and leukocytosis.  Source possibly pulmonary and urinary.  Lactate 12.1 --> 2.3, UA trace LE, Bacteria present, hyaline casts, C. Diff negative GI PCR negative.  Lactate cleared, S/p Zosyn 3.375 x 3, Vancomycin 1250mg, NaCl 2200cc bolus in ED.   Still unclear source. AAOx4, mentating well since 11/29.    - s/p Vanc 1000mg qD and Zosyn 3.375 q8 (11/28-12/1)  - repeat BCx/UA w/ reflex culture NGTD    Anemia.   Patient with history of hypothyroidism, home medications Levothyroxine 75mcg daily. On 12/1 TSH 10.81, free T4 0.67.  - c/w synthroid 88mcg qD.    DVT (deep venous thrombosis).   Patient with frequent falls at home, per outpatient nursing notes, patient's HHA have reported falls at home sometimes with head trauma.  He ambulated at baseline with cane.  Etiology likely due to overall decompensation in setting of metastatic cancer. CTH and CT cervical spine without fractures of trauma.    - PT/OT consulted - recommending [facility]  - OOBTC daily w/ supervision.    Patient was discharged to home with home nursing and home PT.     New medications: synthroid 88 mcg qd and pantoprazole 40 mg BID  Medications that were stopped: stopped synthroid 75mcg.     Items to follow up as outpatient: please make sure to follow up with Dr. Griffin on 12/29 and Dr. Asencio.    Physical exam at the time of discharge:  General: NAD  HEENT: NC/AT, EOMI, dry MM  Cardiac: RRR; normal S1/S2, no MRG  Respiratory: BL crackles  Gastrointestinal: +BSx4, abdomen soft, NT/ND  Extremities: WWP x4  Neurologic: AAOx3; no focal deficits       #Discharge:    71 y/o male history of metastatic stage IV lung adenocarcinoma, hypothyroidism, depression, anxiety, undergoing chemo brought in by EMS with HHA for altered mental status Monday morning admitted for sepsis (unclear source) and electrolyte derangements. Now found to have anemia and severe ileus on 12/6, s/p EGD/flex sig 12/9 w/ ulcers, w/ ongoing GOC discussions.      Hospital course:   #Sepsis.   Patient complaining of pain in his LUE, new onset swelling  - LUE doppler showed evidence of DVT in L brachial vein  - holding off AC as per Dr. Asencio due to increased risk of bleeding    #ASHLYN (acute kidney injury).   Pt with anemia on admission to 9.1. On 12/5, Hb 5.7. Pt asx, mild tachycardia, but normal BP. Unclear source. Pt w/ multiple dark green bowel movements, no overt melena observed. No hemoptysis, no hematuria. Significant bruising on R flank, c/f retroperitoneal bleed but r/o on imaging. Possible GI bleed, GI consulted. Heme onc consulted. Recurrent bleeding.  - CTAP w/o contrast 12/5/22: No retroperitoneal or extraperitoneal hemorrhage. Severe ileus.  - retic index: 1.1 (hypoproliferative), elevated hapto/LDH (hemolysis unlikely), Fe studies showing  - EGD 12/9: Severe circumferential esophagitis s/p bx, Hiatal hernia, Ulcer in hernia sac s/p bx, Ulcer in cardia s/p bx, atrophic gastritis s/p bx  - Biopsy report: ulcers, no H pylori  - pantoprazole 40 IV BID, switched to 40 PO BID.   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   - c/w solumedrol 40mg IV BID (started 12/15)  - EGD done 12/16: small hiatal hernia, grade C esophagitis (biopsied r/o checkpoint inhibitor esophagitis) Single cratered non-bleeding healing in the cardia, a few cratered non-bleeding healing ulcers in the pylorus, multiple cratered non-bleeding ulcers in the duodenal bulb (biopsied to r/o checkpoint inhibitor duodenitis).   - GI recommending: PPI BID for 8 weeks, repeat EGD in 2 months, and Carafate Suspension 1g po qid for 2 weeks.    #Diarrhea.   Patient with diarrhea described as loose stool, per outpatient notes seems to be chronic for several weeks (later stated for a year). Patient was prescribed Loperamide early November.  Unclear etiology. May be in setting of metastatic malignancy, however currently considering inflammatory given chronicity and white count. Diarrhea likely not infectious C. diff negative, GI PCR negative. Pt persistently hypokalemic likely from diarrhea. GI consulted on 12/2. 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 EGD on 12/9 showing poor rectal tone.  - CRP high 132; quantitative IgA normal; tissue transglutaminase (TTG) IgA-antibody nml  Plan - RESOLVED.    #Hypokalemia.   On 12/5 pt found to have distended abdomen. CTAP w/o contrast on 12/5 showing severe ileus. Surgery was formally consulted. Decided not to staff with attending but will continue to follow for serial abdominal exams. Primary team placed sump tube, set to suction. GI also following.  - placed sump tube 12/5  - bladder scan on 12/5, not retaining  - repeat abdominal XR 12/7: abdominal distention w/ improving ileus    #Hypomagnesemia.   Patient with ASHLYN on admission BUN 31, Scr 2.35 -->Scr 2.31 (baseline Scr 1.1 10/22). ASHLYN likely prerenal i/s/o hypovolemia due to diarrhea and poor po intake. Given prostate mets, there may also be a post-renal component. Pt w/ mark initially, then removed after passing TOV.   - downtrending Cr 12/9    #Hypothyroidism.   Patient with hypokalemia, likely due to persistent diarrhea. No EKG changes. On 12/1 K low to 2.1, repeat EKG possible u waves. Pt asymptomatic, no fasciculations or weakness. Aggressively repleted. Diarrhea stopped when pt NPO, and K improved, now worsening w/ worsening diarrhea after diet restarted.  - sent Rx for synthroid 88mcg qd    #Adenocarcinoma, lung.   Patient with encephalopathy and hypotension, meeting 3 SIRS on admission for tachycardia, tachypnea, and leukocytosis.  Source possibly pulmonary and urinary.  Lactate 12.1 --> 2.3, UA trace LE, Bacteria present, hyaline casts, C. Diff negative GI PCR negative.  Lactate cleared, S/p Zosyn 3.375 x 3, Vancomycin 1250mg, NaCl 2200cc bolus in ED.   Still unclear source. AAOx4, mentating well since 11/29.    #Anemia.   Patient with history of hypothyroidism, home medications Levothyroxine 75mcg daily. On 12/1 TSH 10.81, free T4 0.67.  - c/w synthroid 88mcg qD.    #DVT (deep venous thrombosis).   Patient with frequent falls at home, per outpatient nursing notes, patient's HHA have reported falls at home sometimes with head trauma.  He ambulated at baseline with cane.  Etiology likely due to overall decompensation in setting of metastatic cancer. CTH and CT cervical spine without fractures of trauma.    - PT/OT consulted - recommending [facility]  - OOBTC daily w/ supervision.    Patient was discharged to home with home nursing and home PT.     New medications: synthroid 88 mcg qd, pantoprazole 40 mg BID, and carafate solution every 6 hours.   Medications that were stopped: stopped synthroid 75mcg.     Items to follow up as outpatient: please make sure to follow up with Dr. Griffin on 12/29 and Dr. Asencio.    Physical exam at the time of discharge:  General: NAD  HEENT: NC/AT, EOMI, dry MM  Cardiac: RRR; normal S1/S2, no MRG  Respiratory: BL crackles  Gastrointestinal: +BSx4, abdomen soft, NT/ND  Extremities: WWP x4  Neurologic: AAOx3; no focal deficits       #Discharge:    73 y/o male history of metastatic stage IV lung adenocarcinoma, hypothyroidism, depression, anxiety, undergoing chemo brought in by EMS with HHA for altered mental status Monday morning admitted for sepsis (unclear source) and electrolyte derangements. Now found to have anemia and severe ileus on 12/6, s/p EGD/flex sig 12/9 w/ ulcers, w/ ongoing GOC discussions.      Hospital course:   #Sepsis.   Patient complaining of pain in his LUE, new onset swelling  - LUE doppler showed evidence of DVT in L brachial vein  - holding off AC as per Dr. Asencio due to increased risk of bleeding    #ASHLYN (acute kidney injury).   Pt with anemia on admission to 9.1. On 12/5, Hb 5.7. Pt asx, mild tachycardia, but normal BP. Unclear source. Pt w/ multiple dark green bowel movements, no overt melena observed. No hemoptysis, no hematuria. Significant bruising on R flank, c/f retroperitoneal bleed but r/o on imaging. Possible GI bleed, GI consulted. Heme onc consulted. Recurrent bleeding.  - CTAP w/o contrast 12/5/22: No retroperitoneal or extraperitoneal hemorrhage. Severe ileus.  - retic index: 1.1 (hypoproliferative), elevated hapto/LDH (hemolysis unlikely), Fe studies showing  - EGD 12/9: Severe circumferential esophagitis s/p bx, Hiatal hernia, Ulcer in hernia sac s/p bx, Ulcer in cardia s/p bx, atrophic gastritis s/p bx  - Biopsy report: ulcers, no H pylori  - pantoprazole 40 IV BID, switched to 40 PO BID.   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   - c/w solumedrol 40mg IV BID (started 12/15)  - EGD done 12/16: small hiatal hernia, grade C esophagitis (biopsied r/o checkpoint inhibitor esophagitis) Single cratered non-bleeding healing in the cardia, a few cratered non-bleeding healing ulcers in the pylorus, multiple cratered non-bleeding ulcers in the duodenal bulb (biopsied to r/o checkpoint inhibitor duodenitis).   - GI recommending: PPI BID for 8 weeks, repeat EGD in 2 months, and sucralfate 1g po qid for 2 weeks.    #Diarrhea.   Patient with diarrhea described as loose stool, per outpatient notes seems to be chronic for several weeks (later stated for a year). Patient was prescribed Loperamide early November.  Unclear etiology. May be in setting of metastatic malignancy, however currently considering inflammatory given chronicity and white count. Diarrhea likely not infectious C. diff negative, GI PCR negative. Pt persistently hypokalemic likely from diarrhea. GI consulted on 12/2. 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 EGD on 12/9 showing poor rectal tone.  - CRP high 132; quantitative IgA normal; tissue transglutaminase (TTG) IgA-antibody nml  Plan - RESOLVED.    #Hypokalemia.   On 12/5 pt found to have distended abdomen. CTAP w/o contrast on 12/5 showing severe ileus. Surgery was formally consulted. Decided not to staff with attending but will continue to follow for serial abdominal exams. Primary team placed sump tube, set to suction. GI also following.  - placed sump tube 12/5  - bladder scan on 12/5, not retaining  - repeat abdominal XR 12/7: abdominal distention w/ improving ileus    #Hypomagnesemia.   Patient with ASHLYN on admission BUN 31, Scr 2.35 -->Scr 2.31 (baseline Scr 1.1 10/22). ASHLYN likely prerenal i/s/o hypovolemia due to diarrhea and poor po intake. Given prostate mets, there may also be a post-renal component. Pt w/ mark initially, then removed after passing TOV.   - downtrending Cr 12/9    #Hypothyroidism.   Patient with hypokalemia, likely due to persistent diarrhea. No EKG changes. On 12/1 K low to 2.1, repeat EKG possible u waves. Pt asymptomatic, no fasciculations or weakness. Aggressively repleted. Diarrhea stopped when pt NPO, and K improved, now worsening w/ worsening diarrhea after diet restarted.  - sent Rx for synthroid 88mcg qd    #Adenocarcinoma, lung.   Patient with encephalopathy and hypotension, meeting 3 SIRS on admission for tachycardia, tachypnea, and leukocytosis.  Source possibly pulmonary and urinary.  Lactate 12.1 --> 2.3, UA trace LE, Bacteria present, hyaline casts, C. Diff negative GI PCR negative.  Lactate cleared, S/p Zosyn 3.375 x 3, Vancomycin 1250mg, NaCl 2200cc bolus in ED.   Still unclear source. AAOx4, mentating well since 11/29.    #Anemia.   Patient with history of hypothyroidism, home medications Levothyroxine 75mcg daily. On 12/1 TSH 10.81, free T4 0.67.  - c/w synthroid 88mcg qD.    #DVT (deep venous thrombosis).   Patient with frequent falls at home, per outpatient nursing notes, patient's HHA have reported falls at home sometimes with head trauma.  He ambulated at baseline with cane.  Etiology likely due to overall decompensation in setting of metastatic cancer. CTH and CT cervical spine without fractures of trauma.    - PT/OT consulted - recommending SASHA  - OOBTC daily w/ supervision.    Patient was discharged to home with home nursing and home PT.     New medications: synthroid 88 mcg qd, pantoprazole 40 mg BID for 8 weeks, and sucralfate 1g every 6 hours for 2 weeks.   Medications that were stopped: stopped synthroid 75mcg.     Items to follow up as outpatient: please make sure to follow up with Dr. Griffin on 12/29 and Dr. Asencio.    Physical exam at the time of discharge:  General: NAD  HEENT: NC/AT, EOMI, dry MM  Cardiac: RRR; normal S1/S2, no MRG  Respiratory: BL crackles  Gastrointestinal: +BSx4, abdomen soft, NT/ND  Extremities: WWP x4  Neurologic: AAOx3; no focal deficits       #Discharge:    73 y/o male history of metastatic stage IV lung adenocarcinoma, hypothyroidism, depression, anxiety, undergoing chemo brought in by EMS with HHA for altered mental status, admitted for sepsis (unclear source) and electrolyte derangements with course c/b anemia and severe ileus on 12/6, s/p EGD/flex sig 12/9 w/ ulcers.    Hospital course:     #Anemia   Pt with anemia on admission to 9.1. On 12/5, Hb 5.7. Pt asx, mild tachycardia, but normal BP. Unclear source. Pt w/ multiple dark green bowel movements, no overt melena observed. No hemoptysis, no hematuria. Significant bruising on R flank, c/f retroperitoneal bleed but r/o on imaging. Possible GI bleed, GI consulted. Heme onc consulted. Recurrent bleeding.  - CTAP w/o contrast 12/5/22: No retroperitoneal or extraperitoneal hemorrhage. Severe ileus.  - retic index: 1.1 (hypoproliferative), elevated hapto/LDH (hemolysis unlikely), Fe studies showing  - EGD 12/9: Severe circumferential esophagitis s/p bx, Hiatal hernia, Ulcer in hernia sac s/p bx, Ulcer in cardia s/p bx, atrophic gastritis s/p bx  - Biopsy report: ulcers, no H pylori  - pantoprazole 40 IV BID, switched to 40 PO BID.   - Heme/Onc recommending steroids for immune checkpoint inhibitor colitis.   - s/p solumedrol 40mg IV BID, tapered down to prednisone 40mg qd  -will d/c on prednisone 40mg qd with outpatient f/u with Dr. Asencio   - EGD done 12/16: small hiatal hernia, grade C esophagitis (biopsied r/o checkpoint inhibitor esophagitis) Single cratered non-bleeding healing in the cardia, a few cratered non-bleeding healing ulcers in the pylorus, multiple cratered non-bleeding ulcers in the duodenal bulb (biopsied to r/o checkpoint inhibitor duodenitis).   - GI recommending: PPI BID for 8 weeks, repeat EGD in 2 months, and sucralfate 1g po qid for 2 weeks.    #Diarrhea.   Patient with diarrhea described as loose stool, per outpatient notes seems to be chronic for several weeks (later stated for a year). Patient was prescribed Loperamide early November.  Unclear etiology. May be in setting of metastatic malignancy, however currently considering inflammatory given chronicity and white count. Diarrhea likely not infectious C. diff negative, GI PCR negative. Pt persistently hypokalemic likely from diarrhea. GI consulted on 12/2. 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 EGD on 12/9 showing poor rectal tone.  - CRP high 132; quantitative IgA normal; tissue transglutaminase (TTG) IgA-antibody nml  Plan - RESOLVED.    #Ileus   On 12/5 pt found to have distended abdomen. CTAP w/o contrast on 12/5 showing severe ileus. Surgery was formally consulted. Decided not to staff with attending but will continue to follow for serial abdominal exams. Primary team placed sump tube, set to suction. GI also following.  - placed sump tube 12/5  - bladder scan on 12/5, not retaining  - repeat abdominal XR 12/7: abdominal distention w/ improving ileus    #ASHLYN   Patient with ASHLYN on admission BUN 31, Scr 2.35 -->Scr 2.31 (baseline Scr 1.1 10/22). ASHLYN likely prerenal i/s/o hypovolemia due to diarrhea and poor po intake. Given prostate mets, there may also be a post-renal component. Pt w/ mark initially, then removed after passing TOV.   - downtrending Cr 12/9    #Hypokalemia    Patient with hypokalemia, likely due to persistent diarrhea. No EKG changes. On 12/1 K low to 2.1, repeat EKG possible u waves. Pt asymptomatic, no fasciculations or weakness. Aggressively repleted. Diarrhea stopped when pt NPO, and K improved.  - RESOLVED     #Sepsis   Patient with encephalopathy and hypotension, meeting 3 SIRS on admission for tachycardia, tachypnea, and leukocytosis.  Source possibly pulmonary and urinary.  Lactate 12.1 --> 2.3, UA trace LE, Bacteria present, hyaline casts, C. Diff negative GI PCR negative.  Lactate cleared, S/p Zosyn 3.375 x 3, Vancomycin 1250mg, NaCl 2200cc bolus in ED.   Still unclear source. AAOx4, mentating well since 11/29.  -BCs x3 NGTD   -UCs negative   -s/p 7 day course vancomycin and zosyn     #Hypothyroidism    Patient with history of hypothyroidism, home medications Levothyroxine 75mcg daily. On 12/1 TSH 10.81, free T4 0.67.  - c/w synthroid 88mcg qD.     #DVT    Patient complaining of pain in his LUE, new onset swelling  - LUE doppler showed evidence of DVT in L brachial vein  - holding off AC as per Dr. Asencio due to increased risk of bleeding    #frequent falls   Patient with frequent falls at home, per outpatient nursing notes, patient's HHA have reported falls at home sometimes with head trauma.  He ambulated at baseline with cane.  Etiology likely due to overall decompensation in setting of metastatic cancer. CTH and CT cervical spine without fractures of trauma.      Patient was discharged to home with home nursing and home PT.     New medications: synthroid 88 mcg qd, pantoprazole 40 mg BID for 8 weeks, and sucralfate 1g every 6 hours for 2 weeks.   Medications that were stopped: stopped synthroid 75mcg.     Items to follow up as outpatient: please make sure to follow up with Dr. Griffin on 12/29 and Dr. Asencio.    Physical exam at the time of discharge:  General: NAD  HEENT: NC/AT, EOMI, dry MM  Cardiac: RRR; normal S1/S2, no MRG  Respiratory: BL crackles  Gastrointestinal: +BSx4, abdomen soft, NT/ND  Extremities: WWP x4  Neurologic: AAOx3; no focal deficits

## 2022-12-10 NOTE — PROGRESS NOTE ADULT - PROBLEM SELECTOR PLAN 12
Patient with recently found (08/22) with RUL spiculated nodule with R paratracheal adenopathy and L adrenal nodule. Concern for metastatic cancer because of rectal lesion on PET scan as well as L adrenal area that lit up. Now, s/p FNA of R axillary node, with pathology consistent with metastatic lung adenocarcinoma. Follows w/ Dr. Patricia outpatient.  - f/u heme/onc consult  - palliative consulted: DNR/DNI on 12/7  - attempt family member contact, no available contacts    #Prophylactic measures  F: d/c maintenance fluids  E: replete K<4, Mg<1.8, Phos<3, Ca<8.5   N: full liquid    VTE Prophylaxis: hold i/s/o anemia  GI: pantoprazole 40 IV BID  C: DNR/DNI  D: SASHA Patient with recently found (08/22) with RUL spiculated nodule with R paratracheal adenopathy and L adrenal nodule. Concern for metastatic cancer because of rectal lesion on PET scan as well as L adrenal area that lit up. Now, s/p FNA of R axillary node, with pathology consistent with metastatic lung adenocarcinoma. Follows w/ Dr. Patricia outpatient.  - f/u heme/onc consult  - palliative consulted: DNR/DNI on 12/7  - attempt family member contact, no available contacts    #Prophylactic measures  F: d/c maintenance fluids  E: replenish K<4, Mg<1.8, Phos<3, Ca<8.5   N: minced and moist diet    VTE Prophylaxis: hold i/s/o anemia  GI: pantoprazole 40 IV BID  C: DNR/DNI  D: SASHA

## 2022-12-10 NOTE — DISCHARGE NOTE PROVIDER - PROVIDER TOKENS
PROVIDER:[TOKEN:[[id number]],SCHEDULEDAPPT:[12/29/2022],SCHEDULEDAPPTTIME:[11:20 AM]] PROVIDER:[TOKEN:[[id number]],SCHEDULEDAPPT:[12/29/2022],SCHEDULEDAPPTTIME:[11:20 AM],ESTABLISHEDPATIENT:[T]],PROVIDER:[TOKEN:[[id number]],FOLLOWUP:[2 weeks],ESTABLISHEDPATIENT:[T]]

## 2022-12-10 NOTE — DISCHARGE NOTE PROVIDER - CARE PROVIDERS DIRECT ADDRESSES
,DirectAddress_Unknown ,DirectAddress_Unknown,[email or web address]

## 2022-12-10 NOTE — PROGRESS NOTE ADULT - ASSESSMENT
71 y/o male history of metastatic stage IV lung adenocarcinoma, hypothyroidism, depression, anxiety, undergoing chemo brought in by EMS with HHA for altered mental status Monday morning admitted for sepsis (unclear source) and electrolyte derangements. Now found to have anemia and severe ileus on 12/6, s/p EGD/flex sig 12/9 w/ ulcers.

## 2022-12-10 NOTE — DISCHARGE NOTE PROVIDER - NSDCFUSCHEDAPPT_GEN_ALL_CORE_FT
Joy Patricia  [facility]  Scheduled Appointment: 12/29/2022     Joy Patricia  [facility]  [facility] [address]  Scheduled Appointment: 12/27/2022    Joy Patricia  [facility]  Scheduled Appointment: 12/29/2022     Joy Patricia  [facility]  Scheduled Appointment: 12/29/2022    Joy Patricia  [facility]  [facility] [address]  Scheduled Appointment: 12/29/2022

## 2022-12-10 NOTE — PROGRESS NOTE ADULT - ASSESSMENT
per Internal Medicine    72 y o male history of metastatic stage IV lung adenocarcinoma, hypothyroidism, depression, anxiety, undergoing chemo brought in by EMS with HHA for altered mental status Monday morning admitted for sepsis (unclear source) and electrolyte derangements. Now found to have anemia and severe ileus on 12/6, s/p EGD/flex sig 12/9 w/ ulcers.     Problem/Plan - 1:  ·  Problem: Anemia.   ·  Plan: Pt with anemia on admission to .. On 12/5, Hb 5.7. Pt asx, mild tachycardia, but normal BP. Unclear source. Pt w/ multiple dark green bowel movements, no overt melena observed. No hemoptysis, no hematuria. Significant bruising on R flank, c/f retroperitoneal bleed but r/o on imaging. Possible GI bleed, GI consulted. Heme onc consulted.  - CTAP w/o contrast 12/5/22: No retroperitoneal or extraperitoneal hemorrhage. Severe ileus.  - S/p 1 unit pRBC 12/5 w/ good response, additional 1 unit pRBC on 12/6  - retic index: 1.1 (hypoproliferative), elevated hapto/LDH (hemolysis unlikely), Fe studies showing  - folate/B12 normal  - EGD 12/9: Severe circumferential esophagitis s/p bx, Hiatal hernia, Ulcer in hernia sac s/p bx, Ulcer in cardia s/p bx, atrophic gastritis s/p bx  - Biopsy report: ulcers, no H pylori  Plan:  - monitor vitals  - advance diet to full liquids  - pantoprazole 40 IV BID  - f/u GI recs  - f/u heme onc recs.    Problem/Plan - 2:  ·  Problem: Diarrhea.   ·  Plan: Patient with diarrhea described as loose stool, per outpatient notes seems to be chronic for several weeks (later stated for a year). Patient was prescribed Loperamide early November.  Unclear etiology. May be in setting of metastatic malignancy, however currently considering inflammatory given chronicity and white count. Diarrhea likely not infectious C. diff negative, GI PCR negative. Pt persistently hypokalemic likely from diarrhea. GI consulted on 12/2. 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 EGD on 12/9 showing poor rectal tone. 3 small volume episodes of diarrhea last night.  - CRP high 132; quantitative IgA normal; tissue transglutaminase (TTG) IgA-antibody  Plan:  - f/u stool calprotectin  - CTM bowel movements (having large volume watery)  - banatrol added to diet  - lomotil 2 tabs q6 hrs  - no loperamide due to prolonged QTc 508 (12/1/2022 EKG)  - f/u GI recs.    Problem/Plan - 3:  ·  Problem: Ileus.   ·  Plan: On 12/5 pt found to have distended abdomen. CTAP w/o contrast on 12/5 showing severe ileus. Surgery was formally consulted. Decided not to staff with attending but will continue to follow for serial abdominal exams. Primary team placed sump tube, set to suction. GI also following.  - placed sump tube 12/5  - bladder scan on 12/5, not retaining  - repeat abdominal XR 12/7: abdominal distention w/ improving ileus  - surgery signed off 12/9  Plan:  - minced and moist diet, advance as tolerated  - f/u GI recs.    Problem/Plan - 4:  ·  Problem: ASHLYN (acute kidney injury).   ·  Plan: Patient with ASHLYN on admission BUN 31, Scr 2.35 -->Scr 2.31 (baseline Scr 1.1 10/22). ASHLYN likely prerenal i/s/o hypovolemia due to diarrhea and poor po intake. Given prostate mets, there may also be a post-renal component. Pt w/ mark initially, then removed after passing TOV.   - downtrending Cr 12/9  - d/c maintenance fluids i/s/o crackles  - strict I/Os  - trend Cr, avoid nephrotoxic drugs, renally dose meds.    Problem/Plan - 5:  ·  Problem: Hypokalemia.   ·  Plan: Patient with hypokalemia, likely due to persistent diarrhea. No EKG changes. On 12/1 K low to 2.1, repeat EKG possible u waves. Pt asymptomatic, no fasiculations or weakness. Aggressively repleted. Diarrhea stopped when pt NPO, and K improved, now worsening w/ worsening diarrhea after diet restarted.  - K replenish, goal >4.    Problem/Plan - 6:  ·  Problem: Sepsis.   ·  Plan: Patient with encephalopathy and hypotension, meeting 3 SIRS on admission for tachycardia, tachypnea, and leukocytosis.  Source possibly pulmonary and urinary.  Lactate 12.1 --> 2.3, UA trace LE, Bacteria present, hyaline casts, C. Diff negative GI PCR negative.  Lactate cleared, S/p Zosyn 3.375 x 3, Vancomycin 1250mg, NaCl 2200cc bolus in ED.   Still unclear source. AAOx4, mentating well since 11/29.  - UCx no growth final  - BCx NGTD  - ULeg negative  - s/p Vanc 1000mg qD and Zosyn 3.375 q8 (11/28-12/1)  Plan:  - repeat BCx/UA w/ reflex culture NGTD  - continue to trend WBC.    Problem/Plan - 7:  ·  Problem: Hypothyroidism.   ·  Plan: Patient with history of hypothyroidism, home medications Levothyroxine 75mcg daily. On 12/1 TSH 10.81, free T4 0.67.  - increase synthroid to 88mcg qD on 12/1/22.    Problem/Plan - 8:  ·  Problem: Falls.   ·  Plan: Patient with frequent falls at home, per outpatient nursing notes, patient's HHA have reported falls at home sometimes with head trauma.  He ambulated at baseline with cane.  Etiology likely due to overall decompensation in setting of metastatic cancer. CTH and CT cervical spine without fractures of trauma.    - PT/OT consulted - recommending SASHA  - OOBTC daily w/ supervision.    Problem/Plan - 9:  ·  Problem: Adult failure to thrive.   ·  Plan: Patient with underlying metastatic lung adenocarcinoma with chronic diarrhea and frequent falls, overall appears very frail.    - dietitian consulted - rec ensure enlive TID  - PT/OT consulted, rec SASHA  - palliative consult: DNR/DNI  - contact family  - incentive spirometry.    Problem/Plan - 10:  ·  Problem: Hypomagnesemia.   ·  Plan; Patient with hypomagnesemia, likely due to persistent diarrhea.  Likely contributing to overall weakness and falls.  - Replenish PRN.    Problem/Plan - 11:  ·  Problem: Hypophosphatemia.   ·  Plan: Patient with hypophosphatemia, likely due to persistent diarrhea.  Likely contributing to overall weakness and falls.  - Replenish PRN.    Problem/Plan - 12:  ·  Problem: Adenocarcinoma, lung.   ·  Plan: Patient with recently found (08/22) with RUL spiculated nodule with R paratracheal adenopathy and L adrenal nodule. Concern for metastatic cancer because of rectal lesion on PET scan as well as L adrenal area that lit up. Now, s/p FNA of R axillary node, with pathology consistent with metastatic lung adenocarcinoma. Follows w/ Dr. Patricia outpatient.  - f/u heme/onc consult  - palliative consulted: DNR/DNI on 12/7  - attempt family member contact, no available contacts    #Prophylactic measures  F: d/c maintenance fluids  E: replenish K<4, Mg<1.8, Phos<3, Ca<8.5   N: minced and moist diet    VTE Prophylaxis: hold i/s/o anemia  GI: pantoprazole 40 IV BID  C: DNR/DNI  D: SASHA.

## 2022-12-10 NOTE — PROGRESS NOTE ADULT - SUBJECTIVE AND OBJECTIVE BOX
OVERNIGHT: no acute events    General: Resting comfortably in bed; NAD  HEENT: NC/AT, EOMI, anicteric sclera, dry mucous membranes, neck supple  Cardiac: RRR; normal S1/S2, no MRG, mild LE edema  Respiratory: bibasilar crackles; no wheezes, ronchi, increased work of breathing, retractions  Gastrointestinal: +BSx4, tympanic, abdomen soft w/o guarding or rebound (distention improved)  Extremities: WWP x4 extremities  Dermatologic: skin warm, dry and intact; no rashes, open wounds  Neurologic: AAOx3; no focal deficits OVERNIGHT: no acute events    SUBJECTIVE: Patient seen an examined at bedside. Patient states he had 3 episodes of small volume diarrhea last night. Denies other complaints. 12 pt ROS otherwise negative.     General: Resting comfortably in bed; NAD  HEENT: NC/AT, EOMI, anicteric sclera, dry mucous membranes, neck supple  Cardiac: RRR; normal S1/S2, no MRG, mild LE edema  Respiratory: bibasilar crackles; no wheezes, ronchi, increased work of breathing, retractions  Gastrointestinal: +BSx4, tympanic, abdomen soft w/o guarding or rebound (distention improved)  Extremities: WWP x4 extremities  Dermatologic: skin warm, dry and intact; no rashes, open wounds  Neurologic: AAOx3; no focal deficits

## 2022-12-10 NOTE — PROGRESS NOTE ADULT - PROBLEM SELECTOR PLAN 2
Patient with diarrhea described as loose stool, per outpatient notes seems to be chronic for several weeks (later stated for a year). Patient was prescribed Loperamide early November.  Unclear etiology. May be in setting of metastatic malignancy, however currently considering inflammatory given chronicity and white count. Diarrhea likely not infectious C. diff negative, GI PCR negative. Pt persistently hypokalemic likely from diarrhea. GI consulted on 12/2. 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 EGD on 12/9 showing poor rectal tone.   - CRP high 132; quantitative IgA normal; tissue transglutaminase (TTG) IgA-antibody  Plan:  - f/u stool calprotectin  - CTM bowel movements (having large volume watery)  - banatrol added to diet  - lomotil 2 tabs q6 hrs  - no loperamide due to prolonged QTc 508 (12/1/2022 EKG)  - f/u GI recs Patient with diarrhea described as loose stool, per outpatient notes seems to be chronic for several weeks (later stated for a year). Patient was prescribed Loperamide early November.  Unclear etiology. May be in setting of metastatic malignancy, however currently considering inflammatory given chronicity and white count. Diarrhea likely not infectious C. diff negative, GI PCR negative. Pt persistently hypokalemic likely from diarrhea. GI consulted on 12/2. 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 EGD on 12/9 showing poor rectal tone. 3 small volume episodes of diarrhea last night.  - CRP high 132; quantitative IgA normal; tissue transglutaminase (TTG) IgA-antibody  Plan:  - f/u stool calprotectin  - CTM bowel movements (having large volume watery)  - banatrol added to diet  - lomotil 2 tabs q6 hrs  - no loperamide due to prolonged QTc 508 (12/1/2022 EKG)  - f/u GI recs

## 2022-12-10 NOTE — DISCHARGE NOTE PROVIDER - NSDCCPTREATMENT_GEN_ALL_CORE_FT
PRINCIPAL PROCEDURE  Procedure: EGD, with sigmoidoscopy  Findings and Treatment: S/p EGD + Flex sig 12/8/22  EGD:   - Severe circumferential esophagitis s/p bx  - Hiatal hernia  - Ulcer in hernia sac s/p bx  - Ulcer in cardia s/p bx  - atrophic gastritis s/p bx  Flex sig:  - grossly normal mucosa s/p bx  - poor rectal tone        SECONDARY PROCEDURE  Procedure: Miscellaneous pathology test  Findings and Treatment: Surgical Pathology Report:   Collected Date/Time: 12/8/2022 12:40 EST   Final Diagnosis   1. Duodenum, biopsy:   Extensive gastric foveolar metaplasia and Brunner's gland hyperplasia   2. Stomach, biopsy:   Antral-type mucosa without significant pathologic features   3. Gastric cardia ulcer, biopsy:   Acute inflammatory exudate with necrotic tissue, small fragment of,   consistent with   ulcer   Oxyntic gastric mucosa without significant pathologic features   4. Hernia sac, biopsy:   Oxyntic gastric mucosa showing degenerative with sloughed surface   epithelium and   numerous bacteria   5. Distal esophagus, biopsy:   Abundant necrotic tissue with hemosiderin deposition, scanty inflamed   soft tissue   and scanty smooth muscle, consistent with ulcer   Mucosa is not seen   6. Sigmoid colon, biopsy:   Colonic mucosa without significant pathologic features       Procedure: CT scan of abdomen and pelvis  Findings and Treatment: PROCEDURE DATE:  12/05/2022    PRIOR STUDIES: CT abdomen pelvis 11/20/2022.  FINDINGS: Images of the lower chest demonstrate centrilobular emphysema.   Subsegmental atelectasis bilateral lower lobes. Trace bilateral pleural   effusions. Low density of cardiac chambers relative to the myocardium   suggesting anemia. Dilated fluid-filled esophagus. Mild bilateral   gynecomastia.  Images of the upper abdomen demonstrate no focal hepatic abnormalities.   No radiopaque gallstones are seen.   The pancreas is normal in appearance.  No splenic abnormalities are seen.  Normal right adrenal. Nodular thickening left adrenal measuring 1.3 x 1.9   x 2.0 cm, unchanged..   The kidneys are normal in size bilaterally.  No renal stones are seen.    No hydronephrosis is evident.  No abdominal aortic aneurysm is seen. No evidence of retroperitoneal   hemorrhage.   No retroperitoneal lymphadenopathy is seen.  Evaluation of bowel is limited without oral contrast. Within that   limitation, there is marked fluid and air dilatation of stomach,   duodenum, small bowel and large bowel suggestive of adynamic ileus..   Normal appendix.   No ascites is evident.  Images of the pelvis demonstrate under distended bladder..   No filling   defects are seen in the urinary bladder. There has been interval removal   of Galvin catheter. Unremarkable seminal vesicles and prostate. The   prostate measures 4.8 x 3.1 x 3.5 cm.  No pelvic lymphadenopathy is seen.  Evaluation of the osseous structures demonstrates degenerative disc   disease L5-S1.  Anasarca noted in relation to the wall of the lower abdomen and pelvis.  IMPRESSION:  No retroperitoneal or extraperitoneal hemorrhage.  Severe ileus.  Anemia.      Procedure: XR abdomen 1V  Findings and Treatment: PROCEDURE DATE:  12/06/2022    INTERPRETATION:  ABDOMEN SUPINE:  History: Ileus  Prior studies: CT abdomen and pelvis 12/5/2022  Findings:  Dilated air-filled loops of small and large bowel consistent   with ileus. NG tube noted with tip overlying the greater curvature of the   gastric fundus. Distal sidehole appears to overlie the GE junction.   Subsegmental atelectasis left base.  Impression:  Interval placement of an NG tube.  --- End of Report ---      Procedure: XR abdomen 1V  Findings and Treatment: PROCEDURE DATE:  12/07/2022    INTERPRETATION:  Abdomen  INDICATION: Follow-up abdominal distention, ileus  COMPARISON: 12/6  IMPRESSION: 2 supine views demonstrate improved bowel distention   consistent with improving ileus. NG tube in the stomach. Evaluation for   pneumoperitoneum is limited without upright views.  --- End of Report ---      Procedure: CT head wo con  Findings and Treatment: PROCEDURE DATE:  11/28/2022    INTERPRETATION:  PROCEDURE: CT head without intravenous contrast  INDICATION: Altered mental status  TECHNIQUE: Multiple axial images were obtained at 5mm intervals from the   skull base to the vertex. Sagittal and coronal reformatted images were   obtained from the axial data set. The images were reviewed in brain and   bone windows.  COMPARISON: MRI brain 10/7/2022  FINDINGS: The CT examination demonstrates generalized volume loss. There   is no midline shift or extra axial collections. The gray white   differentiation appears within normal limits. There is no intracranial   hemorrhage or acute transcortical infarct. There are patchy areasof   hypodensity within the periventricular white matter which may represent   the sequela of small vessel ischemic disease. The bony windows   demonstrates no fractures. The visualized paranasal sinuses are within   normal limits. The mastoid air cells are well aerated.  IMPRESSION: No intracranial hemorrhage or acute transcortical infarct.   Generalized volume loss with small vessel ischemic disease.      Procedure: CT of cervical spine  Findings and Treatment: PROCEDURE DATE:  11/28/2022    FINDINGS: The CT examination demonstrates scoliosis of the cervical spine   with the apex to the left centered at the C5/6 level. In addition, there   is approximately 3 mm of anterolisthesis of C2 on C3 and 2 mm of   anterolisthesis of C7 on T1. There is loss of intervertebral disc space   height at C3/4 through the C7/T1 level. The vertebral body heights are   maintained. There are degenerative changes at the C1-C2 level. The   prevertebral soft tissues are within normal limits. The osseous   structures are intact without fracture.  At the C2/3 level, there is uncovering of the intervertebral disc space   with disc bulge with superimposed small central disc herniation. There   are degenerative changes involving the facets bilaterally. There is mild   to moderate bilateral neural foramen narrowing. There is no central   spinal canal stenosis.  At the C3/4 level, there is osseous ridging with disc bulge indenting the   thecal sac. There is bilateral uncovertebral joint and degenerative facet   disease. There is moderate to severe neural foramen narrowing. There is   mild central spinal canal stenosis.  At the C4/5 level, there is osseous ridging with disc bulge indenting the   thecal sac. There is bilateral uncovertebral joint and degenerative facet   disease. There is moderate to severe right and mild left neural foramen   narrowing. There is mild central spinal canal stenosis.  At the C5/6 level, there is osseous ridging with disc bulge indenting the   thecal sac. There is bilateral uncovertebral joint and degenerative facet   disease. There is severe right and mild left neural foramen narrowing.   There is mild central spinal canal stenosis.  At the C6/7 level ,there is osseous ridging with disc bulge indenting the   thecal sac. There is bilateral uncovertebral jointand degenerative facet   disease. There is severe bilateral foramen narrowing. There is no central   spinal canal stenosis.  At the C7/T1 level, there is uncovering of the intervertebral disc s    Procedure: CT chest wo con  Findings and Treatment: PROCEDURE DATE:  11/28/2022    CHEST:  LUNGS AND LARGE AIRWAYS: Patent central airways. Centrilobular and   paraseptal emphysema. Unchanged spiculated mass with coarse calcification   in right upper lobe apical segment, 2.4 x 2.1 cm. Redemonstrated few   scattered micronodules. Mild mucous plugging.  PLEURA: No pleural effusion. No pneumothorax.  VESSELS: Mild coronary artery and aortic atherosclerotic calcifications.  HEART: Heart size is normal. No pericardial effusion.  MEDIASTINUM AND EVA: No enlarged mediastinal or axillary nodes. No bulky   hilar adenopathy.  CHEST WALL AND LOWER NECK: Bilateral gynecomastia.  Limited noncontrast evaluation of visceral organs.  ABDOMEN AND PELVIS:  LIVER: Within normal limits.  BILE DUCTS: Normal caliber.  GALLBLADDER: Within normal limits.  SPLEEN: Within normal limits.  PANCREAS: Within normal limits.  ADRENALS: Slight decreased left adrenal nodule 2 cm, previously up to 2.6   cm.  KIDNEYS/URETERS: Within normal limits.  BLADDER: Minimally fluid distended, decompressed with Galvin catheter in   situ and small intravesicular air likely from recent manipulation.  REPRODUCTIVE ORGANS:Prostate is enlarged.  BOWEL: No bowel obstruction.  PERITONEUM: No ascites.  VESSELS: Atherosclerotic changes.  RETROPERITONEUM/LYMPH NODES: Decreased left periaortic node 0.7 cm short   axis, previously 1.3 cm; decreased right inguinal node 0.5 cm short axis,   previously 1.4 cm.  ABDOMINAL WALL: Within normal limits.  BONES: No acute fractures. No suspicious lesion. Old right rib fractures   and degenerative changes of the spine.  IMPRESSION:  No acute fractures.  Since October 7, 2022, unchanged right upper lobe malignancy.  Decreased left adrenal metastasis.  Decreased abdominal and pelvic adenopathy.      Procedure: CT abdomen pelvis  Findings and Treatment: PROCEDURE DATE:  11/28/2022    FINDINGS:  CHEST:  LUNGS AND LARGE AIRWAYS: Patent central airways. Centrilobular and   paraseptal emphysema. Unchanged spiculated mass with coarse calcification   in right upper lobe apical segment, 2.4 x 2.1 cm. Redemonstrated few   scattered micronodules. Mild mucous plugging.  PLEURA: No pleural effusion. No pneumothorax.  VESSELS: Mild coronary artery and aortic atherosclerotic calcifications.  HEART: Heart size is normal. No pericardial effusion.  MEDIASTINUM AND EVA: No enlarged mediastinal or axillary nodes. No bulky   hilar adenopathy.  CHEST WALL AND LOWER NECK: Bilateral gynecomastia.  Limited noncontrast evaluation of visceral organs.  ABDOMEN AND PELVIS:  LIVER: Within normal limits.  BILE DUCTS: Normal caliber.  GALLBLADDER: Within normal limits.  SPLEEN: Within normal limits.  PANCREAS: Within normal limits.  ADRENALS: Slight decreased left adrenal nodule 2 cm, previously up to 2.6   cm.  KIDNEYS/URETERS: Within normal limits.  BLADDER: Minimally fluid distended, decompressed with Galvin catheter in   situ and small intravesicular air likely from recent manipulation.  REPRODUCTIVE ORGANS:Prostate is enlarged.  BOWEL: No bowel obstruction.  PERITONEUM: No ascites.  VESSELS: Atherosclerotic changes.  RETROPERITONEUM/LYMPH NODES: Decreased left periaortic node 0.7 cm short   axis, previously 1.3 cm; decreased right inguinal node 0.5 cm short axis,   previously 1.4 cm.  ABDOMINAL WALL: Within normal limits.  BONES: No acute fractures. No suspicious lesion. Old right rib fractures   and degenerative changes of the spine.  IMPRESSION:  No acute fractures.  Since October 7, 2022, unchanged right upper lobe malignancy.  Decreased left adrenal metastasis.  Decreased abdominal and pelvic adenopathy.

## 2022-12-10 NOTE — PROGRESS NOTE ADULT - PROBLEM SELECTOR PLAN 10
Patient with hypomagnesemia, likely due to persistent diarrhea.  Likely contributing to overall weakness and falls.  - Replete PRN Patient with hypomagnesemia, likely due to persistent diarrhea.  Likely contributing to overall weakness and falls.  - Replenish PRN

## 2022-12-11 NOTE — PROGRESS NOTE ADULT - SUBJECTIVE AND OBJECTIVE BOX
Interval Events: Reviewed  Patient seen and examined at bedside.    Patient is a 72y old  Male who presents with a chief complaint of sepsis (07 Dec 2022 12:34)  no acute event overnight, RA 98%      PAST MEDICAL & SURGICAL HISTORY:      MEDICATIONS:  Pulmonary:    Antimicrobials:    Anticoagulants:    Cardiac:      Allergies    No Known Allergies    Intolerances        Vital Signs Last 24 Hrs  T(C): 36.8 (10 Dec 2022 21:16), Max: 36.8 (10 Dec 2022 16:01)  T(F): 98.2 (10 Dec 2022 21:16), Max: 98.2 (10 Dec 2022 16:01)  HR: 109 (10 Dec 2022 21:16) (100 - 109)  BP: 119/64 (10 Dec 2022 21:16) (109/65 - 127/75)  BP(mean): --  RR: 18 (10 Dec 2022 21:16) (18 - 18)  SpO2: 98% (10 Dec 2022 21:16) (98% - 99%)    Parameters below as of 10 Dec 2022 21:16  Patient On (Oxygen Delivery Method): room air            Review of Systems:   •	General: negative  •	Skin/Breast: negative  •	Ophthalmologic: negative  •	ENMT: negative  •	Respiratory and Thorax: negative  •	Cardiovascular: negative  •	Gastrointestinal: negative  •	Genitourinary: negative  •	Musculoskeletal: negative  •	Neurological: negative  •	Psychiatric: negative  •	Hematology/Lymphatics: negative  •	Endocrine: negative  •	Allergic/Immunologic: negative    Physical Exam:   • Constitutional:	thin, frail. elderly male, NAD  • Eyes:	EOMI; PERRL; no drainage or redness  • ENMT:	No oral lesions; no gross abnormalities  • Neck	no thyromegaly or nodules  • Breasts:	not examined  • Back:	No deformity or limitation of movement  • Respiratory:	Breath Sounds equal & clear to auscultation, no accessory muscle use  • Cardiovascular:	Regular rate & rhythm, normal S1, S2; no murmurs, gallops or rubs; no S3, S4  • Gastrointestinal:	Soft, non-tender, no hepatosplenomegaly, normal bowel sounds  • Genitourinary:	not examined  • Rectal: not examined  • Extremities:	No cyanosis, clubbing or edema  • Vascular:	Equal and normal pulses (dorsalis pedis)  • Neurologica:l	not examined  • Skin:	No lesions; no rash  • Lymph Nodes:	No lymphadedenopathy  • Musculoskeletal:	No joint pain, swelling or deformity; no limitation of movement        LABS:      CBC Full  -  ( 10 Dec 2022 15:07 )  WBC Count : 7.25 K/uL  RBC Count : 3.03 M/uL  Hemoglobin : 8.3 g/dL  Hematocrit : 26.1 %  Platelet Count - Automated : 145 K/uL  Mean Cell Volume : 86.1 fl  Mean Cell Hemoglobin : 27.4 pg  Mean Cell Hemoglobin Concentration : 31.8 gm/dL  Auto Neutrophil # : 5.89 K/uL  Auto Lymphocyte # : 1.24 K/uL  Auto Monocyte # : 0.07 K/uL  Auto Eosinophil # : 0.06 K/uL  Auto Basophil # : 0.00 K/uL  Auto Neutrophil % : 81.2 %  Auto Lymphocyte % : 17.1 %  Auto Monocyte % : 0.9 %  Auto Eosinophil % : 0.8 %  Auto Basophil % : 0.0 %    12-10    140  |  114<H>  |  16  ----------------------------<  108<H>  See Note   |  16<L>  |  2.14<H>    Ca    7.6<L>      10 Dec 2022 12:00  Phos  2.8     12-10  Mg     1.6     12-10    TPro  5.5<L>  /  Alb  2.0<L>  /  TBili  0.4  /  DBili  x   /  AST  See Note  /  ALT  See Note  /  AlkPhos  143<H>  12-10                        RADIOLOGY & ADDITIONAL STUDIES (The following images were personally reviewed):  Galvin:                                     No  Urine output:                       adequate  DVT prophylaxis:                 Yes  Flattus:                                  Yes  Bowel movement:              No

## 2022-12-11 NOTE — PROGRESS NOTE ADULT - PROBLEM SELECTOR PLAN 10
Patient with hypomagnesemia, likely due to persistent diarrhea.  Likely contributing to overall weakness and falls.  - Replenish PRN

## 2022-12-11 NOTE — PROGRESS NOTE ADULT - PROBLEM SELECTOR PLAN 12
Patient with recently found (08/22) with RUL spiculated nodule with R paratracheal adenopathy and L adrenal nodule. Concern for metastatic cancer because of rectal lesion on PET scan as well as L adrenal area that lit up. Now, s/p FNA of R axillary node, with pathology consistent with metastatic lung adenocarcinoma. Follows w/ Dr. Patricia outpatient.  - f/u heme/onc consult  - palliative consulted: DNR/DNI on 12/7  - attempt family member contact, no available contacts    #Prophylactic measures  F: d/c maintenance fluids  E: replenish K<4, Mg<1.8, Phos<3, Ca<8.5   N: minced and moist diet    VTE Prophylaxis: hold i/s/o anemia  GI: pantoprazole 40 IV BID  C: DNR/DNI  D: SASHA

## 2022-12-11 NOTE — PROGRESS NOTE ADULT - PROBLEM SELECTOR PLAN 8
Patient with frequent falls at home, per outpatient nursing notes, patient's HHA have reported falls at home sometimes with head trauma.  He ambulated at baseline with cane.  Etiology likely due to overall decompensation in setting of metastatic cancer. CTH and CT cervical spine without fractures of trauma.    - PT/OT consulted - recommending SASHA  - OOBTC daily w/ supervision

## 2022-12-11 NOTE — PROGRESS NOTE ADULT - PROBLEM SELECTOR PLAN 3
On 12/5 pt found to have distended abdomen. CTAP w/o contrast on 12/5 showing severe ileus. Surgery was formally consulted. Decided not to staff with attending but will continue to follow for serial abdominal exams. Primary team placed sump tube, set to suction. GI also following.  - placed sump tube 12/5  - bladder scan on 12/5, not retaining  - repeat abdominal XR 12/7: abdominal distention w/ improving ileus  - surgery signed off 12/9  Plan:  - minced and moist diet, advance as tolerated  - f/u GI recs

## 2022-12-11 NOTE — PROGRESS NOTE ADULT - PROBLEM SELECTOR PLAN 2
Patient with diarrhea described as loose stool, per outpatient notes seems to be chronic for several weeks (later stated for a year). Patient was prescribed Loperamide early November.  Unclear etiology. May be in setting of metastatic malignancy, however currently considering inflammatory given chronicity and white count. Diarrhea likely not infectious C. diff negative, GI PCR negative. Pt persistently hypokalemic likely from diarrhea. GI consulted on 12/2. 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 EGD on 12/9 showing poor rectal tone. 3 small volume episodes of diarrhea last night.  - CRP high 132; quantitative IgA normal; tissue transglutaminase (TTG) IgA-antibody  Plan:  - f/u stool calprotectin  - CTM bowel movements (having large volume watery)  - banatrol added to diet  - lomotil 2 tabs q6 hrs  - no loperamide due to prolonged QTc 508 (12/1/2022 EKG)  - f/u GI recs

## 2022-12-11 NOTE — PROGRESS NOTE ADULT - PROBLEM SELECTOR PLAN 11
Patient with hypophosphatemia, likely due to persistent diarrhea.  Likely contributing to overall weakness and falls.  - Replenish PRN

## 2022-12-11 NOTE — PROGRESS NOTE ADULT - TIME BILLING
Improving;  OOB  No change in current regimen  Path noted  Continue Iv Protonix and PO Carafate   Encourage PO

## 2022-12-11 NOTE — PROGRESS NOTE ADULT - PROBLEM SELECTOR PLAN 5
Patient with hypokalemia, likely due to persistent diarrhea. No EKG changes. On 12/1 K low to 2.1, repeat EKG possible u waves. Pt asymptomatic, no fasiculations or weakness. Aggressively repleted. Diarrhea stopped when pt NPO, and K improved, now worsening w/ worsening diarrhea after diet restarted.  - K replenish, goal >4

## 2022-12-11 NOTE — PROGRESS NOTE ADULT - PROBLEM SELECTOR PLAN 4
Patient with ASHLYN on admission BUN 31, Scr 2.35 -->Scr 2.31 (baseline Scr 1.1 10/22). ASHLYN likely prerenal i/s/o hypovolemia due to diarrhea and poor po intake. Given prostate mets, there may also be a post-renal component. Pt w/ mark initially, then removed after passing TOV.   - downtrending Cr 12/9  - d/c maintenance fluids i/s/o crackles  - strict I/Os  - trend Cr, avoid nephrotoxic drugs, renally dose meds

## 2022-12-12 NOTE — PROGRESS NOTE ADULT - PROBLEM SELECTOR PLAN 12
Patient with recently found (08/22) with RUL spiculated nodule with R paratracheal adenopathy and L adrenal nodule. Concern for metastatic cancer because of rectal lesion on PET scan as well as L adrenal area that lit up. Now, s/p FNA of R axillary node, with pathology consistent with metastatic lung adenocarcinoma. Follows w/ Dr. Patricia outpatient.  - f/u heme/onc consult  - palliative consulted: DNR/DNI on 12/7  - attempt family member contact, no available contacts    #Prophylactic measures  F: d/c maintenance fluids  E: replenish K<4, Mg<1.8, Phos<3, Ca<8.5   N: minced and moist diet    VTE Prophylaxis: hold i/s/o anemia  GI: pantoprazole 40 IV BID  C: DNR/DNI  D: SASHA Patient with recently found (08/22) with RUL spiculated nodule with R paratracheal adenopathy and L adrenal nodule. Concern for metastatic cancer because of rectal lesion on PET scan as well as L adrenal area that lit up. Now, s/p FNA of R axillary node, with pathology consistent with metastatic lung adenocarcinoma. Follows w/ Dr. Patricia outpatient.  - f/u heme/onc consult  - palliative consulted: DNR/DNI on 12/7  - attempt family member contact, no available contacts    #Prophylactic measures  F: 500cc LR bolus plus 100cc/hr LR for 10 hours maintenance fluids  E: replenish K<4, Mg<1.8, Phos<3, Ca<8.5   N: NPO    VTE Prophylaxis: hold i/s/o anemia  GI: pantoprazole 40 IV BID  C: DNR/DNI  D: SASHA

## 2022-12-12 NOTE — PROGRESS NOTE ADULT - SUBJECTIVE AND OBJECTIVE BOX
GASTROENTEROLOGY RE-CONSULT  NOTE  Patient seen and examined at bedside. Denies black tarry stool , denies hematemesis / coffee ground emesis    PERTINENT REVIEW OF SYSTEMS:  CONSTITUTIONAL: No fevers or chills  HEENT: No visual changes; No vertigo or throat pain   GASTROINTESTINAL: As above.  NEUROLOGICAL: No numbness or weakness  SKIN: No itching, burning, rashes, or lesions     Allergies    No Known Allergies    Intolerances      MEDICATIONS:  MEDICATIONS  (STANDING):  buPROPion XL (24-Hour) . 150 milliGRAM(s) Oral every 24 hours  diphenoxylate/atropine 2 Tablet(s) Oral every 6 hours  lactated ringers. 1000 milliLiter(s) (100 mL/Hr) IV Continuous <Continuous>  levothyroxine Injectable 50 MICROGram(s) IV Push <User Schedule>  multivitamin 1 Tablet(s) Oral daily  pantoprazole  Injectable 40 milliGRAM(s) IV Push every 12 hours  QUEtiapine 250 milliGRAM(s) Oral at bedtime  sucralfate 1 Gram(s) Oral every 6 hours    MEDICATIONS  (PRN):  trimethobenzamide Injectable 200 milliGRAM(s) IntraMuscular every 12 hours PRN Nausea and/or Vomiting    Vital Signs Last 24 Hrs  T(C): 36.8 (12 Dec 2022 08:38), Max: 37.2 (11 Dec 2022 16:13)  T(F): 98.3 (12 Dec 2022 08:38), Max: 99 (11 Dec 2022 16:13)  HR: 110 (12 Dec 2022 08:38) (80 - 132)  BP: 95/51 (12 Dec 2022 08:38) (91/47 - 108/54)  BP(mean): --  RR: 18 (12 Dec 2022 08:38) (16 - 18)  SpO2: 96% (12 Dec 2022 08:38) (96% - 99%)    Parameters below as of 12 Dec 2022 08:38  Patient On (Oxygen Delivery Method): room air        12-11 @ 07:01  -  12-12 @ 07:00  --------------------------------------------------------  IN: 0 mL / OUT: 200 mL / NET: -200 mL      PHYSICAL EXAM:    General: lying in bed, in no acute distress, appears chronically ill  HEENT: MMM, conjunctiva and sclera clear  Gastrointestinal: Soft non-tender non-distended; No rebound or guarding  Skin: Warm and dry. No obvious rash    LABS:                        7.2    3.54  )-----------( 157      ( 12 Dec 2022 09:30 )             22.3     12-12    137  |  108  |  22  ----------------------------<  72  3.7   |  15<L>  |  2.15<H>    Ca    7.2<L>      12 Dec 2022 09:30  Phos  2.6     12-12  Mg     1.6     12-12    TPro  5.1<L>  /  Alb  1.8<L>  /  TBili  0.5  /  DBili  x   /  AST  26  /  ALT  11  /  AlkPhos  132<H>  12-11                      RADIOLOGY & ADDITIONAL STUDIES:  Reviewed

## 2022-12-12 NOTE — PROGRESS NOTE ADULT - SUBJECTIVE AND OBJECTIVE BOX
O/N Events: see overnight chart note. Patient was tachy, received 1L of LR and 1upRBC's.    Subjective/ROS: Patient seen and examined at bedside, appeared lethargic.     Denies Fever/Chills, HA, CP, SOB, n/v, changes in bowel/urinary habits.  12pt ROS otherwise negative.    VITALS  Vital Signs Last 24 Hrs  T(C): 36.8 (12 Dec 2022 08:38), Max: 37.2 (11 Dec 2022 16:13)  T(F): 98.3 (12 Dec 2022 08:38), Max: 99 (11 Dec 2022 16:13)  HR: 110 (12 Dec 2022 08:38) (80 - 132)  BP: 95/51 (12 Dec 2022 08:38) (91/47 - 108/54)  BP(mean): --  RR: 18 (12 Dec 2022 08:38) (16 - 18)  SpO2: 96% (12 Dec 2022 08:38) (96% - 99%)    Parameters below as of 12 Dec 2022 08:38  Patient On (Oxygen Delivery Method): room air    CAPILLARY BLOOD GLUCOSE    PHYSICAL EXAM  General: NAD  Head: scar over left forehead  Neck: Supple; no JVD  Respiratory: poor inspiratory effort, mild cough non-productive, bibasilar crackles; no wheezes, ronchi,   Cardiovascular: Regular rhythm/rate; S1/S2+, no murmurs, rubs gallops   Gastrointestinal: Soft; NT, mildly distended abdomen; bowel sounds normal and present  Extremities: WWP; mild LE edema   Neurological: A&Ox3    MEDICATIONS  (STANDING):  buPROPion XL (24-Hour) . 150 milliGRAM(s) Oral every 24 hours  diphenoxylate/atropine 2 Tablet(s) Oral every 6 hours  lactated ringers. 1000 milliLiter(s) (100 mL/Hr) IV Continuous <Continuous>  lactated ringers. 500 milliLiter(s) (500 mL/Hr) IV Continuous <Continuous>  levothyroxine Injectable 50 MICROGram(s) IV Push <User Schedule>  multivitamin 1 Tablet(s) Oral daily  pantoprazole  Injectable 40 milliGRAM(s) IV Push every 12 hours  QUEtiapine 250 milliGRAM(s) Oral at bedtime  sucralfate 1 Gram(s) Oral every 6 hours    MEDICATIONS  (PRN):  trimethobenzamide Injectable 200 milliGRAM(s) IntraMuscular every 12 hours PRN Nausea and/or Vomiting      No Known Allergies      LABS                        7.2    3.54  )-----------( 157      ( 12 Dec 2022 09:30 )             22.3     12-12    137  |  108  |  22  ----------------------------<  72  3.7   |  15<L>  |  2.15<H>    Ca    7.2<L>      12 Dec 2022 09:30  Phos  2.6     12-12  Mg     1.6     12-12    TPro  5.1<L>  /  Alb  1.8<L>  /  TBili  0.5  /  DBili  x   /  AST  26  /  ALT  11  /  AlkPhos  132<H>  12-11    IMAGING/EKG/ETC

## 2022-12-12 NOTE — PROGRESS NOTE ADULT - ASSESSMENT
73 y/o male history of metastatic stage IV lung adenocarcinoma, hypothyroidism, depression, anxiety, on new chemo as of a few weeks ago ;    Initially brought in by EMS due to altered mental status , as noted by HHA ;   Patient was admitted for SIRS and suspicion for sepsis    GI was initially consulted for subacute diarrhea,   Reconsulted for drop in HgB    S/p EGD + Flex sig 12/8/22  EGD:   - Severe circumferential esophagitis s/p bx  - Hiatal hernia  - Ulcer in hernia sac s/p bx  - Ulcer in cardia s/p bx  - atrophic gastritis s/p bx    Flex sig:  - grossly normal mucosa s/p bx  - poor rectal tone    Recommendations:  Trend HgB  Continue PPI BID  Carafate Suspension 1g po qid   Patient deferring on repeat EGD / full colonoscopy , recommend further discussion based on patient preference , medical status   Palliative Care Consultation and GOC    Thank you for allowing us to participate in the care of this patient.  Please call us if you have any further questions or concerns    Lux Donald M.D.  Gastroenterology Fellow  Weekday Pager: [phone]  Weeknights/Weekend Coverage: Please Call the  for contact info

## 2022-12-12 NOTE — PROGRESS NOTE ADULT - TIME BILLING
Patient seen and examined;  Have GI follow up regarding H/H  May start steroids  Continue Protonix and Carafate

## 2022-12-12 NOTE — PROGRESS NOTE ADULT - PROBLEM SELECTOR PLAN 9
Patient with underlying metastatic lung adenocarcinoma with chronic diarrhea and frequent falls, overall appears very frail.    - dietitian consulted - rec ensure enlive TID  - PT/OT consulted, rec SASHA  - palliative consult: DNR/DNI  - contact family  - incentive spirometry

## 2022-12-12 NOTE — PROGRESS NOTE ADULT - SUBJECTIVE AND OBJECTIVE BOX
INTERVAL HPI/OVERNIGHT EVENTS:  All notes reviewed  H/H noted;  Would not increase level of care since patient is DNR/DNI  Check with GI since with dropping H/H they may want Steroids      MEDICATIONS  (STANDING):  buPROPion XL (24-Hour) . 150 milliGRAM(s) Oral every 24 hours  diphenoxylate/atropine 2 Tablet(s) Oral every 6 hours  lactated ringers. 1000 milliLiter(s) (100 mL/Hr) IV Continuous <Continuous>  lactated ringers. 500 milliLiter(s) (500 mL/Hr) IV Continuous <Continuous>  levothyroxine Injectable 50 MICROGram(s) IV Push <User Schedule>  multivitamin 1 Tablet(s) Oral daily  pantoprazole  Injectable 40 milliGRAM(s) IV Push every 12 hours  QUEtiapine 250 milliGRAM(s) Oral at bedtime  sucralfate 1 Gram(s) Oral every 6 hours    MEDICATIONS  (PRN):  trimethobenzamide Injectable 200 milliGRAM(s) IntraMuscular every 12 hours PRN Nausea and/or Vomiting      Allergies    No Known Allergies    Intolerances        Vital Signs Last 24 Hrs  T(C): 36.8 (12 Dec 2022 08:38), Max: 37.2 (11 Dec 2022 16:13)  T(F): 98.3 (12 Dec 2022 08:38), Max: 99 (11 Dec 2022 16:13)  HR: 110 (12 Dec 2022 08:38) (80 - 132)  BP: 95/51 (12 Dec 2022 08:38) (91/47 - 108/54)  BP(mean): --  RR: 18 (12 Dec 2022 08:38) (16 - 18)  SpO2: 96% (12 Dec 2022 08:38) (96% - 99%)    Parameters below as of 12 Dec 2022 08:38  Patient On (Oxygen Delivery Method): room air              Constitutional:  Awake     Eyes: ARBEN    ENMT: Negative    Neck: Supple    Back:  no tenderness     Respiratory:  clear    Cardiovascular: S1 S2    Gastrointestinal: soft     Genitourinary:    Extremities: no edema     Vascular:    Neurological:    Skin:    Lymph Nodes:            12-11 @ 07:01  -  12-12 @ 07:00  --------------------------------------------------------  IN: 0 mL / OUT: 200 mL / NET: -200 mL      LABS:                        7.2    3.54  )-----------( 157      ( 12 Dec 2022 09:30 )             22.3     12-12    137  |  108  |  22  ----------------------------<  72  3.7   |  15<L>  |  2.15<H>    Ca    7.2<L>      12 Dec 2022 09:30  Phos  2.6     12-12  Mg     1.6     12-12    TPro  5.1<L>  /  Alb  1.8<L>  /  TBili  0.5  /  DBili  x   /  AST  26  /  ALT  11  /  AlkPhos  132<H>  12-11          RADIOLOGY & ADDITIONAL TESTS:

## 2022-12-12 NOTE — CHART NOTE - NSCHARTNOTEFT_GEN_A_CORE
9:30pm: , /54, pt asymptomatic.     10:15pm: Repeat , pt without cardiac sx but dry on exam and reports decreased PO intake. Ordered 1L LR bolus. Held nighttime lomotil (started on 12/9) i/s/o tachycardia (and pt already had 1 BM in daytime)     1:30am: Repeat vitals after 1L LR: , /50.  Pt continues to be asymptomatic - no chest pain, palpitations, dyspnea, dizziness, lightheadedness.     2:30am: CBC with Hb 8.1 > 6.2. Type and screen collected and 1U pRBC ordered. Pt denies hematemesis or bloody BMs. No abd or flank tenderness on exam. MUKESH performed at bedside negative for melena; pt with liquidy brown stool. Ordered additional 500cc LR while waiting for T&S to result. 9:30pm: , /54, pt asymptomatic.     10:15pm: Repeat , pt without cardiac sx but dry on exam and reports decreased PO intake. Ordered 1L LR bolus. Held nighttime lomotil (started on 12/9) i/s/o tachycardia (and pt already had 1 BM in daytime)     1:30am: Repeat vitals after 1L LR: , /50.  Pt continues to be asymptomatic - no chest pain, palpitations, dyspnea, dizziness, lightheadedness.     2:30am: CBC with Hb 8.1 > 6.2. Type and screen collected and 1U pRBC ordered. Pt denies hematemesis or bloody BMs. No abd or flank tenderness on exam. MUKESH performed at bedside negative for melena; pt with liquidy brown stool. Ordered additional 500cc LR while waiting for T&S to result.    4:30am: Pt about to receive 1U pRBC. Additional IV access obtained and pt started on 1L LR @100cc/hr x10hr. Will recheck vitals in 1 hour. 9:30pm: , /54, pt asymptomatic.     10:15pm: Repeat , pt without cardiac sx but dry on exam and reports decreased PO intake. Ordered 1L LR bolus. Held nighttime lomotil (started on 12/9) i/s/o tachycardia (and pt already had 1 BM in daytime)     1:30am: Repeat vitals after 1L LR: , /50.  Pt continues to be asymptomatic - no chest pain, palpitations, dyspnea, dizziness, lightheadedness. Obtained stat CBC.     2:30am: CBC with Hb 8.1 > 6.2. Type and screen collected and 1U pRBC ordered. Pt denies hematemesis or bloody BMs. No abd or flank tenderness on exam. MUKESH performed at bedside negative for melena; pt with liquidy brown stool. Ordered additional 500cc LR while waiting for T&S to result.    4:30am: Pt about to receive 1U pRBC. Additional IV access obtained and pt started on 1L LR @100cc/hr x10hr. Will recheck vitals in 1 hour. 9:30pm: , /54, pt asymptomatic.     10:15pm: Repeat , pt without cardiac sx but dry on exam and reports decreased PO intake. Ordered 1L LR bolus. Held nighttime lomotil (started on 12/9) i/s/o tachycardia (and pt already had 1 BM in daytime)     1:30am: Repeat vitals after 1L LR: , /50.  Pt continues to be asymptomatic - no chest pain, palpitations, dyspnea, dizziness, lightheadedness. Obtained stat CBC.     2:30am: CBC with Hb 8.1 > 6.2. Type and screen collected and 1U pRBC ordered. Pt denies hematemesis or bloody BMs. No abd or flank tenderness on exam. MUKESH performed at bedside negative for melena; pt with liquidy brown stool. Ordered additional 500cc LR while waiting for T&S to result.    4:30am: Pt about to receive 1U pRBC. Additional IV access obtained and pt started on 1L LR @100cc/hr x10hr.     6:15am: Repeat BP 90s-100s/50s, , pt asx. pRBC and LR still running.

## 2022-12-12 NOTE — CHART NOTE - NSCHARTNOTEFT_GEN_A_CORE
Admitting Diagnosis:   Patient is a 72y old  Male who presents with a chief complaint of AMS (11 Dec 2022 06:19)      PAST MEDICAL & SURGICAL HISTORY:      Current Nutrition Order:   Diet, NPO:   Except Medications  With Ice Chips/Sips of Water (12-12-22 @ 03:23)      PO Intake: Good (%) [   ]  Fair (50-75%) [   ] Poor (<25%) [   ] -- N/A    GI Issues: Pt endorses mild nausea, no vomiting. Fecal incontinent, last BM 12/11. Per chart, pt having chronic diarrhea  No abd discomfort or distention    Pain: No pain reported    Skin Integrity: Casey 14, no ecchymotic  No PU or edema noted    Labs:   12-12    137  |  108  |  22  ----------------------------<  72  3.7   |  15<L>  |  2.15<H>    Ca    7.2<L>      12 Dec 2022 09:30  Phos  2.6     12-12  Mg     1.6     12-12    TPro  5.1<L>  /  Alb  1.8<L>  /  TBili  0.5  /  DBili  x   /  AST  26  /  ALT  11  /  AlkPhos  132<H>  12-11    CAPILLARY BLOOD GLUCOSE          Medications:  MEDICATIONS  (STANDING):  buPROPion XL (24-Hour) . 150 milliGRAM(s) Oral every 24 hours  diphenoxylate/atropine 2 Tablet(s) Oral every 6 hours  lactated ringers. 1000 milliLiter(s) (100 mL/Hr) IV Continuous <Continuous>  levothyroxine Injectable 50 MICROGram(s) IV Push <User Schedule>  multivitamin 1 Tablet(s) Oral daily  pantoprazole  Injectable 40 milliGRAM(s) IV Push every 12 hours  QUEtiapine 250 milliGRAM(s) Oral at bedtime  sucralfate 1 Gram(s) Oral every 6 hours    MEDICATIONS  (PRN):  trimethobenzamide Injectable 200 milliGRAM(s) IntraMuscular every 12 hours PRN Nausea and/or Vomiting    Adm Anthropometrics:  (11/28) 72kg; Ht 182.9cm; BMI 21.5; IBW 80.9kg; %IBW 89%    Weight Change:   No new wts in EMR. Please obtain new wts biweekly to assess/trend for changes.    Nutrition Focused Physical Exam: Completed [   ]  Not Pertinent [   ]  Muscle Wasting- Temporal [   ]  Clavicle/Pectoral [   ]  Shoulder/Deltoid [   ]  Scapula [   ]  Interosseous [   ]  Quadriceps [   ]  Gastrocnemius [   ]  Fat Wasting- Orbital [   ]  Buccal [   ]  Triceps [   ]  Rib [   ]  Suspect [PCM] 2/2 to physical assessment, [poor intake], and [wt loss]; please see malnutrition chart note.    Estimated energy needs:   ABW used to calculate energy needs due to pt's current body weight within % IBW (89%).    Needs calculated for age and increased d/t cancer and chemo status, moderate malnutrition.   Energy: 2160-2520kcal (30-35cal/kg)  Protein: 101-115g pro (1.4-1.6g/kg pro)  Fluid: 2160-2520ml (30-35ml/kg)    Subjective:   71 y/o male history of metastatic stage IV lung adenocarcinoma, hypothyroidism, depression, anxiety, undergoing chemo brought in by EMS with HHA for altered mental status Monday morning admitted for sepsis (unclear source) and electrolyte derangements. Now found to have anemia and severe ileus on 12/6, repeat abdominal XR 12/7: abdominal distention w/ improving ileus. s/p EGD/flex sig 12/9 w/ ulcers.    Pt seen resting in bed. Remains NPO at this time with ice chips and sips of water, requesting lemonade. He reports mild nausea, no vomiting. Pt is fecal incontinent, last BM 12/11, per chart noted persistent diarrhea. Was previously on minced and moist diet (12/9), 2/2 poor dentition. Paged team, discussed feasibility to adv diet, pending PO at this time. No reported complaints per pt, endorses fatigue. Reviewed labs, Cr 2.15, Glu 72, lytes WNL. RD to continue to follow.    Previous Nutrition Diagnosis:  Malnutrition of moderate degree in the context of chronic illness r/t inability to meet nutritional demands secondary to pt's clinical conditions AEB mild to moderate muscle and subcutaneous fat loss per NFPE, suspected </=75% PO x>1month PTA    Active [ x  ]  Resolved [   ]    If resolved, new PES:     Goal:  1. Diet adv when medically feasible, within 24-48 hrs as able  2. Consistently meet >75% est needs during hospital stay w/o overt s/s of malnutrition    Recommendations:  1. NPO, pending ability to adv diet, rec resume previous diet as tolerated  >>Rec include Vanilla Ensure Plus High Protein TID (1050 kcal, 60g protein, 540mL free H2O)  >> Banatrol BID for persistent diarrhea  2. Monitor for GI intolerances, s/s of distress  3. BM and pain regimen per team  4. Monitor BMP, BG, POCT, renal indices, LFTs, CBC, lytes, replete prn  5. Continue MVI  6. Align with GOC at all times    Education: N/A    Risk Level: High [ x  ] Moderate [   ] Low [   ]

## 2022-12-13 NOTE — PROGRESS NOTE ADULT - SUBJECTIVE AND OBJECTIVE BOX
***INCOMPLETE NOTE    SUBJECTIVE / INTERVAL HPI: Patient seen and examined at bedside. No overnight events.    VITAL SIGNS:  Vital Signs Last 24 Hrs  T(C): 36.7 (13 Dec 2022 11:57), Max: 37.2 (13 Dec 2022 05:26)  T(F): 98 (13 Dec 2022 11:57), Max: 99 (13 Dec 2022 05:26)  HR: 100 (13 Dec 2022 11:57) (100 - 113)  BP: 103/63 (13 Dec 2022 11:57) (97/55 - 127/67)  BP(mean): --  RR: 18 (13 Dec 2022 11:57) (17 - 18)  SpO2: 97% (13 Dec 2022 11:57) (96% - 100%)    Parameters below as of 13 Dec 2022 11:57  Patient On (Oxygen Delivery Method): room air        PHYSICAL EXAM:    General: Resting comfortably in bed; NAD  HEENT: NC/AT, EOMI, anicteric sclera, MMM, neck supple, no nasal discharge  Cardiac: RRR; normal S1/S2, no MRG, no LE edema, no JVD  Respiratory: CTAB; no wheezes, ronchi, increased work of breathing, retractions  Gastrointestinal: +BSx4, abdomen soft, NT/ND; no rebound or guarding  Genitourinary: no suprapubic tenderness; mark*; normal external genitalia  Extremities: WWP, no clubbing or cyanosis; no peripheral edema  Vascular: 2+ radial and DP pulses bilaterally  Dermatologic: skin warm, dry and intact; no rashes, open wounds  Neurologic: AAOx3; no focal deficits  Psychiatric: affect and characteristics of appearance, verbalizations, behaviors are appropriate    MEDICATIONS:  MEDICATIONS  (STANDING):  buPROPion XL (24-Hour) . 150 milliGRAM(s) Oral every 24 hours  dextrose 5% + lactated ringers. 1200 milliLiter(s) (100 mL/Hr) IV Continuous <Continuous>  diphenoxylate/atropine 2 Tablet(s) Oral every 6 hours  levothyroxine Injectable 50 MICROGram(s) IV Push <User Schedule>  multivitamin 1 Tablet(s) Oral daily  pantoprazole  Injectable 40 milliGRAM(s) IV Push every 12 hours  sucralfate 1 Gram(s) Oral every 6 hours    MEDICATIONS  (PRN):  trimethobenzamide Injectable 200 milliGRAM(s) IntraMuscular every 12 hours PRN Nausea and/or Vomiting      ALLERGIES:  Allergies    No Known Allergies    Intolerances        LABS:                        7.5    4.10  )-----------( 185      ( 13 Dec 2022 05:30 )             22.5     12-13    139  |  109<H>  |  22  ----------------------------<  77  3.9   |  13<L>  |  2.03<H>    Ca    7.4<L>      13 Dec 2022 05:30  Phos  3.3     12-13  Mg     1.6     12-13          CAPILLARY BLOOD GLUCOSE      POCT Blood Glucose.: 69 mg/dL (12 Dec 2022 18:01)      RADIOLOGY & ADDITIONAL TESTS: Reviewed.   SUBJECTIVE / INTERVAL HPI: Patient seen and examined at bedside. No overnight events. Patient feeling a little bit better this morning. Denies dizziness, lightheadedness at rest, but has some with movement. endorses shortness of breath "at times." Continues to have bowel movements, large volume ~2 overnight. Would like seroquel for sleep at night.    VITAL SIGNS:  Vital Signs Last 24 Hrs  T(C): 36.7 (13 Dec 2022 11:57), Max: 37.2 (13 Dec 2022 05:26)  T(F): 98 (13 Dec 2022 11:57), Max: 99 (13 Dec 2022 05:26)  HR: 100 (13 Dec 2022 11:57) (100 - 113)  BP: 103/63 (13 Dec 2022 11:57) (97/55 - 127/67)  BP(mean): --  RR: 18 (13 Dec 2022 11:57) (17 - 18)  SpO2: 97% (13 Dec 2022 11:57) (96% - 100%)    Parameters below as of 13 Dec 2022 11:57  Patient On (Oxygen Delivery Method): room air        PHYSICAL EXAM:    General: Resting comfortably in bed; NAD  HEENT: NC/AT, EOMI, anicteric sclera, dry mucous membranes, chapped lips, neck supple, no nasal discharge  Cardiac: RRR; normal S1/S2, no MRG, no LE edema  Respiratory: BL crackles; no wheezes, ronchi, increased work of breathing, retractions  Gastrointestinal: +BSx4, abdomen soft, NT/ND; no rebound or guarding  Extremities: WWP x4; no peripheral edema  Dermatologic: skin warm, dry and intact; no rashes, open wounds  Neurologic: AAOx3; no focal deficits    MEDICATIONS:  MEDICATIONS  (STANDING):  buPROPion XL (24-Hour) . 150 milliGRAM(s) Oral every 24 hours  dextrose 5% + lactated ringers. 1200 milliLiter(s) (100 mL/Hr) IV Continuous <Continuous>  diphenoxylate/atropine 2 Tablet(s) Oral every 6 hours  levothyroxine Injectable 50 MICROGram(s) IV Push <User Schedule>  multivitamin 1 Tablet(s) Oral daily  pantoprazole  Injectable 40 milliGRAM(s) IV Push every 12 hours  sucralfate 1 Gram(s) Oral every 6 hours    MEDICATIONS  (PRN):  trimethobenzamide Injectable 200 milliGRAM(s) IntraMuscular every 12 hours PRN Nausea and/or Vomiting      ALLERGIES:  Allergies    No Known Allergies    Intolerances        LABS:                        7.5    4.10  )-----------( 185      ( 13 Dec 2022 05:30 )             22.5     12-13    139  |  109<H>  |  22  ----------------------------<  77  3.9   |  13<L>  |  2.03<H>    Ca    7.4<L>      13 Dec 2022 05:30  Phos  3.3     12-13  Mg     1.6     12-13          CAPILLARY BLOOD GLUCOSE      POCT Blood Glucose.: 69 mg/dL (12 Dec 2022 18:01)      RADIOLOGY & ADDITIONAL TESTS: Reviewed.

## 2022-12-13 NOTE — PROGRESS NOTE ADULT - PROBLEM SELECTOR PLAN 9
Patient with underlying metastatic lung adenocarcinoma with chronic diarrhea and frequent falls, overall appears very frail.    - dietitian consulted - rec ensure enlive TID  - PT/OT consulted, rec SASHA  - palliative consult: DNR/DNI  - contact family  - incentive spirometry Patient with underlying metastatic lung adenocarcinoma with chronic diarrhea and frequent falls, overall appears very frail.    - dietitian consulted - rec ensure enlive TID  - PT/OT consulted, rec SASHA  - palliative consult: DNR/DNI; palliative to speak w/ Dr. Patricia for possible hospice  - contact family  - incentive spirometry

## 2022-12-13 NOTE — PROGRESS NOTE ADULT - PROBLEM SELECTOR PLAN 6
Patient with encephalopathy and hypotension, meeting 3 SIRS on admission for tachycardia, tachypnea, and leukocytosis.  Source possibly pulmonary and urinary.  Lactate 12.1 --> 2.3, UA trace LE, Bacteria present, hyaline casts, C. Diff negative GI PCR negative.  Lactate cleared, S/p Zosyn 3.375 x 3, Vancomycin 1250mg, NaCl 2200cc bolus in ED.   Still unclear source. AAOx4, mentating well since 11/29.  - UCx no growth final  - BCx NGTD  - ULeg negative  - s/p Vanc 1000mg qD and Zosyn 3.375 q8 (11/28-12/1)  Plan:  - repeat BCx/UA w/ reflex culture NGTD  - continue to trend WBC Patient with encephalopathy and hypotension, meeting 3 SIRS on admission for tachycardia, tachypnea, and leukocytosis.  Source possibly pulmonary and urinary.  Lactate 12.1 --> 2.3, UA trace LE, Bacteria present, hyaline casts, C. Diff negative GI PCR negative.  Lactate cleared, S/p Zosyn 3.375 x 3, Vancomycin 1250mg, NaCl 2200cc bolus in ED.   Still unclear source. AAOx4, mentating well since 11/29.  - UCx no growth final  - BCx NGTD  - ULeg negative  - s/p Vanc 1000mg qD and Zosyn 3.375 q8 (11/28-12/1)  - repeat BCx/UA w/ reflex culture NGTD  Plan:  - continue to trend WBC

## 2022-12-13 NOTE — PROGRESS NOTE ADULT - PROBLEM SELECTOR PLAN 4
Patient with ASHLYN on admission BUN 31, Scr 2.35 -->Scr 2.31 (baseline Scr 1.1 10/22). ASHLYN likely prerenal i/s/o hypovolemia due to diarrhea and poor po intake. Given prostate mets, there may also be a post-renal component. Pt w/ mark initially, then removed after passing TOV.   - downtrending Cr 12/9  - d/c maintenance fluids i/s/o crackles  - strict I/Os  - trend Cr, avoid nephrotoxic drugs, renally dose meds Patient with ASHLYN on admission BUN 31, Scr 2.35 -->Scr 2.31 (baseline Scr 1.1 10/22). ASHLYN likely prerenal i/s/o hypovolemia due to diarrhea and poor po intake. Given prostate mets, there may also be a post-renal component. Pt w/ mark initially, then removed after passing TOV.   - downtrending Cr 12/9  - c/w maintenance fluids w/ D5 due to low sugars  - strict I/Os  - trend Cr, avoid nephrotoxic drugs, renally dose meds

## 2022-12-13 NOTE — PROGRESS NOTE ADULT - SUBJECTIVE AND OBJECTIVE BOX
INTERVAL HPI/OVERNIGHT EVENTS:  Awake and no complaint today;  H/H noted from today      MEDICATIONS  (STANDING):  buPROPion XL (24-Hour) . 150 milliGRAM(s) Oral every 24 hours  diphenoxylate/atropine 2 Tablet(s) Oral every 6 hours  lactated ringers. 1000 milliLiter(s) (100 mL/Hr) IV Continuous <Continuous>  lactated ringers. 1000 milliLiter(s) (100 mL/Hr) IV Continuous <Continuous>  levothyroxine Injectable 50 MICROGram(s) IV Push <User Schedule>  multivitamin 1 Tablet(s) Oral daily  pantoprazole  Injectable 40 milliGRAM(s) IV Push every 12 hours  sucralfate 1 Gram(s) Oral every 6 hours    MEDICATIONS  (PRN):  trimethobenzamide Injectable 200 milliGRAM(s) IntraMuscular every 12 hours PRN Nausea and/or Vomiting      Allergies    No Known Allergies    Intolerances        Vital Signs Last 24 Hrs  T(C): 37.2 (13 Dec 2022 05:26), Max: 37.2 (13 Dec 2022 05:26)  T(F): 99 (13 Dec 2022 05:26), Max: 99 (13 Dec 2022 05:26)  HR: 111 (13 Dec 2022 05:26) (111 - 113)  BP: 102/53 (13 Dec 2022 05:26) (97/55 - 112/52)  BP(mean): --  RR: 17 (13 Dec 2022 05:26) (17 - 17)  SpO2: 96% (13 Dec 2022 05:26) (96% - 100%)    Parameters below as of 13 Dec 2022 05:26  Patient On (Oxygen Delivery Method): room air              Constitutional:  Awake     Eyes: ARBEN    ENMT: Negative    Neck: Supple    Back:  no tenderness     Respiratory:  clear    Cardiovascular: S1 S2    Gastrointestinal: soft    Genitourinary:    Extremities:  no edema     Vascular:    Neurological:    Skin:    Lymph Nodes:            LABS:                        7.5    4.10  )-----------( 185      ( 13 Dec 2022 05:30 )             22.5     12-13    139  |  109<H>  |  22  ----------------------------<  77  3.9   |  13<L>  |  2.03<H>    Ca    7.4<L>      13 Dec 2022 05:30  Phos  3.3     12-13  Mg     1.6     12-13            RADIOLOGY & ADDITIONAL TESTS:

## 2022-12-13 NOTE — PROGRESS NOTE ADULT - PROBLEM SELECTOR PLAN 12
Patient with recently found (08/22) with RUL spiculated nodule with R paratracheal adenopathy and L adrenal nodule. Concern for metastatic cancer because of rectal lesion on PET scan as well as L adrenal area that lit up. Now, s/p FNA of R axillary node, with pathology consistent with metastatic lung adenocarcinoma. Follows w/ Dr. Patricia outpatient.  - f/u heme/onc consult  - palliative consulted: DNR/DNI on 12/7  - attempt family member contact, no available contacts    #Prophylactic measures  F: 500cc LR bolus plus 100cc/hr LR for 10 hours maintenance fluids  E: replenish K<4, Mg<1.8, Phos<3, Ca<8.5   N: NPO    VTE Prophylaxis: hold i/s/o anemia  GI: pantoprazole 40 IV BID  C: DNR/DNI  D: SASHA Patient with recently found (08/22) with RUL spiculated nodule with R paratracheal adenopathy and L adrenal nodule. Concern for metastatic cancer because of rectal lesion on PET scan as well as L adrenal area that lit up. Now, s/p FNA of R axillary node, with pathology consistent with metastatic lung adenocarcinoma. Follows w/ Dr. Patricia outpatient.  - f/u heme/onc consult  - palliative consulted: DNR/DNI on 12/7, palliative to speak w/ Dr. Patricia about possible hospice  - attempt family member contact, no available contacts    #Prophylactic measures  F: maintenance fluids 100cc/hr LR w/ D5  E: replenish K<4, Mg<1.8, Phos<3, Ca<8.5   N: NPO    VTE Prophylaxis: hold i/s/o anemia  GI: pantoprazole 40 IV BID  C: DNR/DNI  D: SASHA

## 2022-12-13 NOTE — PROGRESS NOTE ADULT - PROBLEM SELECTOR PLAN 1
Pt with anemia on admission to [facility]. On 12/5, Hb 5.7. Pt asx, mild tachycardia, but normal BP. Unclear source. Pt w/ multiple dark green bowel movements, no overt melena observed. No hemoptysis, no hematuria. Significant bruising on R flank, c/f retroperitoneal bleed but r/o on imaging. Possible GI bleed, GI consulted. Heme onc consulted.  - CTAP w/o contrast 12/5/22: No retroperitoneal or extraperitoneal hemorrhage. Severe ileus.  - S/p 1 unit pRBC 12/5 w/ good response, additional 1 unit pRBC on 12/6  - retic index: 1.1 (hypoproliferative), elevated hapto/LDH (hemolysis unlikely), Fe studies showing  - folate/B12 normal  - EGD 12/9: Severe circumferential esophagitis s/p bx, Hiatal hernia, Ulcer in hernia sac s/p bx, Ulcer in cardia s/p bx, atrophic gastritis s/p bx  - Biopsy report: ulcers, no H pylori  Plan:  - monitor vitals  - advance diet to full liquids  - pantoprazole 40 IV BID  - f/u GI recs  - f/u heme onc recs Pt with anemia on admission to 9.1. On 12/5, Hb 5.7. Pt asx, mild tachycardia, but normal BP. Unclear source. Pt w/ multiple dark green bowel movements, no overt melena observed. No hemoptysis, no hematuria. Significant bruising on R flank, c/f retroperitoneal bleed but r/o on imaging. Possible GI bleed, GI consulted. Heme onc consulted. Recurrent bleeding.  - CTAP w/o contrast 12/5/22: No retroperitoneal or extraperitoneal hemorrhage. Severe ileus.  - S/p 1 unit pRBC 12/5 w/ good response, additional 1 unit pRBC on 12/6, additional 1 unit pRBC on 12/12  - retic index: 1.1 (hypoproliferative), elevated hapto/LDH (hemolysis unlikely), Fe studies showing  - folate/B12 normal  - EGD 12/9: Severe circumferential esophagitis s/p bx, Hiatal hernia, Ulcer in hernia sac s/p bx, Ulcer in cardia s/p bx, atrophic gastritis s/p bx  - Biopsy report: ulcers, no H pylori  Plan:  - monitor vitals  - advance diet as tolerated  - pantoprazole 40 IV BID  - f/u GI recs  - f/u heme onc recs    #Tachycardia  Pt w/ tachycardia since 12/12. Received fluid boluses and 1 unit pRBCs. Hypovolemia likely contributing. Also on differential is PE/DVT, however, pt w/ poor renal function, would not be candidate for contrast.  - CTM  - repeat EKG

## 2022-12-13 NOTE — PROGRESS NOTE ADULT - ASSESSMENT
73 y/o male history of metastatic stage IV lung adenocarcinoma, hypothyroidism, depression, anxiety, undergoing chemo brought in by EMS with HHA for altered mental status Monday morning admitted for sepsis (unclear source) and electrolyte derangements. Now found to have anemia and severe ileus on 12/6, s/p EGD/flex sig 12/9 w/ ulcers. 71 y/o male history of metastatic stage IV lung adenocarcinoma, hypothyroidism, depression, anxiety, undergoing chemo brought in by EMS with HHA for altered mental status Monday morning admitted for sepsis (unclear source) and electrolyte derangements. Now found to have anemia and severe ileus on 12/6, s/p EGD/flex sig 12/9 w/ ulcers, w/ ongoing GOC discussions.

## 2022-12-13 NOTE — PROGRESS NOTE ADULT - PROBLEM SELECTOR PLAN 2
Patient with diarrhea described as loose stool, per outpatient notes seems to be chronic for several weeks (later stated for a year). Patient was prescribed Loperamide early November.  Unclear etiology. May be in setting of metastatic malignancy, however currently considering inflammatory given chronicity and white count. Diarrhea likely not infectious C. diff negative, GI PCR negative. Pt persistently hypokalemic likely from diarrhea. GI consulted on 12/2. 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 EGD on 12/9 showing poor rectal tone. 3 small volume episodes of diarrhea last night.  - CRP high 132; quantitative IgA normal; tissue transglutaminase (TTG) IgA-antibody  Plan:  - f/u stool calprotectin  - CTM bowel movements (having large volume watery)  - banatrol added to diet  - lomotil 2 tabs q6 hrs  - no loperamide due to prolonged QTc 508 (12/1/2022 EKG)  - f/u GI recs Patient with diarrhea described as loose stool, per outpatient notes seems to be chronic for several weeks (later stated for a year). Patient was prescribed Loperamide early November.  Unclear etiology. May be in setting of metastatic malignancy, however currently considering inflammatory given chronicity and white count. Diarrhea likely not infectious C. diff negative, GI PCR negative. Pt persistently hypokalemic likely from diarrhea. GI consulted on 12/2. 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 EGD on 12/9 showing poor rectal tone.  - CRP high 132; quantitative IgA normal; tissue transglutaminase (TTG) IgA-antibody nml  Plan:  - CTM bowel movements (having large volume watery)  - banatrol added to diet  - lomotil 2 tabs q6 hrs  - no loperamide due to prolonged QTc 508 (12/1/2022 EKG)  - f/u GI recs

## 2022-12-14 NOTE — PROGRESS NOTE ADULT - PROBLEM SELECTOR PLAN 6
Patient with encephalopathy and hypotension, meeting 3 SIRS on admission for tachycardia, tachypnea, and leukocytosis.  Source possibly pulmonary and urinary.  Lactate 12.1 --> 2.3, UA trace LE, Bacteria present, hyaline casts, C. Diff negative GI PCR negative.  Lactate cleared, S/p Zosyn 3.375 x 3, Vancomycin 1250mg, NaCl 2200cc bolus in ED.   Still unclear source. AAOx4, mentating well since 11/29.  - UCx no growth final  - BCx NGTD  - ULeg negative  - s/p Vanc 1000mg qD and Zosyn 3.375 q8 (11/28-12/1)  - repeat BCx/UA w/ reflex culture NGTD  Plan:  - continue to trend WBC

## 2022-12-14 NOTE — PROGRESS NOTE ADULT - PROBLEM SELECTOR PLAN 9
Patient with underlying metastatic lung adenocarcinoma with chronic diarrhea and frequent falls, overall appears very frail.    - dietitian consulted - rec ensure enlive TID  - PT/OT consulted, rec SASHA  - palliative consult: DNR/DNI; palliative to speak w/ Dr. Patricia for possible hospice  - contact family  - incentive spirometry

## 2022-12-14 NOTE — PROGRESS NOTE ADULT - SUBJECTIVE AND OBJECTIVE BOX
INTERVAL HPI/OVERNIGHT EVENTS:  All notes reviewed;  Discussed plans with Oncology  Low H/H noted; It may be secondary to chemo; GI is going to follow up  Patient continues to want full support      MEDICATIONS  (STANDING):  buPROPion XL (24-Hour) . 150 milliGRAM(s) Oral every 24 hours  dextrose 5% + lactated ringers. 1200 milliLiter(s) (100 mL/Hr) IV Continuous <Continuous>  diphenoxylate/atropine 2 Tablet(s) Oral every 6 hours  levothyroxine 88 MICROGram(s) Oral daily  multivitamin 1 Tablet(s) Oral daily  pantoprazole  Injectable 40 milliGRAM(s) IV Push every 12 hours  sucralfate 1 Gram(s) Oral every 6 hours    MEDICATIONS  (PRN):  trimethobenzamide Injectable 200 milliGRAM(s) IntraMuscular every 12 hours PRN Nausea and/or Vomiting      Allergies    No Known Allergies    Intolerances        Vital Signs Last 24 Hrs  T(C): 37.2 (14 Dec 2022 13:15), Max: 37.2 (14 Dec 2022 05:39)  T(F): 98.9 (14 Dec 2022 13:15), Max: 99 (14 Dec 2022 05:39)  HR: 104 (14 Dec 2022 13:15) (89 - 106)  BP: 109/63 (14 Dec 2022 13:15) (94/54 - 109/63)  BP(mean): --  RR: 16 (14 Dec 2022 13:15) (16 - 18)  SpO2: 98% (14 Dec 2022 09:43) (94% - 98%)    Parameters below as of 14 Dec 2022 09:43  Patient On (Oxygen Delivery Method): room air              Constitutional:  Awake    Eyes: ARBEN    ENMT: Negative    Neck: Supple    Back:  no tenderness     Respiratory:  clear    Cardiovascular: S1 S2    Gastrointestinal:  soft    Genitourinary:    Extremities:  no edema    Vascular:    Neurological:    Skin:    Lymph Nodes:            LABS:                        6.8    4.26  )-----------( 170      ( 14 Dec 2022 14:00 )             21.1     12-13    139  |  109<H>  |  22  ----------------------------<  77  3.9   |  13<L>  |  2.03<H>    Ca    7.4<L>      13 Dec 2022 05:30  Phos  3.3     12-13  Mg     1.6     12-13            RADIOLOGY & ADDITIONAL TESTS:

## 2022-12-14 NOTE — PROGRESS NOTE ADULT - ASSESSMENT
per Internal Medicine    72 y o male history of metastatic stage IV lung adenocarcinoma, hypothyroidism, depression, anxiety, undergoing chemo brought in by EMS with HHA for altered mental status Monday morning admitted for sepsis (unclear source) and electrolyte derangements. Now found to have anemia and severe ileus on 12/6, s/p EGD/flex sig 12/9 w/ ulcers, w/ ongoing GOC discussions.    Problem/Plan - 1:  ·  Problem: Anemia.   ·  Plan: Pt with anemia on admission to [facility]. On 12/5, Hb 5.7. Pt asx, mild tachycardia, but normal BP. Unclear source. Pt w/ multiple dark green bowel movements, no overt melena observed. No hemoptysis, no hematuria. Significant bruising on R flank, c/f retroperitoneal bleed but r/o on imaging. Possible GI bleed, GI consulted. Heme onc consulted. Recurrent bleeding.  - CTAP w/o contrast 12/5/22: No retroperitoneal or extraperitoneal hemorrhage. Severe ileus.  - S/p 1 unit pRBC 12/5 w/ good response, additional 1 unit pRBC on 12/6, additional 1 unit pRBC on 12/12  - retic index: 1.1 (hypoproliferative), elevated hapto/LDH (hemolysis unlikely), Fe studies showing  - folate/B12 normal  - EGD 12/9: Severe circumferential esophagitis s/p bx, Hiatal hernia, Ulcer in hernia sac s/p bx, Ulcer in cardia s/p bx, atrophic gastritis s/p bx  - Biopsy report: ulcers, no H pylori  Plan:  - monitor vitals  - advance diet as tolerated  - pantoprazole 40 IV BID  - f/u GI recs  - f/u heme onc recs    #Tachycardia  Pt w/ tachycardia since 12/12. Received fluid boluses and 1 unit pRBCs. Hypovolemia likely contributing. Also on differential is PE/DVT, however, pt w/ poor renal function, would not be candidate for contrast.  - CTM  - repeat EKG.    Problem/Plan - 2:  ·  Problem: Diarrhea.   ·  Plan: Patient with diarrhea described as loose stool, per outpatient notes seems to be chronic for several weeks (later stated for a year). Patient was prescribed Loperamide early November.  Unclear etiology. May be in setting of metastatic malignancy, however currently considering inflammatory given chronicity and white count. Diarrhea likely not infectious C. diff negative, GI PCR negative. Pt persistently hypokalemic likely from diarrhea. GI consulted on 12/2. 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 EGD on 12/9 showing poor rectal tone.  - CRP high 132; quantitative IgA normal; tissue transglutaminase (TTG) IgA-antibody nml  Plan:  - CTM bowel movements (having large volume watery)  - banatrol added to diet  - lomotil 2 tabs q6 hrs  - no loperamide due to prolonged QTc 508 (12/1/2022 EKG)  - f/u GI recs.    Problem/Plan - 3:  ·  Problem: Ileus.   ·  Plan: On 12/5 pt found to have distended abdomen. CTAP w/o contrast on 12/5 showing severe ileus. Surgery was formally consulted. Decided not to staff with attending but will continue to follow for serial abdominal exams. Primary team placed sump tube, set to suction. GI also following.  - placed sump tube 12/5  - bladder scan on 12/5, not retaining  - repeat abdominal XR 12/7: abdominal distention w/ improving ileus  - surgery signed off 12/9  Plan:  - minced and moist diet, advance as tolerated  - f/u GI recs.    Problem/Plan - 4:  ·  Problem: ASHLYN (acute kidney injury).   ·  Plan: Patient with ASHLYN on admission BUN 31, Scr 2.35 -->Scr 2.31 (baseline Scr 1.1 10/22). ASHLYN likely prerenal i/s/o hypovolemia due to diarrhea and poor po intake. Given prostate mets, there may also be a post-renal component. Pt w/ mark initially, then removed after passing TOV.   - downtrending Cr 12/9  - c/w maintenance fluids w/ D5 due to low sugars  - strict I/Os  - trend Cr, avoid nephrotoxic drugs, renally dose meds.    Problem/Plan - 5:  ·  Problem: Hypokalemia.   ·  Plan: Patient with hypokalemia, likely due to persistent diarrhea. No EKG changes. On 12/1 K low to 2.1, repeat EKG possible u waves. Pt asymptomatic, no fasiculations or weakness. Aggressively repleted. Diarrhea stopped when pt NPO, and K improved, now worsening w/ worsening diarrhea after diet restarted.  - K replenish, goal >4.    Problem/Plan - 6:  ·  Problem: Sepsis.   ·  Plan: Patient with encephalopathy and hypotension, meeting 3 SIRS on admission for tachycardia, tachypnea, and leukocytosis.  Source possibly pulmonary and urinary.  Lactate 12.1 --> 2.3, UA trace LE, Bacteria present, hyaline casts, C. Diff negative GI PCR negative.  Lactate cleared, S/p Zosyn 3.375 x 3, Vancomycin 1250mg, NaCl 2200cc bolus in ED.   Still unclear source. AAOx4, mentating well since 11/29.  - UCx no growth final  - BCx NGTD  - ULeg negative  - s/p Vanc 1000mg qD and Zosyn 3.375 q8 (11/28-12/1)  - repeat BCx/UA w/ reflex culture NGTD  Plan:  - continue to trend WBC.    Problem/Plan - 7:  ·  Problem: Hypothyroidism.   ·  Plan: Patient with history of hypothyroidism, home medications Levothyroxine 75mcg daily. On 12/1 TSH 10.81, free T4 0.67.  - c/w synthroid 88mcg qD.    Problem/Plan - 8:  ·  Problem: Falls.   ·  Plan: Patient with frequent falls at home, per outpatient nursing notes, patient's HHA have reported falls at home sometimes with head trauma.  He ambulated at baseline with cane.  Etiology likely due to overall decompensation in setting of metastatic cancer. CTH and CT cervical spine without fractures of trauma.    - PT/OT consulted - recommending SASHA  - OOBTC daily w/ supervision.    Problem/Plan - 9:  ·  Problem: Adult failure to thrive.   ·  Plan: Patient with underlying metastatic lung adenocarcinoma with chronic diarrhea and frequent falls, overall appears very frail.    - dietitian consulted - rec ensure enlive TID  - PT/OT consulted, rec SASHA  - palliative consult: DNR/DNI; palliative to speak w/ Dr. Patricia for possible hospice  - contact family  - incentive spirometry.    Problem/Plan - 10:  ·  Problem: Hypomagnesemia.   ·  Plan; Patient with hypomagnesemia, likely due to persistent diarrhea.  Likely contributing to overall weakness and falls.  - Replenish PRN.    Problem/Plan - 11:  ·  Problem: Hypophosphatemia.   ·  Plan: Patient with hypophosphatemia, likely due to persistent diarrhea.  Likely contributing to overall weakness and falls.  - Replenish PRN.    Problem/Plan - 12:  ·  Problem: Adenocarcinoma, lung.   ·  Plan: Patient with recently found (08/22) with RUL spiculated nodule with R paratracheal adenopathy and L adrenal nodule. Concern for metastatic cancer because of rectal lesion on PET scan as well as L adrenal area that lit up. Now, s/p FNA of R axillary node, with pathology consistent with metastatic lung adenocarcinoma. Follows w/ Dr. Patricia outpatient.  - f/u heme/onc consult  - palliative consulted: DNR/DNI on 12/7, palliative to speak w/ Dr. Patricia about possible hospice  - attempt family member contact, no available contacts    #Prophylactic measures  F: maintenance fluids 100cc/hr LR w/ D5  E: replenish K<4, Mg<1.8, Phos<3, Ca<8.5   N: NPO    VTE Prophylaxis: hold i/s/o anemia  GI: pantoprazole 40 IV BID  C: DNR/DNI  D: OLIVER

## 2022-12-14 NOTE — PROGRESS NOTE ADULT - PROBLEM SELECTOR PLAN 2
Patient with diarrhea described as loose stool, per outpatient notes seems to be chronic for several weeks (later stated for a year). Patient was prescribed Loperamide early November.  Unclear etiology. May be in setting of metastatic malignancy, however currently considering inflammatory given chronicity and white count. Diarrhea likely not infectious C. diff negative, GI PCR negative. Pt persistently hypokalemic likely from diarrhea. GI consulted on 12/2. 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 EGD on 12/9 showing poor rectal tone.  - CRP high 132; quantitative IgA normal; tissue transglutaminase (TTG) IgA-antibody nml  Plan:  - CTM bowel movements (having large volume watery)  - banatrol added to diet  - lomotil 2 tabs q6 hrs  - no loperamide due to prolonged QTc 508 (12/1/2022 EKG)

## 2022-12-14 NOTE — PROGRESS NOTE ADULT - ASSESSMENT
71 y/o M, metastatic stage IV lung adenocarcinoma, hypothyroidism, depression, anxiety, on new chemo as of a few weeks ago ;    Initially brought in by EMS due to altered mental status , as noted by HHA ;   Patient was admitted for SIRS and suspicion for sepsis    GI was initially consulted for subacute diarrhea,   Reconsulted for drop in HgB  Reconsulted for continued drop in HgB and recommendations re: immune checkpoint GI side effects     S/p EGD + Flex sig 12/8/22  EGD:   - Severe circumferential esophagitis s/p bx  - Hiatal hernia  - Ulcer in hernia sac s/p bx  - Ulcer in cardia s/p bx  - atrophic gastritis s/p bx    Flex sig:  - grossly normal mucosa s/p bx  - poor rectal tone    Pathology:  1. Duodenum, biopsy:  Extensive gastric foveolar metaplasia and Brunner's gland hyperplasia    2. Stomach, biopsy:  Antral-type mucosa without significant pathologic features    3. Gastric cardia ulcer, biopsy:  Acute inflammatory exudate with necrotic tissue, small fragment of,  consistent with  ulcer  Oxyntic gastric mucosa without significant pathologic features    4. Hernia sac, biopsy:  Oxyntic gastric mucosa showing degenerative with sloughed surface  epithelium and  numerous bacteria    5. Distal esophagus, biopsy:  Abundant necrotic tissue with hemosiderin deposition, scanty inflamed  soft tissue  and scanty smooth muscle, consistent with ulcer  Mucosa is not seen    6. Sigmoid colon, biopsy:  Colonic mucosa without significant pathologic features    We discussed with pathology the above results, findings of sigmoid biopsy, bacteria (not H. pylori) numerous and unlikely contaminant    Recommendations:  Would trial steroid course, as discussed with Heme-Onc  Consideration of Second Look EGD based on HgB trend  Please have patient save BM for healthcare provider to investigate for GI blood loss  Continue Trend HgB  Continue PPI BID  Carafate Suspension 1g po qid     Thank you for the courtesy of this consult.    Lux Donald M.D.  Gastroenterology Fellow  Weekday Pager: [phone]  Weeknights/Weekend Coverage: Please Call the  for contact info

## 2022-12-14 NOTE — PROGRESS NOTE ADULT - SUBJECTIVE AND OBJECTIVE BOX
*** NOTE IN PROGRESS ***    Hematology Oncology Progress Note      HPI:  71 y/o male history of metastatic stage IV lung adenocarcinoma, hypothyroidism, depression, anxiety, undergoing chemo brought in by EMS with HHA for altered mental status Monday morning. At baseline patient is A&Ox3, per HHA he was AOx1, not responding to questions or commands. Patient was reported to be hypotensive to SBP 50s in the field, IO was placed and given fluids with improvement in BP. Patient lives along, has HHA 2 days per week for 10 hours, per ED note HHA states she spoke to pt 3 days ago and foung him in his bed Monday morning at which time he was confused. Currently being treated for lung cancer, last chemo 1 month ago.  Patient also has had recurrent falls at home, some associated with head trauma but no loc, syncope, bladder/bowel incontinence.  Otherwise no reported illness, sick contacts, medical changes.  ROS otherwise negative.      ED Course   Vitals: Temp 98.7,  --> 86, /63, RR 20, SaO2 98% on 4L NC --> 97% room air   Labs: WBC 16.24, Hgb 11.7, Plt 449, Na 143, K 3.2, CO2 18, AG 24, BUN 31, Scr 2.35, Ca 7.6, Ma 1.4, Lactate 12.1 --> 2.3, UA trace LE, Bacteria present, hyaline casts, C. Diff negative GI PCR negative  EKG: sinus tachycardia, 1st degree AV block, narrow QRS, prolonged QTC  CXR: Known right upper lobe pulmonary mass   CT chest, abdomen, pelvis: No acute fractures. Unchanged right upper lobe malignancy. Decreased left adrenal metastasis. Decreased abdominal and pelvic adenopathy.  CTH: No intracranial hemorrhage or acute transcortical infarct.  Generalized volume loss with small vessel ischemic disease.  CT C-spine: No fracture. Levoscoliosis with Grade 1 anterolisthesis of C2 on C3 and C7 on T1. Multilevel cervical spondylosis. Right apical lung spiculated mass.  Interventions: Tylenol ig IV, Ca Gluconate 2g, MgSO4 2g x 2, KCl 40mg po x 1, KCl 10mg IV x 5, Zosyn 3.375 x 3, Vancomycin 1250mg, NaCl 2200cc bolus   (28 Nov 2022 21:33)      Interval History: Hgb dropped 6.0 in AM    SUBJECTIVE: Patient seen and examined at bedside. Tired. Loose bowel movements. Denies chest pain.    OBJECTIVE:    VITAL SIGNS:  ICU Vital Signs Last 24 Hrs  T(C): 37.2 (14 Dec 2022 05:39), Max: 37.2 (14 Dec 2022 05:39)  T(F): 99 (14 Dec 2022 05:39), Max: 99 (14 Dec 2022 05:39)  HR: 89 (14 Dec 2022 05:39) (89 - 109)  BP: 107/64 (14 Dec 2022 05:39) (94/54 - 127/67)  BP(mean): --  ABP: --  ABP(mean): --  RR: 18 (14 Dec 2022 05:39) (17 - 18)  SpO2: 97% (14 Dec 2022 05:39) (94% - 97%)    O2 Parameters below as of 14 Dec 2022 05:39  Patient On (Oxygen Delivery Method): room air              CAPILLARY BLOOD GLUCOSE      POCT Blood Glucose.: 69 mg/dL (12 Dec 2022 18:01)      PHYSICAL EXAM:  General: elderly, thin, pale, NAD, answering questions, pleasantly conversant  HEENT: NC/AT; PERRL, clear conjunctiva  Neck: supple  Respiratory: CTA b/l  Cardiovascular: +S1/S2; RRR  Abdomen: slightly distended, non tender, +BS  Extremities: WWP, 2+ peripheral pulses b/l; no LE edema  Skin: normal color and turgor; no rash  Neurological: AAOx3    MEDICATIONS:  MEDICATIONS  (STANDING):  buPROPion XL (24-Hour) . 150 milliGRAM(s) Oral every 24 hours  dextrose 5% + lactated ringers. 1200 milliLiter(s) (100 mL/Hr) IV Continuous <Continuous>  diphenoxylate/atropine 2 Tablet(s) Oral every 6 hours  levothyroxine 88 MICROGram(s) Oral daily  multivitamin 1 Tablet(s) Oral daily  pantoprazole  Injectable 40 milliGRAM(s) IV Push every 12 hours  sucralfate 1 Gram(s) Oral every 6 hours    MEDICATIONS  (PRN):  trimethobenzamide Injectable 200 milliGRAM(s) IntraMuscular every 12 hours PRN Nausea and/or Vomiting      ALLERGIES:  Allergies    No Known Allergies    Intolerances        LABS:                        6.0    4.07  )-----------( 182      ( 14 Dec 2022 08:38 )             19.1     12-13    139  |  109<H>  |  22  ----------------------------<  77  3.9   |  13<L>  |  2.03<H>    Ca    7.4<L>      13 Dec 2022 05:30  Phos  3.3     12-13  Mg     1.6     12-13                      RADIOLOGY & ADDITIONAL TESTS: Reviewed.   Hematology Oncology Progress Note      HPI:  71 y/o male history of metastatic stage IV lung adenocarcinoma, hypothyroidism, depression, anxiety, undergoing chemo brought in by EMS with HHA for altered mental status Monday morning. At baseline patient is A&Ox3, per HHA he was AOx1, not responding to questions or commands. Patient was reported to be hypotensive to SBP 50s in the field, IO was placed and given fluids with improvement in BP. Patient lives along, has HHA 2 days per week for 10 hours, per ED note HHA states she spoke to pt 3 days ago and foung him in his bed Monday morning at which time he was confused. Currently being treated for lung cancer, last chemo 1 month ago.  Patient also has had recurrent falls at home, some associated with head trauma but no loc, syncope, bladder/bowel incontinence.  Otherwise no reported illness, sick contacts, medical changes.  ROS otherwise negative.      ED Course   Vitals: Temp 98.7,  --> 86, /63, RR 20, SaO2 98% on 4L NC --> 97% room air   Labs: WBC 16.24, Hgb 11.7, Plt 449, Na 143, K 3.2, CO2 18, AG 24, BUN 31, Scr 2.35, Ca 7.6, Ma 1.4, Lactate 12.1 --> 2.3, UA trace LE, Bacteria present, hyaline casts, C. Diff negative GI PCR negative  EKG: sinus tachycardia, 1st degree AV block, narrow QRS, prolonged QTC  CXR: Known right upper lobe pulmonary mass   CT chest, abdomen, pelvis: No acute fractures. Unchanged right upper lobe malignancy. Decreased left adrenal metastasis. Decreased abdominal and pelvic adenopathy.  CTH: No intracranial hemorrhage or acute transcortical infarct.  Generalized volume loss with small vessel ischemic disease.  CT C-spine: No fracture. Levoscoliosis with Grade 1 anterolisthesis of C2 on C3 and C7 on T1. Multilevel cervical spondylosis. Right apical lung spiculated mass.  Interventions: Tylenol ig IV, Ca Gluconate 2g, MgSO4 2g x 2, KCl 40mg po x 1, KCl 10mg IV x 5, Zosyn 3.375 x 3, Vancomycin 1250mg, NaCl 2200cc bolus   (28 Nov 2022 21:33)      Interval History: Hgb dropped 6.0 in AM    SUBJECTIVE: Patient seen and examined at bedside. Tired. Loose bowel movements. Denies chest pain.    OBJECTIVE:    VITAL SIGNS:  ICU Vital Signs Last 24 Hrs  T(C): 37.2 (14 Dec 2022 05:39), Max: 37.2 (14 Dec 2022 05:39)  T(F): 99 (14 Dec 2022 05:39), Max: 99 (14 Dec 2022 05:39)  HR: 89 (14 Dec 2022 05:39) (89 - 109)  BP: 107/64 (14 Dec 2022 05:39) (94/54 - 127/67)  BP(mean): --  ABP: --  ABP(mean): --  RR: 18 (14 Dec 2022 05:39) (17 - 18)  SpO2: 97% (14 Dec 2022 05:39) (94% - 97%)    O2 Parameters below as of 14 Dec 2022 05:39  Patient On (Oxygen Delivery Method): room air              CAPILLARY BLOOD GLUCOSE      POCT Blood Glucose.: 69 mg/dL (12 Dec 2022 18:01)      PHYSICAL EXAM:  General: elderly, thin, pale, NAD, answering questions, pleasantly conversant  HEENT: NC/AT; PERRL, clear conjunctiva  Neck: supple  Respiratory: CTA b/l  Cardiovascular: +S1/S2; RRR  Abdomen: slightly distended, non tender, +BS  Extremities: WWP, 2+ peripheral pulses b/l; no LE edema  Skin: normal color and turgor; no rash  Neurological: AAOx3    MEDICATIONS:  MEDICATIONS  (STANDING):  buPROPion XL (24-Hour) . 150 milliGRAM(s) Oral every 24 hours  dextrose 5% + lactated ringers. 1200 milliLiter(s) (100 mL/Hr) IV Continuous <Continuous>  diphenoxylate/atropine 2 Tablet(s) Oral every 6 hours  levothyroxine 88 MICROGram(s) Oral daily  multivitamin 1 Tablet(s) Oral daily  pantoprazole  Injectable 40 milliGRAM(s) IV Push every 12 hours  sucralfate 1 Gram(s) Oral every 6 hours    MEDICATIONS  (PRN):  trimethobenzamide Injectable 200 milliGRAM(s) IntraMuscular every 12 hours PRN Nausea and/or Vomiting      ALLERGIES:  Allergies    No Known Allergies    Intolerances        LABS:                        6.0    4.07  )-----------( 182      ( 14 Dec 2022 08:38 )             19.1     12-13    139  |  109<H>  |  22  ----------------------------<  77  3.9   |  13<L>  |  2.03<H>    Ca    7.4<L>      13 Dec 2022 05:30  Phos  3.3     12-13  Mg     1.6     12-13                      RADIOLOGY & ADDITIONAL TESTS: Reviewed.

## 2022-12-14 NOTE — PROGRESS NOTE ADULT - ASSESSMENT
71 y/o male history of metastatic stage IV lung adenocarcinoma, hypothyroidism, depression, anxiety, undergoing chemo brought in by EMS with HHA for altered mental status Monday morning admitted for sepsis (unclear source) and electrolyte derangements. Now found to have anemia and severe ileus on 12/6, s/p EGD/flex sig 12/9 w/ ulcers, w/ ongoing GOC discussions.

## 2022-12-14 NOTE — PROGRESS NOTE ADULT - PROBLEM SELECTOR PLAN 1
Pt with anemia on admission to 9.1. On 12/5, Hb 5.7. Pt asx, mild tachycardia, but normal BP. Unclear source. Pt w/ multiple dark green bowel movements, no overt melena observed. No hemoptysis, no hematuria. Significant bruising on R flank, c/f retroperitoneal bleed but r/o on imaging. Possible GI bleed, GI consulted. Heme onc consulted. Recurrent bleeding.  - CTAP w/o contrast 12/5/22: No retroperitoneal or extraperitoneal hemorrhage. Severe ileus.  - S/p 1 unit pRBC 12/5 w/ good response, additional 1 unit pRBC on 12/6, additional 1 unit pRBC on 12/12  - retic index: 1.1 (hypoproliferative), elevated hapto/LDH (hemolysis unlikely), Fe studies showing  - folate/B12 normal  - EGD 12/9: Severe circumferential esophagitis s/p bx, Hiatal hernia, Ulcer in hernia sac s/p bx, Ulcer in cardia s/p bx, atrophic gastritis s/p bx  - Biopsy report: ulcers, no H pylori  Plan:  - monitor vitals  - advance diet as tolerated  - pantoprazole 40 IV BID  - f/u GI recs  - f/u heme onc recs  - f/u calprotectin, tissue transglutaminase (TTG) IgA-antibody, total IgA level, and CRP    #Tachycardia  Pt w/ tachycardia since 12/12. Received fluid boluses and 1 unit pRBCs. Hypovolemia likely contributing. Also on differential is PE/DVT, however, pt w/ poor renal function, would not be candidate for contrast.  - CTM  - repeat EKG

## 2022-12-14 NOTE — PROGRESS NOTE ADULT - SUBJECTIVE AND OBJECTIVE BOX
O/N Events: no acute events overnight.     Subjective/ROS: Patient seen and examined at bedside.     Denies Fever/Chills, HA, CP, SOB, n/v, changes in bowel/urinary habits.  12pt ROS otherwise negative.    VITALS  Vital Signs Last 24 Hrs  T(C): 36.7 (15 Dec 2022 21:07), Max: 36.9 (15 Dec 2022 04:54)  T(F): 98 (15 Dec 2022 21:07), Max: 98.4 (15 Dec 2022 04:54)  HR: 74 (15 Dec 2022 21:07) (74 - 100)  BP: 133/82 (15 Dec 2022 21:07) (100/62 - 133/82)  BP(mean): --  RR: 17 (15 Dec 2022 21:07) (16 - 17)  SpO2: 100% (15 Dec 2022 21:07) (96% - 100%)    Parameters below as of 15 Dec 2022 21:07  Patient On (Oxygen Delivery Method): room air    CAPILLARY BLOOD GLUCOSE    POCT Blood Glucose.: 134 mg/dL (15 Dec 2022 12:37)  POCT Blood Glucose.: 89 mg/dL (15 Dec 2022 06:58)  POCT Blood Glucose.: 99 mg/dL (15 Dec 2022 00:54)    PHYSICAL EXAM  General: Resting comfortably in bed; NAD  HEENT: NC/AT, EOMI, dry MM  Cardiac: RRR; normal S1/S2, no MRG, no LE edema  Respiratory: BL crackles  Gastrointestinal: +BSx4, abdomen soft, NT/ND; no rebound or guarding  Extremities: WWP x4; no peripheral edema  Neurologic: AAOx3; no focal deficits    MEDICATIONS  (STANDING):  buPROPion XL (24-Hour) . 150 milliGRAM(s) Oral every 24 hours  dextrose 5% + lactated ringers. 1200 milliLiter(s) (100 mL/Hr) IV Continuous <Continuous>  levothyroxine 88 MICROGram(s) Oral daily  methylPREDNISolone sodium succinate Injectable 40 milliGRAM(s) IV Push every 12 hours  multivitamin 1 Tablet(s) Oral daily  pantoprazole  Injectable 40 milliGRAM(s) IV Push every 12 hours  sucralfate 1 Gram(s) Oral every 6 hours    MEDICATIONS  (PRN):  trimethobenzamide Injectable 200 milliGRAM(s) IntraMuscular every 12 hours PRN Nausea and/or Vomiting      No Known Allergies      LABS                        10.6   5.94  )-----------( 216      ( 15 Dec 2022 05:30 )             31.6     12-15    140  |  110<H>  |  17  ----------------------------<  110<H>  3.3<L>   |  19<L>  |  1.93<H>    Ca    7.2<L>      15 Dec 2022 05:30  Phos  2.6     12-15  Mg     1.3     12-15                  IMAGING/EKG/ETC

## 2022-12-14 NOTE — PROGRESS NOTE ADULT - PROBLEM SELECTOR PLAN 12
Patient with recently found (08/22) with RUL spiculated nodule with R paratracheal adenopathy and L adrenal nodule. Concern for metastatic cancer because of rectal lesion on PET scan as well as L adrenal area that lit up. Now, s/p FNA of R axillary node, with pathology consistent with metastatic lung adenocarcinoma. Follows w/ Dr. Patricia outpatient.  - f/u heme/onc consult  - palliative consulted: DNR/DNI on 12/7, palliative to speak w/ Dr. Patricia about possible hospice  - attempt family member contact, no available contacts    #Prophylactic measures  F: maintenance fluids 100cc/hr LR w/ D5  E: replenish K<4, Mg<1.8, Phos<3, Ca<8.5   N: NPO    VTE Prophylaxis: hold i/s/o anemia  GI: pantoprazole 40 IV BID  C: DNR/DNI  D: SASHA

## 2022-12-14 NOTE — PROVIDER CONTACT NOTE (CRITICAL VALUE NOTIFICATION) - TEST AND RESULT REPORTED:
K+2.9. improved from 2.6 on last draw. MD aware
Lactate 12.1
Potassium 2.9
hgb 6.2 hct 18.8
Lactate 2.9
K 2.6
Hgb 7
Potassium 2.4
Hemoglobin 6.8

## 2022-12-14 NOTE — PROGRESS NOTE ADULT - SUBJECTIVE AND OBJECTIVE BOX
GASTROENTEROLOGY RECONSULT NOTE  Patient seen and examined at bedside. GI reconsulted for continued drop in HgB, input re: possibility of checkpoint GI side effects. Patient reports loose stool.    PERTINENT REVIEW OF SYSTEMS:  CONSTITUTIONAL: No fevers or chills  HEENT: No visual changes; No vertigo or throat pain   GASTROINTESTINAL: As above.  NEUROLOGICAL: No numbness or weakness  SKIN: No itching, burning, rashes, or lesions     Allergies    No Known Allergies    Intolerances      MEDICATIONS:  MEDICATIONS  (STANDING):  buPROPion XL (24-Hour) . 150 milliGRAM(s) Oral every 24 hours  dextrose 5% + lactated ringers. 1200 milliLiter(s) (100 mL/Hr) IV Continuous <Continuous>  diphenoxylate/atropine 2 Tablet(s) Oral every 6 hours  levothyroxine 88 MICROGram(s) Oral daily  multivitamin 1 Tablet(s) Oral daily  pantoprazole  Injectable 40 milliGRAM(s) IV Push every 12 hours  sucralfate 1 Gram(s) Oral every 6 hours    MEDICATIONS  (PRN):  trimethobenzamide Injectable 200 milliGRAM(s) IntraMuscular every 12 hours PRN Nausea and/or Vomiting    Vital Signs Last 24 Hrs  T(C): 36.7 (14 Dec 2022 15:43), Max: 37.2 (14 Dec 2022 05:39)  T(F): 98 (14 Dec 2022 15:43), Max: 99 (14 Dec 2022 05:39)  HR: 99 (14 Dec 2022 15:43) (89 - 106)  BP: 111/57 (14 Dec 2022 15:43) (94/54 - 111/57)  BP(mean): --  RR: 17 (14 Dec 2022 15:43) (16 - 18)  SpO2: 99% (14 Dec 2022 15:43) (94% - 99%)    Parameters below as of 14 Dec 2022 15:43  Patient On (Oxygen Delivery Method): room air        PHYSICAL EXAM:    General: lying in bed, in no acute distress, appears chronically ill  HEENT: MMM, conjunctiva and sclera clear  Gastrointestinal: Soft non-tender non-distended; No rebound or guarding  Skin: Warm and dry. No obvious rash    LABS:                        6.8    4.26  )-----------( 170      ( 14 Dec 2022 14:00 )             21.1     12-13    139  |  109<H>  |  22  ----------------------------<  77  3.9   |  13<L>  |  2.03<H>    Ca    7.4<L>      13 Dec 2022 05:30  Phos  3.3     12-13  Mg     1.6     12-13                        RADIOLOGY & ADDITIONAL STUDIES:  Reviewed

## 2022-12-14 NOTE — PROGRESS NOTE ADULT - PROBLEM SELECTOR PLAN 3
On 12/5 pt found to have distended abdomen. CTAP w/o contrast on 12/5 showing severe ileus. Surgery was formally consulted. Decided not to staff with attending but will continue to follow for serial abdominal exams. Primary team placed sump tube, set to suction. GI also following.  - placed sump tube 12/5  - bladder scan on 12/5, not retaining  - repeat abdominal XR 12/7: abdominal distention w/ improving ileus  - surgery signed off 12/9  Plan:  - minced and moist diet, advance as tolerated

## 2022-12-14 NOTE — PROGRESS NOTE ADULT - PROBLEM SELECTOR PLAN 4
Patient with ASHLYN on admission BUN 31, Scr 2.35 -->Scr 2.31 (baseline Scr 1.1 10/22). ASHLYN likely prerenal i/s/o hypovolemia due to diarrhea and poor po intake. Given prostate mets, there may also be a post-renal component. Pt w/ mark initially, then removed after passing TOV.   - downtrending Cr 12/9  - c/w maintenance fluids w/ D5 due to low sugars  - strict I/Os  - trend Cr, avoid nephrotoxic drugs, renally dose meds

## 2022-12-14 NOTE — PROGRESS NOTE ADULT - SUBJECTIVE AND OBJECTIVE BOX
Physical Medicine and Rehabilitation Progress Note :       Patient is a 72y old  Male who presents with a chief complaint of AMS (11 Dec 2022 06:19)      HPI:  73 y/o male history of metastatic stage IV lung adenocarcinoma, hypothyroidism, depression, anxiety, undergoing chemo brought in by EMS with HHA for altered mental status Monday morning. At baseline patient is A&Ox3, per HHA he was AOx1, not responding to questions or commands. Patient was reported to be hypotensive to SBP 50s in the field, IO was placed and given fluids with improvement in BP. Patient lives along, has HHA 2 days per week for 10 hours, per ED note HHA states she spoke to pt 3 days ago and foung him in his bed Monday morning at which time he was confused. Currently being treated for lung cancer, last chemo 1 month ago.  Patient also has had recurrent falls at home, some associated with head trauma but no loc, syncope, bladder/bowel incontinence.  Otherwise no reported illness, sick contacts, medical changes.  ROS otherwise negative.      ED Course   Vitals: Temp 98.7,  --> 86, /63, RR 20, SaO2 98% on 4L NC --> 97% room air   Labs: WBC 16.24, Hgb 11.7, Plt 449, Na 143, K 3.2, CO2 18, AG 24, BUN 31, Scr 2.35, Ca 7.6, Ma 1.4, Lactate 12.1 --> 2.3, UA trace LE, Bacteria present, hyaline casts, C. Diff negative GI PCR negative  EKG: sinus tachycardia, 1st degree AV block, narrow QRS, prolonged QTC  CXR: Known right upper lobe pulmonary mass   CT chest, abdomen, pelvis: No acute fractures. Unchanged right upper lobe malignancy. Decreased left adrenal metastasis. Decreased abdominal and pelvic adenopathy.  CTH: No intracranial hemorrhage or acute transcortical infarct.  Generalized volume loss with small vessel ischemic disease.  CT C-spine: No fracture. Levoscoliosis with Grade 1 anterolisthesis of C2 on C3 and C7 on T1. Multilevel cervical spondylosis. Right apical lung spiculated mass.  Interventions: Tylenol ig IV, Ca Gluconate 2g, MgSO4 2g x 2, KCl 40mg po x 1, KCl 10mg IV x 5, Zosyn 3.375 x 3, Vancomycin 1250mg, NaCl 2200cc bolus   (28 Nov 2022 21:33)                            6.0    4.07  )-----------( 182      ( 14 Dec 2022 08:38 )             19.1       12-13    139  |  109<H>  |  22  ----------------------------<  77  3.9   |  13<L>  |  2.03<H>    Ca    7.4<L>      13 Dec 2022 05:30  Phos  3.3     12-13  Mg     1.6     12-13      Vital Signs Last 24 Hrs  T(C): 36.9 (14 Dec 2022 09:43), Max: 37.2 (14 Dec 2022 05:39)  T(F): 98.4 (14 Dec 2022 09:43), Max: 99 (14 Dec 2022 05:39)  HR: 105 (14 Dec 2022 09:43) (89 - 105)  BP: 105/60 (14 Dec 2022 09:43) (94/54 - 107/64)  BP(mean): --  RR: 16 (14 Dec 2022 09:43) (16 - 18)  SpO2: 98% (14 Dec 2022 09:43) (94% - 98%)    Parameters below as of 14 Dec 2022 09:43  Patient On (Oxygen Delivery Method): room air        MEDICATIONS  (STANDING):  buPROPion XL (24-Hour) . 150 milliGRAM(s) Oral every 24 hours  dextrose 5% + lactated ringers. 1200 milliLiter(s) (100 mL/Hr) IV Continuous <Continuous>  diphenoxylate/atropine 2 Tablet(s) Oral every 6 hours  levothyroxine 88 MICROGram(s) Oral daily  multivitamin 1 Tablet(s) Oral daily  pantoprazole  Injectable 40 milliGRAM(s) IV Push every 12 hours  sucralfate 1 Gram(s) Oral every 6 hours    MEDICATIONS  (PRN):  trimethobenzamide Injectable 200 milliGRAM(s) IntraMuscular every 12 hours PRN Nausea and/or Vomiting         Occupational Therapy Functional Status Assessment :   12/13/2022     Cognitive/Neuro/Behavioral  Cognitive/Neuro/Behavioral [WDL Definition: Alert; opens eyes spontaneously; arouses to voice or touch; oriented x 4; follows commands; speech spontaneous, logical; purposeful motor response; behavior appropriate to situation]: WDL  Level of Consciousness: lethargic  Speech: clear;  spontaneous  Mood/Behavior: anxious;  cooperative    Language Assistance  Preferred Language to Address Healthcare Preferred Language to Address Healthcare: English    Therapeutic Interventions      Bed Mobility  Bed Mobility Training Rolling/Turning: moderate assist (50% patient effort);  1 person assist;  verbal cues  Bed Mobility Training Scooting: moderate assist (50% patient effort);  1 person assist;  verbal cues  Bed Mobility Training Bridging: maximum assist (25% patient effort);  1 person assist;  verbal cues  Bed Mobility Training Sit-to-Supine: moderate assist (50% patient effort);  2 person assist;  verbal cues  Bed Mobility Training Supine-to-Sit: moderate assist (50% patient effort);  2 person assist;  verbal cues  Bed Mobility Training Limitations: decreased flexibility;  decreased strength    Sit-Stand Transfer Training  Sit-to-Stand Transfer Training Symptoms Noted During/After Treatment: Pt declined    Therapeutic Exercise  Therapeutic Exercise Symptoms Noted During/After Treatment: fatigue  Therapeutic Exercise Charges: EOB sitting/dangling of LEs ~8 mins w/ CGA   Therapeutic Exercise Detail: BUE, BLE AROMs - multiple reps per plane     Lower Body Dressing Training  Lower Body Dressing Training Assistance: maximum assist (25% patient effort);  1 person assist;  verbal cues;  to don b/l socks w/ comp tech - +longsitting in bed;  decreased strength;  impaired balance;  impaired coordination    Upper Body Dressing Training  Upper Body Dressing Training Symptoms Noted During/After Treatment: fatigue  Upper Body Dressing Training Assistance: moderate assist (50% patient effort);  1 person assist;  verbal cues;  decreased strength;  impaired balance;  impaired motor control              PM&R Impression : as above    Current Disposition Plan Recommendations :    subacute rehab placement

## 2022-12-14 NOTE — PROGRESS NOTE ADULT - ASSESSMENT
71 y/o male history of metastatic stage IV lung adenocarcinoma (carboplatin, pemetrexed, pembrolizumab on C1D1 10/27), hypothyroidism, depression, anxiety, undergoing chemo brought in by EMS with HHA for altered mental status. Found to have sepsis and ASHLYN. Oncology consulted given recent treatment of lung cancer and possible therapy releated side effects.    # Metastatic lung adenocarcinoma, AJCC Stage IV  Pt with R axillary LN biopsy positive for metastatic lung adenocarcinoma, with FDG avidity in thoracic, abdominal, inguinal LNs, as well as rectal thickening. Pt has not had any recent colonoscopies to further investigate GI involvement. Repeat CT CAP with increase in primary lung mass, persistent rectal thickening and other sites of disease as previously described. 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 Received Cycle 1 of chemo with carboplatin, pemetrexed, and pembrolizumab on 10/27/22. Now admitted with sepsis and ASHLYN, possibly 2/2 infection vs chemotherapy. Patient states that diarrhea started 1-2 weeks after chemotherapy and has been having a 2-3 bowel movements for the past 2 weeks. 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 Interval CT scan on admission 11/28 showing unchanged right upper lobe malignancy since October 7, 2022. Decreased left adrenal metastasis. Decreased abdominal and pelvic adenopathy.  - Will hold off on further chemotherapy given prolonged hospital course and persistent anemia  - Will discuss further systemic therapy outpatient with teams and patient today    # Leukocytosis, improved  Persistent neutrophilic predominant leukocytosis from admission, prior to systemic chemoimmunotherapy WBC was normal in October 2022. Reported about 1 month of diarrhea post treatment. WBC count fluctuating between 16-25 during current admission, afebrile and with negative infectious work up. 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    # Microcytic anemia  Hemoglobin baseline August-October 2022 was 13-15, in 2021 was ~10. During current hospitalization, patient has a downtrend in hemoglobin from 11.7 on admission 11/28 (possibly concentrated) to 7.4 on 12/4. On 12/5 with drop in hemoglobin to 5.7. Would be concerned for acute losses. CT Abd/Pelvis admission without acute abdominal findings, but patient has had persistent diarrhea/decrease in hgb. Given dark brown/black gastric contents and persistent drop in Hgb, would be concerned for acute GI bleed. Ferritin normal and no iron deficiency (iron sat 31%), Unlikely hemolysis given elevated hapto, slightly elevated LDH. EGD showing severe esophagitis, ulcers and atrophic gastritis in stomach. Flex sig showing normal mucosa, colon not visualized. Suspect that patient still having intermittent GI losses given dark stools, but no reported melena on MUKESH and no more episodes of coffee ground emesis. Frequent transfusions every few days.   - Would supplement with multivitamin when able to tolerate PO  - Would check stool calprotectin, tissue transglutaminase (TTG) IgA-antibody, total IgA level, and a serum inflammatory marker (eg, C-reactive protein [CRP])  - GI following, but patient declined repeat scope  - Maintain active type and screen  - Transfuse for hemoglobin <7 or active bleeding    Pending discussion with Dr. Patricia. 73 y/o male history of metastatic stage IV lung adenocarcinoma (carboplatin, pemetrexed, pembrolizumab on C1D1 10/27), hypothyroidism, depression, anxiety, undergoing chemo brought in by EMS with HHA for altered mental status. Found to have sepsis and ASHLYN. Oncology consulted given recent treatment of lung cancer and possible therapy releated side effects.    # Metastatic lung adenocarcinoma, AJCC Stage IV  Pt with R axillary LN biopsy positive for metastatic lung adenocarcinoma, with FDG avidity in thoracic, abdominal, inguinal LNs, as well as rectal thickening. Pt has not had any recent colonoscopies to further investigate GI involvement. Repeat CT CAP with increase in primary lung mass, persistent rectal thickening and other sites of disease as previously described. 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 Received Cycle 1 of chemo with carboplatin, pemetrexed, and pembrolizumab on 10/27/22. Now admitted with sepsis and ASHLYN, possibly 2/2 infection vs chemotherapy. Patient states that diarrhea started 1-2 weeks after chemotherapy and has been having a 2-3 bowel movements for the past 2 weeks. 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 Interval CT scan on admission 11/28 showing unchanged right upper lobe malignancy since October 7, 2022. Decreased left adrenal metastasis. Decreased abdominal and pelvic adenopathy.  - Will hold off on further chemotherapy given prolonged hospital course and persistent anemia  - Will discuss further systemic therapy outpatient with teams and patient today    # Leukocytosis, improved  Persistent neutrophilic predominant leukocytosis from admission, prior to systemic chemoimmunotherapy WBC was normal in October 2022. Reported about 1 month of diarrhea post treatment. WBC count fluctuating between 16-25 during current admission, afebrile and with negative infectious work up. 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    # Microcytic anemia  Hemoglobin baseline August-October 2022 was 13-15, in 2021 was ~10. During current hospitalization, patient has a downtrend in hemoglobin from 11.7 on admission 11/28 (possibly concentrated) to 7.4 on 12/4. On 12/5 with drop in hemoglobin to 5.7. Would be concerned for acute losses. CT Abd/Pelvis admission without acute abdominal findings, but patient has had persistent diarrhea/decrease in hgb. Given dark brown/black gastric contents and persistent drop in Hgb, would be concerned for acute GI bleed. Ferritin normal and no iron deficiency (iron sat 31%), Unlikely hemolysis given elevated hapto, slightly elevated LDH. EGD showing severe esophagitis, ulcers and atrophic gastritis in stomach. Flex sig showing normal mucosa, colon not visualized. Given presence of bacteria on pathology, will see if path can stain for H pylori. Will discuss with GI regarding ulcers. Suspect that patient still having intermittent GI losses given dark stools, but no reported melena on MUKESH and no more episodes of coffee ground emesis. Frequent transfusions every few days.   - Would supplement with multivitamin when able to tolerate PO  - Would check stool calprotectin, tissue transglutaminase (TTG) IgA-antibody, total IgA level, and a serum inflammatory marker (eg, C-reactive protein [CRP])  - GI following, but patient declined repeat scope  - Maintain active type and screen  - Transfuse for hemoglobin <7 or active bleeding    Discussed with Dr. Patricia.

## 2022-12-15 NOTE — PROGRESS NOTE ADULT - SUBJECTIVE AND OBJECTIVE BOX
INTERVAL HPI/OVERNIGHT EVENTS:  States he feels better  Stronger  Oncology follow up noted      MEDICATIONS  (STANDING):  buPROPion XL (24-Hour) . 150 milliGRAM(s) Oral every 24 hours  dextrose 5% + lactated ringers. 1200 milliLiter(s) (100 mL/Hr) IV Continuous <Continuous>  diphenoxylate/atropine 2 Tablet(s) Oral every 6 hours  levothyroxine 88 MICROGram(s) Oral daily  magnesium sulfate  IVPB 1 Gram(s) IV Intermittent once  multivitamin 1 Tablet(s) Oral daily  pantoprazole  Injectable 40 milliGRAM(s) IV Push every 12 hours  potassium chloride    Tablet ER 40 milliEquivalent(s) Oral every 4 hours  sucralfate 1 Gram(s) Oral every 6 hours    MEDICATIONS  (PRN):  trimethobenzamide Injectable 200 milliGRAM(s) IntraMuscular every 12 hours PRN Nausea and/or Vomiting      Allergies    No Known Allergies    Intolerances        Vital Signs Last 24 Hrs  T(C): 36.4 (15 Dec 2022 08:54), Max: 37.2 (14 Dec 2022 13:15)  T(F): 97.6 (15 Dec 2022 08:54), Max: 98.9 (14 Dec 2022 13:15)  HR: 100 (15 Dec 2022 08:54) (96 - 104)  BP: 100/62 (15 Dec 2022 08:54) (100/62 - 119/61)  BP(mean): --  RR: 16 (15 Dec 2022 08:54) (16 - 17)  SpO2: 98% (15 Dec 2022 08:54) (96% - 99%)    Parameters below as of 15 Dec 2022 08:54  Patient On (Oxygen Delivery Method): room air              Constitutional:  Awake     Eyes: ARBEN    ENMT: Negative    Neck: Supple    Back:  no tenderness     Respiratory:  clear    Cardiovascular: S1 S2    Gastrointestinal: soft    Genitourinary:    Extremities: no edema    Vascular:    Neurological:    Skin:    Lymph Nodes:            LABS:                        10.6   5.94  )-----------( 216      ( 15 Dec 2022 05:30 )             31.6     12-15    140  |  110<H>  |  17  ----------------------------<  110<H>  3.3<L>   |  19<L>  |  1.93<H>    Ca    7.2<L>      15 Dec 2022 05:30  Phos  2.6     12-15  Mg     1.3     12-15            RADIOLOGY & ADDITIONAL TESTS:

## 2022-12-15 NOTE — PROGRESS NOTE ADULT - PROBLEM SELECTOR PLAN 2
Patient with diarrhea described as loose stool, per outpatient notes seems to be chronic for several weeks (later stated for a year). Patient was prescribed Loperamide early November.  Unclear etiology. May be in setting of metastatic malignancy, however currently considering inflammatory given chronicity and white count. Diarrhea likely not infectious C. diff negative, GI PCR negative. Pt persistently hypokalemic likely from diarrhea. GI consulted on 12/2. 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 EGD on 12/9 showing poor rectal tone.  - CRP high 132; quantitative IgA normal; tissue transglutaminase (TTG) IgA-antibody nml  Plan:  - CTM bowel movements (having large volume watery)  - banatrol added to diet  - lomotil 2 tabs q6 hrs, holding since last BM was 12/14 AM  - no loperamide due to prolonged QTc 508 (12/1/2022 EKG)

## 2022-12-15 NOTE — PROGRESS NOTE ADULT - ASSESSMENT
73 y/o M, metastatic stage IV lung adenocarcinoma, hypothyroidism, depression, anxiety, on new chemo as of a few weeks ago ;    Initially brought in by EMS due to altered mental status , as noted by HHA ;   Patient was admitted for SIRS and suspicion for sepsis    GI was initially consulted for subacute diarrhea,   Reconsulted for drop in HgB  Reconsulted for continued drop in HgB and recommendations re: immune checkpoint GI side effects     S/p EGD + Flex sig 12/8/22  EGD:   - Severe circumferential esophagitis s/p bx  - Hiatal hernia  - Ulcer in hernia sac s/p bx  - Ulcer in cardia s/p bx  - atrophic gastritis s/p bx    Flex sig:  - grossly normal mucosa s/p bx  - poor rectal tone    Pathology:  1. Duodenum, biopsy:  Extensive gastric foveolar metaplasia and Brunner's gland hyperplasia    2. Stomach, biopsy:  Antral-type mucosa without significant pathologic features    3. Gastric cardia ulcer, biopsy:  Acute inflammatory exudate with necrotic tissue, small fragment of,  consistent with  ulcer  Oxyntic gastric mucosa without significant pathologic features    4. Hernia sac, biopsy:  Oxyntic gastric mucosa showing degenerative with sloughed surface  epithelium and  numerous bacteria    5. Distal esophagus, biopsy:  Abundant necrotic tissue with hemosiderin deposition, scanty inflamed  soft tissue  and scanty smooth muscle, consistent with ulcer  Mucosa is not seen    6. Sigmoid colon, biopsy:  Colonic mucosa without significant pathologic features    We discussed with pathology the above results, findings of sigmoid biopsy, bacteria (not H. pylori) numerous and unlikely contaminant    Recommendations:  Would trial steroid course, as discussed with Heme-Onc  NPO MN for EGD second look tomorrow  Please send STAT COVID wab  Please have patient save BM for healthcare provider to investigate for GI blood loss  Continue Trend HgB BID  Continue PPI BID  Carafate Suspension 1g po qid     Thank you for the courtesy of this consult. We will follow along with you.    Lux Donald M.D.  Gastroenterology Fellow  Weekday Pager: [phone]  Weeknights/Weekend Coverage: Please Call the  for contact info

## 2022-12-15 NOTE — PROGRESS NOTE ADULT - SUBJECTIVE AND OBJECTIVE BOX
O/N Events: no acute events overnight.     Subjective/ROS: Patient seen and examined at bedside.     Denies Fever/Chills, HA, CP, SOB, n/v, changes in bowel/urinary habits.  12pt ROS otherwise negative.     VITALS  Vital Signs Last 24 Hrs  T(C): 36.4 (15 Dec 2022 15:33), Max: 37.2 (14 Dec 2022 19:52)  T(F): 97.6 (15 Dec 2022 15:33), Max: 98.9 (14 Dec 2022 19:52)  HR: 92 (15 Dec 2022 15:33) (92 - 100)  BP: 118/67 (15 Dec 2022 15:33) (100/62 - 119/61)  BP(mean): --  RR: 16 (15 Dec 2022 15:33) (16 - 17)  SpO2: 99% (15 Dec 2022 15:33) (96% - 99%)    Parameters below as of 15 Dec 2022 15:33  Patient On (Oxygen Delivery Method): room air      CAPILLARY BLOOD GLUCOSE    POCT Blood Glucose.: 134 mg/dL (15 Dec 2022 12:37)  POCT Blood Glucose.: 89 mg/dL (15 Dec 2022 06:58)  POCT Blood Glucose.: 99 mg/dL (15 Dec 2022 00:54)      PHYSICAL EXAM---INCOMPLETE  General: Resting comfortably in bed; NAD  HEENT: NC/AT, EOMI, dry mucous membranes, chapped lips, neck supple, no nasal discharge  Cardiac: RRR; normal S1/S2, no MRG, no LE edema  Respiratory: BL crackles; no wheezes, ronchi, increased work of breathing, retractions  Gastrointestinal: +BSx4, abdomen soft, NT/ND; no rebound or guarding  Extremities: WWP x4; no peripheral edema  Dermatologic: skin warm, dry and intact; no rashes, open wounds  Neurologic: AAOx3; no focal deficits    MEDICATIONS  (STANDING):  buPROPion XL (24-Hour) . 150 milliGRAM(s) Oral every 24 hours  dextrose 5% + lactated ringers. 1200 milliLiter(s) (100 mL/Hr) IV Continuous <Continuous>  levothyroxine 88 MICROGram(s) Oral daily  methylPREDNISolone sodium succinate Injectable 40 milliGRAM(s) IV Push every 12 hours  multivitamin 1 Tablet(s) Oral daily  pantoprazole  Injectable 40 milliGRAM(s) IV Push every 12 hours  sucralfate 1 Gram(s) Oral every 6 hours    MEDICATIONS  (PRN):  trimethobenzamide Injectable 200 milliGRAM(s) IntraMuscular every 12 hours PRN Nausea and/or Vomiting      No Known Allergies      LABS                        10.6   5.94  )-----------( 216      ( 15 Dec 2022 05:30 )             31.6     12-15    140  |  110<H>  |  17  ----------------------------<  110<H>  3.3<L>   |  19<L>  |  1.93<H>    Ca    7.2<L>      15 Dec 2022 05:30  Phos  2.6     12-15  Mg     1.3     12-15      IMAGING/EKG/ETC   O/N Events: no acute events overnight.     Subjective/ROS: Patient seen and examined at bedside.     Denies Fever/Chills, HA, CP, SOB, n/v, changes in bowel/urinary habits.  12pt ROS otherwise negative.     VITALS  Vital Signs Last 24 Hrs  T(C): 36.4 (15 Dec 2022 15:33), Max: 37.2 (14 Dec 2022 19:52)  T(F): 97.6 (15 Dec 2022 15:33), Max: 98.9 (14 Dec 2022 19:52)  HR: 92 (15 Dec 2022 15:33) (92 - 100)  BP: 118/67 (15 Dec 2022 15:33) (100/62 - 119/61)  BP(mean): --  RR: 16 (15 Dec 2022 15:33) (16 - 17)  SpO2: 99% (15 Dec 2022 15:33) (96% - 99%)    Parameters below as of 15 Dec 2022 15:33  Patient On (Oxygen Delivery Method): room air      CAPILLARY BLOOD GLUCOSE    POCT Blood Glucose.: 134 mg/dL (15 Dec 2022 12:37)  POCT Blood Glucose.: 89 mg/dL (15 Dec 2022 06:58)  POCT Blood Glucose.: 99 mg/dL (15 Dec 2022 00:54)      PHYSICAL EXAM---INCOMPLETE  General: Resting comfortably in bed; NAD  HEENT: NC/AT, EOMI, dry mucous membranes, chapped lips  Cardiac: RRR; normal S1/S2, no MRG, no LE edema  Respiratory: BL crackles; no wheezes, ronchi, increased work of breathing, retractions  Gastrointestinal: +BSx4, abdomen soft, NT/ND; no rebound or guarding  Extremities: WWP x4; no peripheral edema  Dermatologic: skin warm, dry and intact; no rashes, open wounds  Neurologic: AAOx3; no focal deficits    MEDICATIONS  (STANDING):  buPROPion XL (24-Hour) . 150 milliGRAM(s) Oral every 24 hours  dextrose 5% + lactated ringers. 1200 milliLiter(s) (100 mL/Hr) IV Continuous <Continuous>  levothyroxine 88 MICROGram(s) Oral daily  methylPREDNISolone sodium succinate Injectable 40 milliGRAM(s) IV Push every 12 hours  multivitamin 1 Tablet(s) Oral daily  pantoprazole  Injectable 40 milliGRAM(s) IV Push every 12 hours  sucralfate 1 Gram(s) Oral every 6 hours    MEDICATIONS  (PRN):  trimethobenzamide Injectable 200 milliGRAM(s) IntraMuscular every 12 hours PRN Nausea and/or Vomiting      No Known Allergies      LABS                        10.6   5.94  )-----------( 216      ( 15 Dec 2022 05:30 )             31.6     12-15    140  |  110<H>  |  17  ----------------------------<  110<H>  3.3<L>   |  19<L>  |  1.93<H>    Ca    7.2<L>      15 Dec 2022 05:30  Phos  2.6     12-15  Mg     1.3     12-15      IMAGING/EKG/ETC   O/N Events: no acute events overnight.     Subjective/ROS: Patient seen and examined at bedside.     Denies Fever/Chills, HA, CP, SOB, n/v, changes in bowel/urinary habits.  12pt ROS otherwise negative.     VITALS  Vital Signs Last 24 Hrs  T(C): 36.4 (15 Dec 2022 15:33), Max: 37.2 (14 Dec 2022 19:52)  T(F): 97.6 (15 Dec 2022 15:33), Max: 98.9 (14 Dec 2022 19:52)  HR: 92 (15 Dec 2022 15:33) (92 - 100)  BP: 118/67 (15 Dec 2022 15:33) (100/62 - 119/61)  BP(mean): --  RR: 16 (15 Dec 2022 15:33) (16 - 17)  SpO2: 99% (15 Dec 2022 15:33) (96% - 99%)    Parameters below as of 15 Dec 2022 15:33  Patient On (Oxygen Delivery Method): room air      CAPILLARY BLOOD GLUCOSE    POCT Blood Glucose.: 134 mg/dL (15 Dec 2022 12:37)  POCT Blood Glucose.: 89 mg/dL (15 Dec 2022 06:58)  POCT Blood Glucose.: 99 mg/dL (15 Dec 2022 00:54)      PHYSICAL EXAM  General: Resting comfortably in bed; NAD  HEENT: NC/AT, EOMI, dry MM  Cardiac: RRR; normal S1/S2, no MRG, no LE edema  Respiratory: BL crackles  Gastrointestinal: +BSx4, abdomen soft, NT/ND; no rebound or guarding  Extremities: WWP x4; no peripheral edema  Neurologic: AAOx3; no focal deficits    MEDICATIONS  (STANDING):  buPROPion XL (24-Hour) . 150 milliGRAM(s) Oral every 24 hours  dextrose 5% + lactated ringers. 1200 milliLiter(s) (100 mL/Hr) IV Continuous <Continuous>  levothyroxine 88 MICROGram(s) Oral daily  methylPREDNISolone sodium succinate Injectable 40 milliGRAM(s) IV Push every 12 hours  multivitamin 1 Tablet(s) Oral daily  pantoprazole  Injectable 40 milliGRAM(s) IV Push every 12 hours  sucralfate 1 Gram(s) Oral every 6 hours    MEDICATIONS  (PRN):  trimethobenzamide Injectable 200 milliGRAM(s) IntraMuscular every 12 hours PRN Nausea and/or Vomiting      No Known Allergies      LABS                        10.6   5.94  )-----------( 216      ( 15 Dec 2022 05:30 )             31.6     12-15    140  |  110<H>  |  17  ----------------------------<  110<H>  3.3<L>   |  19<L>  |  1.93<H>    Ca    7.2<L>      15 Dec 2022 05:30  Phos  2.6     12-15  Mg     1.3     12-15      IMAGING/EKG/ETC

## 2022-12-15 NOTE — PROGRESS NOTE ADULT - PROBLEM SELECTOR PLAN 1
Pt with anemia on admission to 9.1. On 12/5, Hb 5.7. Pt asx, mild tachycardia, but normal BP. Unclear source. Pt w/ multiple dark green bowel movements, no overt melena observed. No hemoptysis, no hematuria. Significant bruising on R flank, c/f retroperitoneal bleed but r/o on imaging. Possible GI bleed, GI consulted. Heme onc consulted. Recurrent bleeding.  - CTAP w/o contrast 12/5/22: No retroperitoneal or extraperitoneal hemorrhage. Severe ileus.  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 Received fluid boluses and 1 unit pRBCs. Hypovolemia likely contributing. Also on differential is PE/DVT, however, pt w/ poor renal function, would not be candidate for contrast.  - CTM  - repeat EKG

## 2022-12-15 NOTE — PROGRESS NOTE ADULT - ASSESSMENT
71 y/o male history of metastatic stage IV lung adenocarcinoma (carboplatin, pemetrexed, pembrolizumab on C1D1 10/27), hypothyroidism, depression, anxiety, undergoing chemo brought in by EMS with HHA for altered mental status. Found to have sepsis and ASHLYN. Oncology consulted given recent treatment of lung cancer and possible therapy releated side effects.    # Metastatic lung adenocarcinoma, AJCC Stage IV  Pt with R axillary LN biopsy positive for metastatic lung adenocarcinoma, with FDG avidity in thoracic, abdominal, inguinal LNs, as well as rectal thickening. Pt has not had any recent colonoscopies to further investigate GI involvement. Repeat CT CAP with increase in primary lung mass, persistent rectal thickening and other sites of disease as previously described. 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 Received Cycle 1 of chemo with carboplatin, pemetrexed, and pembrolizumab on 10/27/22. Now admitted with sepsis and ASHLYN, possibly 2/2 infection vs chemotherapy. Patient states that diarrhea started 1-2 weeks after chemotherapy and has been having a 2-3 bowel movements for the past 2 weeks. 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 Interval CT scan on admission 11/28 showing unchanged right upper lobe malignancy since October 7, 2022. Decreased left adrenal metastasis. Decreased abdominal and pelvic adenopathy.  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    # Leukocytosis, improved  Persistent neutrophilic predominant leukocytosis from admission, prior to systemic chemoimmunotherapy WBC was normal in October 2022. Reported about 1 month of diarrhea post treatment. WBC count fluctuating between 16-25 during current admission, afebrile and with negative infectious work up. 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    # Microcytic anemia  Hemoglobin baseline August-October 2022 was 13-15, in 2021 was ~10. During current hospitalization, patient has a downtrend in hemoglobin from 11.7 on admission 11/28 (possibly concentrated) to 7.4 on 12/4. On 12/5 with drop in hemoglobin to 5.7. Would be concerned for acute losses. CT Abd/Pelvis admission without acute abdominal findings, but patient has had persistent diarrhea/decrease in hgb. Given dark brown/black gastric contents and persistent drop in Hgb, would be concerned for acute GI bleed. Ferritin normal and no iron deficiency (iron sat 31%), Unlikely hemolysis given elevated hapto, slightly elevated LDH. EGD showing severe esophagitis, ulcers and atrophic gastritis in stomach. Flex sig showing normal mucosa, colon not visualized. Given presence of bacteria on pathology, will see if path can stain for H pylori. Will discuss with GI regarding ulcers. Suspect that patient still having intermittent GI losses given dark stools, but no reported melena on MUKESH and no more episodes of coffee ground emesis. Frequent transfusions every few days. 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      Discussed with Dr. Patricia. 73 y/o male history of metastatic stage IV lung adenocarcinoma (carboplatin, pemetrexed, pembrolizumab on C1D1 10/27), hypothyroidism, depression, anxiety, undergoing chemo brought in by EMS with HHA for altered mental status. Found to have sepsis and ASHLYN. Oncology consulted given recent treatment of lung cancer and possible therapy releated side effects.    # Metastatic lung adenocarcinoma, AJCC Stage IV  Pt with R axillary LN biopsy positive for metastatic lung adenocarcinoma, with FDG avidity in thoracic, abdominal, inguinal LNs, as well as rectal thickening. Pt has not had any recent colonoscopies to further investigate GI involvement. Repeat CT CAP with increase in primary lung mass, persistent rectal thickening and other sites of disease as previously described. 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 Received Cycle 1 of chemo with carboplatin, pemetrexed, and pembrolizumab on 10/27/22. Now admitted with sepsis and ASHLYN, possibly 2/2 infection vs chemotherapy. Patient states that diarrhea started 1-2 weeks after chemotherapy and has been having a 2-3 bowel movements for the past 2 weeks. 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 Interval CT scan on admission 11/28 showing unchanged right upper lobe malignancy since October 7, 2022. Decreased left adrenal metastasis. Decreased abdominal and pelvic adenopathy.  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    # Leukocytosis, improved  Persistent neutrophilic predominant leukocytosis from admission, prior to systemic chemoimmunotherapy WBC was normal in October 2022. Reported about 1 month of diarrhea post treatment. WBC count fluctuating between 16-25 during current admission, afebrile and with negative infectious work up. 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    # Microcytic anemia  Hemoglobin baseline August-October 2022 was 13-15, in 2021 was ~10. During current hospitalization, patient has a downtrend in hemoglobin from 11.7 on admission 11/28 (possibly concentrated) to 7.4 on 12/4. On 12/5 with drop in hemoglobin to 5.7. Would be concerned for acute losses. CT Abd/Pelvis admission without acute abdominal findings, but patient has had persistent diarrhea/decrease in hgb. Given dark brown/black gastric contents and persistent drop in Hgb, would be concerned for acute GI bleed. Ferritin normal and no iron deficiency (iron sat 31%), Unlikely hemolysis given elevated hapto, slightly elevated LDH. EGD showing severe esophagitis, ulcers and atrophic gastritis in stomach. Flex sig showing normal mucosa, colon not visualized. Given presence of bacteria on pathology, will see if path can stain for H pylori. Will discuss with GI regarding ulcers. Suspect that patient still having intermittent GI losses given dark stools, but no reported melena on MUKESH and no more episodes of coffee ground emesis. Frequent transfusions every few days. 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 Continue with PPI while on steroids.      Discussed with Dr. Patricia.

## 2022-12-15 NOTE — PROGRESS NOTE ADULT - PROBLEM SELECTOR PLAN 5
Patient with hypokalemia, likely due to persistent diarrhea. No EKG changes. On 12/1 K low to 2.1, repeat EKG possible u waves. Pt asymptomatic, no fasciculations or weakness. Aggressively repleted. Diarrhea stopped when pt NPO, and K improved, now worsening w/ worsening diarrhea after diet restarted.  - K replenish, goal >4

## 2022-12-15 NOTE — PROGRESS NOTE ADULT - TIME BILLING
Patient seen and examined;  Follow H/H  Continue present meds  If he drops H/H again possibly us of steroids Patient seen and examined;  Follow H/H  Continue present meds  If he drops H/H again possibly use of steroids

## 2022-12-15 NOTE — PROGRESS NOTE ADULT - SUBJECTIVE AND OBJECTIVE BOX
Hematology Oncology Progress Note      HPI:  73 y/o male history of metastatic stage IV lung adenocarcinoma, hypothyroidism, depression, anxiety, undergoing chemo brought in by EMS with HHA for altered mental status Monday morning. At baseline patient is A&Ox3, per HHA he was AOx1, not responding to questions or commands. Patient was reported to be hypotensive to SBP 50s in the field, IO was placed and given fluids with improvement in BP. Patient lives along, has HHA 2 days per week for 10 hours, per ED note HHA states she spoke to pt 3 days ago and foung him in his bed Monday morning at which time he was confused. Currently being treated for lung cancer, last chemo 1 month ago.  Patient also has had recurrent falls at home, some associated with head trauma but no loc, syncope, bladder/bowel incontinence.  Otherwise no reported illness, sick contacts, medical changes.  ROS otherwise negative.      ED Course   Vitals: Temp 98.7,  --> 86, /63, RR 20, SaO2 98% on 4L NC --> 97% room air   Labs: WBC 16.24, Hgb 11.7, Plt 449, Na 143, K 3.2, CO2 18, AG 24, BUN 31, Scr 2.35, Ca 7.6, Ma 1.4, Lactate 12.1 --> 2.3, UA trace LE, Bacteria present, hyaline casts, C. Diff negative GI PCR negative  EKG: sinus tachycardia, 1st degree AV block, narrow QRS, prolonged QTC  CXR: Known right upper lobe pulmonary mass   CT chest, abdomen, pelvis: No acute fractures. Unchanged right upper lobe malignancy. Decreased left adrenal metastasis. Decreased abdominal and pelvic adenopathy.  CTH: No intracranial hemorrhage or acute transcortical infarct.  Generalized volume loss with small vessel ischemic disease.  CT C-spine: No fracture. Levoscoliosis with Grade 1 anterolisthesis of C2 on C3 and C7 on T1. Multilevel cervical spondylosis. Right apical lung spiculated mass.  Interventions: Tylenol ig IV, Ca Gluconate 2g, MgSO4 2g x 2, KCl 40mg po x 1, KCl 10mg IV x 5, Zosyn 3.375 x 3, Vancomycin 1250mg, NaCl 2200cc bolus   (28 Nov 2022 21:33)      SUBJECTIVE: Patient seen and examined at bedside. Feels slightly better after pRBC. Still with loose stool. PO intake is okay.    OBJECTIVE:    VITAL SIGNS:  ICU Vital Signs Last 24 Hrs  T(C): 36.4 (15 Dec 2022 15:33), Max: 37.2 (14 Dec 2022 19:52)  T(F): 97.6 (15 Dec 2022 15:33), Max: 98.9 (14 Dec 2022 19:52)  HR: 92 (15 Dec 2022 15:33) (92 - 100)  BP: 118/67 (15 Dec 2022 15:33) (100/62 - 119/61)  BP(mean): --  ABP: --  ABP(mean): --  RR: 16 (15 Dec 2022 15:33) (16 - 17)  SpO2: 99% (15 Dec 2022 15:33) (96% - 99%)    O2 Parameters below as of 15 Dec 2022 15:33  Patient On (Oxygen Delivery Method): room air              CAPILLARY BLOOD GLUCOSE      POCT Blood Glucose.: 134 mg/dL (15 Dec 2022 12:37)      PHYSICAL EXAM:  General: elderly, thin, pale, NAD, answering questions, pleasantly conversant  HEENT: NC/AT; PERRL, clear conjunctiva  Neck: supple  Respiratory: CTA b/l  Cardiovascular: +S1/S2; RRR  Abdomen: slightly distended, non tender, +BS  Extremities: WWP, 2+ peripheral pulses b/l; no LE edema  Skin: normal color and turgor; no rash  Neurological: AAOx3    MEDICATIONS:  MEDICATIONS  (STANDING):  buPROPion XL (24-Hour) . 150 milliGRAM(s) Oral every 24 hours  dextrose 5% + lactated ringers. 1200 milliLiter(s) (100 mL/Hr) IV Continuous <Continuous>  levothyroxine 88 MICROGram(s) Oral daily  multivitamin 1 Tablet(s) Oral daily  pantoprazole  Injectable 40 milliGRAM(s) IV Push every 12 hours  sucralfate 1 Gram(s) Oral every 6 hours    MEDICATIONS  (PRN):  trimethobenzamide Injectable 200 milliGRAM(s) IntraMuscular every 12 hours PRN Nausea and/or Vomiting      ALLERGIES:  Allergies    No Known Allergies    Intolerances        LABS:                        10.6   5.94  )-----------( 216      ( 15 Dec 2022 05:30 )             31.6     12-15    140  |  110<H>  |  17  ----------------------------<  110<H>  3.3<L>   |  19<L>  |  1.93<H>    Ca    7.2<L>      15 Dec 2022 05:30  Phos  2.6     12-15  Mg     1.3     12-15                      RADIOLOGY & ADDITIONAL TESTS: Reviewed.

## 2022-12-15 NOTE — PROGRESS NOTE ADULT - SUBJECTIVE AND OBJECTIVE BOX
GASTROENTEROLOGY PROGRESS NOTE  Patient seen and examined at bedside. RN reports one semiformed BM. Patient without acute complaints    PERTINENT REVIEW OF SYSTEMS:  CONSTITUTIONAL: No fevers or chills  HEENT: No visual changes; No vertigo or throat pain   GASTROINTESTINAL: As above.  NEUROLOGICAL: No numbness or weakness  SKIN: No itching, burning, rashes, or lesions     Allergies    No Known Allergies    Intolerances      MEDICATIONS:  MEDICATIONS  (STANDING):  buPROPion XL (24-Hour) . 150 milliGRAM(s) Oral every 24 hours  dextrose 5% + lactated ringers. 1200 milliLiter(s) (100 mL/Hr) IV Continuous <Continuous>  levothyroxine 88 MICROGram(s) Oral daily  methylPREDNISolone sodium succinate Injectable 40 milliGRAM(s) IV Push every 12 hours  multivitamin 1 Tablet(s) Oral daily  pantoprazole  Injectable 40 milliGRAM(s) IV Push every 12 hours  sucralfate 1 Gram(s) Oral every 6 hours    MEDICATIONS  (PRN):  trimethobenzamide Injectable 200 milliGRAM(s) IntraMuscular every 12 hours PRN Nausea and/or Vomiting    Vital Signs Last 24 Hrs  T(C): 36.4 (15 Dec 2022 15:33), Max: 37.1 (14 Dec 2022 20:52)  T(F): 97.6 (15 Dec 2022 15:33), Max: 98.8 (14 Dec 2022 20:52)  HR: 92 (15 Dec 2022 15:33) (92 - 100)  BP: 118/67 (15 Dec 2022 15:33) (100/62 - 118/67)  BP(mean): --  RR: 16 (15 Dec 2022 15:33) (16 - 17)  SpO2: 99% (15 Dec 2022 15:33) (96% - 99%)    Parameters below as of 15 Dec 2022 15:33  Patient On (Oxygen Delivery Method): room air        PHYSICAL EXAM:    General: lying in bed, in no acute distress, appears tired and chronically ill  HEENT: MMM, conjunctiva and sclera clear  Gastrointestinal: Soft non-tender non-distended; No rebound or guarding  Skin: Warm and dry. No obvious rash    LABS:                        10.6   5.94  )-----------( 216      ( 15 Dec 2022 05:30 )             31.6     12-15    140  |  110<H>  |  17  ----------------------------<  110<H>  3.3<L>   |  19<L>  |  1.93<H>    Ca    7.2<L>      15 Dec 2022 05:30  Phos  2.6     12-15  Mg     1.3     12-15                        RADIOLOGY & ADDITIONAL STUDIES:  Reviewed

## 2022-12-16 NOTE — PRE-ANESTHESIA EVALUATION ADULT - NSANTHOSAYNRD_GEN_A_CORE
No. PA screening performed.  STOP BANG Legend: 0-2 = LOW Risk; 3-4 = INTERMEDIATE Risk; 5-8 = HIGH Risk
No. PA screening performed.  STOP BANG Legend: 0-2 = LOW Risk; 3-4 = INTERMEDIATE Risk; 5-8 = HIGH Risk

## 2022-12-16 NOTE — PROGRESS NOTE ADULT - PROBLEM SELECTOR PLAN 3
On 12/5 pt found to have distended abdomen. CTAP w/o contrast on 12/5 showing severe ileus. Surgery was formally consulted. Decided not to staff with attending but will continue to follow for serial abdominal exams. Primary team placed sump tube, set to suction. GI also following.  - placed sump tube 12/5  - bladder scan on 12/5, not retaining  - repeat abdominal XR 12/7: abdominal distention w/ improving ileus  - surgery signed off 12/9  Plan:  - minced and moist diet, advance as tolerated On 12/5 pt found to have distended abdomen. CTAP w/o contrast on 12/5 showing severe ileus. Surgery was formally consulted. Decided not to staff with attending but will continue to follow for serial abdominal exams. Primary team placed sump tube, set to suction. GI also following.  - placed sump tube 12/5  - bladder scan on 12/5, not retaining  - repeat abdominal XR 12/7: abdominal distention w/ improving ileus  - surgery signed off 12/9  Plan:  - restarting minced and moist diet, advance as tolerated

## 2022-12-16 NOTE — PROGRESS NOTE ADULT - ASSESSMENT
per Internal Medicine    72 y o male history of metastatic stage IV lung adenocarcinoma, hypothyroidism, depression, anxiety, undergoing chemo brought in by EMS with HHA for altered mental status Monday morning admitted for sepsis (unclear source) and electrolyte derangements. Now found to have anemia and severe ileus on 12/6, s/p EGD/flex sig 12/9 w/ ulcers, w/ ongoing GOC discussions.       Problem/Plan - 1:  ·  Problem: Anemia.   ·  Plan: Pt with anemia on admission to .. On 12/5, Hb 5.7. Pt asx, mild tachycardia, but normal BP. Unclear source. Pt w/ multiple dark green bowel movements, no overt melena observed. No hemoptysis, no hematuria. Significant bruising on R flank, c/f retroperitoneal bleed but r/o on imaging. Possible GI bleed, GI consulted. Heme onc consulted. Recurrent bleeding.  - CTAP w/o contrast 12/5/22: No retroperitoneal or extraperitoneal hemorrhage. Severe ileus.  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 Received fluid boluses and 1 unit pRBCs. Hypovolemia likely contributing. Also on differential is PE/DVT, however, pt w/ poor renal function, would not be candidate for contrast.  - CTM  - repeat EKG.    Problem/Plan - 2:  ·  Problem: Diarrhea.   ·  Plan: Patient with diarrhea described as loose stool, per outpatient notes seems to be chronic for several weeks (later stated for a year). Patient was prescribed Loperamide early November.  Unclear etiology. May be in setting of metastatic malignancy, however currently considering inflammatory given chronicity and white count. Diarrhea likely not infectious C. diff negative, GI PCR negative. Pt persistently hypokalemic likely from diarrhea. GI consulted on 12/2. 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 EGD on 12/9 showing poor rectal tone.  - CRP high 132; quantitative IgA normal; tissue transglutaminase (TTG) IgA-antibody nml  Plan:  - CTM bowel movements (having large volume watery)  - banatrol added to diet  - lomotil 2 tabs q6 hrs, holding since last BM was 12/14 AM  - no loperamide due to prolonged QTc 508 (12/1/2022 EKG).    Problem/Plan - 3:  ·  Problem: Ileus.   ·  Plan: On 12/5 pt found to have distended abdomen. CTAP w/o contrast on 12/5 showing severe ileus. Surgery was formally consulted. Decided not to staff with attending but will continue to follow for serial abdominal exams. Primary team placed sump tube, set to suction. GI also following.  - placed sump tube 12/5  - bladder scan on 12/5, not retaining  - repeat abdominal XR 12/7: abdominal distention w/ improving ileus  - surgery signed off 12/9  Plan:  - minced and moist diet, advance as tolerated.    Problem/Plan - 4:  ·  Problem: ASHLYN (acute kidney injury).   ·  Plan: Patient with ASHLYN on admission BUN 31, Scr 2.35 -->Scr 2.31 (baseline Scr 1.1 10/22). ASHLYN likely prerenal i/s/o hypovolemia due to diarrhea and poor po intake. Given prostate mets, there may also be a post-renal component. Pt w/ mark initially, then removed after passing TOV.   - downtrending Cr 12/9  - c/w maintenance fluids w/ D5 due to low sugars  - strict I/Os  - trend Cr, avoid nephrotoxic drugs, renally dose meds.    Problem/Plan - 5:  ·  Problem: Hypokalemia.   ·  Plan: Patient with hypokalemia, likely due to persistent diarrhea. No EKG changes. On 12/1 K low to 2.1, repeat EKG possible u waves. Pt asymptomatic, no fasciculations or weakness. Aggressively repleted. Diarrhea stopped when pt NPO, and K improved, now worsening w/ worsening diarrhea after diet restarted.  - K replenish, goal >4.    Problem/Plan - 6:  ·  Problem: Sepsis.   ·  Plan: Patient with encephalopathy and hypotension, meeting 3 SIRS on admission for tachycardia, tachypnea, and leukocytosis.  Source possibly pulmonary and urinary.  Lactate 12.1 --> 2.3, UA trace LE, Bacteria present, hyaline casts, C. Diff negative GI PCR negative.  Lactate cleared, S/p Zosyn 3.375 x 3, Vancomycin 1250mg, NaCl 2200cc bolus in ED.   Still unclear source. AAOx4, mentating well since 11/29.  - UCx no growth final  - BCx NGTD  - ULeg negative  - s/p Vanc 1000mg qD and Zosyn 3.375 q8 (11/28-12/1)  - repeat BCx/UA w/ reflex culture NGTD  Plan:  - continue to trend WBC.    Problem/Plan - 7:  ·  Problem: Hypothyroidism.   ·  Plan: Patient with history of hypothyroidism, home medications Levothyroxine 75mcg daily. On 12/1 TSH 10.81, free T4 0.67.  - c/w synthroid 88mcg qD.    Problem/Plan - 8:  ·  Problem: Falls.   ·  Plan: Patient with frequent falls at home, per outpatient nursing notes, patient's HHA have reported falls at home sometimes with head trauma.  He ambulated at baseline with cane.  Etiology likely due to overall decompensation in setting of metastatic cancer. CTH and CT cervical spine without fractures of trauma.    - PT/OT consulted - recommending SASHA  - OOBTC daily w/ supervision.    Problem/Plan - 9:  ·  Problem: Adult failure to thrive.   ·  Plan: Patient with underlying metastatic lung adenocarcinoma with chronic diarrhea and frequent falls, overall appears very frail.    - dietitian consulted - rec ensure enlive TID  - PT/OT consulted, rec SASHA  - palliative consult: DNR/DNI; palliative to speak w/ Dr. Patricia for possible hospice  - contact family  - incentive spirometry.    Problem/Plan - 10:  ·  Problem: Hypomagnesemia.   ·  Plan; Patient with hypomagnesemia, likely due to persistent diarrhea.  Likely contributing to overall weakness and falls.  - Replenish PRN.    Problem/Plan - 11:  ·  Problem: Hypophosphatemia.   ·  Plan: Patient with hypophosphatemia, likely due to persistent diarrhea.  Likely contributing to overall weakness and falls.  - Replenish PRN.    Problem/Plan - 12:  ·  Problem: Adenocarcinoma, lung.   ·  Plan: Patient with recently found (08/22) with RUL spiculated nodule with R paratracheal adenopathy and L adrenal nodule. Concern for metastatic cancer because of rectal lesion on PET scan as well as L adrenal area that lit up. Now, s/p FNA of R axillary node, with pathology consistent with metastatic lung adenocarcinoma. Follows w/ Dr. Patricia outpatient.  - f/u heme/onc consult  - palliative consulted: DNR/DNI on 12/7, palliative to speak w/ Dr. Patricia about possible hospice  - attempt family member contact, no available contacts    #Prophylactic measures  F: maintenance fluids 100cc/hr LR w/ D5  E: replenish K<4, Mg<1.8, Phos<3, Ca<8.5   N: NPO    VTE Prophylaxis: hold i/s/o anemia  GI: pantoprazole 40 IV BID  C: DNR/DNI  D: OLIVER

## 2022-12-16 NOTE — PROGRESS NOTE ADULT - SUBJECTIVE AND OBJECTIVE BOX
Physical Medicine and Rehabilitation Progress Note :       Patient is a 72y old  Male who presents with a chief complaint of AMS (11 Dec 2022 06:19)      HPI:  73 y/o male history of metastatic stage IV lung adenocarcinoma, hypothyroidism, depression, anxiety, undergoing chemo brought in by EMS with HHA for altered mental status Monday morning. At baseline patient is A&Ox3, per HHA he was AOx1, not responding to questions or commands. Patient was reported to be hypotensive to SBP 50s in the field, IO was placed and given fluids with improvement in BP. Patient lives along, has HHA 2 days per week for 10 hours, per ED note HHA states she spoke to pt 3 days ago and foung him in his bed Monday morning at which time he was confused. Currently being treated for lung cancer, last chemo 1 month ago.  Patient also has had recurrent falls at home, some associated with head trauma but no loc, syncope, bladder/bowel incontinence.  Otherwise no reported illness, sick contacts, medical changes.  ROS otherwise negative.      ED Course   Vitals: Temp 98.7,  --> 86, /63, RR 20, SaO2 98% on 4L NC --> 97% room air   Labs: WBC 16.24, Hgb 11.7, Plt 449, Na 143, K 3.2, CO2 18, AG 24, BUN 31, Scr 2.35, Ca 7.6, Ma 1.4, Lactate 12.1 --> 2.3, UA trace LE, Bacteria present, hyaline casts, C. Diff negative GI PCR negative  EKG: sinus tachycardia, 1st degree AV block, narrow QRS, prolonged QTC  CXR: Known right upper lobe pulmonary mass   CT chest, abdomen, pelvis: No acute fractures. Unchanged right upper lobe malignancy. Decreased left adrenal metastasis. Decreased abdominal and pelvic adenopathy.  CTH: No intracranial hemorrhage or acute transcortical infarct.  Generalized volume loss with small vessel ischemic disease.  CT C-spine: No fracture. Levoscoliosis with Grade 1 anterolisthesis of C2 on C3 and C7 on T1. Multilevel cervical spondylosis. Right apical lung spiculated mass.  Interventions: Tylenol ig IV, Ca Gluconate 2g, MgSO4 2g x 2, KCl 40mg po x 1, KCl 10mg IV x 5, Zosyn 3.375 x 3, Vancomycin 1250mg, NaCl 2200cc bolus   (28 Nov 2022 21:33)                            10.2   6.35  )-----------( 226      ( 16 Dec 2022 07:30 )             32.3       12-16    136  |  108  |  17  ----------------------------<  157<H>  4.7   |  17<L>  |  1.82<H>    Ca    7.3<L>      16 Dec 2022 08:41  Phos  2.5     12-16  Mg     2.1     12-16      Vital Signs Last 24 Hrs  T(C): 36.3 (16 Dec 2022 10:55), Max: 36.7 (15 Dec 2022 21:07)  T(F): 97.4 (16 Dec 2022 05:08), Max: 98 (15 Dec 2022 21:07)  HR: 89 (16 Dec 2022 10:55) (74 - 92)  BP: 104/51 (16 Dec 2022 10:55) (104/51 - 133/82)  BP(mean): 72 (16 Dec 2022 10:55) (72 - 72)  RR: 16 (16 Dec 2022 10:55) (16 - 17)  SpO2: 98% (16 Dec 2022 10:55) (98% - 100%)    Parameters below as of 16 Dec 2022 10:55    O2 Flow (L/min): 15      MEDICATIONS  (STANDING):  buPROPion XL (24-Hour) . 150 milliGRAM(s) Oral every 24 hours  dextrose 5% + lactated ringers. 1200 milliLiter(s) (100 mL/Hr) IV Continuous <Continuous>  levothyroxine 88 MICROGram(s) Oral daily  methylPREDNISolone sodium succinate Injectable 40 milliGRAM(s) IV Push every 12 hours  multivitamin 1 Tablet(s) Oral daily  pantoprazole  Injectable 40 milliGRAM(s) IV Push every 12 hours  sucralfate 1 Gram(s) Oral every 6 hours    MEDICATIONS  (PRN):  trimethobenzamide Injectable 200 milliGRAM(s) IntraMuscular every 12 hours PRN Nausea and/or Vomiting         Physical Therapy Functional Status Assessment :   12/15/2022     Cognitive/Neuro/Behavioral  Cognitive/Neuro/Behavioral [WDL Definition: Alert; opens eyes spontaneously; arouses to voice or touch; oriented x 4; follows commands; speech spontaneous, logical; purposeful motor response; behavior appropriate to situation]: WDL  Orientation: disoriented to;  time;  did not know day of the week or date but knows the month    Language Assistance  Preferred Language to Address Healthcare Preferred Language to Address Healthcare: English    Therapeutic Interventions      Bed Mobility  Bed Mobility Training Rolling/Turning: moderate assist (50% patient effort);  2 person assist;  verbal cues;  nonverbal cues (demo/gestures)  Bed Mobility Training Scooting: moderate assist (50% patient effort);  1 person assist  Bed Mobility Training Bridging: maximum assist (25% patient effort);  2 person assist  Bed Mobility Training Sit-to-Supine: moderate assist (50% patient effort);  2 person assist  Bed Mobility Training Supine-to-Sit: moderate assist (50% patient effort);  2 person assist  Bed Mobility Training Limitations: decreased ability to use arms for pushing/pulling;  decreased ability to use legs for bridging/pushing;  impaired ability to control trunk for mobility;  decreased strength;  impaired balance;  impaired postural control    Sit-Stand Transfer Training  Transfer Training Sit-to-Stand Transfer: moderate assist (50% patient effort);  2 person assist;  BL HHA  Transfer Training Stand-to-Sit Transfer: moderate assist (50% patient effort);  2 person assist;  HHA x2  Sit-to-Stand Transfer Training Transfer Safety Analysis: decreased balance;  decreased weight-shifting ability;  impaired balance;  impaired postural control;  HHA x 2              PM&R Impression : as above    Current Disposition Plan Recommendations :    subacute rehab placement

## 2022-12-16 NOTE — PROGRESS NOTE ADULT - PROBLEM SELECTOR PLAN 1
Pt with anemia on admission to 9.1. On 12/5, Hb 5.7. Pt asx, mild tachycardia, but normal BP. Unclear source. Pt w/ multiple dark green bowel movements, no overt melena observed. No hemoptysis, no hematuria. Significant bruising on R flank, c/f retroperitoneal bleed but r/o on imaging. Possible GI bleed, GI consulted. Heme onc consulted. Recurrent bleeding.  - CTAP w/o contrast 12/5/22: No retroperitoneal or extraperitoneal hemorrhage. Severe ileus.  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   - c/w solumedrol 40mg IV BID (started 12/15)  - EGD done 12/16: small hiatal hernia, grade C esophagitis (biopsied r/o checkpoint inhibitor esophagitis) Single cratered non-bleeding healing in the cardia, a few cratered non-bleeding healing ulcers in the pylorus, multiple cratered non-bleeding ulcers in the duodenal bulb (biopsied to r/o checkpoint inhibitor duodenitis).   - GI recommending:       - PPI BID for 8 weeks       - repeat EGD in 2 months        - Carafate Suspension 1g po qid for 2 weeks

## 2022-12-16 NOTE — PROGRESS NOTE ADULT - PROBLEM SELECTOR PLAN 2
Patient with diarrhea described as loose stool, per outpatient notes seems to be chronic for several weeks (later stated for a year). Patient was prescribed Loperamide early November.  Unclear etiology. May be in setting of metastatic malignancy, however currently considering inflammatory given chronicity and white count. Diarrhea likely not infectious C. diff negative, GI PCR negative. Pt persistently hypokalemic likely from diarrhea. GI consulted on 12/2. 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 EGD on 12/9 showing poor rectal tone.  - CRP high 132; quantitative IgA normal; tissue transglutaminase (TTG) IgA-antibody nml  Plan:  - CTM bowel movements (having large volume watery)  - banatrol added to diet  - lomotil 2 tabs q6 hrs, holding since last BM was 12/14 AM  - no loperamide due to prolonged QTc 508 (12/1/2022 EKG) Patient with diarrhea described as loose stool, per outpatient notes seems to be chronic for several weeks (later stated for a year). Patient was prescribed Loperamide early November.  Unclear etiology. May be in setting of metastatic malignancy, however currently considering inflammatory given chronicity and white count. Diarrhea likely not infectious C. diff negative, GI PCR negative. Pt persistently hypokalemic likely from diarrhea. GI consulted on 12/2. 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 EGD on 12/9 showing poor rectal tone.  - CRP high 132; quantitative IgA normal; tissue transglutaminase (TTG) IgA-antibody nml  Plan - RESOLVED

## 2022-12-16 NOTE — CHART NOTE - NSCHARTNOTEFT_GEN_A_CORE
Patient is s/p EGD performed in Endoscopy with Dr. Barnes    Findings:    Esophagus  A small size hiatal hernia was seen, the esophagogastric junction(EGJ) was noted at 36 cm, with hiatal narrowing at 40 cm from the incisors. Additional findings include ulceration. Retroflexion view in the stomach confirmed the size and morphology of the hernia.    Grade C esophagitis with scant contact bleeding was seen in the esophagus, compatible with nonspecific erosive esophagitis. Cold forceps biopsies were performed for histology and ablation in the lower third of the esophagus. Appeared to be improving, biopsied to rule out checkpoint inhibitor esophagitis.    Stomach  A single cratered non-bleeding healing ulcer was found in the cardia.  A few cratered non-bleeding healing ulcers were found in the pylorus.    Duodenum  Multiple cratered non-bleeding ulcers were found in the duodenal bulb. Cold forceps biopsies were performed in the duodenal bulb. Biopsied to rule out checkpoint inhibitor duodenitis.      There were no signs of active bleeding throughout the examination.    Plan:	  PPI bid for 8 weeks  Repeat EGD in 2 months  Carafate Suspension 1g po qid for 2 weeks  Advance diet as tolerated  Return to floor for further management  Await pathology results.    Lux Donald M.D.  Gastroenterology Fellow  Weekday Pager: [phone]  Weeknights/Weekend Coverage: Please Call the  for contact info

## 2022-12-16 NOTE — CHART NOTE - NSCHARTNOTEFT_GEN_A_CORE
Admitting Diagnosis:   Patient is a 72y old  Male who presents with a chief complaint of AMS (11 Dec 2022 06:19)    Current Nutrition Order:   Diet, NPO:   Except Medications (12-16-22 @ 06:22)  Diet, NPO after Midnight:      NPO Start Date: 15-Dec-2022,   NPO Start Time: 23:59 (12-15-22 @ 22:06)    PO Intake: Good (%) [   ]  Fair (50-75%) [   ] Poor (<25%) [   ]    GI Issues:     Pain:    Skin Integrity:    Labs:   12-16    136  |  108  |  17  ----------------------------<  157<H>  4.7   |  17<L>  |  1.82<H>    Ca    7.3<L>      16 Dec 2022 08:41  Phos  2.5     12-16  Mg     2.1     12-16    CAPILLARY BLOOD GLUCOSE    POCT Blood Glucose.: 156 mg/dL (16 Dec 2022 05:38)  POCT Blood Glucose.: 154 mg/dL (16 Dec 2022 00:18)    Medications:  MEDICATIONS  (STANDING):  buPROPion XL (24-Hour) . 150 milliGRAM(s) Oral every 24 hours  dextrose 5% + lactated ringers. 1200 milliLiter(s) (100 mL/Hr) IV Continuous <Continuous>  levothyroxine 88 MICROGram(s) Oral daily  methylPREDNISolone sodium succinate Injectable 40 milliGRAM(s) IV Push every 12 hours  multivitamin 1 Tablet(s) Oral daily  pantoprazole  Injectable 40 milliGRAM(s) IV Push every 12 hours  sucralfate 1 Gram(s) Oral every 6 hours    MEDICATIONS  (PRN):  trimethobenzamide Injectable 200 milliGRAM(s) IntraMuscular every 12 hours PRN Nausea and/or Vomiting      Weight:  Daily Height in cm: 182.88 (16 Dec 2022 10:55)    Daily     Weight Change:     Nutrition Focused Physical Exam: Completed [   ]  Not Pertinent [   ]  Muscle Wasting- Temporal [   ]  Clavicle/Pectoral [   ]  Shoulder/Deltoid [   ]  Scapula [   ]  Interosseous [   ]  Quadriceps [   ]  Gastrocnemius [   ]  Fat Wasting- Orbital [   ]  Buccal [   ]  Triceps [   ]  Rib [   ]  Suspect [PCM] 2/2 to physical assessment, [poor intake], and [wt loss]; please see malnutrition chart note.    Estimated energy needs:     Subjective: 71 y/o male history of metastatic stage IV lung adenocarcinoma, hypothyroidism, depression, anxiety, undergoing chemo brought in by EMS with HHA for altered mental status Monday morning admitted for sepsis (unclear source) and electrolyte derangements. Now found to have anemia and severe ileus on 12/6, repeat abdominal XR 12/7: abdominal distention w/ improving ileus. s/p EGD/flex sig 12/9 w/ ulcers.    Pt seen on 4U for nutrition follow up exam. Resting in bed. Pt is currently NPO status; was previously Minced and Moist diet r/t poor dentition. As per pt and per pt's RN/medical team, when pt was on the minced and moist diet, he was eating minimally, ~0-<25%; additionally, pt stated sometimes the Ensure ONS "goes through" him, thus he does not always drink it; RD may consider discussing Banatrol and medical management of diarrhea again with medical team once pt's diet is initiated. Pt endorsed some nausea, no vomiting. Most recent BM yesterday per pt.     Previous Nutrition Diagnosis:    Active [   ]  Resolved [   ]    If resolved, new PES:     Goal:    Recommendations:    Education:     Risk Level: High [   ] Moderate [   ] Low [   ] Admitting Diagnosis:   Patient is a 72y old  Male who presents with a chief complaint of AMS (11 Dec 2022 06:19)    Current Nutrition Order:   Diet, NPO:   Except Medications (12-16-22 @ 06:22)  Diet, NPO after Midnight:      NPO Start Date: 15-Dec-2022,   NPO Start Time: 23:59 (12-15-22 @ 22:06)    PO Intake: Good (%) [   ]  Fair (50-75%) [   ] Poor (<25%) [   ]    GI Issues: Pt endorsed some nausea, no vomiting. Most recent BM yesterday per pt. Chronic diarrhea noted.     Pain: denies pain/discomfort per chart (0)     Skin Integrity: bruised (ecchymosis) noted per chart, Casey: 14; Efrain ankle edema 2+ (mild), L arm edema 3+.     Labs:   12-16    136  |  108  |  17  ----------------------------<  157<H>  4.7   |  17<L>  |  1.82<H>    Ca    7.3<L>      16 Dec 2022 08:41  Phos  2.5     12-16  Mg     2.1     12-16    CAPILLARY BLOOD GLUCOSE    POCT Blood Glucose.: 156 mg/dL (16 Dec 2022 05:38)  POCT Blood Glucose.: 154 mg/dL (16 Dec 2022 00:18)    Medications:  MEDICATIONS  (STANDING):  buPROPion XL (24-Hour) . 150 milliGRAM(s) Oral every 24 hours  dextrose 5% + lactated ringers. 1200 milliLiter(s) (100 mL/Hr) IV Continuous <Continuous>  levothyroxine 88 MICROGram(s) Oral daily  methylPREDNISolone sodium succinate Injectable 40 milliGRAM(s) IV Push every 12 hours  multivitamin 1 Tablet(s) Oral daily  pantoprazole  Injectable 40 milliGRAM(s) IV Push every 12 hours  sucralfate 1 Gram(s) Oral every 6 hours    MEDICATIONS  (PRN):  trimethobenzamide Injectable 200 milliGRAM(s) IntraMuscular every 12 hours PRN Nausea and/or Vomiting    Anthropometrics on admission:   (11/28) 72kg; Ht 182.9cm; BMI 21.5; IBW 80.9kg; %IBW 89%    Weight Change:   No new wts in EMR. Please obtain new wts biweekly to assess/trend for changes.    Nutrition Focused Physical Exam: please refer to nutrition focused physical exam on 11/29/22 for information; per RD observation, pt still exhibits muscle and fat loss.     Estimated energy needs:   ABW used to calculate energy needs due to pt's current body weight within % IBW (89%).    Needs calculated for age and increased d/t cancer and chemo status, moderate malnutrition.   Energy: 2160-2520kcal (30-35cal/kg)  Protein: 101-115g pro (1.4-1.6g/kg pro)  Fluid: 2160-2520ml (30-35ml/kg)    Subjective: 71 y/o male history of metastatic stage IV lung adenocarcinoma, hypothyroidism, depression, anxiety, undergoing chemo brought in by EMS with HHA for altered mental status Monday morning admitted for sepsis (unclear source) and electrolyte derangements. Now found to have anemia and severe ileus on 12/6, repeat abdominal XR 12/7: abdominal distention w/ improving ileus. s/p EGD/flex sig 12/9 w/ ulcers. 12/10/22: 3 episodes of diarrhea overnight. Repleted Mg. 12/11/22: diarrhea has improved. 12/12/22: additional 500mL LR. 12/13/22: diet updated. o/n Lungs clear, continued D5LR at 100mL/h x 12h. 12/14/22: ordered 1U PRBC and CBC. 12/15/22: made NPO at [facility] per GI.     Pt seen on 4U for nutrition follow up exam. Resting in bed. Pt is currently NPO status; was previously Minced and Moist diet r/t poor dentition. As per pt and per pt's RN/medical team, when pt was on the minced and moist diet, he was eating minimally, ~0-<25%; additionally, pt stated sometimes the Ensure ONS "goes through" him, thus he does not always drink it; RD may consider discussing Banatrol and medical management of diarrhea again with medical team once pt's diet is initiated. Pt endorsed some nausea, no vomiting. Most recent BM yesterday per pt. No pressure ulcers noted, pt's edema remains. Labs reviewed: low H&H, elevated Cr (1.82), Glucose (157), low Ca (7.3), eGFR (39); will continue to monitor trends. RD encouraged pt to increase PO intakes as able via small, frequent meals, hydration in between meals, and RD to f/u with team in regards to diarrhea and nausea s/s management (via medications, modulars). RD to remain available per protocol. Additional nutrition recommendations below to follow.     Previous Nutrition Diagnosis:  Malnutrition of moderate degree in the context of chronic illness r/t inability to meet nutritional demands secondary to pt's clinical conditions AEB mild to moderate muscle and subcutaneous fat loss per NFPE, suspected </=75% PO x>1month PTA    Active [ x  ]  Resolved [   ]    Goal:  1. Diet adv when medically feasible, within 24-48 hrs as able  2. Consistently meet >75% est needs during hospital stay w/o overt s/s of malnutrition    Recommendations:  1. NPO, pending ability to adv diet, rec resume previous diet as tolerated  >>Rec include Vanilla Ensure Plus High Protein TID (1050 kcal, 60g protein, 540mL free H2O)  >> Banatrol BID for persistent diarrhea  2. Monitor for GI intolerances, s/s of distress  3. BM and pain regimen per team  4. Monitor BMP, BG, POCT, renal indices, LFTs, CBC, lytes, replete prn  5. Continue MVI  6. Align with GOC at all times    Education: RD encouraged pt to increase PO intakes as able via small, frequent meals, hydration in between meals, and RD to f/u with team in regards to diarrhea and nausea s/s management (via medications, modulars).     Risk Level: High [ x ] Moderate [   ] Low [   ]

## 2022-12-16 NOTE — PROGRESS NOTE ADULT - SUBJECTIVE AND OBJECTIVE BOX
O/N Events: no acute events, patient was made NPO at midnight for AM EGD.     Subjective/ROS: Patient seen and examined at bedside.     Denies Fever/Chills, HA, CP, SOB, n/v, changes in bowel/urinary habits.  12pt ROS otherwise negative.    VITALS  Vital Signs Last 24 Hrs  T(C): 36.3 (16 Dec 2022 10:55), Max: 36.7 (15 Dec 2022 21:07)  T(F): 97.4 (16 Dec 2022 05:08), Max: 98 (15 Dec 2022 21:07)  HR: 89 (16 Dec 2022 10:55) (74 - 92)  BP: 104/51 (16 Dec 2022 10:55) (104/51 - 133/82)  BP(mean): 72 (16 Dec 2022 10:55) (72 - 72)  RR: 16 (16 Dec 2022 10:55) (16 - 17)  SpO2: 98% (16 Dec 2022 10:55) (98% - 100%)    Parameters below as of 16 Dec 2022 10:55    O2 Flow (L/min): 15    CAPILLARY BLOOD GLUCOSE    POCT Blood Glucose.: 156 mg/dL (16 Dec 2022 05:38)  POCT Blood Glucose.: 154 mg/dL (16 Dec 2022 00:18)  POCT Blood Glucose.: 134 mg/dL (15 Dec 2022 12:37)      PHYSICAL EXAM  General: NAD  Head: NC/AT; MMM; PERRL; EOMI;  Neck: Supple; no JVD  Respiratory: CTAB; no wheezes/rales/rhonchi  Cardiovascular: Regular rhythm/rate; S1/S2+, no murmurs, rubs gallops   Gastrointestinal: Soft; NTND; bowel sounds normal and present  Extremities: WWP; no edema/cyanosis  Neurological: A&Ox3, CNII-XII grossly intact; no obvious focal deficits    MEDICATIONS  (STANDING):  buPROPion XL (24-Hour) . 150 milliGRAM(s) Oral every 24 hours  dextrose 5% + lactated ringers. 1200 milliLiter(s) (100 mL/Hr) IV Continuous <Continuous>  levothyroxine 88 MICROGram(s) Oral daily  methylPREDNISolone sodium succinate Injectable 40 milliGRAM(s) IV Push every 12 hours  multivitamin 1 Tablet(s) Oral daily  pantoprazole  Injectable 40 milliGRAM(s) IV Push every 12 hours  sucralfate 1 Gram(s) Oral every 6 hours    MEDICATIONS  (PRN):  trimethobenzamide Injectable 200 milliGRAM(s) IntraMuscular every 12 hours PRN Nausea and/or Vomiting      No Known Allergies      LABS                        10.2   6.35  )-----------( 226      ( 16 Dec 2022 07:30 )             32.3     12-16    136  |  108  |  17  ----------------------------<  157<H>  4.7   |  17<L>  |  1.82<H>    Ca    7.3<L>      16 Dec 2022 08:41  Phos  2.5     12-16  Mg     2.1     12-16                  IMAGING/EKG/ETC   O/N Events: no acute events, patient was made NPO at midnight for AM EGD.     Subjective/ROS: Patient seen and examined at bedside.     Denies Fever/Chills, HA, CP, SOB, n/v, changes in bowel/urinary habits.  12pt ROS otherwise negative.    VITALS  Vital Signs Last 24 Hrs  T(C): 36.3 (16 Dec 2022 10:55), Max: 36.7 (15 Dec 2022 21:07)  T(F): 97.4 (16 Dec 2022 05:08), Max: 98 (15 Dec 2022 21:07)  HR: 89 (16 Dec 2022 10:55) (74 - 92)  BP: 104/51 (16 Dec 2022 10:55) (104/51 - 133/82)  BP(mean): 72 (16 Dec 2022 10:55) (72 - 72)  RR: 16 (16 Dec 2022 10:55) (16 - 17)  SpO2: 98% (16 Dec 2022 10:55) (98% - 100%)    Parameters below as of 16 Dec 2022 10:55    O2 Flow (L/min): 15    CAPILLARY BLOOD GLUCOSE    POCT Blood Glucose.: 156 mg/dL (16 Dec 2022 05:38)  POCT Blood Glucose.: 154 mg/dL (16 Dec 2022 00:18)  POCT Blood Glucose.: 134 mg/dL (15 Dec 2022 12:37)      PHYSICAL EXAM  General: Resting comfortably in bed; NAD  HEENT: NC/AT, EOMI, dry MM  Cardiac: RRR; normal S1/S2, no MRG  Respiratory: BL crackles  Gastrointestinal: +BSx4, abdomen soft, NT/ND; no rebound or guarding  Extremities: WWP x4; no peripheral edema  Neurologic: AAOx3; no focal deficits    MEDICATIONS  (STANDING):  buPROPion XL (24-Hour) . 150 milliGRAM(s) Oral every 24 hours  dextrose 5% + lactated ringers. 1200 milliLiter(s) (100 mL/Hr) IV Continuous <Continuous>  levothyroxine 88 MICROGram(s) Oral daily  methylPREDNISolone sodium succinate Injectable 40 milliGRAM(s) IV Push every 12 hours  multivitamin 1 Tablet(s) Oral daily  pantoprazole  Injectable 40 milliGRAM(s) IV Push every 12 hours  sucralfate 1 Gram(s) Oral every 6 hours    MEDICATIONS  (PRN):  trimethobenzamide Injectable 200 milliGRAM(s) IntraMuscular every 12 hours PRN Nausea and/or Vomiting      No Known Allergies      LABS                        10.2   6.35  )-----------( 226      ( 16 Dec 2022 07:30 )             32.3     12-16    136  |  108  |  17  ----------------------------<  157<H>  4.7   |  17<L>  |  1.82<H>    Ca    7.3<L>      16 Dec 2022 08:41  Phos  2.5     12-16  Mg     2.1     12-16    IMAGING/EKG/ETC

## 2022-12-17 NOTE — PROGRESS NOTE ADULT - SUBJECTIVE AND OBJECTIVE BOX
Interval Events: Reviewed  Patient seen and examined at bedside.    Patient is a 72y old  Male who presents with a chief complaint of AMS (11 Dec 2022 06:19)  no acute event overnight, RA 99%      PAST MEDICAL & SURGICAL HISTORY:      MEDICATIONS:  Pulmonary:    Antimicrobials:    Anticoagulants:    Cardiac:      Allergies    No Known Allergies    Intolerances        Vital Signs Last 24 Hrs  T(C): 36.6 (17 Dec 2022 05:57), Max: 36.7 (16 Dec 2022 20:53)  T(F): 97.9 (17 Dec 2022 05:57), Max: 98 (16 Dec 2022 20:53)  HR: 75 (17 Dec 2022 05:57) (75 - 89)  BP: 118/66 (17 Dec 2022 05:57) (101/51 - 118/66)  BP(mean): 72 (16 Dec 2022 10:55) (72 - 72)  RR: 18 (17 Dec 2022 05:57) (16 - 18)  SpO2: 99% (17 Dec 2022 05:57) (98% - 99%)    Parameters below as of 17 Dec 2022 05:57  Patient On (Oxygen Delivery Method): room air            Review of Systems:   •	General: negative  •	Skin/Breast: negative  •	Ophthalmologic: negative  •	ENMT: negative  •	Respiratory and Thorax: negative  •	Cardiovascular: negative  •	Gastrointestinal: negative  •	Genitourinary: negative  •	Musculoskeletal: negative  •	Neurological: negative  •	Psychiatric: negative  •	Hematology/Lymphatics: negative  •	Endocrine: negative  •	Allergic/Immunologic: negative    Physical Exam:   • Constitutional:	thin, elderly male, NAD  • Eyes:	EOMI; PERRL; no drainage or redness  • ENMT:	No oral lesions; no gross abnormalities  • Neck	no thyromegaly or nodules  • Breasts:	not examined  • Back:	No deformity or limitation of movement  • Respiratory:	Breath Sounds equal & clear to auscultation, no accessory muscle use  • Cardiovascular:	Regular rate & rhythm, normal S1, S2; no murmurs, gallops or rubs; no S3, S4  • Gastrointestinal:	Soft, non-tender, no hepatosplenomegaly, normal bowel sounds  • Genitourinary:	not examined  • Rectal: not examined  • Extremities:	No cyanosis, clubbing or edema  • Vascular:	Equal and normal pulses (dorsalis pedis)  • Neurologica:l	not examined  • Skin:	No lesions; no rash  • Lymph Nodes:	No lymphadedenopathy  • Musculoskeletal:	No joint pain, swelling or deformity; no limitation of movement        LABS:      CBC Full  -  ( 16 Dec 2022 07:30 )  WBC Count : 6.35 K/uL  RBC Count : 3.63 M/uL  Hemoglobin : 10.2 g/dL  Hematocrit : 32.3 %  Platelet Count - Automated : 226 K/uL  Mean Cell Volume : 89.0 fl  Mean Cell Hemoglobin : 28.1 pg  Mean Cell Hemoglobin Concentration : 31.6 gm/dL  Auto Neutrophil # : 5.13 K/uL  Auto Lymphocyte # : 0.86 K/uL  Auto Monocyte # : 0.29 K/uL  Auto Eosinophil # : 0.00 K/uL  Auto Basophil # : 0.01 K/uL  Auto Neutrophil % : 80.8 %  Auto Lymphocyte % : 13.5 %  Auto Monocyte % : 4.6 %  Auto Eosinophil % : 0.0 %  Auto Basophil % : 0.2 %    12-16    136  |  108  |  17  ----------------------------<  157<H>  4.7   |  17<L>  |  1.82<H>    Ca    7.3<L>      16 Dec 2022 08:41  Phos  2.5     12-16  Mg     2.1     12-16                          RADIOLOGY & ADDITIONAL STUDIES (The following images were personally reviewed):  Galvin:                                     No  Urine output:                       adequate  DVT prophylaxis:                 Yes  Flattus:                                  Yes  Bowel movement:              No

## 2022-12-17 NOTE — PROGRESS NOTE ADULT - PROBLEM SELECTOR PLAN 3
On 12/5 pt found to have distended abdomen. CTAP w/o contrast on 12/5 showing severe ileus. Surgery was formally consulted. Decided not to staff with attending but will continue to follow for serial abdominal exams. Primary team placed sump tube, set to suction. GI also following.  - placed sump tube 12/5  - bladder scan on 12/5, not retaining  - repeat abdominal XR 12/7: abdominal distention w/ improving ileus  - surgery signed off 12/9  Plan:  - restarting minced and moist diet, advance as tolerated

## 2022-12-17 NOTE — PROGRESS NOTE ADULT - ASSESSMENT
71 y/o M, metastatic stage IV lung adenocarcinoma, hypothyroidism, depression, anxiety, on new chemo as of a few weeks ago ;    Initially brought in by EMS due to altered mental status , as noted by HHA ;   Patient was admitted for SIRS and suspicion for sepsis    GI was initially consulted for subacute diarrhea,   Reconsulted for drop in HgB  Reconsulted for continued drop in HgB and recommendations re: immune checkpoint GI side effects     S/p EGD + Flex sig 12/8/22  EGD:   - Severe circumferential esophagitis s/p bx  - Hiatal hernia  - Ulcer in hernia sac s/p bx  - Ulcer in cardia s/p bx  - atrophic gastritis s/p bx    Flex sig:  - grossly normal mucosa s/p bx  - poor rectal tone    Pathology:  1. Duodenum, biopsy:  Extensive gastric foveolar metaplasia and Brunner's gland hyperplasia    2. Stomach, biopsy:  Antral-type mucosa without significant pathologic features    3. Gastric cardia ulcer, biopsy:  Acute inflammatory exudate with necrotic tissue, small fragment of,  consistent with  ulcer  Oxyntic gastric mucosa without significant pathologic features    4. Hernia sac, biopsy:  Oxyntic gastric mucosa showing degenerative with sloughed surface  epithelium and  numerous bacteria    5. Distal esophagus, biopsy:  Abundant necrotic tissue with hemosiderin deposition, scanty inflamed  soft tissue  and scanty smooth muscle, consistent with ulcer  Mucosa is not seen    6. Sigmoid colon, biopsy:  Colonic mucosa without significant pathologic features    We discussed with pathology the above results, findings of sigmoid biopsy, bacteria (not H. pylori) numerous and unlikely contaminant    Repeat EGD 12/16/22:  Esophagus  A small size hiatal hernia was seen, the esophagogastric junction(EGJ) was noted at 36 cm, with hiatal narrowing at 40 cm from the incisors. Additional findings include ulceration. Retroflexion view in the stomach confirmed the size and morphology of the hernia.    Grade C esophagitis with scant contact bleeding was seen in the esophagus, compatible with nonspecific erosive esophagitis. Cold forceps biopsies were performed for histology and ablation in the lower third of the esophagus. Appeared to be improving, biopsied to rule out checkpoint inhibitor esophagitis.    Stomach  A single cratered non-bleeding healing ulcer was found in the cardia.  A few cratered non-bleeding healing ulcers were found in the pylorus.    Duodenum  Multiple cratered non-bleeding ulcers were found in the duodenal bulb. Cold forceps biopsies were performed in the duodenal bulb. Biopsied to rule out checkpoint inhibitor duodenitis.    There were no signs of active bleeding throughout the examination.    Recommendations:  trial of steroid course, as per Heme-Onc  Continue PPI BID for 8 weeks  Carafate Suspension 1g po qid for 2 weeks  Diet as tolerated  F/u Pathology    Thank you for allowing us to participate in the care of this patient.  Please call us if you have any further questions or concerns    Lux Donald M.D.  Gastroenterology Fellow  Weekday Pager: [phone]  Weeknights/Weekend Coverage: Please Call the  for contact info

## 2022-12-17 NOTE — PROGRESS NOTE ADULT - ATTENDING SUPERVISION STATEMENT
Fellow
Resident
Resident
Fellow

## 2022-12-17 NOTE — PROGRESS NOTE ADULT - SUBJECTIVE AND OBJECTIVE BOX
GASTROENTEROLOGY PROGRESS NOTE  Patient seen and examined at bedside. Feels ok , tolerating liquids, issues with ordering breakfast today    PERTINENT REVIEW OF SYSTEMS:  CONSTITUTIONAL: No fevers or chills  HEENT: No visual changes; No vertigo or throat pain   GASTROINTESTINAL: As above.  NEUROLOGICAL: No numbness or weakness  SKIN: No itching, burning, rashes, or lesions     Allergies    No Known Allergies    Intolerances      MEDICATIONS:  MEDICATIONS  (STANDING):  buPROPion XL (24-Hour) . 150 milliGRAM(s) Oral every 24 hours  dextrose 5% + lactated ringers. 1200 milliLiter(s) (100 mL/Hr) IV Continuous <Continuous>  levothyroxine 88 MICROGram(s) Oral daily  methylPREDNISolone sodium succinate Injectable 40 milliGRAM(s) IV Push every 12 hours  multivitamin 1 Tablet(s) Oral daily  pantoprazole  Injectable 40 milliGRAM(s) IV Push every 12 hours  sucralfate 1 Gram(s) Oral every 6 hours    MEDICATIONS  (PRN):  trimethobenzamide Injectable 200 milliGRAM(s) IntraMuscular every 12 hours PRN Nausea and/or Vomiting    Vital Signs Last 24 Hrs  T(C): 36.5 (17 Dec 2022 09:11), Max: 36.7 (16 Dec 2022 20:53)  T(F): 97.7 (17 Dec 2022 09:11), Max: 98 (16 Dec 2022 20:53)  HR: 84 (17 Dec 2022 09:11) (75 - 89)  BP: 113/72 (17 Dec 2022 09:11) (101/51 - 118/66)  BP(mean): 72 (16 Dec 2022 10:55) (72 - 72)  RR: 18 (17 Dec 2022 09:11) (16 - 18)  SpO2: 100% (17 Dec 2022 09:11) (98% - 100%)    Parameters below as of 17 Dec 2022 09:11  Patient On (Oxygen Delivery Method): room air        PHYSICAL EXAM:    General: lying in bed, in no acute distress, appears chronically ill, thin  HEENT: MMM, conjunctiva and sclera clear  Gastrointestinal: Soft non-tender non-distended; No rebound or guarding  Skin: Warm and dry. No obvious rash    LABS:                        10.1   10.53 )-----------( 258      ( 17 Dec 2022 07:43 )             30.8     12-17    137  |  107  |  21  ----------------------------<  138<H>  4.2   |  21<L>  |  1.80<H>    Ca    7.6<L>      17 Dec 2022 07:43  Phos  2.9     12-17  Mg     1.9     12-17                        RADIOLOGY & ADDITIONAL STUDIES:  Reviewed

## 2022-12-17 NOTE — PROGRESS NOTE ADULT - ASSESSMENT
73 y/o male history of metastatic stage IV lung adenocarcinoma, hypothyroidism, depression, anxiety, undergoing chemo brought in by EMS with HHA for altered mental status Monday morning admitted for sepsis (unclear source) and electrolyte derangements. Now found to have anemia and severe ileus on 12/6, s/p EGD/flex sig 12/9 w/ ulcers, w/ ongoing GOC discussions.

## 2022-12-17 NOTE — PROGRESS NOTE ADULT - PROBLEM SELECTOR PLAN 2
Patient with diarrhea described as loose stool, per outpatient notes seems to be chronic for several weeks (later stated for a year). Patient was prescribed Loperamide early November.  Unclear etiology. May be in setting of metastatic malignancy, however currently considering inflammatory given chronicity and white count. Diarrhea likely not infectious C. diff negative, GI PCR negative. Pt persistently hypokalemic likely from diarrhea. GI consulted on 12/2. 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 EGD on 12/9 showing poor rectal tone.  - CRP high 132; quantitative IgA normal; tissue transglutaminase (TTG) IgA-antibody nml  Plan - RESOLVED

## 2022-12-17 NOTE — PROGRESS NOTE ADULT - ATTENDING COMMENTS
Patient seen and discussed with fellow plan as noted above
Patient seen and discussed with fellow plan as noted above  Plan for steroids, tentatively EGD for second look
Patient is a 72 year old gentleman with history of metastatic lung cancer s/p chemotherapy, hypothyroidism s/p Graves thyroid iodine ablation, anxiety, depression, hemorrhoidectomy 20 years prior, incontinence, surgery following for concern for ileus.    Afebrile, hemodynamically stable, abdominal exam stable, soft, minimal distension.    Tolerating clear liquid diet, underwent upper and lower endoscopy with GI  Passing flatus, having bowel function  Ok to advance as tolerated per primary team  No acute surgical intervention at this time
Patient is a 72 year old gentleman with history of metastatic lung cancer s/p chemotherapy, hypothyroidism s/p Graves thyroid iodine ablation, anxiety, depression, incontinence, initially admitted with altered mental status since improved, now with concern for ileus.    At time of evaluation, afebrile and hemodynamically stable, abdomen is soft and mildly distended, minimal tenderness, no peritonitis.    Continuing bowel rest, NG tube in place for decompression, patient does report passing some flatus and having bowel function  Possible endoscopy with GI pending  No acute surgical intervention at this time, will continue to follow
Patient seen and discussed with fellow plan as noted above
Patient seen and discussed with fellow plan as noted above
Pt seen and d/w fellow and Dr. Asencio.  Will plan on an EGD and a sigmoidoscopy tomorrow.
Pt seen and d/w fellow.  Continue PPI bid and Carafate suspension qid.  F/u as outpt for a repeat EGD and a full colonoscopy.
Pt seen and d/w fellow.  Pr deferring endoscopic evaluation at this time.  If amenable would plan on an EGD and a sigmoidoscopy with biopsy.
Seen and examined with fellow.    71 y/o male history of metastatic stage IV lung adenocarcinoma s/p chemoimmunotherapy on 10/27 (carbo/pem/pem) admitted with AMS, now improved. EGD and flex sig with several ulcers in upper GI tract. Path with bacteria on necrosis, not H.pylori. D/w  and  extensively. Possibly ICI gastritis, though very rare complication. Hb stable post last blood transfusion. Start Solumedrol, with plan to taper over 2-4 wks. Plan for EGD tomorrow.    If Hb remains stable, no no further needs, no oncologic contraindication to discharge early next week. Will switch steroids to oral upon dc with PPI and carafate per GI. Can f/u with me within 2 wks from dc. Please ensure rehab facility will bring him for appointment.    Total time spent on encounter, including but not limited to counseling/coordination of care: 35 mis.
Seen and examined with fellow.    73 y/o male history of metastatic stage IV lung adenocarcinoma s/p chemoimmunotherapy on 10/27 (carbo/pem/pem) admitted with AMS, now improved. EGD and flex sig with several ulcers in upper GI tract. Acute blood loss anemia, pending GI work up. Possible re-scope?    Total time spent on encounter, including but not limited to counseling/coordination of care: 35 mis.
Seen and examined with fellow.    73 y/o male history of metastatic stage IV lung adenocarcinoma s/p chemoimmunotherapy on 10/27 (carbo/pem/pem) admitted with AMS, now improved. Work up for infectious/inflammatory etiology of diarrhea and ASHLYN ongoing. Likely dehydration. Possible ICI colitis. GI consulted, for EGD and flex sig tomorrow.    Total time spent on encounter, including but not limited to counseling/coordination of care: 35 mis.
Seen and examined with fellow.    71 y/o male history of metastatic stage IV lung adenocarcinoma s/p chemoimmunotherapy on 10/27 (carbo/pem/pem) admitted with AMS, now improved. EGD and flex sig with several ulcers in upper GI tract. Path with bacteria on necrosis, not H.pylori. D/w  and  extensively. Possibly ICI gastritis, though very rare complication. Would consider trial of steroids, IV Solumedrol 1-2 mg/kg daily. Will optimize GI strategies to improve ulcer healing. O/w plan as above. Transfuse PRN.    Total time spent on encounter, including but not limited to counseling/coordination of care: 35 mis.
Seen and examined with fellow.    73 y/o male history of metastatic stage IV lung adenocarcinoma s/p chemoimmunotherapy on 10/27 (carbo/pem/pem) admitted with AMS, now improved. Work up for infectious/inflammatory etiology of diarrhea and ASHLYN ongoing. Likely dehydration. Possible ICI colitis. GI consulted.    Total time spent on encounter, including but not limited to counseling/coordination of care: 35 mis.
Patient seen and examined.  Agree with fellow note.  Patient with stage IV lung cancer, s/p disease modifying interventions, now with sepsis, GIB.  Goals of care as documented above.  Patient is in agreement with a DNR/DNI.  MOLST form filled out and placed in chart.  Patient remains undecided about further therapy, and is looking to speak with Oncology.  Although patient does meet hospice criteria, he is not a candidate if he pursues further DMI.

## 2022-12-18 NOTE — PROGRESS NOTE ADULT - SUBJECTIVE AND OBJECTIVE BOX
O/N Events:    Subjective/ROS: Patient seen and examined at bedside.     Denies Fever/Chills, HA, CP, SOB, n/v, changes in bowel/urinary habits.  12pt ROS otherwise negative.    VITALS  Vital Signs Last 24 Hrs  T(C): 36.6 (18 Dec 2022 17:07), Max: 36.6 (18 Dec 2022 17:07)  T(F): 97.8 (18 Dec 2022 17:07), Max: 97.8 (18 Dec 2022 17:07)  HR: 74 (18 Dec 2022 17:07) (73 - 95)  BP: 138/66 (18 Dec 2022 17:07) (121/56 - 138/66)  BP(mean): --  RR: 18 (18 Dec 2022 17:07) (17 - 18)  SpO2: 96% (18 Dec 2022 17:07) (96% - 99%)    Parameters below as of 18 Dec 2022 17:07  Patient On (Oxygen Delivery Method): room air      CAPILLARY BLOOD GLUCOSE    PHYSICAL EXAM  General: NAD  HEENT: NC/AT, EOMI, dry MM  Cardiac: RRR; normal S1/S2, no MRG  Respiratory: BL crackles  Gastrointestinal: +BSx4, abdomen soft, NT/ND  Extremities: WWP x4; no peripheral edema  Neurologic: AAOx3; no focal deficits    MEDICATIONS  (STANDING):  bacitracin   Ointment 1 Application(s) Topical daily  buPROPion XL (24-Hour) . 150 milliGRAM(s) Oral every 24 hours  dextrose 5% + lactated ringers. 1200 milliLiter(s) (100 mL/Hr) IV Continuous <Continuous>  levothyroxine 88 MICROGram(s) Oral daily  methylPREDNISolone sodium succinate Injectable 40 milliGRAM(s) IV Push every 12 hours  multivitamin 1 Tablet(s) Oral daily  pantoprazole  Injectable 40 milliGRAM(s) IV Push every 12 hours  sucralfate 1 Gram(s) Oral every 6 hours    MEDICATIONS  (PRN):  trimethobenzamide Injectable 200 milliGRAM(s) IntraMuscular every 12 hours PRN Nausea and/or Vomiting      No Known Allergies      LABS                        9.4    7.49  )-----------( 275      ( 18 Dec 2022 08:58 )             28.8     12-18    140  |  111<H>  |  28<H>  ----------------------------<  119<H>  4.0   |  19<L>  |  1.90<H>    Ca    7.3<L>      18 Dec 2022 08:58  Phos  2.8     12-18  Mg     1.8     12-18    TPro  4.9<L>  /  Alb  1.8<L>  /  TBili  0.3  /  DBili  x   /  AST  64<H>  /  ALT  31  /  AlkPhos  414<H>  12-18      IMAGING/EKG/ETC

## 2022-12-18 NOTE — PROGRESS NOTE ADULT - SUBJECTIVE AND OBJECTIVE BOX
Interval Events: Reviewed  Patient seen and examined at bedside.    Patient is a 72y old  Male who presents with a chief complaint of AMS (17 Dec 2022 06:26)  no acute event overnight, RA 97%, tolerating diet      PAST MEDICAL & SURGICAL HISTORY:      MEDICATIONS:  Pulmonary:    Antimicrobials:    Anticoagulants:    Cardiac:      Allergies    No Known Allergies    Intolerances        Vital Signs Last 24 Hrs  T(C): 36.4 (18 Dec 2022 05:00), Max: 36.7 (17 Dec 2022 15:28)  T(F): 97.6 (18 Dec 2022 05:00), Max: 98 (17 Dec 2022 15:28)  HR: 76 (18 Dec 2022 05:00) (76 - 95)  BP: 128/65 (18 Dec 2022 05:00) (113/72 - 128/65)  BP(mean): --  RR: 18 (18 Dec 2022 05:00) (18 - 18)  SpO2: 97% (18 Dec 2022 05:00) (97% - 100%)    Parameters below as of 18 Dec 2022 05:00  Patient On (Oxygen Delivery Method): room air            Review of Systems:   •	General: negative  •	Skin/Breast: negative  •	Ophthalmologic: negative  •	ENMT: negative  •	Respiratory and Thorax: negative  •	Cardiovascular: negative  •	Gastrointestinal: negative  •	Genitourinary: negative  •	Musculoskeletal: negative  •	Neurological: negative  •	Psychiatric: negative  •	Hematology/Lymphatics: negative  •	Endocrine: negative  •	Allergic/Immunologic: negative    Physical Exam:   • Constitutional:	thin, frail male, NAD  • Eyes:	EOMI; PERRL; no drainage or redness  • ENMT:	No oral lesions; no gross abnormalities  • Neck	no thyromegaly or nodules  • Breasts:	not examined  • Back:	No deformity or limitation of movement  • Respiratory:	Breath Sounds equal & clear to auscultation, no accessory muscle use  • Cardiovascular:	Regular rate & rhythm, normal S1, S2; no murmurs, gallops or rubs; no S3, S4  • Gastrointestinal:	Soft, non-tender, no hepatosplenomegaly, normal bowel sounds  • Genitourinary:	not examined  • Rectal: not examined  • Extremities:	No cyanosis, clubbing or edema  • Vascular:	Equal and normal pulses (dorsalis pedis)  • Neurologica:l	not examined  • Skin:	No lesions; no rash  • Lymph Nodes:	No lymphadedenopathy  • Musculoskeletal:	No joint pain, swelling or deformity; no limitation of movement        LABS:      CBC Full  -  ( 17 Dec 2022 07:43 )  WBC Count : 10.53 K/uL  RBC Count : 3.56 M/uL  Hemoglobin : 10.1 g/dL  Hematocrit : 30.8 %  Platelet Count - Automated : 258 K/uL  Mean Cell Volume : 86.5 fl  Mean Cell Hemoglobin : 28.4 pg  Mean Cell Hemoglobin Concentration : 32.8 gm/dL  Auto Neutrophil # : 8.71 K/uL  Auto Lymphocyte # : 1.16 K/uL  Auto Monocyte # : 0.54 K/uL  Auto Eosinophil # : 0.00 K/uL  Auto Basophil # : 0.02 K/uL  Auto Neutrophil % : 82.8 %  Auto Lymphocyte % : 11.0 %  Auto Monocyte % : 5.1 %  Auto Eosinophil % : 0.0 %  Auto Basophil % : 0.2 %    12-17    137  |  107  |  21  ----------------------------<  138<H>  4.2   |  21<L>  |  1.80<H>    Ca    7.6<L>      17 Dec 2022 07:43  Phos  2.9     12-17  Mg     1.9     12-17                          RADIOLOGY & ADDITIONAL STUDIES (The following images were personally reviewed):  Galvin:                                     No  Urine output:                       adequate  DVT prophylaxis:                 Yes  Flattus:                                  Yes  Bowel movement:              No

## 2022-12-18 NOTE — PROGRESS NOTE ADULT - PROBLEM SELECTOR PLAN 12
Patient with recently found (08/22) with RUL spiculated nodule with R paratracheal adenopathy and L adrenal nodule. Concern for metastatic cancer because of rectal lesion on PET scan as well as L adrenal area that lit up. Now, s/p FNA of R axillary node, with pathology consistent with metastatic lung adenocarcinoma. Follows w/ Dr. Patricia outpatient.  - f/u heme/onc consult  - palliative consulted: DNR/DNI on 12/7, palliative to speak w/ Dr. Patricia about possible hospice  - attempt family member contact, no available contacts    #Prophylactic measures  F: maintenance fluids 100cc/hr LR w/ D5  E: replenish K<4, Mg<1.8, Phos<3, Ca<8.5   N: Minced and Moist    VTE Prophylaxis: hold i/s/o anemia  GI: pantoprazole 40 IV BID  C: DNR/DNI  D: SASHA

## 2022-12-19 NOTE — PROGRESS NOTE ADULT - SUBJECTIVE AND OBJECTIVE BOX
Hematology Oncology Progress Note      HPI:  71 y/o male history of metastatic stage IV lung adenocarcinoma, hypothyroidism, depression, anxiety, undergoing chemo brought in by EMS with HHA for altered mental status Monday morning. At baseline patient is A&Ox3, per HHA he was AOx1, not responding to questions or commands. Patient was reported to be hypotensive to SBP 50s in the field, IO was placed and given fluids with improvement in BP. Patient lives along, has HHA 2 days per week for 10 hours, per ED note HHA states she spoke to pt 3 days ago and foung him in his bed Monday morning at which time he was confused. Currently being treated for lung cancer, last chemo 1 month ago.  Patient also has had recurrent falls at home, some associated with head trauma but no loc, syncope, bladder/bowel incontinence.  Otherwise no reported illness, sick contacts, medical changes.  ROS otherwise negative.      ED Course   Vitals: Temp 98.7,  --> 86, /63, RR 20, SaO2 98% on 4L NC --> 97% room air   Labs: WBC 16.24, Hgb 11.7, Plt 449, Na 143, K 3.2, CO2 18, AG 24, BUN 31, Scr 2.35, Ca 7.6, Ma 1.4, Lactate 12.1 --> 2.3, UA trace LE, Bacteria present, hyaline casts, C. Diff negative GI PCR negative  EKG: sinus tachycardia, 1st degree AV block, narrow QRS, prolonged QTC  CXR: Known right upper lobe pulmonary mass   CT chest, abdomen, pelvis: No acute fractures. Unchanged right upper lobe malignancy. Decreased left adrenal metastasis. Decreased abdominal and pelvic adenopathy.  CTH: No intracranial hemorrhage or acute transcortical infarct.  Generalized volume loss with small vessel ischemic disease.  CT C-spine: No fracture. Levoscoliosis with Grade 1 anterolisthesis of C2 on C3 and C7 on T1. Multilevel cervical spondylosis. Right apical lung spiculated mass.  Interventions: Tylenol ig IV, Ca Gluconate 2g, MgSO4 2g x 2, KCl 40mg po x 1, KCl 10mg IV x 5, Zosyn 3.375 x 3, Vancomycin 1250mg, NaCl 2200cc bolus   (28 Nov 2022 21:33)      SUBJECTIVE: Patient seen and examined at bedside. Has more energy today. Feels that BM have imrpoved slightly.    OBJECTIVE:    VITAL SIGNS:  ICU Vital Signs Last 24 Hrs  T(C): 36.5 (19 Dec 2022 10:03), Max: 36.6 (18 Dec 2022 17:07)  T(F): 97.7 (19 Dec 2022 10:03), Max: 97.8 (18 Dec 2022 17:07)  HR: 85 (19 Dec 2022 10:03) (68 - 85)  BP: 122/57 (19 Dec 2022 10:03) (119/58 - 146/68)  BP(mean): --  ABP: --  ABP(mean): --  RR: 18 (19 Dec 2022 10:03) (17 - 18)  SpO2: 98% (19 Dec 2022 10:03) (96% - 99%)    O2 Parameters below as of 19 Dec 2022 10:03  Patient On (Oxygen Delivery Method): room air              CAPILLARY BLOOD GLUCOSE      POCT Blood Glucose.: 115 mg/dL (19 Dec 2022 12:01)      PHYSICAL EXAM:  General: elderly, thin, pale, NAD, answering questions, pleasantly conversant  HEENT: NC/AT; PERRL, clear conjunctiva  Neck: supple  Respiratory: CTA b/l  Cardiovascular: +S1/S2; RRR  Abdomen: slightly distended, non tender, +BS  Extremities: WWP, 2+ peripheral pulses b/l; no LE edema  Skin: normal color and turgor; no rash  Neurological: AAOx3    MEDICATIONS:  MEDICATIONS  (STANDING):  bacitracin   Ointment 1 Application(s) Topical daily  buPROPion XL (24-Hour) . 150 milliGRAM(s) Oral every 24 hours  dextrose 5% + lactated ringers. 1200 milliLiter(s) (100 mL/Hr) IV Continuous <Continuous>  heparin   Injectable 5000 Unit(s) SubCutaneous every 8 hours  levothyroxine 88 MICROGram(s) Oral daily  magnesium sulfate  IVPB 1 Gram(s) IV Intermittent once  methylPREDNISolone sodium succinate Injectable 40 milliGRAM(s) IV Push every 12 hours  multivitamin 1 Tablet(s) Oral daily  pantoprazole  Injectable 40 milliGRAM(s) IV Push every 12 hours  sucralfate 1 Gram(s) Oral every 6 hours    MEDICATIONS  (PRN):  acetaminophen     Tablet .. 650 milliGRAM(s) Oral every 6 hours PRN Moderate Pain (4 - 6)  trimethobenzamide Injectable 200 milliGRAM(s) IntraMuscular every 12 hours PRN Nausea and/or Vomiting      ALLERGIES:  Allergies    No Known Allergies    Intolerances        LABS:                        9.6    7.58  )-----------( 280      ( 19 Dec 2022 07:41 )             29.1     12-19    138  |  108  |  33<H>  ----------------------------<  100<H>  4.1   |  20<L>  |  1.94<H>    Ca    7.0<L>      19 Dec 2022 07:41  Phos  2.7     12-19  Mg     1.8     12-19    TPro  4.9<L>  /  Alb  1.8<L>  /  TBili  0.3  /  DBili  x   /  AST  64<H>  /  ALT  31  /  AlkPhos  414<H>  12-18                    RADIOLOGY & ADDITIONAL TESTS: Reviewed.

## 2022-12-19 NOTE — PROGRESS NOTE ADULT - TIME BILLING
Patient seen and examined;  Discuss with Oncology changing to PO steroids  Okay for SASHA at this point  Labs stable

## 2022-12-19 NOTE — PROGRESS NOTE ADULT - SUBJECTIVE AND OBJECTIVE BOX
INTERVAL HPI/OVERNIGHT EVENTS:  No complaint;  Discussed with oncology ; Can be transitioned to Po Steroids   Labs noted       MEDICATIONS  (STANDING):  bacitracin   Ointment 1 Application(s) Topical daily  buPROPion XL (24-Hour) . 150 milliGRAM(s) Oral every 24 hours  dextrose 5% + lactated ringers. 1200 milliLiter(s) (100 mL/Hr) IV Continuous <Continuous>  heparin   Injectable 5000 Unit(s) SubCutaneous every 8 hours  levothyroxine 88 MICROGram(s) Oral daily  magnesium sulfate  IVPB 1 Gram(s) IV Intermittent once  methylPREDNISolone sodium succinate Injectable 40 milliGRAM(s) IV Push every 12 hours  multivitamin 1 Tablet(s) Oral daily  pantoprazole  Injectable 40 milliGRAM(s) IV Push every 12 hours  sucralfate 1 Gram(s) Oral every 6 hours    MEDICATIONS  (PRN):  trimethobenzamide Injectable 200 milliGRAM(s) IntraMuscular every 12 hours PRN Nausea and/or Vomiting      Allergies    No Known Allergies    Intolerances        Vital Signs Last 24 Hrs  T(C): 36.5 (19 Dec 2022 10:03), Max: 36.6 (18 Dec 2022 17:07)  T(F): 97.7 (19 Dec 2022 10:03), Max: 97.8 (18 Dec 2022 17:07)  HR: 85 (19 Dec 2022 10:03) (68 - 85)  BP: 122/57 (19 Dec 2022 10:03) (119/58 - 146/68)  BP(mean): --  RR: 18 (19 Dec 2022 10:03) (17 - 18)  SpO2: 98% (19 Dec 2022 10:03) (96% - 99%)    Parameters below as of 19 Dec 2022 10:03  Patient On (Oxygen Delivery Method): room air              Constitutional: Awake     Eyes: ARBEN    ENMT: Negative    Neck: Supple    Back:  no tenderness     Respiratory:  clear    Cardiovascular: S1 S2    Gastrointestinal: soft    Genitourinary:    Extremities:  no edema     Vascular:    Neurological:    Skin:    Lymph Nodes:            LABS:                        9.6    7.58  )-----------( 280      ( 19 Dec 2022 07:41 )             29.1     12-19    138  |  108  |  33<H>  ----------------------------<  100<H>  4.1   |  20<L>  |  1.94<H>    Ca    7.0<L>      19 Dec 2022 07:41  Phos  2.7     12-19  Mg     1.8     12-19    TPro  4.9<L>  /  Alb  1.8<L>  /  TBili  0.3  /  DBili  x   /  AST  64<H>  /  ALT  31  /  AlkPhos  414<H>  12-18          RADIOLOGY & ADDITIONAL TESTS:

## 2022-12-19 NOTE — PROGRESS NOTE ADULT - SUBJECTIVE AND OBJECTIVE BOX
O/N Events: no acute events overnight.     Subjective/ROS: Patient seen and examined at bedside.  Patient complaining of a headache.      VITALS  Vital Signs Last 24 Hrs  T(C): 36.5 (19 Dec 2022 10:03), Max: 36.6 (18 Dec 2022 17:07)  T(F): 97.7 (19 Dec 2022 10:03), Max: 97.8 (18 Dec 2022 17:07)  HR: 85 (19 Dec 2022 10:03) (68 - 85)  BP: 122/57 (19 Dec 2022 10:03) (119/58 - 146/68)  BP(mean): --  RR: 18 (19 Dec 2022 10:03) (17 - 18)  SpO2: 98% (19 Dec 2022 10:03) (96% - 99%)    Parameters below as of 19 Dec 2022 10:03  Patient On (Oxygen Delivery Method): room air    CAPILLARY BLOOD GLUCOSE      POCT Blood Glucose.: 115 mg/dL (19 Dec 2022 12:01)    PHYSICAL EXAM  General: NAD  HEENT: NC/AT, EOMI, dry MM  Cardiac: RRR; normal S1/S2, no MRG  Respiratory: BL crackles  Gastrointestinal: +BSx4, abdomen soft, NT/ND  Extremities: WWP x4; no peripheral edema  Neurologic: AAOx3; no focal deficits    MEDICATIONS  (STANDING):  bacitracin   Ointment 1 Application(s) Topical daily  buPROPion XL (24-Hour) . 150 milliGRAM(s) Oral every 24 hours  dextrose 5% + lactated ringers. 1200 milliLiter(s) (100 mL/Hr) IV Continuous <Continuous>  heparin   Injectable 5000 Unit(s) SubCutaneous every 8 hours  levothyroxine 88 MICROGram(s) Oral daily  methylPREDNISolone sodium succinate Injectable 40 milliGRAM(s) IV Push every 12 hours  multivitamin 1 Tablet(s) Oral daily  pantoprazole  Injectable 40 milliGRAM(s) IV Push every 12 hours  sucralfate 1 Gram(s) Oral every 6 hours    MEDICATIONS  (PRN):  acetaminophen     Tablet .. 650 milliGRAM(s) Oral every 6 hours PRN Moderate Pain (4 - 6)  trimethobenzamide Injectable 200 milliGRAM(s) IntraMuscular every 12 hours PRN Nausea and/or Vomiting      No Known Allergies      LABS                        9.6    7.58  )-----------( 280      ( 19 Dec 2022 07:41 )             29.1     12-19    138  |  108  |  33<H>  ----------------------------<  100<H>  4.1   |  20<L>  |  1.94<H>    Ca    7.0<L>      19 Dec 2022 07:41  Phos  2.7     12-19  Mg     1.8     12-19    TPro  4.9<L>  /  Alb  1.8<L>  /  TBili  0.3  /  DBili  x   /  AST  64<H>  /  ALT  31  /  AlkPhos  414<H>  12-18    IMAGING/EKG/ETC

## 2022-12-19 NOTE — PROGRESS NOTE ADULT - ASSESSMENT
71 y/o male history of metastatic stage IV lung adenocarcinoma (carboplatin, pemetrexed, pembrolizumab on C1D1 10/27), hypothyroidism, depression, anxiety, undergoing chemo brought in by EMS with HHA for altered mental status. Found to have sepsis and ASHLYN. Oncology consulted given recent treatment of lung cancer and possible therapy releated side effects.    # Metastatic lung adenocarcinoma, AJCC Stage IV  Pt with R axillary LN biopsy positive for metastatic lung adenocarcinoma, with FDG avidity in thoracic, abdominal, inguinal LNs, as well as rectal thickening. Pt has not had any recent colonoscopies to further investigate GI involvement. Repeat CT CAP with increase in primary lung mass, persistent rectal thickening and other sites of disease as previously described. 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 Received Cycle 1 of chemo with carboplatin, pemetrexed, and pembrolizumab on 10/27/22. Now admitted with sepsis and ASHLYN, possibly 2/2 infection vs chemotherapy. Patient states that diarrhea started 1-2 weeks after chemotherapy and has been having a 2-3 bowel movements for the past 2 weeks. 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 Interval CT scan on admission 11/28 showing unchanged right upper lobe malignancy since October 7, 2022. Decreased left adrenal metastasis. Decreased abdominal and pelvic adenopathy.  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 Would discharge with Prednisone 60mg daily, taper by 20mg per week (week 1 60mg, week 2 40mg, week 3 20mg, etc.) Will continue to manage and monitor taper outpatient in hematology clinic .    # Leukocytosis, improved  Persistent neutrophilic predominant leukocytosis from admission, prior to systemic chemoimmunotherapy WBC was normal in October 2022. Reported about 1 month of diarrhea post treatment. WBC count fluctuating between 16-25 during current admission, afebrile and with negative infectious work up. 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    # Microcytic anemia, stable  Hemoglobin baseline August-October 2022 was 13-15, in 2021 was ~10. During current hospitalization, patient has a downtrend in hemoglobin from 11.7 on admission 11/28 (possibly concentrated) to 7.4 on 12/4. On 12/5 with drop in hemoglobin to 5.7. Would be concerned for acute losses. CT Abd/Pelvis admission without acute abdominal findings, but patient has had persistent diarrhea/decrease in hgb. Given dark brown/black gastric contents and persistent drop in Hgb, would be concerned for acute GI bleed. Ferritin normal and no iron deficiency (iron sat 31%), Unlikely hemolysis given elevated hapto, slightly elevated LDH. EGD showing severe esophagitis, ulcers and atrophic gastritis in stomach. Flex sig showing normal mucosa, colon not visualized. Given presence of bacteria on pathology, will see if path can stain for H pylori. Will discuss with GI regarding ulcers. Suspect that patient still having intermittent GI losses given dark stools, but no reported melena on MUKESH and no more episodes of coffee ground emesis. Frequent transfusions every few days. 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 Would discharge with Prednisone 60mg daily, taper by 20mg per week (week 1 60mg, week 2 40mg, week 3 20mg, etc.) Will continue to manage and monitor taper outpatient in hematology clinic .  - Please ensure that patient's oncology appointment with Dr. Patricia is listed in several places throughout paperwork so that rehab can facilitate transport.    Discussed with Dr. Patricia.

## 2022-12-20 NOTE — PROGRESS NOTE ADULT - ASSESSMENT
per Internal Medicine    72 y o male history of metastatic stage IV lung adenocarcinoma, hypothyroidism, depression, anxiety, undergoing chemo brought in by EMS with HHA for altered mental status Monday morning admitted for sepsis (unclear source) and electrolyte derangements. Now found to have anemia and severe ileus on 12/6, s/p EGD/flex sig 12/9 w/ ulcers, w/ ongoing GOC discussions.       Problem/Plan - 1:  ·  Problem: Anemia.   ·  Plan: Pt with anemia on admission to [facility]. On 12/5, Hb 5.7. Pt asx, mild tachycardia, but normal BP. Unclear source. Pt w/ multiple dark green bowel movements, no overt melena observed. No hemoptysis, no hematuria. Significant bruising on R flank, c/f retroperitoneal bleed but r/o on imaging. Possible GI bleed, GI consulted. Heme onc consulted. Recurrent bleeding.  - CTAP w/o contrast 12/5/22: No retroperitoneal or extraperitoneal hemorrhage. Severe ileus.  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   - c/w solumedrol 40mg IV BID (started 12/15)  - EGD done 12/16: small hiatal hernia, grade C esophagitis (biopsied r/o checkpoint inhibitor esophagitis) Single cratered non-bleeding healing in the cardia, a few cratered non-bleeding healing ulcers in the pylorus, multiple cratered non-bleeding ulcers in the duodenal bulb (biopsied to r/o checkpoint inhibitor duodenitis).   - GI recommending:       - PPI BID for 8 weeks       - repeat EGD in 2 months        - Carafate Suspension 1g po qid for 2 weeks.    Problem/Plan - 2:  ·  Problem: Diarrhea.   ·  Plan: Patient with diarrhea described as loose stool, per outpatient notes seems to be chronic for several weeks (later stated for a year). Patient was prescribed Loperamide early November.  Unclear etiology. May be in setting of metastatic malignancy, however currently considering inflammatory given chronicity and white count. Diarrhea likely not infectious C. diff negative, GI PCR negative. Pt persistently hypokalemic likely from diarrhea. GI consulted on 12/2. 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 EGD on 12/9 showing poor rectal tone.  - CRP high 132; quantitative IgA normal; tissue transglutaminase (TTG) IgA-antibody nml  Plan - RESOLVED.    Problem/Plan - 3:  ·  Problem: Ileus.   ·  Plan: On 12/5 pt found to have distended abdomen. CTAP w/o contrast on 12/5 showing severe ileus. Surgery was formally consulted. Decided not to staff with attending but will continue to follow for serial abdominal exams. Primary team placed sump tube, set to suction. GI also following.  - placed sump tube 12/5  - bladder scan on 12/5, not retaining  - repeat abdominal XR 12/7: abdominal distention w/ improving ileus  - surgery signed off 12/9  Plan:  - restarting minced and moist diet, advance as tolerated.    Problem/Plan - 4:  ·  Problem: ASHLYN (acute kidney injury).   ·  Plan: Patient with ASHLYN on admission BUN 31, Scr 2.35 -->Scr 2.31 (baseline Scr 1.1 10/22). ASHLYN likely prerenal i/s/o hypovolemia due to diarrhea and poor po intake. Given prostate mets, there may also be a post-renal component. Pt w/ mark initially, then removed after passing TOV.   - downtrending Cr 12/9  - c/w maintenance fluids w/ D5 due to low sugars  - strict I/Os  - trend Cr, avoid nephrotoxic drugs, renally dose meds.    Problem/Plan - 5:  ·  Problem: Hypokalemia.   ·  Plan: Patient with hypokalemia, likely due to persistent diarrhea. No EKG changes. On 12/1 K low to 2.1, repeat EKG possible u waves. Pt asymptomatic, no fasciculations or weakness. Aggressively repleted. Diarrhea stopped when pt NPO, and K improved, now worsening w/ worsening diarrhea after diet restarted.  - K replenish, goal >4.    Problem/Plan - 6:  ·  Problem: Sepsis.   ·  Plan: Patient with encephalopathy and hypotension, meeting 3 SIRS on admission for tachycardia, tachypnea, and leukocytosis.  Source possibly pulmonary and urinary.  Lactate 12.1 --> 2.3, UA trace LE, Bacteria present, hyaline casts, C. Diff negative GI PCR negative.  Lactate cleared, S/p Zosyn 3.375 x 3, Vancomycin 1250mg, NaCl 2200cc bolus in ED.   Still unclear source. AAOx4, mentating well since 11/29.  - UCx no growth final  - BCx NGTD  - ULeg negative  - s/p Vanc 1000mg qD and Zosyn 3.375 q8 (11/28-12/1)  - repeat BCx/UA w/ reflex culture NGTD  Plan:  - continue to trend WBC.    Problem/Plan - 7:  ·  Problem: Hypothyroidism.   ·  Plan: Patient with history of hypothyroidism, home medications Levothyroxine 75mcg daily. On 12/1 TSH 10.81, free T4 0.67.  - c/w synthroid 88mcg qD.    Problem/Plan - 8:  ·  Problem: Falls.   ·  Plan: Patient with frequent falls at home, per outpatient nursing notes, patient's HHA have reported falls at home sometimes with head trauma.  He ambulated at baseline with cane.  Etiology likely due to overall decompensation in setting of metastatic cancer. CTH and CT cervical spine without fractures of trauma.    - PT/OT consulted - recommending SASHA  - OOBTC daily w/ supervision.    Problem/Plan - 9:  ·  Problem: Adult failure to thrive.   ·  Plan: Patient with underlying metastatic lung adenocarcinoma with chronic diarrhea and frequent falls, overall appears very frail.    - dietitian consulted - rec ensure enlive TID  - PT/OT consulted, rec SASHA  - palliative consult: DNR/DNI; palliative to speak w/ Dr. Patricia for possible hospice  - contact family  - incentive spirometry.    Problem/Plan - 10:  ·  Problem: Hypomagnesemia.   ·  Plan; Patient with hypomagnesemia, likely due to persistent diarrhea.  Likely contributing to overall weakness and falls.  - Replenish PRN.    Problem/Plan - 11:  ·  Problem: Hypophosphatemia.   ·  Plan: Patient with hypophosphatemia, likely due to persistent diarrhea.  Likely contributing to overall weakness and falls.  - Replenish PRN.    Problem/Plan - 12:  ·  Problem: Adenocarcinoma, lung.   ·  Plan: Patient with recently found (08/22) with RUL spiculated nodule with R paratracheal adenopathy and L adrenal nodule. Concern for metastatic cancer because of rectal lesion on PET scan as well as L adrenal area that lit up. Now, s/p FNA of R axillary node, with pathology consistent with metastatic lung adenocarcinoma. Follows w/ Dr. Patricia outpatient.  - f/u heme/onc consult  - palliative consulted: DNR/DNI on 12/7, palliative to speak w/ Dr. Patricia about possible hospice  - attempt family member contact, no available contacts    #Prophylactic measures  F: maintenance fluids 100cc/hr LR w/ D5  E: replenish K<4, Mg<1.8, Phos<3, Ca<8.5   N: Minced and Moist    VTE Prophylaxis: hold i/s/o anemia  GI: pantoprazole 40 IV BID  C: DNR/DNI  D: OLIVER

## 2022-12-20 NOTE — PROGRESS NOTE ADULT - SUBJECTIVE AND OBJECTIVE BOX
O/N Events: no acute events overnight.     Subjective/ROS: Patient seen and examined at bedside.     VITALS  Vital Signs Last 24 Hrs  T(C): 36.4 (20 Dec 2022 08:43), Max: 36.5 (19 Dec 2022 21:48)  T(F): 97.5 (20 Dec 2022 08:43), Max: 97.7 (19 Dec 2022 21:48)  HR: 79 (20 Dec 2022 08:43) (72 - 79)  BP: 163/80 (20 Dec 2022 08:43) (138/67 - 167/79)  BP(mean): --  RR: 17 (20 Dec 2022 08:43) (16 - 18)  SpO2: 99% (20 Dec 2022 08:43) (96% - 100%)    Parameters below as of 20 Dec 2022 08:43  Patient On (Oxygen Delivery Method): room air      CAPILLARY BLOOD GLUCOSE    POCT Blood Glucose.: 111 mg/dL (19 Dec 2022 16:52)    PHYSICAL EXAM  General: NAD  HEENT: NC/AT, EOMI, dry MM  Cardiac: RRR; normal S1/S2, no MRG  Respiratory: BL crackles  Gastrointestinal: +BSx4, abdomen soft, NT/ND  Extremities: WWP x4, LUE pain and swelling  Neurologic: AAOx3; no focal deficits    MEDICATIONS  (STANDING):  bacitracin   Ointment 1 Application(s) Topical daily  buPROPion XL (24-Hour) . 150 milliGRAM(s) Oral every 24 hours  dextrose 5% + lactated ringers. 1200 milliLiter(s) (100 mL/Hr) IV Continuous <Continuous>  heparin   Injectable 5000 Unit(s) SubCutaneous every 8 hours  levothyroxine 88 MICROGram(s) Oral daily  magnesium sulfate  IVPB 1 Gram(s) IV Intermittent once  methylPREDNISolone sodium succinate Injectable 40 milliGRAM(s) IV Push every 12 hours  multivitamin 1 Tablet(s) Oral daily  pantoprazole  Injectable 40 milliGRAM(s) IV Push every 12 hours  sucralfate suspension 1 Gram(s) Oral every 6 hours    MEDICATIONS  (PRN):  acetaminophen     Tablet .. 650 milliGRAM(s) Oral every 6 hours PRN Moderate Pain (4 - 6)  trimethobenzamide Injectable 200 milliGRAM(s) IntraMuscular every 12 hours PRN Nausea and/or Vomiting      No Known Allergies      LABS                        10.5   9.11  )-----------( 326      ( 20 Dec 2022 05:30 )             32.0     12-20    137  |  107  |  36<H>  ----------------------------<  112<H>  4.1   |  20<L>  |  1.81<H>    Ca    7.1<L>      20 Dec 2022 05:30  Phos  2.1     12-20  Mg     1.9     12-20    IMAGING/EKG/ETC

## 2022-12-20 NOTE — PROGRESS NOTE ADULT - PROBLEM SELECTOR PLAN 8
Patient with history of hypothyroidism, home medications Levothyroxine 75mcg daily. On 12/1 TSH 10.81, free T4 0.67.  - c/w synthroid 88mcg qD

## 2022-12-20 NOTE — PROGRESS NOTE ADULT - SUBJECTIVE AND OBJECTIVE BOX
Physical Medicine and Rehabilitation Progress Note :       Patient is a 72y old  Male who presents with a chief complaint of AMS (18 Dec 2022 06:36)      HPI:  73 y/o male history of metastatic stage IV lung adenocarcinoma, hypothyroidism, depression, anxiety, undergoing chemo brought in by EMS with HHA for altered mental status Monday morning. At baseline patient is A&Ox3, per HHA he was AOx1, not responding to questions or commands. Patient was reported to be hypotensive to SBP 50s in the field, IO was placed and given fluids with improvement in BP. Patient lives along, has HHA 2 days per week for 10 hours, per ED note HHA states she spoke to pt 3 days ago and foung him in his bed Monday morning at which time he was confused. Currently being treated for lung cancer, last chemo 1 month ago.  Patient also has had recurrent falls at home, some associated with head trauma but no loc, syncope, bladder/bowel incontinence.  Otherwise no reported illness, sick contacts, medical changes.  ROS otherwise negative.      ED Course   Vitals: Temp 98.7,  --> 86, /63, RR 20, SaO2 98% on 4L NC --> 97% room air   Labs: WBC 16.24, Hgb 11.7, Plt 449, Na 143, K 3.2, CO2 18, AG 24, BUN 31, Scr 2.35, Ca 7.6, Ma 1.4, Lactate 12.1 --> 2.3, UA trace LE, Bacteria present, hyaline casts, C. Diff negative GI PCR negative  EKG: sinus tachycardia, 1st degree AV block, narrow QRS, prolonged QTC  CXR: Known right upper lobe pulmonary mass   CT chest, abdomen, pelvis: No acute fractures. Unchanged right upper lobe malignancy. Decreased left adrenal metastasis. Decreased abdominal and pelvic adenopathy.  CTH: No intracranial hemorrhage or acute transcortical infarct.  Generalized volume loss with small vessel ischemic disease.  CT C-spine: No fracture. Levoscoliosis with Grade 1 anterolisthesis of C2 on C3 and C7 on T1. Multilevel cervical spondylosis. Right apical lung spiculated mass.  Interventions: Tylenol ig IV, Ca Gluconate 2g, MgSO4 2g x 2, KCl 40mg po x 1, KCl 10mg IV x 5, Zosyn 3.375 x 3, Vancomycin 1250mg, NaCl 2200cc bolus   (28 Nov 2022 21:33)                            10.5   9.11  )-----------( 326      ( 20 Dec 2022 05:30 )             32.0       12-20    137  |  107  |  36<H>  ----------------------------<  112<H>  4.1   |  20<L>  |  1.81<H>    Ca    7.1<L>      20 Dec 2022 05:30  Phos  2.1     12-20  Mg     1.9     12-20      Vital Signs Last 24 Hrs  T(C): 36.4 (20 Dec 2022 08:43), Max: 36.5 (19 Dec 2022 21:48)  T(F): 97.5 (20 Dec 2022 08:43), Max: 97.7 (19 Dec 2022 21:48)  HR: 79 (20 Dec 2022 08:43) (72 - 79)  BP: 163/80 (20 Dec 2022 08:43) (138/67 - 167/79)  BP(mean): --  RR: 17 (20 Dec 2022 08:43) (16 - 18)  SpO2: 99% (20 Dec 2022 08:43) (96% - 100%)    Parameters below as of 20 Dec 2022 08:43  Patient On (Oxygen Delivery Method): room air        MEDICATIONS  (STANDING):  bacitracin   Ointment 1 Application(s) Topical daily  buPROPion XL (24-Hour) . 150 milliGRAM(s) Oral every 24 hours  dextrose 5% + lactated ringers. 1200 milliLiter(s) (100 mL/Hr) IV Continuous <Continuous>  heparin   Injectable 5000 Unit(s) SubCutaneous every 8 hours  levothyroxine 88 MICROGram(s) Oral daily  magnesium sulfate  IVPB 1 Gram(s) IV Intermittent once  methylPREDNISolone sodium succinate Injectable 40 milliGRAM(s) IV Push every 12 hours  multivitamin 1 Tablet(s) Oral daily  pantoprazole  Injectable 40 milliGRAM(s) IV Push every 12 hours  sodium phosphate 15 milliMole(s)/250 mL IVPB 15 milliMole(s) IV Intermittent once  sucralfate suspension 1 Gram(s) Oral every 6 hours    MEDICATIONS  (PRN):  acetaminophen     Tablet .. 650 milliGRAM(s) Oral every 6 hours PRN Moderate Pain (4 - 6)  trimethobenzamide Injectable 200 milliGRAM(s) IntraMuscular every 12 hours PRN Nausea and/or Vomiting         Physical / Occupational Therapy Functional Status Assessment :   12/19/2022    Pain Assessment/Intervention/Re-Assesssment    Pain Assessment/Number Scale (0-10) Adult  Presence of Pain: complains of pain/discomfort  Body Location: back pain  Pain Rating (0-10): Rest: 5   Pain Rating (0-10): Activity: 5     Cognitive/Neuro      Cognitive/Neuro/Behavioral  Cognitive/Neuro/Behavioral [WDL Definition: Alert; opens eyes spontaneously; arouses to voice or touch; oriented x 4; follows commands; speech spontaneous, logical; purposeful motor response; behavior appropriate to situation]: WDL    Language Assistance  Preferred Language to Address Healthcare Preferred Language to Address Healthcare: English    Therapeutic Interventions      Bed Mobility  Bed Mobility Training Scooting: contact guard;  1 person assist;  verbal cues  Bed Mobility Training Bridging: contact guard;  1 person assist;  verbal cues  Bed Mobility Training Sit-to-Supine: contact guard;  1 person assist;  verbal cues  Bed Mobility Training Supine-to-Sit: contact guard;  1 person assist;  verbal cues  Bed Mobility Training Limitations: decreased ability to use arms for pushing/pulling;  decreased ability to use legs for bridging/pushing;  impaired ability to control trunk for mobility;  decreased strength;  impaired balance;  impaired postural control;  pain    Sit-Stand Transfer Training  Transfer Training Sit-to-Stand Transfer: min A x 2 with b/l UEs support on high back chair in 1st trial, mod A x 2 in 2nd trial ;  high back chair ;  verbal cues  Transfer Training Stand-to-Sit Transfer: minimum assist (75% patient effort);  2 person assist;  2 trials   Sit-to-Stand Transfer Training Transfer Safety Analysis: decreased balance;  decreased strength;  impaired balance;  impaired postural control;  pain    Gait Training  Gait Training: moderate assist (50% patient effort);  minimum assist (75% patient effort);  2 person assist;  b/l UEs support on high back chair ;  8 side steps   Gait Analysis: swing-to gait   increased time in double stance;  decreased weight-shifting ability;  slightly unsteady gait, no lose of balance ;  decreased strength;  impaired balance;  impaired postural control;  unable to ambulate for further distance secondary pt complains feeling back pain ;  pain    Therapeutic Exercise  Therapeutic Exercise Detail: Pt dangled at EOB for~ 7 mins with CG/CS.Sated ther ex: shoulder flexion x 10 bilalterally, forward punch with RUE x 10.Pt stood with b/l UEs support on high back chair with min A x 2 for~ 1.5 min.       Therapeutic Exercise  Therapeutic Exercise Detail: Pt sat EOB ~10 mins with supervision. Pt performed RUE forward punches x 10 reps (pt declining on LUE 2/2 pain at L elbow), BUE shoulder flexion x 10 reps.// Pt also performed 5 sidesteps holding onto back of chair and b/l axillary and trunk support with min A x2.     Lower Body Dressing Training  Lower Body Dressing Training Assistance: contact guard;  verbal cues;  1 person assist;  nonverbal cues (demo/gestures);  pull up socks seated EOB;  decreased strength;  impaired balance;  impaired coordination    Upper Body Dressing Training  Upper Body Dressing Training Assistance: minimum assist (75% patient effort);  nonverbal cues (demo/gestures);  verbal cues;  1 person assist;  doff soiled gown/azra new gown seated EOB;  decreased strength;  impaired balance;  impaired coordination    Toilet Training  Toilet Training Assistance: maximum assist (25% patient effort);  1 person assist;  verbal cues;  nonverbal cues (demo/gestures);  for hygiene in standing after bowel movement;  decreased strength;  impaired balance              PM&R Impression : as above    Current Disposition Plan Recommendations :    subacute rehab placement

## 2022-12-21 NOTE — CHART NOTE - NSCHARTNOTESELECT_GEN_ALL_CORE
Follow up/Nutrition Services
Nutrition Follow-up/Nutrition Services
Nutrition Services
Nutrition Services
Palliative Care Attending
CURTIS/Event Note
Event Note
GI - EGD
GI - EGD/ Flex sig
Nutrition Services

## 2022-12-21 NOTE — PROGRESS NOTE ADULT - SUBJECTIVE AND OBJECTIVE BOX
O/N Events: sonny    Subjective/ROS: Patient seen and examined at bedside.     Denies Fever/Chills, HA, CP, SOB, n/v, changes in bowel/urinary habits.  12pt ROS otherwise negative.    VITALS  Vital Signs Last 24 Hrs  T(C): 36.6 (21 Dec 2022 22:07), Max: 36.6 (21 Dec 2022 22:07)  T(F): 97.8 (21 Dec 2022 22:07), Max: 97.8 (21 Dec 2022 22:07)  HR: 83 (21 Dec 2022 22:07) (69 - 83)  BP: 157/68 (21 Dec 2022 22:07) (138/58 - 161/66)  BP(mean): --  RR: 18 (21 Dec 2022 22:07) (17 - 18)  SpO2: 99% (21 Dec 2022 22:07) (99% - 100%)    Parameters below as of 21 Dec 2022 22:07  Patient On (Oxygen Delivery Method): room air        CAPILLARY BLOOD GLUCOSE      POCT Blood Glucose.: 96 mg/dL (21 Dec 2022 17:49)  POCT Blood Glucose.: 92 mg/dL (21 Dec 2022 11:52)  POCT Blood Glucose.: 94 mg/dL (21 Dec 2022 06:04)  POCT Blood Glucose.: 127 mg/dL (21 Dec 2022 00:09)      PHYSICAL EXAM  General: NAD  HEENT: NC/AT, EOMI, dry MM  Cardiac: RRR; normal S1/S2, no MRG  Respiratory: BL crackles  Gastrointestinal: +BSx4, abdomen soft, NT/ND  Extremities: WWP x4, LUE pain and swelling  Neurologic: AAOx3; no focal deficits    MEDICATIONS  (STANDING):  bacitracin   Ointment 1 Application(s) Topical daily  buPROPion XL (24-Hour) . 150 milliGRAM(s) Oral every 24 hours  dextrose 5% + lactated ringers. 1200 milliLiter(s) (100 mL/Hr) IV Continuous <Continuous>  heparin   Injectable 5000 Unit(s) SubCutaneous every 8 hours  levothyroxine 88 MICROGram(s) Oral daily  methylPREDNISolone sodium succinate Injectable 40 milliGRAM(s) IV Push every 12 hours  multivitamin 1 Tablet(s) Oral daily  pantoprazole  Injectable 40 milliGRAM(s) IV Push every 12 hours  sucralfate suspension 1 Gram(s) Oral every 6 hours    MEDICATIONS  (PRN):  acetaminophen     Tablet .. 650 milliGRAM(s) Oral every 6 hours PRN Moderate Pain (4 - 6)  trimethobenzamide Injectable 200 milliGRAM(s) IntraMuscular every 12 hours PRN Nausea and/or Vomiting      No Known Allergies      LABS                        10.7   11.36 )-----------( 332      ( 21 Dec 2022 07:12 )             32.5     12-21    137  |  107  |  35<H>  ----------------------------<  97  4.4   |  20<L>  |  1.72<H>    Ca    7.1<L>      21 Dec 2022 07:12  Phos  2.9     12-21  Mg     1.8     12-21    IMAGING/EKG/ETC

## 2022-12-21 NOTE — CHART NOTE - NSCHARTNOTEFT_GEN_A_CORE
Admitting Diagnosis:   Patient is a 72y old  Male who presents with a chief complaint of AMS (18 Dec 2022 06:36)    Current Nutrition Order: Minced and Moist diet with Banatrol BiD, Ensure Plus High Protein TID     PO Intake: Good (%) [   ]  Fair (50-75%) [ x ] Poor (<25%) [   ]    GI Issues: fecal incontinence noted, last BM on 12/21/22; pt endorsed some nausea, some diarrhea    Pain: denies pain/discomfort per chart    Skin Integrity: bruised (ecchymosis) per chart; Efrain ankle edema 1+ (trace) noted; L arm skin tear noted    Labs:   12-21    137  |  107  |  35<H>  ----------------------------<  97  4.4   |  20<L>  |  1.72<H>    Ca    7.1<L>      21 Dec 2022 07:12  Phos  2.9     12-21  Mg     1.8     12-21    CAPILLARY BLOOD GLUCOSE    POCT Blood Glucose.: 96 mg/dL (21 Dec 2022 17:49)  POCT Blood Glucose.: 92 mg/dL (21 Dec 2022 11:52)  POCT Blood Glucose.: 94 mg/dL (21 Dec 2022 06:04)  POCT Blood Glucose.: 127 mg/dL (21 Dec 2022 00:09)    Medications:  MEDICATIONS  (STANDING):  bacitracin   Ointment 1 Application(s) Topical daily  buPROPion XL (24-Hour) . 150 milliGRAM(s) Oral every 24 hours  dextrose 5% + lactated ringers. 1200 milliLiter(s) (100 mL/Hr) IV Continuous <Continuous>  heparin   Injectable 5000 Unit(s) SubCutaneous every 8 hours  levothyroxine 88 MICROGram(s) Oral daily  methylPREDNISolone sodium succinate Injectable 40 milliGRAM(s) IV Push every 12 hours  multivitamin 1 Tablet(s) Oral daily  pantoprazole  Injectable 40 milliGRAM(s) IV Push every 12 hours  sucralfate suspension 1 Gram(s) Oral every 6 hours    MEDICATIONS  (PRN):  acetaminophen     Tablet .. 650 milliGRAM(s) Oral every 6 hours PRN Moderate Pain (4 - 6)  trimethobenzamide Injectable 200 milliGRAM(s) IntraMuscular every 12 hours PRN Nausea and/or Vomiting    Anthropometrics on admission:   Wt (11/28) 72kg;   Ht 182.9cm;   BMI 21.5;   IBW 80.9kg;   %IBW 89%    Weight Change: no new weights noted; recommend nursing obtain updated weights weekly    Nutrition Focused Physical Exam: please refer to nutrition focused physical exam on 11/29/22 for information; per RD observation, pt still exhibits muscle and fat loss.     Estimated energy needs:   ABW used to calculate energy needs due to pt's current body weight within % IBW (89%).    Needs calculated for age and increased d/t cancer and chemo status, moderate malnutrition.   Energy: 2160-2520kcal (30-35cal/kg)  Protein: 101-115g pro (1.4-1.6g/kg pro)  Fluid: 2160-2520ml (30-35ml/kg)    Subjective: 71 y/o male history of metastatic stage IV lung adenocarcinoma, hypothyroidism, depression, anxiety, undergoing chemo brought in by EMS with HHA for altered mental status Monday morning admitted for sepsis (unclear source) and electrolyte derangements. Now found to have anemia and severe ileus on 12/6, repeat abdominal XR 12/7: abdominal distention w/ improving ileus. s/p EGD/flex sig 12/9 w/ ulcers. 12/10/22: 3 episodes of diarrhea overnight. Repleted Mg. 12/11/22: diarrhea has improved. 12/12/22: additional 500mL LR. 12/13/22: diet updated. o/n Lungs clear, continued D5LR at 100mL/h x 12h. 12/14/22: ordered 1U PRBC and CBC. 12/15/22: made NPO at [facility] per per GI. 12/16: covid negative. 12/19: repleted Mg. 12/20: repleting Mg and Phos.     Pt seen on 4U at bedside for nutrition follow up evaluation. Pt is currently receiving a Minced and Moist diet with Banatrol modular for s/s of diarrhea BID as well as Ensure Plus High Protein ONS TiD for additional nutrition. Pt endorsed his appetite is better than previously and stated he has been eating more; RD observed breakfast tray ~50-65% consumed; fair intakes overall noted by pt. Pt endorsed he drinks the ONS cautiously because he is still experiencing diarrhea; RD suggested switching to Ensure Plant Based ONS since pt stated there appears to be lactose in those products. RD to continue to recommend Banatrol modular. Pt endorsed some nausea--pt continues to receive Tigan for s/s Nausea. Labs reviewed: ; will continue to monitor trends. RD encouraged pt to increase PO intakes as able via small, frequent meals, hydration in between meals, and RD to f/u with team in regards to diarrhea and nausea s/s management (via medications, modulars). RD to remain available per protocol. Additional nutrition recommendations below to follow.     Previous Nutrition Diagnosis:    Active [   ]  Resolved [   ]    If resolved, new PES:     Goal:    Recommendations:    Education:     Risk Level: High [   ] Moderate [   ] Low [   ] Admitting Diagnosis:   Patient is a 72y old  Male who presents with a chief complaint of AMS (18 Dec 2022 06:36)    Current Nutrition Order: Minced and Moist diet with Banatrol BiD, Ensure Plus High Protein TID     PO Intake: Good (%) [   ]  Fair (50-75%) [ x ] Poor (<25%) [   ]    GI Issues: fecal incontinence noted, last BM on 12/21/22; pt endorsed some nausea, some diarrhea    Pain: denies pain/discomfort per chart    Skin Integrity: bruised (ecchymosis) per chart; Efrain ankle edema 1+ (trace) noted; L arm skin tear noted    Labs:   12-21    137  |  107  |  35<H>  ----------------------------<  97  4.4   |  20<L>  |  1.72<H>    Ca    7.1<L>      21 Dec 2022 07:12  Phos  2.9     12-21  Mg     1.8     12-21    CAPILLARY BLOOD GLUCOSE    POCT Blood Glucose.: 96 mg/dL (21 Dec 2022 17:49)  POCT Blood Glucose.: 92 mg/dL (21 Dec 2022 11:52)  POCT Blood Glucose.: 94 mg/dL (21 Dec 2022 06:04)  POCT Blood Glucose.: 127 mg/dL (21 Dec 2022 00:09)    Medications:  MEDICATIONS  (STANDING):  bacitracin   Ointment 1 Application(s) Topical daily  buPROPion XL (24-Hour) . 150 milliGRAM(s) Oral every 24 hours  dextrose 5% + lactated ringers. 1200 milliLiter(s) (100 mL/Hr) IV Continuous <Continuous>  heparin   Injectable 5000 Unit(s) SubCutaneous every 8 hours  levothyroxine 88 MICROGram(s) Oral daily  methylPREDNISolone sodium succinate Injectable 40 milliGRAM(s) IV Push every 12 hours  multivitamin 1 Tablet(s) Oral daily  pantoprazole  Injectable 40 milliGRAM(s) IV Push every 12 hours  sucralfate suspension 1 Gram(s) Oral every 6 hours    MEDICATIONS  (PRN):  acetaminophen     Tablet .. 650 milliGRAM(s) Oral every 6 hours PRN Moderate Pain (4 - 6)  trimethobenzamide Injectable 200 milliGRAM(s) IntraMuscular every 12 hours PRN Nausea and/or Vomiting    Anthropometrics on admission:   Wt (11/28) 72kg;   Ht 182.9cm;   BMI 21.5;   IBW 80.9kg;   %IBW 89%    Weight Change: no new weights noted; recommend nursing obtain updated weights weekly    Nutrition Focused Physical Exam: please refer to nutrition focused physical exam on 11/29/22 for information; per RD observation, pt still exhibits muscle and fat loss.     Estimated energy needs:   ABW used to calculate energy needs due to pt's current body weight within % IBW (89%).    Needs calculated for age and increased d/t cancer and chemo status, moderate malnutrition.   Energy: 2160-2520kcal (30-35cal/kg)  Protein: 101-115g pro (1.4-1.6g/kg pro)  Fluid: 2160-2520ml (30-35ml/kg)    Subjective: 73 y/o male history of metastatic stage IV lung adenocarcinoma, hypothyroidism, depression, anxiety, undergoing chemo brought in by EMS with HHA for altered mental status Monday morning admitted for sepsis (unclear source) and electrolyte derangements. Now found to have anemia and severe ileus on 12/6, repeat abdominal XR 12/7: abdominal distention w/ improving ileus. s/p EGD/flex sig 12/9 w/ ulcers. 12/10/22: 3 episodes of diarrhea overnight. Repleted Mg. 12/11/22: diarrhea has improved. 12/12/22: additional 500mL LR. 12/13/22: diet updated. o/n Lungs clear, continued D5LR at 100mL/h x 12h. 12/14/22: ordered 1U PRBC and CBC. 12/15/22: made NPO at [facility] per per GI. 12/16: covid negative. 12/19: repleted Mg. 12/20: repleting Mg and Phos.     Pt seen on 4U at bedside for nutrition follow up evaluation. Pt is currently receiving a Minced and Moist diet with Banatrol modular for s/s of diarrhea BID as well as Ensure Plus High Protein ONS TiD for additional nutrition. Pt endorsed his appetite is better than previously and stated he has been eating more; RD observed breakfast tray ~50-65% consumed; fair intakes overall noted by pt. Pt endorsed he drinks the ONS cautiously because he is still experiencing diarrhea; RD suggested switching to Ensure Plant Based ONS since pt stated there appears to be lactose in those products. RD to continue to recommend Banatrol modular. Pt endorsed some nausea--pt continues to receive Tigan for s/s Nausea. Labs reviewed: low H&H (10.7/32.5), POCT BG was elevated now WNL (96), elevated BUN (35), Cr (1.72), low Ca (7.1), low eGFR (42); will continue to monitor trends. RD encouraged pt to increase PO intakes as able via small, frequent meals, hydration in between meals, and RD to f/u with team in regards to diarrhea and nausea s/s management (via medications, modulars). RD to remain available per protocol. Additional nutrition recommendations below to follow.     Previous Nutrition Diagnosis: Malnutrition of moderate degree in the context of chronic illness r/t inability to meet nutritional demands secondary to pt's clinical conditions AEB mild to moderate muscle and subcutaneous fat loss per NFPE, suspected </=75% PO x>1month PTA    Active [ x  ]  Resolved [   ]    Goal:  Consistently meet >75% est needs during hospital stay w/o overt s/s of malnutrition    Recommendations:  1. Continue current diet  >>Ensure Plant Based ONS TiD (540kcal 60gPRO provided total)   >> Banatrol BID for persistent diarrhea  2. Monitor for GI intolerances, s/s of distress  3. BM and pain regimen per team  4. Monitor BMP, BG, POCT, renal indices, LFTs, CBC, lytes, replete prn  5. Continue MVI  6. Align with GOC at all times    Education: RD encouraged pt to increase PO intakes as able via small, frequent meals, hydration in between meals, and RD to f/u with team in regards to diarrhea and nausea s/s management (via medications, modulars).     Risk Level: High [   ] Moderate [ x ] Low [   ] Admitting Diagnosis:   Patient is a 72y old  Male who presents with a chief complaint of AMS (18 Dec 2022 06:36)    Current Nutrition Order: Minced and Moist diet with Banatrol BiD, Ensure Plus High Protein TID     PO Intake: Good (%) [   ]  Fair (50-75%) [ x ] Poor (<25%) [   ]    GI Issues: fecal incontinence noted, last BM on 12/21/22; pt endorsed some nausea, some diarrhea    Pain: denies pain/discomfort per chart    Skin Integrity: bruised (ecchymosis) per chart; Efrain ankle edema 1+ (trace) noted; L arm skin tear noted    Labs:   12-21    137  |  107  |  35<H>  ----------------------------<  97  4.4   |  20<L>  |  1.72<H>    Ca    7.1<L>      21 Dec 2022 07:12  Phos  2.9     12-21  Mg     1.8     12-21    CAPILLARY BLOOD GLUCOSE    POCT Blood Glucose.: 96 mg/dL (21 Dec 2022 17:49)  POCT Blood Glucose.: 92 mg/dL (21 Dec 2022 11:52)  POCT Blood Glucose.: 94 mg/dL (21 Dec 2022 06:04)  POCT Blood Glucose.: 127 mg/dL (21 Dec 2022 00:09)    Medications:  MEDICATIONS  (STANDING):  bacitracin   Ointment 1 Application(s) Topical daily  buPROPion XL (24-Hour) . 150 milliGRAM(s) Oral every 24 hours  dextrose 5% + lactated ringers. 1200 milliLiter(s) (100 mL/Hr) IV Continuous <Continuous>  heparin   Injectable 5000 Unit(s) SubCutaneous every 8 hours  levothyroxine 88 MICROGram(s) Oral daily  methylPREDNISolone sodium succinate Injectable 40 milliGRAM(s) IV Push every 12 hours  multivitamin 1 Tablet(s) Oral daily  pantoprazole  Injectable 40 milliGRAM(s) IV Push every 12 hours  sucralfate suspension 1 Gram(s) Oral every 6 hours    MEDICATIONS  (PRN):  acetaminophen     Tablet .. 650 milliGRAM(s) Oral every 6 hours PRN Moderate Pain (4 - 6)  trimethobenzamide Injectable 200 milliGRAM(s) IntraMuscular every 12 hours PRN Nausea and/or Vomiting    Anthropometrics on admission:   Wt (11/28) 72kg;   Ht 182.9cm;   BMI 21.5;   IBW 80.9kg;   %IBW 89%    Weight Change: no new weights noted; recommend nursing obtain updated weights weekly    Nutrition Focused Physical Exam: please refer to nutrition focused physical exam on 11/29/22 for information; per RD observation, pt still exhibits muscle and fat loss.     Estimated energy needs:   ABW used to calculate energy needs due to pt's current body weight within % IBW (89%).    Needs calculated for age and increased d/t cancer and chemo status, moderate malnutrition.   Energy: 2160-2520kcal (30-35cal/kg)  Protein: 101-115g pro (1.4-1.6g/kg pro)  Fluid: 2160-2520ml (30-35ml/kg)    Subjective: 73 y/o male history of metastatic stage IV lung adenocarcinoma, hypothyroidism, depression, anxiety, undergoing chemo brought in by EMS with HHA for altered mental status Monday morning admitted for sepsis (unclear source) and electrolyte derangements. Now found to have anemia and severe ileus on 12/6, repeat abdominal XR 12/7: abdominal distention w/ improving ileus. s/p EGD/flex sig 12/9 w/ ulcers. 12/10/22: 3 episodes of diarrhea overnight. Repleted Mg. 12/11/22: diarrhea has improved. 12/12/22: additional 500mL LR. 12/13/22: diet updated. o/n Lungs clear, continued D5LR at 100mL/h x 12h. 12/14/22: ordered 1U PRBC and CBC. 12/15/22: made NPO at [facility] per per GI. 12/16: covid negative. 12/19: repleted Mg. 12/20: repleting Mg and Phos.     Pt seen on 4U at bedside for nutrition follow up evaluation. Pt is currently receiving a Minced and Moist diet with Banatrol modular for s/s of diarrhea BID as well as Ensure Plus High Protein ONS TiD for additional nutrition. Pt continues to receive ivf (D5 + LR), as well as multivitamin. Pt endorsed his appetite is better than previously and stated he has been eating more; RD observed breakfast tray ~50-65% consumed; fair intakes overall noted by pt. Pt endorsed he drinks the ONS cautiously because he is still experiencing diarrhea; RD suggested switching to Ensure Plant Based ONS since pt stated there appears to be lactose in those products. RD to continue to recommend Banatrol modular. Pt endorsed some nausea--pt continues to receive Tigan for s/s Nausea. Labs reviewed: low H&H (10.7/32.5), POCT BG was elevated now WNL (96), elevated BUN (35), Cr (1.72), low Ca (7.1), low eGFR (42); will continue to monitor trends. RD encouraged pt to increase PO intakes as able via small, frequent meals, hydration in between meals, and RD to f/u with team in regards to diarrhea and nausea s/s management (via medications, modulars). RD to remain available per protocol. Additional nutrition recommendations below to follow.     Previous Nutrition Diagnosis: Malnutrition of moderate degree in the context of chronic illness r/t inability to meet nutritional demands secondary to pt's clinical conditions AEB mild to moderate muscle and subcutaneous fat loss per NFPE, suspected </=75% PO x>1month PTA    Active [ x  ]  Resolved [   ]    Goal:  Consistently meet >75% est needs during hospital stay w/o overt s/s of malnutrition    Recommendations:  1. Continue current diet  >>Ensure Plant Based ONS TiD (540kcal 60gPRO provided total)   >> Banatrol BID for persistent diarrhea  2. Monitor for GI intolerances, s/s of distress  3. BM and pain regimen per team  4. Monitor BMP, BG, POCT, renal indices, LFTs, CBC, lytes, replete prn  5. Continue MVI  6. Align with GOC at all times    Education: RD encouraged pt to increase PO intakes as able via small, frequent meals, hydration in between meals, and RD to f/u with team in regards to diarrhea and nausea s/s management (via medications, modulars).     Risk Level: High [   ] Moderate [ x ] Low [   ]

## 2022-12-21 NOTE — PROGRESS NOTE ADULT - SUBJECTIVE AND OBJECTIVE BOX
INTERVAL HPI/OVERNIGHT EVENTS:  Awake and alert  Continues on IV medication  Can change to PO Prednisone as outlined by oncology  Also change IV Protonix to PO   H/H remains stable      MEDICATIONS  (STANDING):  bacitracin   Ointment 1 Application(s) Topical daily  buPROPion XL (24-Hour) . 150 milliGRAM(s) Oral every 24 hours  dextrose 5% + lactated ringers. 1200 milliLiter(s) (100 mL/Hr) IV Continuous <Continuous>  heparin   Injectable 5000 Unit(s) SubCutaneous every 8 hours  levothyroxine 88 MICROGram(s) Oral daily  magnesium sulfate  IVPB 1 Gram(s) IV Intermittent once  methylPREDNISolone sodium succinate Injectable 40 milliGRAM(s) IV Push every 12 hours  multivitamin 1 Tablet(s) Oral daily  pantoprazole  Injectable 40 milliGRAM(s) IV Push every 12 hours  sucralfate suspension 1 Gram(s) Oral every 6 hours    MEDICATIONS  (PRN):  acetaminophen     Tablet .. 650 milliGRAM(s) Oral every 6 hours PRN Moderate Pain (4 - 6)  trimethobenzamide Injectable 200 milliGRAM(s) IntraMuscular every 12 hours PRN Nausea and/or Vomiting      Allergies    No Known Allergies    Intolerances        Vital Signs Last 24 Hrs  T(C): 36.1 (21 Dec 2022 09:47), Max: 36.6 (20 Dec 2022 20:21)  T(F): 96.9 (21 Dec 2022 09:47), Max: 97.8 (20 Dec 2022 20:21)  HR: 71 (21 Dec 2022 09:47) (69 - 83)  BP: 161/66 (21 Dec 2022 09:47) (137/67 - 161/66)  BP(mean): --  RR: 18 (21 Dec 2022 09:47) (17 - 18)  SpO2: 100% (21 Dec 2022 09:47) (97% - 100%)    Parameters below as of 21 Dec 2022 09:47  Patient On (Oxygen Delivery Method): room air              Constitutional: Awake     Eyes: ARBEN    ENMT: Negative    Neck: Supple    Back:  no tenderness     Respiratory:  clear    Cardiovascular: S1 S2    Gastrointestinal:  soft    Genitourinary:    Extremities:  no edema    Vascular:    Neurological:    Skin:    Lymph Nodes:            LABS:                        10.7   11.36 )-----------( 332      ( 21 Dec 2022 07:12 )             32.5     12-21    137  |  107  |  35<H>  ----------------------------<  97  4.4   |  20<L>  |  1.72<H>    Ca    7.1<L>      21 Dec 2022 07:12  Phos  2.9     12-21  Mg     1.8     12-21            RADIOLOGY & ADDITIONAL TESTS:

## 2022-12-22 NOTE — PROGRESS NOTE ADULT - SUBJECTIVE AND OBJECTIVE BOX
O/N Events: sonny     Subjective/ROS: Patient seen and examined at bedside.     Denies Fever/Chills, HA, CP, SOB, n/v, changes in bowel/urinary habits.  12pt ROS otherwise negative.    VITALS  Vital Signs Last 24 Hrs  T(C): 35.9 (22 Dec 2022 09:49), Max: 36.6 (21 Dec 2022 22:07)  T(F): 96.6 (22 Dec 2022 09:49), Max: 97.8 (21 Dec 2022 22:07)  HR: 73 (22 Dec 2022 09:49) (73 - 83)  BP: 152/68 (22 Dec 2022 09:49) (104/68 - 157/68)  BP(mean): --  RR: 18 (22 Dec 2022 09:49) (18 - 18)  SpO2: 100% (22 Dec 2022 09:49) (99% - 100%)    Parameters below as of 22 Dec 2022 09:49  Patient On (Oxygen Delivery Method): room air      CAPILLARY BLOOD GLUCOSE    POCT Blood Glucose.: 108 mg/dL (22 Dec 2022 11:53)  POCT Blood Glucose.: 75 mg/dL (22 Dec 2022 07:26)  POCT Blood Glucose.: 128 mg/dL (22 Dec 2022 01:31)  POCT Blood Glucose.: 96 mg/dL (21 Dec 2022 17:49)      PHYSICAL EXAM  General: NAD  General: NAD  HEENT: NC/AT, EOMI, dry MM  Cardiac: RRR; normal S1/S2, no MRG  Respiratory: BL crackles  Gastrointestinal: +BSx4, abdomen soft, NT/ND  Extremities: WWP x4, LUE pain and swelling  Neurologic: AAOx3; no focal deficits    MEDICATIONS  (STANDING):  bacitracin   Ointment 1 Application(s) Topical daily  buPROPion XL (24-Hour) . 150 milliGRAM(s) Oral every 24 hours  dextrose 5% + lactated ringers. 1200 milliLiter(s) (100 mL/Hr) IV Continuous <Continuous>  heparin   Injectable 5000 Unit(s) SubCutaneous every 8 hours  levothyroxine 88 MICROGram(s) Oral daily  multivitamin 1 Tablet(s) Oral daily  pantoprazole   Suspension 40 milliGRAM(s) Oral two times a day  predniSONE   Tablet 60 milliGRAM(s) Oral daily  sucralfate suspension 1 Gram(s) Oral every 6 hours    MEDICATIONS  (PRN):  acetaminophen     Tablet .. 650 milliGRAM(s) Oral every 6 hours PRN Moderate Pain (4 - 6)  trimethobenzamide Injectable 200 milliGRAM(s) IntraMuscular every 12 hours PRN Nausea and/or Vomiting      No Known Allergies      LABS                        10.9   11.90 )-----------( 322      ( 22 Dec 2022 05:30 )             33.6     12-22    137  |  105  |  35<H>  ----------------------------<  97  4.6   |  22  |  1.55<H>    Ca    7.2<L>      22 Dec 2022 05:30  Phos  2.1     12-22  Mg     2.1     12-22      IMAGING/EKG/ETC

## 2022-12-22 NOTE — PROGRESS NOTE ADULT - PROBLEM SELECTOR PLAN 1
Patient complaining of pain in his LUE, new onset swelling  - LUE doppler showed evidence of DVT in L brachial vein  - holding off AC as per Dr. Asencio due to increased risk of bleeding  - tylenol PRN for pain, which patient states has been relieving his pain to some extent

## 2022-12-22 NOTE — PROGRESS NOTE ADULT - PROBLEM SELECTOR PLAN 4
Pt would like the calcium pills in a \"gummy\" form On 12/5 pt found to have distended abdomen. CTAP w/o contrast on 12/5 showing severe ileus. Surgery was formally consulted. Decided not to staff with attending but will continue to follow for serial abdominal exams. Primary team placed sump tube, set to suction. GI also following.  - placed sump tube 12/5  - bladder scan on 12/5, not retaining  - repeat abdominal XR 12/7: abdominal distention w/ improving ileus  - surgery signed off 12/9  Plan:  - restarting minced and moist diet, advance as tolerated

## 2022-12-22 NOTE — PROGRESS NOTE ADULT - TIME BILLING
Patient seen and examined;  DVT noted; Hold on AC for now  Change Steroids to PO  Change Protonix to PO  Will contact Heme Onc re further plans

## 2022-12-22 NOTE — PROGRESS NOTE ADULT - SUBJECTIVE AND OBJECTIVE BOX
Physical Medicine and Rehabilitation Progress Note :       Patient is a 72y old  Male who presents with a chief complaint of AMS (18 Dec 2022 06:36)      HPI:  71 y/o male history of metastatic stage IV lung adenocarcinoma, hypothyroidism, depression, anxiety, undergoing chemo brought in by EMS with HHA for altered mental status Monday morning. At baseline patient is A&Ox3, per HHA he was AOx1, not responding to questions or commands. Patient was reported to be hypotensive to SBP 50s in the field, IO was placed and given fluids with improvement in BP. Patient lives along, has HHA 2 days per week for 10 hours, per ED note HHA states she spoke to pt 3 days ago and foung him in his bed Monday morning at which time he was confused. Currently being treated for lung cancer, last chemo 1 month ago.  Patient also has had recurrent falls at home, some associated with head trauma but no loc, syncope, bladder/bowel incontinence.  Otherwise no reported illness, sick contacts, medical changes.  ROS otherwise negative.      ED Course   Vitals: Temp 98.7,  --> 86, /63, RR 20, SaO2 98% on 4L NC --> 97% room air   Labs: WBC 16.24, Hgb 11.7, Plt 449, Na 143, K 3.2, CO2 18, AG 24, BUN 31, Scr 2.35, Ca 7.6, Ma 1.4, Lactate 12.1 --> 2.3, UA trace LE, Bacteria present, hyaline casts, C. Diff negative GI PCR negative  EKG: sinus tachycardia, 1st degree AV block, narrow QRS, prolonged QTC  CXR: Known right upper lobe pulmonary mass   CT chest, abdomen, pelvis: No acute fractures. Unchanged right upper lobe malignancy. Decreased left adrenal metastasis. Decreased abdominal and pelvic adenopathy.  CTH: No intracranial hemorrhage or acute transcortical infarct.  Generalized volume loss with small vessel ischemic disease.  CT C-spine: No fracture. Levoscoliosis with Grade 1 anterolisthesis of C2 on C3 and C7 on T1. Multilevel cervical spondylosis. Right apical lung spiculated mass.  Interventions: Tylenol ig IV, Ca Gluconate 2g, MgSO4 2g x 2, KCl 40mg po x 1, KCl 10mg IV x 5, Zosyn 3.375 x 3, Vancomycin 1250mg, NaCl 2200cc bolus   (28 Nov 2022 21:33)                            10.9   11.90 )-----------( 322      ( 22 Dec 2022 05:30 )             33.6       12-22    137  |  105  |  35<H>  ----------------------------<  97  4.6   |  22  |  1.55<H>    Ca    7.2<L>      22 Dec 2022 05:30  Phos  2.1     12-22  Mg     2.1     12-22      Vital Signs Last 24 Hrs  T(C): 35.9 (22 Dec 2022 09:49), Max: 36.6 (21 Dec 2022 22:07)  T(F): 96.6 (22 Dec 2022 09:49), Max: 97.8 (21 Dec 2022 22:07)  HR: 73 (22 Dec 2022 09:49) (73 - 83)  BP: 152/68 (22 Dec 2022 09:49) (104/68 - 157/68)  BP(mean): --  RR: 18 (22 Dec 2022 09:49) (18 - 18)  SpO2: 100% (22 Dec 2022 09:49) (99% - 100%)    Parameters below as of 22 Dec 2022 09:49  Patient On (Oxygen Delivery Method): room air        MEDICATIONS  (STANDING):  bacitracin   Ointment 1 Application(s) Topical daily  buPROPion XL (24-Hour) . 150 milliGRAM(s) Oral every 24 hours  dextrose 5% + lactated ringers. 1200 milliLiter(s) (100 mL/Hr) IV Continuous <Continuous>  heparin   Injectable 5000 Unit(s) SubCutaneous every 8 hours  levothyroxine 88 MICROGram(s) Oral daily  multivitamin 1 Tablet(s) Oral daily  pantoprazole   Suspension 40 milliGRAM(s) Oral two times a day  predniSONE   Tablet 60 milliGRAM(s) Oral daily  sucralfate suspension 1 Gram(s) Oral every 6 hours    MEDICATIONS  (PRN):  acetaminophen     Tablet .. 650 milliGRAM(s) Oral every 6 hours PRN Moderate Pain (4 - 6)  trimethobenzamide Injectable 200 milliGRAM(s) IntraMuscular every 12 hours PRN Nausea and/or Vomiting         Physical Therapy Functional Status Assessment :   12/21/2022    Pain Assessment/Number Scale (0-10) Adult  Presence of Pain: complains of pain/discomfort  Body Location: Left:   arm  Pain Rating (0-10): Rest: 4   Pain Rating (0-10): Activity: 5     Cognitive/Neuro      Cognitive/Neuro/Behavioral  Cognitive/Neuro/Behavioral [WDL Definition: Alert; opens eyes spontaneously; arouses to voice or touch; oriented x 4; follows commands; speech spontaneous, logical; purposeful motor response; behavior appropriate to situation]: WDL    Language Assistance  Preferred Language to Address Healthcare Preferred Language to Address Healthcare: English    Therapeutic Interventions      Bed Mobility  Bed Mobility Training Scooting: contact guard;  1 person assist;  nonverbal cues (demo/gestures);  verbal cues  Bed Mobility Training Sit-to-Supine: contact guard;  minimum assist (75% patient effort);  1 person assist;  nonverbal cues (demo/gestures);  verbal cues;  bed rails  Bed Mobility Training Supine-to-Sit: contact guard;  1 person assist;  nonverbal cues (demo/gestures);  verbal cues;  bed rails  Bed Mobility Training Limitations: decreased ability to use arms for pushing/pulling;  decreased ability to use legs for bridging/pushing;  impaired ability to control trunk for mobility;  decreased strength;  impaired balance;  impaired postural control;  pain    Sit-Stand Transfer Training  Transfer Training Sit-to-Stand Transfer: minimum assist (75% patient effort);  2 person assist;  nonverbal cues (demo/gestures);  verbal cues;  B/L HHA   Transfer Training Stand-to-Sit Transfer: minimum assist (75% patient effort);  2 person assist;  nonverbal cues (demo/gestures);  verbal cues;  B/L HHA   Sit-to-Stand Transfer Training Transfer Safety Analysis: decreased balance;  decreased step length;  decreased sequencing ability;  decreased weight-shifting ability;  decreased strength;  impaired balance;  impaired postural control;  pain;  rolling walker    Gait Training  Gait Training: minimum assist (75% patient effort);  2 person assist;  nonverbal cues (demo/gestures);  verbal cues;  B/L HHA ;  5 feet  Gait Analysis: 3-point gait   decreased vanessa;  crouch;  increased time in double stance;  decreased hip/knee flexion;  shuffling;  decreased step length;  decreased trunk rotation;  decreased weight-shifting ability;  decreased strength;  impaired balance;  impaired postural control;  pain;  5 feet;  B/L HHA     Therapeutic Exercise  Therapeutic Exercise Detail: BLE sitting on EOB x10: LAQ             PM&R Impression : as above    Current Disposition Plan Recommendations :    subacute rehab placement

## 2022-12-22 NOTE — PROGRESS NOTE ADULT - SUBJECTIVE AND OBJECTIVE BOX
INTERVAL HPI/OVERNIGHT EVENTS:  Interim reviewed;  Clot noted left brachial  Concerned about AC with history of GI bleeding   Otherwise patient feels stronger       MEDICATIONS  (STANDING):  bacitracin   Ointment 1 Application(s) Topical daily  buPROPion XL (24-Hour) . 150 milliGRAM(s) Oral every 24 hours  dextrose 5% + lactated ringers. 1200 milliLiter(s) (100 mL/Hr) IV Continuous <Continuous>  heparin   Injectable 5000 Unit(s) SubCutaneous every 8 hours  levothyroxine 88 MICROGram(s) Oral daily  multivitamin 1 Tablet(s) Oral daily  pantoprazole   Suspension 40 milliGRAM(s) Oral two times a day  predniSONE   Tablet 60 milliGRAM(s) Oral daily  sucralfate suspension 1 Gram(s) Oral every 6 hours    MEDICATIONS  (PRN):  acetaminophen     Tablet .. 650 milliGRAM(s) Oral every 6 hours PRN Moderate Pain (4 - 6)  trimethobenzamide Injectable 200 milliGRAM(s) IntraMuscular every 12 hours PRN Nausea and/or Vomiting      Allergies    No Known Allergies    Intolerances        Vital Signs Last 24 Hrs  T(C): 35.9 (22 Dec 2022 09:49), Max: 36.6 (21 Dec 2022 22:07)  T(F): 96.6 (22 Dec 2022 09:49), Max: 97.8 (21 Dec 2022 22:07)  HR: 73 (22 Dec 2022 09:49) (73 - 83)  BP: 152/68 (22 Dec 2022 09:49) (104/68 - 157/68)  BP(mean): --  RR: 18 (22 Dec 2022 09:49) (18 - 18)  SpO2: 100% (22 Dec 2022 09:49) (99% - 100%)    Parameters below as of 22 Dec 2022 09:49  Patient On (Oxygen Delivery Method): room air              Constitutional:  Awake     Eyes: ARBEN    ENMT: Negative    Neck: Supple    Back:  no tenderness     Respiratory:  clear    Cardiovascular: S1 S2    Gastrointestinal:  soft     Genitourinary:    Extremities:  no edema    Left arm does not feel very swollen    Some pain     Vascular:    Neurological:    Skin:    Lymph Nodes:            LABS:                        10.9   11.90 )-----------( 322      ( 22 Dec 2022 05:30 )             33.6     12-22    137  |  105  |  35<H>  ----------------------------<  97  4.6   |  22  |  1.55<H>    Ca    7.2<L>      22 Dec 2022 05:30  Phos  2.1     12-22  Mg     2.1     12-22            RADIOLOGY & ADDITIONAL TESTS:

## 2022-12-23 NOTE — PROGRESS NOTE ADULT - SUBJECTIVE AND OBJECTIVE BOX
O/N Events: patient states he does not want to go to [facility] because he would like to go to the bank and take care of some business at home.     Subjective/ROS: Patient seen and examined at bedside.     Denies Fever/Chills, HA, CP, SOB, n/v, changes in bowel/urinary habits.  12pt ROS otherwise negative.    VITALS  Vital Signs Last 24 Hrs  T(C): 36.4 (23 Dec 2022 15:40), Max: 37.1 (23 Dec 2022 08:57)  T(F): 97.5 (23 Dec 2022 15:40), Max: 98.7 (23 Dec 2022 08:57)  HR: 73 (23 Dec 2022 15:40) (73 - 80)  BP: 124/54 (23 Dec 2022 15:40) (124/54 - 139/67)  BP(mean): --  RR: 18 (23 Dec 2022 15:40) (18 - 18)  SpO2: 95% (23 Dec 2022 15:40) (95% - 99%)    Parameters below as of 23 Dec 2022 15:40  Patient On (Oxygen Delivery Method): room air      CAPILLARY BLOOD GLUCOSE    POCT Blood Glucose.: 126 mg/dL (23 Dec 2022 11:51)  POCT Blood Glucose.: 112 mg/dL (23 Dec 2022 05:44)  POCT Blood Glucose.: 161 mg/dL (22 Dec 2022 23:49)      PHYSICAL EXAM  General: NAD  General: NAD  HEENT: NC/AT, EOMI, dry MM  Cardiac: RRR; normal S1/S2, no MRG  Respiratory: BL crackles  Gastrointestinal: +BSx4, abdomen soft, NT/ND  Extremities: WWP x4, LUE pain and swelling  Neurologic: AAOx3; no focal deficits    MEDICATIONS  (STANDING):  atovaquone  Suspension 1500 milliGRAM(s) Oral daily  bacitracin   Ointment 1 Application(s) Topical daily  buPROPion XL (24-Hour) . 150 milliGRAM(s) Oral every 24 hours  dextrose 5% + lactated ringers. 1200 milliLiter(s) (100 mL/Hr) IV Continuous <Continuous>  heparin   Injectable 5000 Unit(s) SubCutaneous every 8 hours  levothyroxine 88 MICROGram(s) Oral daily  multivitamin 1 Tablet(s) Oral daily  pantoprazole   Suspension 40 milliGRAM(s) Oral two times a day  predniSONE   Tablet 60 milliGRAM(s) Oral daily  sucralfate suspension 1 Gram(s) Oral every 6 hours    MEDICATIONS  (PRN):  acetaminophen     Tablet .. 650 milliGRAM(s) Oral every 6 hours PRN Moderate Pain (4 - 6)  trimethobenzamide Injectable 200 milliGRAM(s) IntraMuscular every 12 hours PRN Nausea and/or Vomiting      No Known Allergies      LABS                        10.4   14.11 )-----------( 330      ( 23 Dec 2022 05:30 )             31.5     12-23    134<L>  |  103  |  31<H>  ----------------------------<  120<H>  3.9   |  21<L>  |  1.44<H>    Ca    6.9<L>      23 Dec 2022 05:30  Phos  1.8     12-23  Mg     1.6     12-23      IMAGING/EKG/ETC

## 2022-12-23 NOTE — PROGRESS NOTE ADULT - SUBJECTIVE AND OBJECTIVE BOX
INTERVAL HPI/OVERNIGHT EVENTS:  Chief complaint diarrhea;  Also does not want to go to rehab;  Needs to go home for some financial reason, Needs to pay bills and ans also needs to go to bank       MEDICATIONS  (STANDING):  atovaquone  Suspension 1500 milliGRAM(s) Oral daily  bacitracin   Ointment 1 Application(s) Topical daily  buPROPion XL (24-Hour) . 150 milliGRAM(s) Oral every 24 hours  dextrose 5% + lactated ringers. 1200 milliLiter(s) (100 mL/Hr) IV Continuous <Continuous>  heparin   Injectable 5000 Unit(s) SubCutaneous every 8 hours  levothyroxine 88 MICROGram(s) Oral daily  magnesium sulfate  IVPB 2 Gram(s) IV Intermittent once  multivitamin 1 Tablet(s) Oral daily  pantoprazole   Suspension 40 milliGRAM(s) Oral two times a day  potassium phosphate / sodium phosphate Powder (PHOS-NaK) 1 Packet(s) Oral every 2 hours  predniSONE   Tablet 60 milliGRAM(s) Oral daily  sucralfate suspension 1 Gram(s) Oral every 6 hours    MEDICATIONS  (PRN):  acetaminophen     Tablet .. 650 milliGRAM(s) Oral every 6 hours PRN Moderate Pain (4 - 6)  trimethobenzamide Injectable 200 milliGRAM(s) IntraMuscular every 12 hours PRN Nausea and/or Vomiting      Allergies    No Known Allergies    Intolerances        Vital Signs Last 24 Hrs  T(C): 37.1 (23 Dec 2022 08:57), Max: 37.1 (23 Dec 2022 08:57)  T(F): 98.7 (23 Dec 2022 08:57), Max: 98.7 (23 Dec 2022 08:57)  HR: 75 (23 Dec 2022 08:57) (74 - 84)  BP: 138/61 (23 Dec 2022 08:57) (117/64 - 139/67)  BP(mean): --  RR: 18 (23 Dec 2022 08:57) (18 - 18)  SpO2: 96% (23 Dec 2022 08:57) (96% - 99%)    Parameters below as of 23 Dec 2022 08:57  Patient On (Oxygen Delivery Method): room air              Constitutional:  Awake     Eyes: ARBEN    ENMT: Negative    Neck: Supple    Back:  no tenderness     Respiratory:  clear    Cardiovascular: S1 S2    Gastrointestinal:  soft    Genitourinary:    Extremities:  no edema     Vascular:    Neurological:    Skin:    Lymph Nodes:            12-22 @ 07:01  -  12-23 @ 07:00  --------------------------------------------------------  IN: 0 mL / OUT: 450 mL / NET: -450 mL      LABS:                        10.4   14.11 )-----------( 330      ( 23 Dec 2022 05:30 )             31.5     12-23    134<L>  |  103  |  31<H>  ----------------------------<  120<H>  3.9   |  21<L>  |  1.44<H>    Ca    6.9<L>      23 Dec 2022 05:30  Phos  1.8     12-23  Mg     1.6     12-23            RADIOLOGY & ADDITIONAL TESTS:

## 2022-12-23 NOTE — PROGRESS NOTE ADULT - TIME BILLING
Patient seen and examined;  Continue present regimen  Will need to go home before any rehab placement   Diarrhea continues

## 2022-12-24 NOTE — PROGRESS NOTE ADULT - SUBJECTIVE AND OBJECTIVE BOX
Physical Medicine and Rehabilitation Progress Note :       Patient is a 72y old  Male who presents with a chief complaint of AMS (18 Dec 2022 06:36)      HPI:  73 y/o male history of metastatic stage IV lung adenocarcinoma, hypothyroidism, depression, anxiety, undergoing chemo brought in by EMS with HHA for altered mental status Monday morning. At baseline patient is A&Ox3, per HHA he was AOx1, not responding to questions or commands. Patient was reported to be hypotensive to SBP 50s in the field, IO was placed and given fluids with improvement in BP. Patient lives along, has HHA 2 days per week for 10 hours, per ED note HHA states she spoke to pt 3 days ago and foung him in his bed Monday morning at which time he was confused. Currently being treated for lung cancer, last chemo 1 month ago.  Patient also has had recurrent falls at home, some associated with head trauma but no loc, syncope, bladder/bowel incontinence.  Otherwise no reported illness, sick contacts, medical changes.  ROS otherwise negative.      ED Course   Vitals: Temp 98.7,  --> 86, /63, RR 20, SaO2 98% on 4L NC --> 97% room air   Labs: WBC 16.24, Hgb 11.7, Plt 449, Na 143, K 3.2, CO2 18, AG 24, BUN 31, Scr 2.35, Ca 7.6, Ma 1.4, Lactate 12.1 --> 2.3, UA trace LE, Bacteria present, hyaline casts, C. Diff negative GI PCR negative  EKG: sinus tachycardia, 1st degree AV block, narrow QRS, prolonged QTC  CXR: Known right upper lobe pulmonary mass   CT chest, abdomen, pelvis: No acute fractures. Unchanged right upper lobe malignancy. Decreased left adrenal metastasis. Decreased abdominal and pelvic adenopathy.  CTH: No intracranial hemorrhage or acute transcortical infarct.  Generalized volume loss with small vessel ischemic disease.  CT C-spine: No fracture. Levoscoliosis with Grade 1 anterolisthesis of C2 on C3 and C7 on T1. Multilevel cervical spondylosis. Right apical lung spiculated mass.  Interventions: Tylenol ig IV, Ca Gluconate 2g, MgSO4 2g x 2, KCl 40mg po x 1, KCl 10mg IV x 5, Zosyn 3.375 x 3, Vancomycin 1250mg, NaCl 2200cc bolus   (28 Nov 2022 21:33)                            10.5   13.19 )-----------( 296      ( 24 Dec 2022 06:39 )             31.6       12-24    136  |  105  |  30<H>  ----------------------------<  91  4.1   |  23  |  1.54<H>    Ca    6.9<L>      24 Dec 2022 06:39  Phos  1.8     12-24  Mg     1.8     12-24      Vital Signs Last 24 Hrs  T(C): 36.5 (24 Dec 2022 08:35), Max: 36.6 (23 Dec 2022 21:14)  T(F): 97.7 (24 Dec 2022 08:35), Max: 97.8 (23 Dec 2022 21:14)  HR: 74 (24 Dec 2022 08:35) (62 - 74)  BP: 118/66 (24 Dec 2022 08:35) (115/66 - 143/61)  BP(mean): --  RR: 17 (24 Dec 2022 08:35) (17 - 18)  SpO2: 99% (24 Dec 2022 08:35) (95% - 99%)    Parameters below as of 24 Dec 2022 08:35  Patient On (Oxygen Delivery Method): room air        MEDICATIONS  (STANDING):  atovaquone  Suspension 1500 milliGRAM(s) Oral daily  bacitracin   Ointment 1 Application(s) Topical daily  buPROPion XL (24-Hour) . 150 milliGRAM(s) Oral every 24 hours  dextrose 5% + lactated ringers. 1200 milliLiter(s) (100 mL/Hr) IV Continuous <Continuous>  heparin   Injectable 5000 Unit(s) SubCutaneous every 8 hours  levothyroxine 88 MICROGram(s) Oral daily  multivitamin 1 Tablet(s) Oral daily  pantoprazole   Suspension 40 milliGRAM(s) Oral two times a day  predniSONE   Tablet 60 milliGRAM(s) Oral daily  sucralfate suspension 1 Gram(s) Oral every 6 hours    MEDICATIONS  (PRN):  acetaminophen     Tablet .. 650 milliGRAM(s) Oral every 6 hours PRN Moderate Pain (4 - 6)  trimethobenzamide Injectable 200 milliGRAM(s) IntraMuscular every 12 hours PRN Nausea and/or Vomiting         Physical Therapy Functional Status Assessment :   12/23/2022     Therapeutic Interventions      Bed Mobility  Bed Mobility Training Rolling/Turning: contact guard;  1 person assist;  nonverbal cues (demo/gestures);  verbal cues  Bed Mobility Training Scooting: contact guard;  1 person assist;  nonverbal cues (demo/gestures);  verbal cues;  bed rails  Bed Mobility Training Sit-to-Supine: contact guard;  1 person assist;  nonverbal cues (demo/gestures);  verbal cues;  bed rails  Bed Mobility Training Supine-to-Sit: contact guard;  1 person assist;  nonverbal cues (demo/gestures);  verbal cues;  bed rails  Bed Mobility Training Limitations: decreased ability to use arms for pushing/pulling;  decreased ability to use legs for bridging/pushing;  impaired ability to control trunk for mobility;  impaired balance;  decreased strength;  impaired postural control    Sit-Stand Transfer Training  Transfer Training Sit-to-Stand Transfer: minimum assist (75% patient effort);  2 person assist;  nonverbal cues (demo/gestures);  verbal cues;  B/L HHA   Transfer Training Stand-to-Sit Transfer: minimum assist (75% patient effort);  2 person assist;  nonverbal cues (demo/gestures);  verbal cues;  B/L HHA   Sit-to-Stand Transfer Training Transfer Safety Analysis: decreased balance;  decreased sequencing ability;  decreased step length;  decreased weight-shifting ability;  decreased strength;  impaired balance;  impaired postural control;  B/L HHA     Gait Training  Gait Training: minimum assist (75% patient effort);  2 person assist;  nonverbal cues (demo/gestures);  verbal cues;  B/L HHA ;  3 side steps along EOB   Gait Analysis: 3-point gait   decreased vanessa;  crouch;  increased time in double stance;  decreased hip/knee flexion;  decreased step length;  retropulsion;  shuffling;  decreased trunk rotation;  decreased weight-shifting ability;  decreased strength;  impaired balance;  impaired postural control;  3 side steps along EOB ;  B/L HHA             PM&R Impression : as above    Current Disposition Plan Recommendations :    subacute rehab placement

## 2022-12-24 NOTE — PROGRESS NOTE ADULT - PROBLEM SELECTOR PLAN 9
Yes Patient with frequent falls at home, per outpatient nursing notes, patient's HHA have reported falls at home sometimes with head trauma.  He ambulated at baseline with cane.  Etiology likely due to overall decompensation in setting of metastatic cancer. CTH and CT cervical spine without fractures of trauma.    - PT/OT consulted - recommending SASHA  - OOBTC daily w/ supervision

## 2022-12-24 NOTE — PROGRESS NOTE ADULT - SUBJECTIVE AND OBJECTIVE BOX
O/N Events: sonny    Subjective/ROS: Patient seen and examined at bedside.     Denies Fever/Chills, HA, CP, SOB, n/v, changes in bowel/urinary habits.  12pt ROS otherwise negative.    VITALS  Vital Signs Last 24 Hrs  T(C): 36.8 (24 Dec 2022 21:07), Max: 36.8 (24 Dec 2022 21:07)  T(F): 98.2 (24 Dec 2022 21:07), Max: 98.2 (24 Dec 2022 21:07)  HR: 72 (24 Dec 2022 21:07) (70 - 74)  BP: 113/71 (24 Dec 2022 21:07) (113/71 - 144/77)  BP(mean): --  RR: 18 (24 Dec 2022 21:07) (16 - 18)  SpO2: 98% (24 Dec 2022 21:07) (98% - 99%)    Parameters below as of 24 Dec 2022 21:07  Patient On (Oxygen Delivery Method): room air        CAPILLARY BLOOD GLUCOSE      POCT Blood Glucose.: 106 mg/dL (24 Dec 2022 12:21)      PHYSICAL EXAM  General: NAD  General: NAD  HEENT: NC/AT, EOMI, dry MM  Cardiac: RRR; normal S1/S2, no MRG  Respiratory: BL crackles  Gastrointestinal: +BSx4, abdomen soft, NT/ND  Extremities: WWP x4, LUE pain and swelling  Neurologic: AAOx3; no focal deficits    MEDICATIONS  (STANDING):  atovaquone  Suspension 1500 milliGRAM(s) Oral daily  bacitracin   Ointment 1 Application(s) Topical daily  buPROPion XL (24-Hour) . 150 milliGRAM(s) Oral every 24 hours  dextrose 5% + lactated ringers. 1200 milliLiter(s) (100 mL/Hr) IV Continuous <Continuous>  heparin   Injectable 5000 Unit(s) SubCutaneous every 8 hours  levothyroxine 88 MICROGram(s) Oral daily  multivitamin 1 Tablet(s) Oral daily  pantoprazole   Suspension 40 milliGRAM(s) Oral two times a day  predniSONE   Tablet 60 milliGRAM(s) Oral daily  sucralfate suspension 1 Gram(s) Oral every 6 hours    MEDICATIONS  (PRN):  acetaminophen     Tablet .. 650 milliGRAM(s) Oral every 6 hours PRN Moderate Pain (4 - 6)  trimethobenzamide Injectable 200 milliGRAM(s) IntraMuscular every 12 hours PRN Nausea and/or Vomiting      No Known Allergies      LABS                        10.5   13.19 )-----------( 296      ( 24 Dec 2022 06:39 )             31.6     12-24    136  |  105  |  30<H>  ----------------------------<  91  4.1   |  23  |  1.54<H>    Ca    6.9<L>      24 Dec 2022 06:39  Phos  1.8     12-24  Mg     1.8     12-24                  IMAGING/EKG/ETC

## 2022-12-24 NOTE — PROGRESS NOTE ADULT - PROBLEM SELECTOR PLAN 5
Patient with ASHLYN on admission BUN 31, Scr 2.35 -->Scr 2.31 (baseline Scr 1.1 10/22). ASHLNY likely prerenal i/s/o hypovolemia due to diarrhea and poor po intake. Given prostate mets, there may also be a post-renal component. Pt w/ mark initially, then removed after passing TOV.   - downtrending Cr 12/9  - c/w maintenance fluids w/ D5 due to low sugars  - strict I/Os  - trend Cr, avoid nephrotoxic drugs, renally dose meds

## 2022-12-24 NOTE — PROGRESS NOTE ADULT - ASSESSMENT
per Internal Medicine    72 y o male history of metastatic stage IV lung adenocarcinoma, hypothyroidism, depression, anxiety, undergoing chemo brought in by EMS with HHA for altered mental status Monday morning admitted for sepsis (unclear source) and electrolyte derangements. Now found to have anemia and severe ileus on 12/6, s/p EGD/flex sig 12/9 w/ ulcers, w/ ongoing GOC discussions.       Problem/Plan - 1:  ·  Problem: Left upper extremity swelling.   ·  Plan: Patient complaining of pain in his LUE, new onset swelling  - LUE doppler showed evidence of DVT in L brachial vein  - holding off AC as per Dr. Asencio due to increased risk of bleeding  - tylenol PRN for pain, which patient states has been relieving his pain to some extent.    Problem/Plan - 2:  ·  Problem: Anemia.   ·  Plan: Pt with anemia on admission to .. On 12/5, Hb 5.7. Pt asx, mild tachycardia, but normal BP. Unclear source. Pt w/ multiple dark green bowel movements, no overt melena observed. No hemoptysis, no hematuria. Significant bruising on R flank, c/f retroperitoneal bleed but r/o on imaging. Possible GI bleed, GI consulted. Heme onc consulted. Recurrent bleeding.  - CTAP w/o contrast 12/5/22: No retroperitoneal or extraperitoneal hemorrhage. Severe ileus.  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   - c/w solumedrol 40mg IV BID (started 12/15)  - EGD done 12/16: small hiatal hernia, grade C esophagitis (biopsied r/o checkpoint inhibitor esophagitis) Single cratered non-bleeding healing in the cardia, a few cratered non-bleeding healing ulcers in the pylorus, multiple cratered non-bleeding ulcers in the duodenal bulb (biopsied to r/o checkpoint inhibitor duodenitis).   - GI recommending:       - PPI BID for 8 weeks       - repeat EGD in 2 months        - Carafate Suspension 1g po qid for 2 weeks.    Problem/Plan - 3:  ·  Problem: Diarrhea.   ·  Plan: Patient with diarrhea described as loose stool, per outpatient notes seems to be chronic for several weeks (later stated for a year). Patient was prescribed Loperamide early November.  Unclear etiology. May be in setting of metastatic malignancy, however currently considering inflammatory given chronicity and white count. Diarrhea likely not infectious C. diff negative, GI PCR negative. Pt persistently hypokalemic likely from diarrhea. GI consulted on 12/2. 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 EGD on 12/9 showing poor rectal tone.  - CRP high 132; quantitative IgA normal; tissue transglutaminase (TTG) IgA-antibody nml  Plan - RESOLVED.    Problem/Plan - 4:  ·  Problem: Ileus.   ·  Plan: On 12/5 pt found to have distended abdomen. CTAP w/o contrast on 12/5 showing severe ileus. Surgery was formally consulted. Decided not to staff with attending but will continue to follow for serial abdominal exams. Primary team placed sump tube, set to suction. GI also following.  - placed sump tube 12/5  - bladder scan on 12/5, not retaining  - repeat abdominal XR 12/7: abdominal distention w/ improving ileus  - surgery signed off 12/9  Plan:  - restarting minced and moist diet, advance as tolerated.    Problem/Plan - 5:  ·  Problem: ASHLYN (acute kidney injury).   ·  Plan: Patient with ASHLYN on admission BUN 31, Scr 2.35 -->Scr 2.31 (baseline Scr 1.1 10/22). ASHLYN likely prerenal i/s/o hypovolemia due to diarrhea and poor po intake. Given prostate mets, there may also be a post-renal component. Pt w/ mark initially, then removed after passing TOV.   - downtrending Cr 12/9  - c/w maintenance fluids w/ D5 due to low sugars  - strict I/Os  - trend Cr, avoid nephrotoxic drugs, renally dose meds.    Problem/Plan - 6:  ·  Problem: Hypokalemia.   ·  Plan: Patient with hypokalemia, likely due to persistent diarrhea. No EKG changes. On 12/1 K low to 2.1, repeat EKG possible u waves. Pt asymptomatic, no fasciculations or weakness. Aggressively repleted. Diarrhea stopped when pt NPO, and K improved, now worsening w/ worsening diarrhea after diet restarted.  - K replenish, goal >4.    Problem/Plan - 7:  ·  Problem: Sepsis.   ·  Plan: Patient with encephalopathy and hypotension, meeting 3 SIRS on admission for tachycardia, tachypnea, and leukocytosis.  Source possibly pulmonary and urinary.  Lactate 12.1 --> 2.3, UA trace LE, Bacteria present, hyaline casts, C. Diff negative GI PCR negative.  Lactate cleared, S/p Zosyn 3.375 x 3, Vancomycin 1250mg, NaCl 2200cc bolus in ED.   Still unclear source. AAOx4, mentating well since 11/29.  - UCx no growth final  - BCx NGTD  - ULeg negative  - s/p Vanc 1000mg qD and Zosyn 3.375 q8 (11/28-12/1)  - repeat BCx/UA w/ reflex culture NGTD  Plan:  - continue to trend WBC.    Problem/Plan - 8:  ·  Problem: Hypothyroidism.   ·  Plan: Patient with history of hypothyroidism, home medications Levothyroxine 75mcg daily. On 12/1 TSH 10.81, free T4 0.67.  - c/w synthroid 88mcg qD.    Problem/Plan - 9:  ·  Problem: Falls.   ·  Plan: Patient with frequent falls at home, per outpatient nursing notes, patient's HHA have reported falls at home sometimes with head trauma.  He ambulated at baseline with cane.  Etiology likely due to overall decompensation in setting of metastatic cancer. CTH and CT cervical spine without fractures of trauma.    - PT/OT consulted - recommending SASHA  - OOBTC daily w/ supervision.    Problem/Plan - 10:  ·  Problem: Adult failure to thrive.   ·  Plan; Patient with underlying metastatic lung adenocarcinoma with chronic diarrhea and frequent falls, overall appears very frail.    - dietitian consulted - rec ensure enlive TID  - PT/OT consulted, rec SASHA  - palliative consult: DNR/DNI; palliative to speak w/ Dr. Patricia for possible hospice  - contact family  - incentive spirometry.    Problem/Plan - 11:  ·  Problem: Hypomagnesemia.   ·  Plan: Patient with hypomagnesemia, likely due to persistent diarrhea.  Likely contributing to overall weakness and falls.  - Replenish PRN.    Problem/Plan - 12:  ·  Problem: Hypophosphatemia.   ·  Plan: Patient with hypophosphatemia, likely due to persistent diarrhea.  Likely contributing to overall weakness and falls.  - Replenish PRN.

## 2022-12-24 NOTE — PROGRESS NOTE ADULT - SUBJECTIVE AND OBJECTIVE BOX
INTERVAL HPI/OVERNIGHT EVENTS:  Awake and feeling stronger  Does not want SASHA;  Wants home      MEDICATIONS  (STANDING):  atovaquone  Suspension 1500 milliGRAM(s) Oral daily  bacitracin   Ointment 1 Application(s) Topical daily  buPROPion XL (24-Hour) . 150 milliGRAM(s) Oral every 24 hours  dextrose 5% + lactated ringers. 1200 milliLiter(s) (100 mL/Hr) IV Continuous <Continuous>  heparin   Injectable 5000 Unit(s) SubCutaneous every 8 hours  levothyroxine 88 MICROGram(s) Oral daily  multivitamin 1 Tablet(s) Oral daily  pantoprazole   Suspension 40 milliGRAM(s) Oral two times a day  predniSONE   Tablet 60 milliGRAM(s) Oral daily  sucralfate suspension 1 Gram(s) Oral every 6 hours    MEDICATIONS  (PRN):  acetaminophen     Tablet .. 650 milliGRAM(s) Oral every 6 hours PRN Moderate Pain (4 - 6)  trimethobenzamide Injectable 200 milliGRAM(s) IntraMuscular every 12 hours PRN Nausea and/or Vomiting      Allergies    No Known Allergies    Intolerances        Vital Signs Last 24 Hrs  T(C): 36.5 (24 Dec 2022 08:35), Max: 36.6 (23 Dec 2022 21:14)  T(F): 97.7 (24 Dec 2022 08:35), Max: 97.8 (23 Dec 2022 21:14)  HR: 74 (24 Dec 2022 08:35) (62 - 74)  BP: 118/66 (24 Dec 2022 08:35) (115/66 - 143/61)  BP(mean): --  RR: 17 (24 Dec 2022 08:35) (17 - 18)  SpO2: 99% (24 Dec 2022 08:35) (95% - 99%)    Parameters below as of 24 Dec 2022 08:35  Patient On (Oxygen Delivery Method): room air              Constitutional: Awake     Eyes: ARBEN    ENMT: Negative    Neck: Supple    Back:  no tenderness     Respiratory:  clear    Cardiovascular: S1 S2    Gastrointestinal: soft    Genitourinary:    Extremities: no edema    Vascular:    Neurological:    Skin:    Lymph Nodes:            LABS:                        10.5   13.19 )-----------( 296      ( 24 Dec 2022 06:39 )             31.6     12-24    136  |  105  |  30<H>  ----------------------------<  91  4.1   |  23  |  1.54<H>    Ca    6.9<L>      24 Dec 2022 06:39  Phos  1.8     12-24  Mg     1.8     12-24            RADIOLOGY & ADDITIONAL TESTS:

## 2022-12-25 NOTE — PROGRESS NOTE ADULT - SUBJECTIVE AND OBJECTIVE BOX
Interval Events: Reviewed  Patient seen and examined at bedside.    Patient is a 72y old  Male who presents with a chief complaint of AMS (18 Dec 2022 06:36)  no acute event overnight, RA 98%    PAST MEDICAL & SURGICAL HISTORY:      MEDICATIONS:  Pulmonary:    Antimicrobials:  atovaquone  Suspension 1500 milliGRAM(s) Oral daily    Anticoagulants:  heparin   Injectable 5000 Unit(s) SubCutaneous every 8 hours    Cardiac:      Allergies    No Known Allergies    Intolerances        Vital Signs Last 24 Hrs  T(C): 36.8 (24 Dec 2022 21:07), Max: 36.8 (24 Dec 2022 21:07)  T(F): 98.2 (24 Dec 2022 21:07), Max: 98.2 (24 Dec 2022 21:07)  HR: 72 (24 Dec 2022 21:07) (70 - 74)  BP: 113/71 (24 Dec 2022 21:07) (113/71 - 144/77)  BP(mean): --  RR: 18 (24 Dec 2022 21:07) (16 - 18)  SpO2: 98% (24 Dec 2022 21:07) (98% - 99%)    Parameters below as of 24 Dec 2022 21:07  Patient On (Oxygen Delivery Method): room air            Review of Systems:   •	General: negative  •	Skin/Breast: negative  •	Ophthalmologic: negative  •	ENMT: negative  •	Respiratory and Thorax: negative  •	Cardiovascular: negative  •	Gastrointestinal: negative  •	Genitourinary: negative  •	Musculoskeletal: negative  •	Neurological: negative  •	Psychiatric: negative  •	Hematology/Lymphatics: negative  •	Endocrine: negative  •	Allergic/Immunologic: negative    Physical Exam:   • Constitutional:	thin elderly male, NAD  • Eyes:	EOMI; PERRL; no drainage or redness  • ENMT:	No oral lesions; no gross abnormalities  • Neck	no thyromegaly or nodules  • Breasts:	not examined  • Back:	No deformity or limitation of movement  • Respiratory:	Breath Sounds equal & clear to auscultation, no accessory muscle use  • Cardiovascular:	Regular rate & rhythm, normal S1, S2; no murmurs, gallops or rubs; no S3, S4  • Gastrointestinal:	Soft, non-tender, no hepatosplenomegaly, normal bowel sounds  • Genitourinary:	not examined  • Rectal: not examined  • Extremities:	No cyanosis, clubbing or edema  • Vascular:	Equal and normal pulses (dorsalis pedis)  • Neurologica:l	not examined  • Skin:	No lesions; no rash  • Lymph Nodes:	No lymphadedenopathy  • Musculoskeletal:	No joint pain, swelling or deformity; no limitation of movement        LABS:      CBC Full  -  ( 24 Dec 2022 06:39 )  WBC Count : 13.19 K/uL  RBC Count : 3.61 M/uL  Hemoglobin : 10.5 g/dL  Hematocrit : 31.6 %  Platelet Count - Automated : 296 K/uL  Mean Cell Volume : 87.5 fl  Mean Cell Hemoglobin : 29.1 pg  Mean Cell Hemoglobin Concentration : 33.2 gm/dL  Auto Neutrophil # : 11.81 K/uL  Auto Lymphocyte # : 0.80 K/uL  Auto Monocyte # : 0.34 K/uL  Auto Eosinophil # : 0.00 K/uL  Auto Basophil # : 0.00 K/uL  Auto Neutrophil % : 89.5 %  Auto Lymphocyte % : 6.1 %  Auto Monocyte % : 2.6 %  Auto Eosinophil % : 0.0 %  Auto Basophil % : 0.0 %    12-24    136  |  105  |  30<H>  ----------------------------<  91  4.1   |  23  |  1.54<H>    Ca    6.9<L>      24 Dec 2022 06:39  Phos  1.8     12-24  Mg     1.8     12-24                          RADIOLOGY & ADDITIONAL STUDIES (The following images were personally reviewed):  Galvin:                                     No  Urine output:                       adequate  DVT prophylaxis:                 Yes  Flattus:                                  Yes  Bowel movement:              No

## 2022-12-26 NOTE — PROGRESS NOTE ADULT - SUBJECTIVE AND OBJECTIVE BOX
INTERVAL HPI/OVERNIGHT EVENTS:  Without complaint;  Feels stronger      MEDICATIONS  (STANDING):  atovaquone  Suspension 1500 milliGRAM(s) Oral daily  bacitracin   Ointment 1 Application(s) Topical daily  buPROPion XL (24-Hour) . 150 milliGRAM(s) Oral every 24 hours  dextrose 5% + lactated ringers. 1200 milliLiter(s) (100 mL/Hr) IV Continuous <Continuous>  heparin   Injectable 5000 Unit(s) SubCutaneous every 8 hours  levothyroxine 88 MICROGram(s) Oral daily  multivitamin 1 Tablet(s) Oral daily  pantoprazole   Suspension 40 milliGRAM(s) Oral two times a day  predniSONE   Tablet 60 milliGRAM(s) Oral daily  sucralfate suspension 1 Gram(s) Oral every 6 hours    MEDICATIONS  (PRN):  acetaminophen     Tablet .. 650 milliGRAM(s) Oral every 6 hours PRN Moderate Pain (4 - 6)  trimethobenzamide Injectable 200 milliGRAM(s) IntraMuscular every 12 hours PRN Nausea and/or Vomiting      Allergies    No Known Allergies    Intolerances        Vital Signs Last 24 Hrs  T(C): 37.1 (26 Dec 2022 10:04), Max: 37.1 (26 Dec 2022 10:04)  T(F): 98.7 (26 Dec 2022 10:04), Max: 98.7 (26 Dec 2022 10:04)  HR: 87 (26 Dec 2022 10:04) (66 - 87)  BP: 122/58 (26 Dec 2022 10:04) (116/63 - 125/65)  BP(mean): --  RR: 18 (26 Dec 2022 10:04) (16 - 18)  SpO2: 97% (26 Dec 2022 10:04) (97% - 99%)    Parameters below as of 26 Dec 2022 10:04  Patient On (Oxygen Delivery Method): room air              Constitutional:  Awake     Eyes: ARBEN    ENMT: Negative    Neck: Supple    Back:  no tenderness     Respiratory:  clear    Cardiovascular: S1 S2    Gastrointestinal: soft    Genitourinary:    Extremities:  no edema    Vascular:    Neurological:    Skin:    Lymph Nodes:            LABS:                        10.2   12.03 )-----------( 289      ( 26 Dec 2022 05:30 )             31.0     12-26    137  |  104  |  21  ----------------------------<  100<H>  3.7   |  20<L>  |  1.25    Ca    6.8<L>      26 Dec 2022 05:30  Phos  2.6     12-26  Mg     1.8     12-26            RADIOLOGY & ADDITIONAL TESTS:

## 2022-12-26 NOTE — PROGRESS NOTE ADULT - SUBJECTIVE AND OBJECTIVE BOX
O/N Events: sonny     Subjective/ROS: Patient seen and examined at bedside.     Denies Fever/Chills, HA, CP, SOB, n/v, changes in bowel/urinary habits.  12pt ROS otherwise negative.    VITALS  Vital Signs Last 24 Hrs  T(C): 37.1 (26 Dec 2022 10:04), Max: 37.1 (26 Dec 2022 10:04)  T(F): 98.7 (26 Dec 2022 10:04), Max: 98.7 (26 Dec 2022 10:04)  HR: 87 (26 Dec 2022 10:04) (66 - 87)  BP: 122/58 (26 Dec 2022 10:04) (116/63 - 125/65)  BP(mean): --  RR: 18 (26 Dec 2022 10:04) (16 - 18)  SpO2: 97% (26 Dec 2022 10:04) (97% - 99%)    Parameters below as of 26 Dec 2022 10:04  Patient On (Oxygen Delivery Method): room air        CAPILLARY BLOOD GLUCOSE      POCT Blood Glucose.: 147 mg/dL (26 Dec 2022 11:48)  POCT Blood Glucose.: 93 mg/dL (26 Dec 2022 06:48)  POCT Blood Glucose.: 112 mg/dL (26 Dec 2022 00:10)      PHYSICAL EXAM  General: NAD  General: NAD  HEENT: NC/AT, EOMI, dry MM  Cardiac: RRR; normal S1/S2, no MRG  Respiratory: BL crackles  Gastrointestinal: +BSx4, abdomen soft, NT/ND  Extremities: WWP x4, LUE pain and swelling  Neurologic: AAOx3; no focal deficits    MEDICATIONS  (STANDING):  atovaquone  Suspension 1500 milliGRAM(s) Oral daily  bacitracin   Ointment 1 Application(s) Topical daily  buPROPion XL (24-Hour) . 150 milliGRAM(s) Oral every 24 hours  dextrose 5% + lactated ringers. 1200 milliLiter(s) (100 mL/Hr) IV Continuous <Continuous>  heparin   Injectable 5000 Unit(s) SubCutaneous every 8 hours  levothyroxine 88 MICROGram(s) Oral daily  multivitamin 1 Tablet(s) Oral daily  pantoprazole   Suspension 40 milliGRAM(s) Oral two times a day  predniSONE   Tablet 60 milliGRAM(s) Oral daily  sucralfate suspension 1 Gram(s) Oral every 6 hours    MEDICATIONS  (PRN):  acetaminophen     Tablet .. 650 milliGRAM(s) Oral every 6 hours PRN Moderate Pain (4 - 6)  trimethobenzamide Injectable 200 milliGRAM(s) IntraMuscular every 12 hours PRN Nausea and/or Vomiting      No Known Allergies      LABS                        10.2   12.03 )-----------( 289      ( 26 Dec 2022 05:30 )             31.0     12-26    137  |  104  |  21  ----------------------------<  100<H>  3.7   |  20<L>  |  1.25    Ca    6.8<L>      26 Dec 2022 05:30  Phos  2.6     12-26  Mg     1.8     12-26                  IMAGING/EKG/ETC

## 2022-12-26 NOTE — PROGRESS NOTE ADULT - TIME BILLING
Patient seen and examined;  Wants to go home with rehab  Otherwise no change in current regimen  Continue steroids for drug induced gastritis ; Checkpoint inhibitors

## 2022-12-27 NOTE — PROGRESS NOTE ADULT - PROBLEM SELECTOR PROBLEM 8
Anemia
Falls
Hypothyroidism
Anemia
Falls
Hypomagnesemia
Anemia
Hypophosphatemia
Anemia
Falls
Hypomagnesemia
Hypophosphatemia
Anemia
Falls
Anemia
Falls
Anemia
Falls
Hypothyroidism
Anemia
Falls
Hypomagnesemia
Falls
Anemia
Anemia
Hypomagnesemia
Falls
Hypophosphatemia
Falls
Hypothyroidism
Hypothyroidism
Falls
Hypothyroidism
Falls

## 2022-12-27 NOTE — PROGRESS NOTE ADULT - PROBLEM SELECTOR PROBLEM 1
Anemia
Sepsis
Anemia
Sepsis
Diarrhea
Sepsis
Sepsis
Anemia
Left upper extremity swelling
Sepsis
Anemia
Sepsis
Left upper extremity swelling
Sepsis
Sepsis
Anemia
Anemia
Sepsis
Anemia
Diarrhea
Sepsis
Sepsis
Anemia
Anemia
Left upper extremity swelling
Anemia
Left upper extremity swelling
Anemia
Left upper extremity swelling
Anemia
Diarrhea
Sepsis
Anemia
Diarrhea
Sepsis
Anemia

## 2022-12-27 NOTE — PROGRESS NOTE ADULT - TIME BILLING
Patient seen and examined  Discussed with case management ; They will see him today about discharge plans  He wants to go home with help at home   Will taper Steroids today

## 2022-12-27 NOTE — PROGRESS NOTE ADULT - PROBLEM SELECTOR PLAN 7
Patient with underlying metastatic lung adenocarcinoma with chronic diarrhea and frequent falls, overall appears very frail.    - dietitian consulted - rec ensure enlive TID  - PT/OT consult - rec SASHA  - s/p 1L LR bolus on 12/2
Patient with underlying metastatic lung adenocarcinoma with chronic diarrhea and frequent falls, overall appears very frail.    - dietitian consulted - rec ensure enlive TID  - PT/OT consult - rec SASHA  - s/p 1L LR bolus on 12/2
Patient with history of hypothyroidism, home medications Levothyroxine 75mcg daily. On 12/1 TSH 10.81, free T4 0.67.  - increase synthroid to 88mcg qD on 12/1/22
Patient with underlying metastatic lung adenocarcinoma with chronic diarrhea and frequent falls, overall appears very frail.    - dietitian consulted - rec ensure enlive TID  - PT/OT consult - rec SASHA  - s/p 1L LR bolus on 12/2
Patient with encephalopathy and hypotension, meeting 3 SIRS on admission for tachycardia, tachypnea, and leukocytosis.  Source possibly pulmonary and urinary.  Lactate 12.1 --> 2.3, UA trace LE, Bacteria present, hyaline casts, C. Diff negative GI PCR negative.  Lactate cleared, S/p Zosyn 3.375 x 3, Vancomycin 1250mg, NaCl 2200cc bolus in ED.   Still unclear source. AAOx4, mentating well since 11/29.  - UCx no growth final  - BCx NGTD  - ULeg negative  - s/p Vanc 1000mg qD and Zosyn 3.375 q8 (11/28-12/1)  - repeat BCx/UA w/ reflex culture NGTD  Plan:  - continue to trend WBC
Patient with history of hypothyroidism, home medications Levothyroxine 75mcg daily. On 12/1 TSH 10.81, free T4 0.67.  - c/w synthroid 88mcg qD
Patient with hypomagnesemia, likely due to persistent diarrhea.  Likely contributing to overall weakness and falls.  - Replete PRN
Patient with history of hypothyroidism, home medications Levothyroxine 75mcg daily. On 12/1 TSH 10.81, free T4 0.67.  - c/w synthroid 88mcg qD
Patient with history of hypothyroidism, home medications Levothyroxine 75mcg daily. On 12/1 TSH 10.81, free T4 0.67.  - increase synthroid to 88mcg qD on 12/1/22
Patient with history of hypothyroidism, home medications Levothyroxine 75mcg daily. On 12/1 TSH 10.81, free T4 0.67.  - c/w synthroid 88mcg qD
Patient with history of hypothyroidism, home medications Levothyroxine 75mcg daily. On 12/1 TSH 10.81, free T4 0.67.  - increase synthroid to 88mcg qD on 12/1/22
Patient with encephalopathy and hypotension, meeting 3 SIRS on admission for tachycardia, tachypnea, and leukocytosis.  Source possibly pulmonary and urinary.  Lactate 12.1 --> 2.3, UA trace LE, Bacteria present, hyaline casts, C. Diff negative GI PCR negative.  Lactate cleared, S/p Zosyn 3.375 x 3, Vancomycin 1250mg, NaCl 2200cc bolus in ED.   Still unclear source. AAOx4, mentating well since 11/29.  - UCx no growth final  - BCx NGTD  - ULeg negative  - s/p Vanc 1000mg qD and Zosyn 3.375 q8 (11/28-12/1)  - repeat BCx/UA w/ reflex culture NGTD  Plan:  - continue to trend WBC
Patient with encephalopathy and hypotension, meeting 3 SIRS on admission for tachycardia, tachypnea, and leukocytosis.  Source possibly pulmonary and urinary.  Lactate 12.1 --> 2.3, UA trace LE, Bacteria present, hyaline casts, C. Diff negative GI PCR negative.  Lactate cleared, S/p Zosyn 3.375 x 3, Vancomycin 1250mg, NaCl 2200cc bolus in ED.   Still unclear source. AAOx4, mentating well since 11/29.  - UCx no growth final  - BCx NGTD  - ULeg negative  - s/p Vanc 1000mg qD and Zosyn 3.375 q8 (11/28-12/1)  - repeat BCx/UA w/ reflex culture NGTD  Plan:  - continue to trend WBC
Patient with history of hypothyroidism, home medications Levothyroxine 75mcg daily. On 12/1 TSH 10.81, free T4 0.67.  - increase synthroid to 88mcg qD on 12/1/22
Patient with history of hypothyroidism, home medications Levothyroxine 75mcg daily. On 12/1 TSH 10.81, free T4 0.67.  - c/w synthroid 88mcg qD
Patient with encephalopathy and hypotension, meeting 3 SIRS on admission for tachycardia, tachypnea, and leukocytosis.  Source possibly pulmonary and urinary.  Lactate 12.1 --> 2.3, UA trace LE, Bacteria present, hyaline casts, C. Diff negative GI PCR negative.  Lactate cleared, S/p Zosyn 3.375 x 3, Vancomycin 1250mg, NaCl 2200cc bolus in ED.   Still unclear source. AAOx4, mentating well since 11/29.  - UCx no growth final  - BCx NGTD  - ULeg negative  - s/p Vanc 1000mg qD and Zosyn 3.375 q8 (11/28-12/1)  - repeat BCx/UA w/ reflex culture NGTD  Plan:  - continue to trend WBC
Patient with encephalopathy and hypotension, meeting 3 SIRS on admission for tachycardia, tachypnea, and leukocytosis.  Source possibly pulmonary and urinary.  Lactate 12.1 --> 2.3, UA trace LE, Bacteria present, hyaline casts, C. Diff negative GI PCR negative.  Lactate cleared, S/p Zosyn 3.375 x 3, Vancomycin 1250mg, NaCl 2200cc bolus in ED.   Still unclear source. AAOx4, mentating well since 11/29.  - UCx no growth final  - BCx NGTD  - ULeg negative  - s/p Vanc 1000mg qD and Zosyn 3.375 q8 (11/28-12/1)  - repeat BCx/UA w/ reflex culture NGTD  Plan:  - continue to trend WBC
Patient with hypomagnesemia, likely due to persistent diarrhea.  Likely contributing to overall weakness and falls.  - Replete PRN
Patient with history of hypothyroidism, home medications Levothyroxine 75mcg daily. On 12/1 TSH 10.81, free T4 0.67.  - c/w synthroid 88mcg qD
palliative care consulted for GOC, pls see GOC note.
Patient with history of hypothyroidism, home medications Levothyroxine 75mcg daily. On 12/1 TSH 10.81, free T4 0.67.  - increase synthroid to 88mcg qD on 12/1/22
Patient with history of hypothyroidism, home medications Levothyroxine 75mcg daily. On 12/1 TSH 10.81, free T4 0.67.  - c/w synthroid 88mcg qD
Patient with history of hypothyroidism, home medications Levothyroxine 75mcg daily. On 12/1 TSH 10.81, free T4 0.67.  - c/w synthroid 88mcg qD
Patient with hypomagnesemia, likely due to persistent diarrhea.  Likely contributing to overall weakness and falls.  - Replete PRN
Patient with history of hypothyroidism, home medications Levothyroxine 75mcg daily. On 12/1 TSH 10.81, free T4 0.67.  - increase synthroid to 88mcg qD on 12/1/22
Patient with history of hypothyroidism, home medications Levothyroxine 75mcg daily. On 12/1 TSH 10.81, free T4 0.67.  - increase synthroid to 88mcg qD on 12/1/22
Patient with encephalopathy and hypotension, meeting 3 SIRS on admission for tachycardia, tachypnea, and leukocytosis.  Source possibly pulmonary and urinary.  Lactate 12.1 --> 2.3, UA trace LE, Bacteria present, hyaline casts, C. Diff negative GI PCR negative.  Lactate cleared, S/p Zosyn 3.375 x 3, Vancomycin 1250mg, NaCl 2200cc bolus in ED.   Still unclear source. AAOx4, mentating well since 11/29.  - UCx no growth final  - BCx NGTD  - ULeg negative  - s/p Vanc 1000mg qD and Zosyn 3.375 q8 (11/28-12/1)  - repeat BCx/UA w/ reflex culture NGTD  Plan:  - continue to trend WBC
Patient with underlying metastatic lung adenocarcinoma with chronic diarrhea and frequent falls, overall appears very frail.    - dietitian consulted - rec ensure enlive TID  - PT/OT consult - rec SASHA  - s/p 1L LR bolus on 12/2
Patient with encephalopathy and hypotension, meeting 3 SIRS on admission for tachycardia, tachypnea, and leukocytosis.  Source possibly pulmonary and urinary.  Lactate 12.1 --> 2.3, UA trace LE, Bacteria present, hyaline casts, C. Diff negative GI PCR negative.  Lactate cleared, S/p Zosyn 3.375 x 3, Vancomycin 1250mg, NaCl 2200cc bolus in ED.   Still unclear source. AAOx4, mentating well since 11/29.  - UCx no growth final  - BCx NGTD  - ULeg negative  - s/p Vanc 1000mg qD and Zosyn 3.375 q8 (11/28-12/1)  - repeat BCx/UA w/ reflex culture NGTD  Plan:  - continue to trend WBC
Patient with encephalopathy and hypotension, meeting 3 SIRS on admission for tachycardia, tachypnea, and leukocytosis.  Source possibly pulmonary and urinary.  Lactate 12.1 --> 2.3, UA trace LE, Bacteria present, hyaline casts, C. Diff negative GI PCR negative.  Lactate cleared, S/p Zosyn 3.375 x 3, Vancomycin 1250mg, NaCl 2200cc bolus in ED.   Still unclear source. AAOx4, mentating well since 11/29.  - UCx no growth final  - BCx NGTD  - ULeg negative  - s/p Vanc 1000mg qD and Zosyn 3.375 q8 (11/28-12/1)  - repeat BCx/UA w/ reflex culture NGTD  Plan:  - continue to trend WBC
Patient with history of hypothyroidism, home medications Levothyroxine 75mcg daily. On 12/1 TSH 10.81, free T4 0.67.  - c/w synthroid 88mcg qD
Patient with history of hypothyroidism, home medications Levothyroxine 75mcg daily. On 12/1 TSH 10.81, free T4 0.67.  - c/w synthroid 88mcg qD

## 2022-12-27 NOTE — PROGRESS NOTE ADULT - PROBLEM SELECTOR PROBLEM 11
Anxiety and depression
Hypophosphatemia
Hypomagnesemia
Hypophosphatemia
Hypomagnesemia
Hypophosphatemia
Anxiety and depression
Hypomagnesemia
Hypomagnesemia
Anxiety and depression
Hypophosphatemia
Hypomagnesemia
Hypophosphatemia
Hypomagnesemia
Hypophosphatemia
Anxiety and depression
Hypomagnesemia
Hypophosphatemia
Anxiety and depression
Hypophosphatemia
Hypomagnesemia
Hypophosphatemia
Anxiety and depression
Anxiety and depression

## 2022-12-27 NOTE — PROGRESS NOTE ADULT - SUBJECTIVE AND OBJECTIVE BOX
O/N Events: sonny o/n     Subjective/ROS: Patient seen and examined at bedside.     Denies Fever/Chills, HA, CP, SOB, n/v, changes in bowel/urinary habits.  12pt ROS otherwise negative.    VITALS  Vital Signs Last 24 Hrs  T(C): 37.5 (27 Dec 2022 08:56), Max: 37.5 (27 Dec 2022 08:56)  T(F): 99.5 (27 Dec 2022 08:56), Max: 99.5 (27 Dec 2022 08:56)  HR: 82 (27 Dec 2022 08:56) (74 - 89)  BP: 124/57 (27 Dec 2022 08:56) (113/55 - 124/57)  BP(mean): --  RR: 17 (27 Dec 2022 08:56) (17 - 18)  SpO2: 97% (27 Dec 2022 08:56) (97% - 98%)    Parameters below as of 27 Dec 2022 08:56  Patient On (Oxygen Delivery Method): room air        CAPILLARY BLOOD GLUCOSE      POCT Blood Glucose.: 115 mg/dL (27 Dec 2022 05:57)  POCT Blood Glucose.: 136 mg/dL (26 Dec 2022 23:41)  POCT Blood Glucose.: 106 mg/dL (26 Dec 2022 16:46)  POCT Blood Glucose.: 147 mg/dL (26 Dec 2022 11:48)      PHYSICAL EXAM  General: NAD  HEENT: NC/AT, EOMI, dry MM  Cardiac: RRR; normal S1/S2, no MRG  Respiratory: BL crackles  Gastrointestinal: +BSx4, abdomen soft, NT/ND  Extremities: WWP x4  Neurologic: AAOx3; no focal deficits    MEDICATIONS  (STANDING):  atovaquone  Suspension 1500 milliGRAM(s) Oral daily  bacitracin   Ointment 1 Application(s) Topical daily  buPROPion XL (24-Hour) . 150 milliGRAM(s) Oral every 24 hours  dextrose 5% + lactated ringers. 1200 milliLiter(s) (100 mL/Hr) IV Continuous <Continuous>  heparin   Injectable 5000 Unit(s) SubCutaneous every 8 hours  levothyroxine 88 MICROGram(s) Oral daily  multivitamin 1 Tablet(s) Oral daily  pantoprazole   Suspension 40 milliGRAM(s) Oral two times a day  predniSONE   Tablet 40 milliGRAM(s) Oral daily  sodium phosphate 15 milliMole(s)/250 mL IVPB 15 milliMole(s) IV Intermittent once  sucralfate suspension 1 Gram(s) Oral every 6 hours    MEDICATIONS  (PRN):  acetaminophen     Tablet .. 650 milliGRAM(s) Oral every 6 hours PRN Moderate Pain (4 - 6)  trimethobenzamide Injectable 200 milliGRAM(s) IntraMuscular every 12 hours PRN Nausea and/or Vomiting      No Known Allergies      LABS                        9.5    13.61 )-----------( 253      ( 27 Dec 2022 08:25 )             29.1     12-27    137  |  105  |  20  ----------------------------<  101<H>  4.3   |  24  |  1.19    Ca    6.9<L>      27 Dec 2022 08:25  Phos  1.8     12-27  Mg     1.9     12-27    IMAGING/EKG/ETC

## 2022-12-27 NOTE — PROGRESS NOTE ADULT - PROBLEM SELECTOR PROBLEM 5
Hypomagnesemia
Hypokalemia
Hypomagnesemia
ASHLYN (acute kidney injury)
Hypokalemia
Hypomagnesemia
Hypothyroidism
Diarrhea
Hypomagnesemia
Hypokalemia
Hypokalemia
Hypomagnesemia
Hypokalemia
Hypokalemia
Hypomagnesemia
Hypokalemia
Hypomagnesemia
Hypokalemia
Hypokalemia
Hypomagnesemia
Hypothyroidism
Ileus
Hypomagnesemia
Hypomagnesemia
Hypothyroidism
ASHLYN (acute kidney injury)
Hypomagnesemia
Hypomagnesemia
Diarrhea
Hypokalemia
ASHLYN (acute kidney injury)
ASHLYN (acute kidney injury)
Hypothyroidism
Diarrhea
Hypokalemia
ASHLYN (acute kidney injury)

## 2022-12-27 NOTE — PROGRESS NOTE ADULT - PROBLEM SELECTOR PROBLEM 2
ASHLYN (acute kidney injury)
Falls
Hypokalemia
ASHLYN (acute kidney injury)
Diarrhea
Diarrhea
ASHLYN (acute kidney injury)
Diarrhea
Falls
ASHLYN (acute kidney injury)
Diarrhea
Diarrhea
Anemia
Diarrhea
ASHLYN (acute kidney injury)
ASHLYN (acute kidney injury)
Diarrhea
Hypokalemia
ASHLYN (acute kidney injury)
ASHLYN (acute kidney injury)
Hypokalemia
ASHLYN (acute kidney injury)
Anemia
Anemia
ASHLYN (acute kidney injury)
Anemia
Falls
Hypokalemia
ASHLYN (acute kidney injury)
Diarrhea
Anemia
Diarrhea
Adenocarcinoma, lung
Diarrhea

## 2022-12-27 NOTE — PROGRESS NOTE ADULT - PROBLEM SELECTOR PROBLEM 12
Adenocarcinoma, lung
Hypophosphatemia
Preventive measure
Adenocarcinoma, lung
Adenocarcinoma, lung
Preventive measure
Adenocarcinoma, lung
Adenocarcinoma, lung
Hypophosphatemia
Adenocarcinoma, lung
Adenocarcinoma, lung
Hypophosphatemia
Preventive measure
Adenocarcinoma, lung
Preventive measure
Adenocarcinoma, lung
Preventive measure
Adenocarcinoma, lung
Adenocarcinoma, lung
Hypophosphatemia
Preventive measure
Adenocarcinoma, lung
Adenocarcinoma, lung
Preventive measure
Hypophosphatemia
Adenocarcinoma, lung
Hypophosphatemia

## 2022-12-27 NOTE — PROGRESS NOTE ADULT - PROBLEM SELECTOR PROBLEM 6
Hypothyroidism
Hypokalemia
Hypothyroidism
Hypothyroidism
Falls
Hypokalemia
Hypothyroidism
Sepsis
Falls
Hypokalemia
Hypothyroidism
Sepsis
Sepsis
Hypothyroidism
Sepsis
Hypothyroidism
Sepsis
Sepsis
Hypothyroidism
Hypokalemia
Sepsis
Falls
Sepsis
Hypokalemia
Sepsis
Sepsis
Hypokalemia
Sepsis
Falls
Hypokalemia
Hypokalemia
Advanced care planning/counseling discussion
Sepsis
Sepsis

## 2022-12-27 NOTE — PROGRESS NOTE ADULT - PROBLEM SELECTOR PROBLEM 7
Adenocarcinoma, lung
Sepsis
Adult failure to thrive
Adenocarcinoma, lung
Hypomagnesemia
Hypothyroidism
Adenocarcinoma, lung
Hypothyroidism
Adenocarcinoma, lung
Adult failure to thrive
Hypomagnesemia
Hypothyroidism
Hypothyroidism
Adenocarcinoma, lung
Adenocarcinoma, lung
Hypothyroidism
Adult failure to thrive
Hypothyroidism
Adult failure to thrive
Hypothyroidism
Adenocarcinoma, lung
Adenocarcinoma, lung
Hypothyroidism
Palliative care encounter
Adenocarcinoma, lung
Adenocarcinoma, lung
Hypothyroidism
Adenocarcinoma, lung
Hypomagnesemia
Hypothyroidism
Sepsis
Hypothyroidism
Sepsis

## 2022-12-27 NOTE — PROGRESS NOTE ADULT - PROVIDER SPECIALTY LIST ADULT
Gastroenterology
Gastroenterology
Heme/Onc
Heme/Onc
Internal Medicine
Rehab Medicine
Gastroenterology
Heme/Onc
Heme/Onc
Internal Medicine
Rehab Medicine
Surgery
Surgery
Gastroenterology
Heme/Onc
Rehab Medicine
Rehab Medicine
Surgery
Internal Medicine
Surgery
Internal Medicine
Palliative Care
Internal Medicine

## 2022-12-27 NOTE — PROGRESS NOTE ADULT - PROBLEM SELECTOR PROBLEM 10
Adenocarcinoma, lung
Hypomagnesemia
Hypomagnesemia
Adenocarcinoma, lung
Hypomagnesemia
Adult failure to thrive
Adenocarcinoma, lung
Adult failure to thrive
Adult failure to thrive
Hypomagnesemia
Hypomagnesemia
Adult failure to thrive
Hypomagnesemia
Hypomagnesemia
Adult failure to thrive
Hypomagnesemia
Hypomagnesemia
Adult failure to thrive
Hypomagnesemia
Adenocarcinoma, lung
Hypomagnesemia
Hypomagnesemia
Adult failure to thrive
Hypomagnesemia
Hypomagnesemia
Adenocarcinoma, lung
Adult failure to thrive
Adenocarcinoma, lung
Adenocarcinoma, lung
Hypomagnesemia
Hypomagnesemia

## 2022-12-27 NOTE — PROGRESS NOTE ADULT - PROBLEM SELECTOR PROBLEM 9
Adult failure to thrive
Falls
Adult failure to thrive
DVT (deep venous thrombosis)
DVT (deep venous thrombosis)
Falls
Hypothyroidism
DVT (deep venous thrombosis)
Adult failure to thrive
DVT (deep venous thrombosis)
Hypophosphatemia
DVT (deep venous thrombosis)
Falls
Adult failure to thrive
Falls
Adult failure to thrive
DVT (deep venous thrombosis)
Adult failure to thrive
Adult failure to thrive
Hypophosphatemia
Adult failure to thrive
Hypothyroidism
Falls
Hypophosphatemia
Adult failure to thrive
Hypothyroidism
Hypophosphatemia

## 2022-12-27 NOTE — PROGRESS NOTE ADULT - SUBJECTIVE AND OBJECTIVE BOX
INTERVAL HPI/OVERNIGHT EVENTS:  Awake and anxious to go home   Medication without change;       MEDICATIONS  (STANDING):  atovaquone  Suspension 1500 milliGRAM(s) Oral daily  bacitracin   Ointment 1 Application(s) Topical daily  buPROPion XL (24-Hour) . 150 milliGRAM(s) Oral every 24 hours  dextrose 5% + lactated ringers. 1200 milliLiter(s) (100 mL/Hr) IV Continuous <Continuous>  heparin   Injectable 5000 Unit(s) SubCutaneous every 8 hours  levothyroxine 88 MICROGram(s) Oral daily  multivitamin 1 Tablet(s) Oral daily  pantoprazole   Suspension 40 milliGRAM(s) Oral two times a day  predniSONE   Tablet 60 milliGRAM(s) Oral daily  sodium phosphate 15 milliMole(s)/250 mL IVPB 15 milliMole(s) IV Intermittent once  sucralfate suspension 1 Gram(s) Oral every 6 hours    MEDICATIONS  (PRN):  acetaminophen     Tablet .. 650 milliGRAM(s) Oral every 6 hours PRN Moderate Pain (4 - 6)  trimethobenzamide Injectable 200 milliGRAM(s) IntraMuscular every 12 hours PRN Nausea and/or Vomiting      Allergies    No Known Allergies    Intolerances        Vital Signs Last 24 Hrs  T(C): 37.5 (27 Dec 2022 08:56), Max: 37.5 (27 Dec 2022 08:56)  T(F): 99.5 (27 Dec 2022 08:56), Max: 99.5 (27 Dec 2022 08:56)  HR: 82 (27 Dec 2022 08:56) (74 - 89)  BP: 124/57 (27 Dec 2022 08:56) (113/55 - 124/57)  BP(mean): --  RR: 17 (27 Dec 2022 08:56) (17 - 18)  SpO2: 97% (27 Dec 2022 08:56) (97% - 98%)    Parameters below as of 27 Dec 2022 08:56  Patient On (Oxygen Delivery Method): room air              Constitutional: Awake     Eyes: ARBEN    ENMT: Negative    Neck: Supple    Back:  no tenderness     Respiratory:  clear    Cardiovascular: S1 S2    Gastrointestinal:  soft    Genitourinary:    Extremities: no edema    Vascular:    Neurological:    Skin:    Lymph Nodes:            12-26 @ 07:01  -  12-27 @ 07:00  --------------------------------------------------------  IN: 0 mL / OUT: 200 mL / NET: -200 mL      LABS:                        9.5    13.61 )-----------( 253      ( 27 Dec 2022 08:25 )             29.1     12-27    137  |  105  |  20  ----------------------------<  101<H>  4.3   |  24  |  1.19    Ca    6.9<L>      27 Dec 2022 08:25  Phos  1.8     12-27  Mg     1.9     12-27            RADIOLOGY & ADDITIONAL TESTS:

## 2022-12-27 NOTE — PROGRESS NOTE ADULT - PROBLEM SELECTOR PROBLEM 4
Hypokalemia
Adult failure to thrive
Hypokalemia
ASHLYN (acute kidney injury)
ASHLYN (acute kidney injury)
Hypokalemia
ASHLYN (acute kidney injury)
ASHLYN (acute kidney injury)
Hypokalemia
ASHLYN (acute kidney injury)
ASHLYN (acute kidney injury)
Adult failure to thrive
Hypokalemia
ASHLYN (acute kidney injury)
Hypokalemia
Ileus
Hypokalemia
Hypokalemia
ASHLYN (acute kidney injury)
Hypokalemia
Ileus
Hypokalemia
Ileus
ASHLYN (acute kidney injury)
ASHLYN (acute kidney injury)
Adult failure to thrive
ASHLYN (acute kidney injury)
ASHLYN (acute kidney injury)
Hypokalemia
ASHLYN (acute kidney injury)
ASHLYN (acute kidney injury)
Ileus
ASHLYN (acute kidney injury)
Ileus
ASHLYN (acute kidney injury)
Adult failure to thrive
ASHLYN (acute kidney injury)

## 2022-12-27 NOTE — PROGRESS NOTE ADULT - PROBLEM SELECTOR PROBLEM 3
Diarrhea
ASHLYN (acute kidney injury)
Diarrhea
Ileus
ASHLYN (acute kidney injury)
Ileus
Sepsis
Diarrhea
Ileus
Diarrhea
Ileus
Ileus
Sepsis
Diarrhea
Ileus
Falls
Diarrhea
Ileus
ASHLYN (acute kidney injury)
Ileus
Sepsis
Ileus
Diarrhea
Diarrhea
Ileus
Sepsis
Diarrhea
Ileus
Diarrhea
Ileus

## 2022-12-27 NOTE — PROGRESS NOTE ADULT - NUTRITIONAL ASSESSMENT
This patient has been assessed with a concern for Malnutrition and has been determined to have a diagnosis/diagnoses of Moderate protein-calorie malnutrition.    This patient is being managed with:   Diet Minced and Moist-  Banatrol TF     Qty per Day:  2  Supplement Feeding Modality:  Oral  Ensure Plant-Based Cans or Servings Per Day:  1       Frequency:  Three Times a day  Entered: Dec 21 2022  6:42PM    Diet Minced and Moist-  Banatrol TF     Qty per Day:  two per day  Supplement Feeding Modality:  Oral  Ensure Plus High Protein Cans or Servings Per Day:  1       Frequency:  Three Times a day  Entered: Dec 17 2022 11:03AM    The following pending diet order is being considered for treatment of Moderate protein-calorie malnutrition:null
This patient has been assessed with a concern for Malnutrition and has been determined to have a diagnosis/diagnoses of Moderate protein-calorie malnutrition.    This patient is being managed with:   Diet Minced and Moist-  Banatrol TF     Qty per Day:  two per day  Supplement Feeding Modality:  Oral  Ensure Plus High Protein Cans or Servings Per Day:  1       Frequency:  Three Times a day  Entered: Dec 13 2022 11:51AM    
This patient has been assessed with a concern for Malnutrition and has been determined to have a diagnosis/diagnoses of Moderate protein-calorie malnutrition.    This patient is being managed with:   Diet NPO-  Except Medications  Entered: Dec 16 2022  6:22AM    Diet NPO after Midnight-     NPO Start Date: 15-Dec-2022   NPO Start Time: 23:59  Entered: Dec 15 2022 10:05PM    
This patient has been assessed with a concern for Malnutrition and has been determined to have a diagnosis/diagnoses of Moderate protein-calorie malnutrition.    This patient is being managed with:   Diet NPO-  Except Medications  With Ice Chips/Sips of Water  Entered: Dec 12 2022  3:22AM    
This patient has been assessed with a concern for Malnutrition and has been determined to have a diagnosis/diagnoses of Moderate protein-calorie malnutrition.    This patient is being managed with:   Diet Clear Liquid-  Entered: Dec  8 2022  4:43PM    
This patient has been assessed with a concern for Malnutrition and has been determined to have a diagnosis/diagnoses of Moderate protein-calorie malnutrition.    This patient is being managed with:   Diet Minced and Moist-  Banatrol TF     Qty per Day:  2  Supplement Feeding Modality:  Oral  Ensure Plant-Based Cans or Servings Per Day:  1       Frequency:  Three Times a day  Entered: Dec 21 2022  6:42PM    Diet Minced and Moist-  Banatrol TF     Qty per Day:  two per day  Supplement Feeding Modality:  Oral  Ensure Plus High Protein Cans or Servings Per Day:  1       Frequency:  Three Times a day  Entered: Dec 17 2022 11:03AM    The following pending diet order is being considered for treatment of Moderate protein-calorie malnutrition:null
This patient has been assessed with a concern for Malnutrition and has been determined to have a diagnosis/diagnoses of Moderate protein-calorie malnutrition.    This patient is being managed with:   Diet Minced and Moist-  Banatrol TF     Qty per Day:  two per day  Supplement Feeding Modality:  Oral  Ensure Plus High Protein Cans or Servings Per Day:  1       Frequency:  Three Times a day  Entered: Dec 13 2022 11:51AM    
This patient has been assessed with a concern for Malnutrition and has been determined to have a diagnosis/diagnoses of Moderate protein-calorie malnutrition.    This patient is being managed with:   Diet Minced and Moist-  Banatrol TF     Qty per Day:  two per day  Supplement Feeding Modality:  Oral  Ensure Plus High Protein Cans or Servings Per Day:  1       Frequency:  Three Times a day  Entered: Dec 17 2022 11:03AM    
This patient has been assessed with a concern for Malnutrition and has been determined to have a diagnosis/diagnoses of Moderate protein-calorie malnutrition.    This patient is being managed with:   Diet NPO-  Except Medications  Entered: Dec  5 2022 10:45AM    
This patient has been assessed with a concern for Malnutrition and has been determined to have a diagnosis/diagnoses of Moderate protein-calorie malnutrition.      
This patient has been assessed with a concern for Malnutrition and has been determined to have a diagnosis/diagnoses of Moderate protein-calorie malnutrition.    This patient is being managed with:   Diet Minced and Moist-  Banatrol TF     Qty per Day:  2  Supplement Feeding Modality:  Oral  Ensure Plant-Based Cans or Servings Per Day:  1       Frequency:  Three Times a day  Entered: Dec 21 2022  6:42PM    Diet Minced and Moist-  Banatrol TF     Qty per Day:  two per day  Supplement Feeding Modality:  Oral  Ensure Plus High Protein Cans or Servings Per Day:  1       Frequency:  Three Times a day  Entered: Dec 17 2022 11:03AM    The following pending diet order is being considered for treatment of Moderate protein-calorie malnutrition:null
This patient has been assessed with a concern for Malnutrition and has been determined to have a diagnosis/diagnoses of Moderate protein-calorie malnutrition.    This patient is being managed with:   Diet NPO-  Except Medications  Entered: Dec  5 2022 10:45AM    
This patient has been assessed with a concern for Malnutrition and has been determined to have a diagnosis/diagnoses of Moderate protein-calorie malnutrition.    This patient is being managed with:   Diet NPO-  Except Medications  Entered: Dec  5 2022 10:45AM    
This patient has been assessed with a concern for Malnutrition and has been determined to have a diagnosis/diagnoses of Moderate protein-calorie malnutrition.    This patient is being managed with:   Diet Minced and Moist-  Banatrol TF     Qty per Day:  2  Supplement Feeding Modality:  Oral  Ensure Plant-Based Cans or Servings Per Day:  1       Frequency:  Three Times a day  Entered: Dec 21 2022  6:42PM    Diet Minced and Moist-  Banatrol TF     Qty per Day:  two per day  Supplement Feeding Modality:  Oral  Ensure Plus High Protein Cans or Servings Per Day:  1       Frequency:  Three Times a day  Entered: Dec 17 2022 11:03AM    The following pending diet order is being considered for treatment of Moderate protein-calorie malnutrition:null
This patient has been assessed with a concern for Malnutrition and has been determined to have a diagnosis/diagnoses of Moderate protein-calorie malnutrition.    This patient is being managed with:   Diet Minced and Moist-  Banatrol TF     Qty per Day:  two per day  Supplement Feeding Modality:  Oral  Ensure Plus High Protein Cans or Servings Per Day:  1       Frequency:  Three Times a day  Entered: Dec 17 2022 11:03AM    
This patient has been assessed with a concern for Malnutrition and has been determined to have a diagnosis/diagnoses of Moderate protein-calorie malnutrition.    This patient is being managed with:   Diet Minced and Moist-  Entered: Dec  9 2022 12:14PM    
This patient has been assessed with a concern for Malnutrition and has been determined to have a diagnosis/diagnoses of Moderate protein-calorie malnutrition.    This patient is being managed with:   Diet NPO-  Except Medications  Entered: Dec  5 2022 10:45AM    
This patient has been assessed with a concern for Malnutrition and has been determined to have a diagnosis/diagnoses of Moderate protein-calorie malnutrition.    This patient is being managed with:   Diet NPO-  Except Medications  Entered: Dec  5 2022 10:45AM    
This patient has been assessed with a concern for Malnutrition and has been determined to have a diagnosis/diagnoses of Moderate protein-calorie malnutrition.    This patient is being managed with:   Diet NPO-  Except Medications  With Ice Chips/Sips of Water  Entered: Dec 12 2022  3:22AM    
This patient has been assessed with a concern for Malnutrition and has been determined to have a diagnosis/diagnoses of Moderate protein-calorie malnutrition.    This patient is being managed with:   Diet Minced and Moist-  Banatrol TF     Qty per Day:  two per day  Supplement Feeding Modality:  Oral  Ensure Plus High Protein Cans or Servings Per Day:  1       Frequency:  Three Times a day  Entered: Dec 17 2022 11:03AM    
This patient has been assessed with a concern for Malnutrition and has been determined to have a diagnosis/diagnoses of Moderate protein-calorie malnutrition.    This patient is being managed with:   Diet NPO-  Except Medications  Entered: Dec  5 2022 10:45AM    
This patient has been assessed with a concern for Malnutrition and has been determined to have a diagnosis/diagnoses of Moderate protein-calorie malnutrition.    This patient is being managed with:   Diet Pureed-  Banatrol TF     Qty per Day:  1  Supplement Feeding Modality:  Oral  Ensure Enlive Cans or Servings Per Day:  1       Frequency:  Three Times a day  Entered: Nov 29 2022  2:00PM    
This patient has been assessed with a concern for Malnutrition and has been determined to have a diagnosis/diagnoses of Moderate protein-calorie malnutrition.    This patient is being managed with:   Diet NPO-  Except Medications  Entered: Dec  5 2022 10:45AM    
This patient has been assessed with a concern for Malnutrition and has been determined to have a diagnosis/diagnoses of Moderate protein-calorie malnutrition.    This patient is being managed with:   Diet Minced and Moist-  Entered: Dec  9 2022 12:14PM    
This patient has been assessed with a concern for Malnutrition and has been determined to have a diagnosis/diagnoses of Moderate protein-calorie malnutrition.    This patient is being managed with:   Diet Minced and Moist-  Banatrol TF     Qty per Day:  2  Supplement Feeding Modality:  Oral  Ensure Plant-Based Cans or Servings Per Day:  1       Frequency:  Three Times a day  Entered: Dec 21 2022  6:42PM    Diet Minced and Moist-  Banatrol TF     Qty per Day:  two per day  Supplement Feeding Modality:  Oral  Ensure Plus High Protein Cans or Servings Per Day:  1       Frequency:  Three Times a day  Entered: Dec 17 2022 11:03AM    The following pending diet order is being considered for treatment of Moderate protein-calorie malnutrition:null
This patient has been assessed with a concern for Malnutrition and has been determined to have a diagnosis/diagnoses of Moderate protein-calorie malnutrition.    This patient is being managed with:   Diet Minced and Moist-  Banatrol TF     Qty per Day:  2  Supplement Feeding Modality:  Oral  Ensure Plant-Based Cans or Servings Per Day:  1       Frequency:  Three Times a day  Entered: Dec 21 2022  6:42PM    Diet Minced and Moist-  Banatrol TF     Qty per Day:  two per day  Supplement Feeding Modality:  Oral  Ensure Plus High Protein Cans or Servings Per Day:  1       Frequency:  Three Times a day  Entered: Dec 17 2022 11:03AM    The following pending diet order is being considered for treatment of Moderate protein-calorie malnutrition:null
This patient has been assessed with a concern for Malnutrition and has been determined to have a diagnosis/diagnoses of Moderate protein-calorie malnutrition.    This patient is being managed with:   Diet Pureed-  Banatrol TF     Qty per Day:  1  Supplement Feeding Modality:  Oral  Ensure Enlive Cans or Servings Per Day:  1       Frequency:  Three Times a day  Entered: Nov 29 2022  2:00PM    
This patient has been assessed with a concern for Malnutrition and has been determined to have a diagnosis/diagnoses of Moderate protein-calorie malnutrition.    This patient is being managed with:   Diet Minced and Moist-  Banatrol TF     Qty per Day:  2  Supplement Feeding Modality:  Oral  Ensure Plant-Based Cans or Servings Per Day:  1       Frequency:  Three Times a day  Entered: Dec 21 2022  6:42PM    Diet Minced and Moist-  Banatrol TF     Qty per Day:  two per day  Supplement Feeding Modality:  Oral  Ensure Plus High Protein Cans or Servings Per Day:  1       Frequency:  Three Times a day  Entered: Dec 17 2022 11:03AM    The following pending diet order is being considered for treatment of Moderate protein-calorie malnutrition:null
This patient has been assessed with a concern for Malnutrition and has been determined to have a diagnosis/diagnoses of Moderate protein-calorie malnutrition.    This patient is being managed with:   Diet Minced and Moist-  Banatrol TF     Qty per Day:  2  Supplement Feeding Modality:  Oral  Ensure Plant-Based Cans or Servings Per Day:  1       Frequency:  Three Times a day  Entered: Dec 21 2022  6:42PM    Diet Minced and Moist-  Banatrol TF     Qty per Day:  two per day  Supplement Feeding Modality:  Oral  Ensure Plus High Protein Cans or Servings Per Day:  1       Frequency:  Three Times a day  Entered: Dec 17 2022 11:03AM    The following pending diet order is being considered for treatment of Moderate protein-calorie malnutrition:null
This patient has been assessed with a concern for Malnutrition and has been determined to have a diagnosis/diagnoses of Moderate protein-calorie malnutrition.    This patient is being managed with:   Diet Pureed-  Banatrol TF     Qty per Day:  1  Supplement Feeding Modality:  Oral  Ensure Enlive Cans or Servings Per Day:  1       Frequency:  Three Times a day  Entered: Nov 29 2022  2:00PM    
This patient has been assessed with a concern for Malnutrition and has been determined to have a diagnosis/diagnoses of Moderate protein-calorie malnutrition.    This patient is being managed with:   Diet Clear Liquid-  Entered: Dec  8 2022  4:43PM    
This patient has been assessed with a concern for Malnutrition and has been determined to have a diagnosis/diagnoses of Moderate protein-calorie malnutrition.    This patient is being managed with:   Diet Minced and Moist-  Banatrol TF     Qty per Day:  two per day  Supplement Feeding Modality:  Oral  Ensure Plus High Protein Cans or Servings Per Day:  1       Frequency:  Three Times a day  Entered: Dec 13 2022 11:51AM    
This patient has been assessed with a concern for Malnutrition and has been determined to have a diagnosis/diagnoses of Moderate protein-calorie malnutrition.    This patient is being managed with:   Diet Minced and Moist-  Banatrol TF     Qty per Day:  two per day  Supplement Feeding Modality:  Oral  Ensure Plus High Protein Cans or Servings Per Day:  1       Frequency:  Three Times a day  Entered: Dec 17 2022 11:03AM    
This patient has been assessed with a concern for Malnutrition and has been determined to have a diagnosis/diagnoses of Moderate protein-calorie malnutrition.    This patient is being managed with:   Diet NPO-  Except Medications  Entered: Dec  5 2022 10:45AM    
This patient has been assessed with a concern for Malnutrition and has been determined to have a diagnosis/diagnoses of Moderate protein-calorie malnutrition.    This patient is being managed with:   Diet Minced and Moist-  Banatrol TF     Qty per Day:  2  Supplement Feeding Modality:  Oral  Ensure Plant-Based Cans or Servings Per Day:  1       Frequency:  Three Times a day  Entered: Dec 21 2022  6:42PM    Diet Minced and Moist-  Banatrol TF     Qty per Day:  two per day  Supplement Feeding Modality:  Oral  Ensure Plus High Protein Cans or Servings Per Day:  1       Frequency:  Three Times a day  Entered: Dec 17 2022 11:03AM    The following pending diet order is being considered for treatment of Moderate protein-calorie malnutrition:null
This patient has been assessed with a concern for Malnutrition and has been determined to have a diagnosis/diagnoses of Moderate protein-calorie malnutrition.    This patient is being managed with:   Diet Minced and Moist-  Banatrol TF     Qty per Day:  two per day  Supplement Feeding Modality:  Oral  Ensure Plus High Protein Cans or Servings Per Day:  1       Frequency:  Three Times a day  Entered: Dec 13 2022 11:51AM    
This patient has been assessed with a concern for Malnutrition and has been determined to have a diagnosis/diagnoses of Moderate protein-calorie malnutrition.    This patient is being managed with:   Diet Pureed-  Banatrol TF     Qty per Day:  1  Supplement Feeding Modality:  Oral  Ensure Enlive Cans or Servings Per Day:  1       Frequency:  Three Times a day  Entered: Nov 29 2022  2:00PM    
This patient has been assessed with a concern for Malnutrition and has been determined to have a diagnosis/diagnoses of Moderate protein-calorie malnutrition.    This patient is being managed with:   Diet NPO-  Except Medications  Entered: Dec  5 2022 10:45AM    
This patient has been assessed with a concern for Malnutrition and has been determined to have a diagnosis/diagnoses of Moderate protein-calorie malnutrition.    This patient is being managed with:   Diet Pureed-  Banatrol TF     Qty per Day:  1  Supplement Feeding Modality:  Oral  Ensure Enlive Cans or Servings Per Day:  1       Frequency:  Three Times a day  Entered: Nov 29 2022  2:00PM    
This patient has been assessed with a concern for Malnutrition and has been determined to have a diagnosis/diagnoses of Moderate protein-calorie malnutrition.    This patient is being managed with:   Diet Pureed-  Banatrol TF     Qty per Day:  1  Supplement Feeding Modality:  Oral  Ensure Enlive Cans or Servings Per Day:  1       Frequency:  Three Times a day  Entered: Nov 29 2022  2:00PM    
This patient has been assessed with a concern for Malnutrition and has been determined to have a diagnosis/diagnoses of Moderate protein-calorie malnutrition.    This patient is being managed with:   Diet Minced and Moist-  Banatrol TF     Qty per Day:  two per day  Supplement Feeding Modality:  Oral  Ensure Plus High Protein Cans or Servings Per Day:  1       Frequency:  Three Times a day  Entered: Dec 17 2022 11:03AM    
This patient has been assessed with a concern for Malnutrition and has been determined to have a diagnosis/diagnoses of Moderate protein-calorie malnutrition.    This patient is being managed with:   Diet NPO-  Except Medications  Entered: Dec  5 2022 10:45AM    
This patient has been assessed with a concern for Malnutrition and has been determined to have a diagnosis/diagnoses of Moderate protein-calorie malnutrition.    This patient is being managed with:   Diet NPO-  Except Medications  Entered: Dec  5 2022 10:45AM    
This patient has been assessed with a concern for Malnutrition and has been determined to have a diagnosis/diagnoses of Moderate protein-calorie malnutrition.    This patient is being managed with:   Diet Pureed-  Banatrol TF     Qty per Day:  1  Supplement Feeding Modality:  Oral  Ensure Enlive Cans or Servings Per Day:  1       Frequency:  Three Times a day  Entered: Nov 29 2022  2:00PM    
This patient has been assessed with a concern for Malnutrition and has been determined to have a diagnosis/diagnoses of Moderate protein-calorie malnutrition.    This patient is being managed with:   Diet Clear Liquid-  Entered: Dec 16 2022  1:21PM    
This patient has been assessed with a concern for Malnutrition and has been determined to have a diagnosis/diagnoses of Moderate protein-calorie malnutrition.    This patient is being managed with:   Diet Pureed-  Banatrol TF     Qty per Day:  1  Supplement Feeding Modality:  Oral  Ensure Enlive Cans or Servings Per Day:  1       Frequency:  Three Times a day  Entered: Nov 29 2022  2:00PM    
This patient has been assessed with a concern for Malnutrition and has been determined to have a diagnosis/diagnoses of Moderate protein-calorie malnutrition.    This patient is being managed with:   Diet NPO-  Except Medications  Entered: Dec  5 2022 10:45AM    
This patient has been assessed with a concern for Malnutrition and has been determined to have a diagnosis/diagnoses of Moderate protein-calorie malnutrition.    This patient is being managed with:   Diet Minced and Moist-  Entered: Dec  9 2022 12:14PM    
This patient has been assessed with a concern for Malnutrition and has been determined to have a diagnosis/diagnoses of Moderate protein-calorie malnutrition.    This patient is being managed with:   Diet Minced and Moist-  Banatrol TF     Qty per Day:  two per day  Supplement Feeding Modality:  Oral  Ensure Plus High Protein Cans or Servings Per Day:  1       Frequency:  Three Times a day  Entered: Dec 13 2022 11:51AM    
This patient has been assessed with a concern for Malnutrition and has been determined to have a diagnosis/diagnoses of Moderate protein-calorie malnutrition.    This patient is being managed with:   Diet Pureed-  Banatrol TF     Qty per Day:  1  Supplement Feeding Modality:  Oral  Ensure Enlive Cans or Servings Per Day:  1       Frequency:  Three Times a day  Entered: Nov 29 2022  2:00PM

## 2022-12-28 NOTE — DISCHARGE NOTE NURSING/CASE MANAGEMENT/SOCIAL WORK - NSDCPETBCESMAN_GEN_ALL_CORE
If you are a smoker, it is important for your health to stop smoking. Please be aware that second hand smoke is also harmful. not applicable

## 2022-12-28 NOTE — DISCHARGE NOTE NURSING/CASE MANAGEMENT/SOCIAL WORK - PATIENT PORTAL LINK FT
You can access the FollowMyHealth Patient Portal offered by [facility] by registering at the following website: [email or web address]. By joining [employer]’s FollowMyHealth portal, you will also be able to view your health information using other applications (apps) compatible with our system.

## 2022-12-28 NOTE — DISCHARGE NOTE NURSING/CASE MANAGEMENT/SOCIAL WORK - NSDCVIVACCINE_GEN_ALL_CORE_FT
influenza, high-dose, quadrivalent; 03-Dec-2022 17:51; Loan Bautista (RN); [employer]; [id number] (Exp. Date: 30-Jun-2023); IntraMuscular; Deltoid Left.; 0.7 milliLiter(s); VIS (VIS Published: 06-Aug-2021, VIS Presented: 03-Dec-2022);

## 2022-12-28 NOTE — ASSESSMENT
[FreeTextEntry1] : 72M, current smoker (~50py, now smokes e-cigarettes), with hypothyroidism, depression, anxiety, who presents for  follow up of metastatic lung adenocarcinoma with C2D1 carbo/pem/pem.\par \par 1) Metastatic lung adenocarcinoma, AJCC Stage IV\par Pt with R axillary LN biopsy positive for metastatic lung adenocarcinoma, with FDG avidity in thoracic, abdominal, inguinal LNs, as well as rectal thickening. Pt has not had any recent colonoscopies to further investigate GI involvement. Repeat CT CAP with increase in primary lung mass, persistent rectal thickening and other sites of disease as previously described. 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 Received Cycle 1 of chemo with carboplatin, pemetrexed, and pembrolizumab on 10/27/22. Now admitted with sepsis and ASHLYN, possibly 2/2 infection vs chemotherapy. c/c/b persistent diarrhea started 1-2 weeks after chemotherapy and has been having a 2-3 bowel movements for the past 2 weeks (NCI CTCAE Grade 1). However, given persistent diarrhea and new drop in Hgb, underwent EGD showing severe esophagitis and multiple non bleeding ulcers. 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 On Prednisone taper, currently 40mg daily, taper by 20mg per week (week 1 60mg, week 2 40mg, week 3 20mg, etc). Will continue to manage and monitor taper outpatient in clinic.\par - Will discuss further systemic cancer therapy outpatient, but will likely not re-challenge with ICI and will discuss continuing with carboplatin and pemetrexed\par \par 2.) rectal thickening - possible second primary\par -spoke to  who will arrange for patient to undergo at least flex sig to further evaluate\par \par 3.) Hyperkalemia - 5.6\par Likely hemolyzed sample.\par Will re-draw\par \par Cleared for C1 Carbo/pem/pem today.

## 2022-12-28 NOTE — HISTORY OF PRESENT ILLNESS
[Disease: _____________________] : Disease: [unfilled] [AJCC Stage: ____] : AJCC Stage: [unfilled] [Therapy: ___] : Therapy: [unfilled] [Cycle: ___] : Cycle: [unfilled] [Day: ___] : Day: [unfilled] [90: Able to carry normal activity; minor signs or symptoms of disease.] : 90: Able to carry normal activity; minor signs or symptoms of disease.  [ECOG Performance Status: 1 - Restricted in physically strenuous activity but ambulatory and able to carry out work of a light or sedentary nature] : Performance Status: 1 - Restricted in physically strenuous activity but ambulatory and able to carry out work of a light or sedentary nature, e.g., light house work, office work [de-identified] : 72M, current smoker (~50py, now smokes e-cigarettes), with hypothyroidism, depression, anxiety, here today for follow up of stage IV lung adenocarcinoma  (ERBB2 V777L, STK11, ATR [id number], PD-L1 negative), here for follow up\par \par ONC HX: \par Patient is a current smoker with history of hypothyroidism, major depression, anxiety recently found with RUL spiculated nodule with R paratracheal adenopathy and L adrenal nodule. Concern for metastatic cancer because of rectal lesion on PET scan as well as L adrenal area that lit up. Now, s/p FNA of R axillary node, with pathology consistent with metastatic lung adenocarcinoma. Liquid NGS with ERBB2 V777L mutation, Veristrat good.\par \par 7/28/22 PET-CT Scan\par Since prior PET/CT from 12/2/2021, increase in FDG uptake in nodular thickening of the rectum, suspicious for malignancy/metastatic disease. Recommend correlation with clinical exam and colonoscopy. New focal FDG uptake in the right peripheral zone of the prostate without discrete CT correlate, suspicious for malignancy/metastatic disease. Recommend correlation with PSA level and close urologic follow up. New FDG avid right hilar and left axillary lymph nodes. Additional thoracic, abdominal, and inguinal FDG avid lymph nodes described above were seen on previous PET/CT from 12/2/2021, and are stable in size and demonstrate slightly decreased FDG uptake.Stable size of right apical spiculated nodule with slight interval decrease in radiotracer uptake. Stable size of left adrenal mass with slight interval decrease in radiotracer uptake. 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.\par Persistent nodular upper left rectal wall thickening suspicious for primary rectal malignancy.\par Persistent right inguinal lymphadenopathy.\par \par 10/7/22 MRI brain:\par No acute intracranial hemorrhage, acute infarction, extra-axial fluid collection or hydrocephalus.\par No enhancing intracranial lesion identified to suggest intracranial metastases.\par \par 11/28/22 CT Chest/abd/pelvis:\par Since October 7, 2022, unchanged right upper lobe malignancy.\par Decreased left adrenal metastasis.\par Decreased abdominal and pelvic adenopathy.\par  [de-identified] : Metastatic adenocarcinoma, consistent with lung primary - ERBB2 V777L mutation via liquid, STK11 G233S, ATR F4295J, TMB-High, PD-L1 negative [de-identified] : Pulm: \par GI:  [FreeTextEntry1] : 10/27/2022: C1 Carbo/pem/pem\par 11/17/2022: C2 Carbo/pem/pem [de-identified] : Prolonged near month long admission for diarrhea, ICI gastritis, anemia, and ASHLYN. Since being discharged ______________________

## 2023-01-01 ENCOUNTER — EMERGENCY (EMERGENCY)
Facility: HOSPITAL | Age: 73
LOS: 1 days | Discharge: HOME | End: 2023-01-01
Attending: STUDENT IN AN ORGANIZED HEALTH CARE EDUCATION/TRAINING PROGRAM | Admitting: STUDENT IN AN ORGANIZED HEALTH CARE EDUCATION/TRAINING PROGRAM
Payer: MEDICARE

## 2023-01-01 ENCOUNTER — NON-APPOINTMENT (OUTPATIENT)
Age: 73
End: 2023-01-01

## 2023-01-01 VITALS
DIASTOLIC BLOOD PRESSURE: 48 MMHG | TEMPERATURE: 99 F | WEIGHT: 145.06 LBS | RESPIRATION RATE: 18 BRPM | HEIGHT: 71 IN | OXYGEN SATURATION: 99 % | HEART RATE: 125 BPM | SYSTOLIC BLOOD PRESSURE: 71 MMHG

## 2023-01-01 VITALS
OXYGEN SATURATION: 100 % | HEART RATE: 91 BPM | TEMPERATURE: 98 F | RESPIRATION RATE: 17 BRPM | DIASTOLIC BLOOD PRESSURE: 55 MMHG | SYSTOLIC BLOOD PRESSURE: 113 MMHG

## 2023-01-01 DIAGNOSIS — F10.10 ALCOHOL ABUSE, UNCOMPLICATED: ICD-10-CM

## 2023-01-01 DIAGNOSIS — N17.9 ACUTE KIDNEY FAILURE, UNSPECIFIED: ICD-10-CM

## 2023-01-01 DIAGNOSIS — R42 DIZZINESS AND GIDDINESS: ICD-10-CM

## 2023-01-01 DIAGNOSIS — F32.A DEPRESSION, UNSPECIFIED: ICD-10-CM

## 2023-01-01 DIAGNOSIS — Z20.822 CONTACT WITH AND (SUSPECTED) EXPOSURE TO COVID-19: ICD-10-CM

## 2023-01-01 DIAGNOSIS — F41.9 ANXIETY DISORDER, UNSPECIFIED: ICD-10-CM

## 2023-01-01 DIAGNOSIS — F03.90 UNSPECIFIED DEMENTIA WITHOUT BEHAVIORAL DISTURBANCE: ICD-10-CM

## 2023-01-01 DIAGNOSIS — Z85.118 PERSONAL HISTORY OF OTHER MALIGNANT NEOPLASM OF BRONCHUS AND LUNG: ICD-10-CM

## 2023-01-01 DIAGNOSIS — E44.0 MODERATE PROTEIN-CALORIE MALNUTRITION: ICD-10-CM

## 2023-01-01 DIAGNOSIS — I95.9 HYPOTENSION, UNSPECIFIED: ICD-10-CM

## 2023-01-01 DIAGNOSIS — Z91.81 HISTORY OF FALLING: ICD-10-CM

## 2023-01-01 DIAGNOSIS — R00.0 TACHYCARDIA, UNSPECIFIED: ICD-10-CM

## 2023-01-01 DIAGNOSIS — A41.9 SEPSIS, UNSPECIFIED ORGANISM: ICD-10-CM

## 2023-01-01 DIAGNOSIS — E86.1 HYPOVOLEMIA: ICD-10-CM

## 2023-01-01 DIAGNOSIS — C78.5 SECONDARY MALIGNANT NEOPLASM OF LARGE INTESTINE AND RECTUM: ICD-10-CM

## 2023-01-01 DIAGNOSIS — E83.42 HYPOMAGNESEMIA: ICD-10-CM

## 2023-01-01 DIAGNOSIS — R65.20 SEVERE SEPSIS WITHOUT SEPTIC SHOCK: ICD-10-CM

## 2023-01-01 DIAGNOSIS — R55 SYNCOPE AND COLLAPSE: ICD-10-CM

## 2023-01-01 DIAGNOSIS — Z92.21 PERSONAL HISTORY OF ANTINEOPLASTIC CHEMOTHERAPY: ICD-10-CM

## 2023-01-01 DIAGNOSIS — C79.70 SECONDARY MALIGNANT NEOPLASM OF UNSPECIFIED ADRENAL GLAND: ICD-10-CM

## 2023-01-01 DIAGNOSIS — E83.39 OTHER DISORDERS OF PHOSPHORUS METABOLISM: ICD-10-CM

## 2023-01-01 DIAGNOSIS — K52.9 NONINFECTIVE GASTROENTERITIS AND COLITIS, UNSPECIFIED: ICD-10-CM

## 2023-01-01 DIAGNOSIS — G93.41 METABOLIC ENCEPHALOPATHY: ICD-10-CM

## 2023-01-01 DIAGNOSIS — Z86.19 PERSONAL HISTORY OF OTHER INFECTIOUS AND PARASITIC DISEASES: ICD-10-CM

## 2023-01-01 DIAGNOSIS — C34.11 MALIGNANT NEOPLASM OF UPPER LOBE, RIGHT BRONCHUS OR LUNG: ICD-10-CM

## 2023-01-01 DIAGNOSIS — N39.0 URINARY TRACT INFECTION, SITE NOT SPECIFIED: ICD-10-CM

## 2023-01-01 DIAGNOSIS — R79.89 OTHER SPECIFIED ABNORMAL FINDINGS OF BLOOD CHEMISTRY: ICD-10-CM

## 2023-01-01 DIAGNOSIS — Z23 ENCOUNTER FOR IMMUNIZATION: ICD-10-CM

## 2023-01-01 DIAGNOSIS — R62.7 ADULT FAILURE TO THRIVE: ICD-10-CM

## 2023-01-01 DIAGNOSIS — E87.6 HYPOKALEMIA: ICD-10-CM

## 2023-01-01 DIAGNOSIS — E03.9 HYPOTHYROIDISM, UNSPECIFIED: ICD-10-CM

## 2023-01-01 LAB
-  CLINDAMYCIN: SIGNIFICANT CHANGE UP
-  COAGULASE NEGATIVE STAPHYLOCOCCUS: SIGNIFICANT CHANGE UP
-  ERYTHROMYCIN: SIGNIFICANT CHANGE UP
-  LINEZOLID: SIGNIFICANT CHANGE UP
-  OXACILLIN: SIGNIFICANT CHANGE UP
-  RIFAMPIN: SIGNIFICANT CHANGE UP
-  TRIMETHOPRIM/SULFAMETHOXAZOLE: SIGNIFICANT CHANGE UP
-  VANCOMYCIN: SIGNIFICANT CHANGE UP
ALBUMIN SERPL ELPH-MCNC: 2.3 G/DL — LOW (ref 3.3–5)
ALP SERPL-CCNC: 237 U/L — HIGH (ref 40–120)
ALT FLD-CCNC: 50 U/L — HIGH (ref 10–45)
ANION GAP SERPL CALC-SCNC: 12 MMOL/L — SIGNIFICANT CHANGE UP (ref 5–17)
APPEARANCE UR: CLEAR — SIGNIFICANT CHANGE UP
AST SERPL-CCNC: 26 U/L — SIGNIFICANT CHANGE UP (ref 10–40)
BACTERIA # UR AUTO: PRESENT /HPF
BASOPHILS # BLD AUTO: 0.02 K/UL — SIGNIFICANT CHANGE UP (ref 0–0.2)
BASOPHILS NFR BLD AUTO: 0.2 % — SIGNIFICANT CHANGE UP (ref 0–2)
BILIRUB SERPL-MCNC: 0.9 MG/DL — SIGNIFICANT CHANGE UP (ref 0.2–1.2)
BILIRUB UR-MCNC: NEGATIVE — SIGNIFICANT CHANGE UP
BUN SERPL-MCNC: 17 MG/DL — SIGNIFICANT CHANGE UP (ref 7–23)
CALCIUM SERPL-MCNC: 7.3 MG/DL — LOW (ref 8.4–10.5)
CHLORIDE SERPL-SCNC: 106 MMOL/L — SIGNIFICANT CHANGE UP (ref 96–108)
CO2 SERPL-SCNC: 20 MMOL/L — LOW (ref 22–31)
COLOR SPEC: YELLOW — SIGNIFICANT CHANGE UP
CREAT SERPL-MCNC: 1.37 MG/DL — HIGH (ref 0.5–1.3)
CULTURE RESULTS: SIGNIFICANT CHANGE UP
DIFF PNL FLD: ABNORMAL
EGFR: 55 ML/MIN/1.73M2 — LOW
EOSINOPHIL # BLD AUTO: 0.02 K/UL — SIGNIFICANT CHANGE UP (ref 0–0.5)
EOSINOPHIL NFR BLD AUTO: 0.2 % — SIGNIFICANT CHANGE UP (ref 0–6)
EPI CELLS # UR: ABNORMAL /HPF (ref 0–5)
GLUCOSE SERPL-MCNC: 114 MG/DL — HIGH (ref 70–99)
GLUCOSE UR QL: NEGATIVE — SIGNIFICANT CHANGE UP
GRAM STN FLD: SIGNIFICANT CHANGE UP
HCT VFR BLD CALC: 26.1 % — LOW (ref 39–50)
HGB BLD-MCNC: 8.3 G/DL — LOW (ref 13–17)
IMM GRANULOCYTES NFR BLD AUTO: 0.4 % — SIGNIFICANT CHANGE UP (ref 0–0.9)
KETONES UR-MCNC: NEGATIVE — SIGNIFICANT CHANGE UP
LACTATE SERPL-SCNC: 1.8 MMOL/L — SIGNIFICANT CHANGE UP (ref 0.5–2)
LACTATE SERPL-SCNC: 2.3 MMOL/L — HIGH (ref 0.5–2)
LEUKOCYTE ESTERASE UR-ACNC: NEGATIVE — SIGNIFICANT CHANGE UP
LYMPHOCYTES # BLD AUTO: 0.71 K/UL — LOW (ref 1–3.3)
LYMPHOCYTES # BLD AUTO: 7.8 % — LOW (ref 13–44)
MAGNESIUM SERPL-MCNC: 1.6 MG/DL — SIGNIFICANT CHANGE UP (ref 1.6–2.6)
MCHC RBC-ENTMCNC: 29.6 PG — SIGNIFICANT CHANGE UP (ref 27–34)
MCHC RBC-ENTMCNC: 31.8 GM/DL — LOW (ref 32–36)
MCV RBC AUTO: 93.2 FL — SIGNIFICANT CHANGE UP (ref 80–100)
METHOD TYPE: SIGNIFICANT CHANGE UP
METHOD TYPE: SIGNIFICANT CHANGE UP
MONOCYTES # BLD AUTO: 0.71 K/UL — SIGNIFICANT CHANGE UP (ref 0–0.9)
MONOCYTES NFR BLD AUTO: 7.8 % — SIGNIFICANT CHANGE UP (ref 2–14)
NEUTROPHILS # BLD AUTO: 7.56 K/UL — HIGH (ref 1.8–7.4)
NEUTROPHILS NFR BLD AUTO: 83.6 % — HIGH (ref 43–77)
NITRITE UR-MCNC: NEGATIVE — SIGNIFICANT CHANGE UP
NRBC # BLD: 0 /100 WBCS — SIGNIFICANT CHANGE UP (ref 0–0)
PH UR: 6 — SIGNIFICANT CHANGE UP (ref 5–8)
PLATELET # BLD AUTO: 232 K/UL — SIGNIFICANT CHANGE UP (ref 150–400)
POTASSIUM SERPL-MCNC: 4.2 MMOL/L — SIGNIFICANT CHANGE UP (ref 3.5–5.3)
POTASSIUM SERPL-SCNC: 4.2 MMOL/L — SIGNIFICANT CHANGE UP (ref 3.5–5.3)
PROT SERPL-MCNC: 4.7 G/DL — LOW (ref 6–8.3)
PROT UR-MCNC: 30 MG/DL
RBC # BLD: 2.8 M/UL — LOW (ref 4.2–5.8)
RBC # FLD: 21.4 % — HIGH (ref 10.3–14.5)
RBC CASTS # UR COMP ASSIST: ABNORMAL /HPF
SARS-COV-2 RNA SPEC QL NAA+PROBE: NEGATIVE — SIGNIFICANT CHANGE UP
SODIUM SERPL-SCNC: 138 MMOL/L — SIGNIFICANT CHANGE UP (ref 135–145)
SP GR SPEC: 1.02 — SIGNIFICANT CHANGE UP (ref 1–1.03)
SPECIMEN SOURCE: SIGNIFICANT CHANGE UP
TROPONIN T SERPL-MCNC: 0.02 NG/ML — HIGH (ref 0–0.01)
TROPONIN T SERPL-MCNC: 0.03 NG/ML — HIGH (ref 0–0.01)
UROBILINOGEN FLD QL: 1 E.U./DL — SIGNIFICANT CHANGE UP
WBC # BLD: 9.06 K/UL — SIGNIFICANT CHANGE UP (ref 3.8–10.5)
WBC # FLD AUTO: 9.06 K/UL — SIGNIFICANT CHANGE UP (ref 3.8–10.5)
WBC UR QL: < 5 /HPF — SIGNIFICANT CHANGE UP

## 2023-01-01 PROCEDURE — 99291 CRITICAL CARE FIRST HOUR: CPT | Mod: 25

## 2023-01-01 PROCEDURE — 93005 ELECTROCARDIOGRAM TRACING: CPT

## 2023-01-01 PROCEDURE — 85025 COMPLETE CBC W/AUTO DIFF WBC: CPT

## 2023-01-01 PROCEDURE — 81001 URINALYSIS AUTO W/SCOPE: CPT

## 2023-01-01 PROCEDURE — 83605 ASSAY OF LACTIC ACID: CPT

## 2023-01-01 PROCEDURE — 87186 SC STD MICRODIL/AGAR DIL: CPT

## 2023-01-01 PROCEDURE — 87150 DNA/RNA AMPLIFIED PROBE: CPT

## 2023-01-01 PROCEDURE — 80053 COMPREHEN METABOLIC PANEL: CPT

## 2023-01-01 PROCEDURE — 87040 BLOOD CULTURE FOR BACTERIA: CPT

## 2023-01-01 PROCEDURE — 83735 ASSAY OF MAGNESIUM: CPT

## 2023-01-01 PROCEDURE — 71046 X-RAY EXAM CHEST 2 VIEWS: CPT | Mod: 26

## 2023-01-01 PROCEDURE — 71046 X-RAY EXAM CHEST 2 VIEWS: CPT

## 2023-01-01 PROCEDURE — 87635 SARS-COV-2 COVID-19 AMP PRB: CPT

## 2023-01-01 PROCEDURE — 84484 ASSAY OF TROPONIN QUANT: CPT

## 2023-01-01 PROCEDURE — 36415 COLL VENOUS BLD VENIPUNCTURE: CPT

## 2023-01-01 PROCEDURE — 99291 CRITICAL CARE FIRST HOUR: CPT

## 2023-01-01 RX ORDER — SODIUM CHLORIDE 9 MG/ML
1000 INJECTION INTRAMUSCULAR; INTRAVENOUS; SUBCUTANEOUS ONCE
Refills: 0 | Status: COMPLETED | OUTPATIENT
Start: 2023-01-01 | End: 2023-01-01

## 2023-01-01 RX ORDER — ACETAMINOPHEN 500 MG
650 TABLET ORAL ONCE
Refills: 0 | Status: COMPLETED | OUTPATIENT
Start: 2023-01-01 | End: 2023-01-01

## 2023-01-01 RX ADMIN — Medication 650 MILLIGRAM(S): at 13:19

## 2023-01-02 NOTE — ED ADULT NURSE NOTE - COVID-19 RESULT DATE/TIME
The patient is doing well without complaints.  Had a few ice chips.  No nausea    Vital Signs Last 24 Hrs  T(C): 36.8 (22 May 2019 05:23), Max: 37 (21 May 2019 21:25)  T(F): 98.3 (22 May 2019 05:23), Max: 98.6 (21 May 2019 21:25)  HR: 73 (22 May 2019 05:23) (73 - 81)  BP: 103/51 (22 May 2019 05:23) (103/51 - 126/66)  BP(mean): --  RR: 17 (22 May 2019 05:23) (16 - 17)  SpO2: 100% (22 May 2019 05:23) (100% - 100%)    PHYSICAL EXAM:  General: NAD.  HEENT: no JVD, no jaundice.  LUNGS: CTAB.  Heart: S1 S2 RRR  Abd: soft nt/nd                             10.9   5.85  )-----------( 111      ( 22 May 2019 05:39 )             34.1       05-22    146<H>  |  113<H>  |  11  ----------------------------<  80  3.5   |  24  |  1.00    Ca    8.4<L>      22 May 2019 05:39    TPro  6.2  /  Alb  2.8<L>  /  TBili  0.8  /  DBili  x   /  AST  21  /  ALT  54  /  AlkPhos  68  05-22 15-Dec-2022 23:52

## 2023-01-02 NOTE — ED ADULT NURSE NOTE - IS THE PATIENT ABLE TO BE SCREENED?
Telephone Encounter by Diane Iyer RN at 07/18/18 09:02 AM     Author:  Diane Iyer RN Service:  (none) Author Type:  Registered Nurse     Filed:  07/18/18 09:02 AM Encounter Date:  7/17/2018 Status:  Signed     :  Diane Iyer RN (Registered Nurse)            Attempted to contact - line busy  See Result note also[KH1.1M]      Revision History        User Key Date/Time User Provider Type Action    > KH1.1 07/18/18 09:02 AM Diane Iyer, RN Registered Nurse Sign    M - Manual             Yes

## 2023-01-02 NOTE — ED ADULT NURSE NOTE - CHIEF COMPLAINT QUOTE
Patient arrives via EMS, states he pressed his life alert today when he leaned against the wall and "slid down.  I knew something was wrong."  Denies syncope/head injury.  Patient found to be hypotensive in field 80s/50s.  20g IV L FA, approx 400ml NS given in field.  Patient states "I feel OK now."  Patient reporting diarrhea for "weeks," states he was recently discharged from [facility].  STAT EKG in progress.

## 2023-01-02 NOTE — ED ADULT TRIAGE NOTE - CHIEF COMPLAINT QUOTE
Patient arrives via EMS, states he pressed his life alert today when he leaned against the wall and "slid down.  I knew something was wrong."  Denies syncope/head injury.  Patient found to be hypotensive in field 80s/50s.  20g IV L FA, approx 400ml NS given in field.  Patient states "I feel OK now."  Patient reporting diarrhea for "weeks," states he was recently discharged from [facility].  STAT EKG in progress.

## 2023-01-02 NOTE — ED PROVIDER NOTE - CARE PLAN
Principal Discharge DX:	Lightheadedness   1 Principal Discharge DX:	Lightheadedness  Secondary Diagnosis:	Hypovolemia  Secondary Diagnosis:	Diarrhea

## 2023-01-02 NOTE — ED PROVIDER NOTE - PATIENT PORTAL LINK FT
You can access the FollowMyHealth Patient Portal offered by [facility] by registering at the following website: [email or web address]. By joining [employer]’s FollowMyHealth portal, you will also be able to view your health information using other applications (apps) compatible with our system.

## 2023-01-02 NOTE — ED ADULT NURSE NOTE - OBJECTIVE STATEMENT
72y male presents to ed c/o weakness. Pt recently discharged from [facility] for similar symptoms. Pt also endorses having diarrhea for weeks. Pt noted to be hypotensive with EMS and arrives with IV fluids infusing through benign IV site in left forearm. Pt normotensive during intial nursing assessment. PMH of stage IV lung cancer, not currently on chemotherapy. A&Ox4.

## 2023-01-02 NOTE — ED PROVIDER NOTE - CRITICAL CARE ATTENDING CONTRIBUTION TO CARE
reassessment and monitoring, consultation with other physicians, review of prior charts and results, discussion w/ patient regarding prognosis and treatment plan

## 2023-01-02 NOTE — ED ADULT NURSE NOTE - CAS EDN DISCHARGE ASSESSMENT
Alert and oriented to person, place and time/Patient baseline mental status/Awake/Symptoms improved

## 2023-01-02 NOTE — ED PROVIDER NOTE - PHYSICAL EXAMINATION
CONST: nontoxic NAD speaking in full sentences  HEAD: atraumatic  EYES: conjunctivae clear, PERRL, EOMI  ENT: dry MM  NECK: supple/FROM, nttp  CARD: tachycardia  CHEST: ctab no r/r/w, no stridor/retractions/tripoding  ABD: soft, nd, nttp, no rebound/guarding  EXT: FROM, symmetric distal pulses intact  SKIN: warm, dry, no rash, no pedal edema/ttp/rash, cap refill <2sec  NEURO: awake alert answering questions following commands moving all extremities

## 2023-01-02 NOTE — ED PROVIDER NOTE - NSFOLLOWUPINSTRUCTIONS_ED_ALL_ED_FT
FOLLOW UP WITH YOUR DOCTOR    Lightheadedness    WHAT YOU NEED TO KNOW:    Lightheadedness is the feeling that you may faint, but you do not. Your heartbeat may be fast or feel like it flutters. Lightheadedness may occur when you take certain medicines, such as medicine to lower your blood pressure. Dehydration, low sodium, low blood sugar, an abnormal heart rhythm, and anxiety are other common causes.     DISCHARGE INSTRUCTIONS:    Return to the emergency department if:   •You have sudden chest pain.       •You have trouble breathing or shortness of breath.       •You have vision changes, are sweating, and have nausea while you are sitting or lying down.       •You feel flushed and your heart is fluttering.      •You faint.       Contact your healthcare provider if:   •You feel lightheaded often.       •Your heart beats faster or slower than usual.       •You have questions or concerns about your condition or care.      Follow up with your healthcare provider as directed: You may need more tests to help find the cause of your lightheadedness. The tests will help healthcare providers plan the best treatment for you. Write down your questions so you remember to ask them during your visits.     Self-care: Talk with your healthcare provider about these and other ways to manage your symptoms:  •Lie down when you feel lightheaded, your throat gets tight, or your vision changes. Raise your legs above the level of your heart.      •Stand up slowly. Sit on the side of the bed or couch for a few minutes before you stand up.       •Take slow, deep breaths when you feel lightheaded. This can help decrease the feeling that you might faint.       •Ask if you need to avoid hot baths and saunas. These may make your symptoms worse.       Watch for signs of low blood sugar: These include hunger, nervousness, sweating, and fast or fluttery heartbeats. Talk with your healthcare provider about ways to keep your blood sugar level steady.    Check your blood pressure often: You should do this especially if you take medicine to lower your blood pressure. Check your blood pressure when you are lying down and when you are standing. Ask how often to check during the day. Keep a record of your blood pressure numbers. Your healthcare provider may use the record to help plan your treatment.    Keep a record of your lightheadedness episodes: Include your symptoms and your activity before and after the episode. The record can help your healthcare provider find the cause of your lightheadedness and help you manage episodes.       © Copyright [employer] 2023           back to top                          © Copyright [employer] 2023

## 2023-01-02 NOTE — ED PROVIDER NOTE - OBJECTIVE STATEMENT
72yM recently diagnosed w/ stage 4 metastatic lung adenocarcinoma s/p 1 round of chemo, depression, anxiety, ambulatory w cane and has HHA, recently discharged a few days ago from [facility] for sepsis of unclear source and anemia 2/2 ulcers seen on EGD  comes in today for lightheadedness- this AM was walking in his apt when he felt lightheaded and leaned against the wall and slid down. Did not lose consciousness and did not hit his head. Reportedly hypotensive w/ EMS and was given some IV fluids in the field. Pt endorses chronic diarrhea for months, during his admission he was evaluated and GI PCR negative and C dif negative. He otherwise feels ok no abd pain vomiting chest pain palpitations SOB.  On initial triage vitals pt was tachycardic and hypotensive. Awake alert and oriented participating in interview. Exam unremarkable except for slightly dry appearing.  Labs w/ normal WBC, lactate 2.3-->repeat 1.8, K 4.2, Cr 1.37 which is slightly higher than few days ago when was ~1.2, trop 0.03--->repeat trop 0.02  pt condition improved w IV fluids, normalization of vital signs including HR and BP. Suspect lightheadedness and hypotension from hypovolemia of chronic diarrhea

## 2023-01-02 NOTE — ED PROVIDER NOTE - CLINICAL SUMMARY MEDICAL DECISION MAKING FREE TEXT BOX
Patient here w/ presyncopal, tachycardic and hypotensive on arrival, only complaint is chronic diarrhea  suspect pt is hypovolemic, r/o infectious etiology as well, cultures sent  patient resuscitate with IV fluids, no infectious source found today-normal WBC, lactate 2.3-->1.8, UA, CXR unremarkable, blood cx sent. Pt just had GI PCR and C dif that were negative  electrolytes ok  Cr 1.37 from recent ~1.2, clinical picture consistent w/ hypovolemia, pt improved with IV fluids and stable for dc home, no indication for further ED or inpatient workup.

## 2023-01-03 NOTE — ED POST DISCHARGE NOTE - DETAILS
asked to call back ED spoke with pt who verbalizes understanding of result, feels ok, counseled to return to ED for repeat cultures

## 2023-01-10 LAB
CULTURE RESULTS: SIGNIFICANT CHANGE UP
ORGANISM # SPEC MICROSCOPIC CNT: SIGNIFICANT CHANGE UP
SPECIMEN SOURCE: SIGNIFICANT CHANGE UP

## 2023-06-20 ENCOUNTER — NON-APPOINTMENT (OUTPATIENT)
Age: 73
End: 2023-06-20

## 2023-07-03 NOTE — PROGRESS NOTE ADULT - ASSESSMENT
Digna is here today for   Chief Complaint   Patient presents with   • Annual Exam   .        Medication Refills needed today?  YES,   if you receive a prescription today would you like it to be sent to [facility]? NO    Medications: medications verified and updated    Patient would like communication of their results via:    Cell Phone:   Telephone Information:   Mobile [phone]     Okay to leave a message containing results? Yes    Tobacco history: verified       Health Maintenance Summary     Pneumococcal Vaccine 0-64 (1 - PCV)  Overdue - never done    Colorectal Cancer Risk - Colonoscopy (Every 5 Years)  Overdue - never done    Lung Cancer Screening (Yearly)  Overdue - never done    Shingles Vaccine (1 of 2)  Overdue - never done    Cervical Cancer Screen 30-64 - (Pap Only - Every 3 Years)  Overdue since 7/9/2015    Breast Cancer Screening (Every 2 Years)  Overdue since 9/22/2017    COVID-19 Vaccine (3 - Moderna series)  Overdue since 7/20/2021    Depression Screening (Yearly)  Due soon on 10/25/2023    Influenza Vaccine (1)  Next due on 9/1/2023    DTaP/Tdap/Td Vaccine (2 - Td or Tdap)  Next due on 6/5/2025    Hepatitis C Screening   Completed    Meningococcal Vaccine   Aged Out    Hepatitis B Vaccine (For Physician/APC Discussion)   Aged Out    HPV Vaccine   Aged Out          Patient is due for topics listed above, she wishes to proceed with Cervical Cancer Screening and Mammogram, but is not proceeding with Immunization(s) COVID-19 and Shingles at this time. The following has occurred: orders placed for mammogram, cologuard and lung screening    COVID-19 Vaccine Interest Assessment:   • Patient reported already received outside of [facility]  If the patient reports an outside immunization, please update the WIR/ICARE information in the Immunizations activity        71 y/o male history of metastatic stage IV lung adenocarcinoma, hypothyroidism, depression, anxiety, undergoing chemo brought in by EMS with HHA for altered mental status Monday morning admitted for sepsis (unclear source) and electrolyte derangements.

## 2023-12-08 NOTE — ED ADULT TRIAGE NOTE - BEFAST BALANCE
Patient educated to call for assistance when needing to get up. Pt very weak. Pt verbalizes understanding. Bed alarm on. No

## (undated) DEVICE — FORCEP RADIAL JAW 4 W NDL 2.2MM 2.8MM 240CM ORANGE DISP